# Patient Record
Sex: MALE | Race: WHITE | NOT HISPANIC OR LATINO | Employment: UNEMPLOYED | URBAN - METROPOLITAN AREA
[De-identification: names, ages, dates, MRNs, and addresses within clinical notes are randomized per-mention and may not be internally consistent; named-entity substitution may affect disease eponyms.]

---

## 2019-10-31 ENCOUNTER — HOSPITAL ENCOUNTER (INPATIENT)
Facility: HOSPITAL | Age: 54
LOS: 9 days | Discharge: OTHER INSTITUTION | DRG: 028 | End: 2019-11-09
Attending: INTERNAL MEDICINE | Admitting: INTERNAL MEDICINE
Payer: COMMERCIAL

## 2019-10-31 DIAGNOSIS — I33.0 ENDOCARDITIS DUE TO METHICILLIN SUSCEPTIBLE STAPHYLOCOCCUS AUREUS (MSSA): ICD-10-CM

## 2019-10-31 DIAGNOSIS — F19.10 IV DRUG ABUSE: ICD-10-CM

## 2019-10-31 DIAGNOSIS — F11.10 OPIATE ABUSE, CONTINUOUS: ICD-10-CM

## 2019-10-31 DIAGNOSIS — L02.214 GROIN ABSCESS: ICD-10-CM

## 2019-10-31 DIAGNOSIS — I05.9 ENDOCARDITIS OF MITRAL VALVE: ICD-10-CM

## 2019-10-31 DIAGNOSIS — L02.91 ABSCESS: ICD-10-CM

## 2019-10-31 DIAGNOSIS — B95.61 ENDOCARDITIS DUE TO METHICILLIN SUSCEPTIBLE STAPHYLOCOCCUS AUREUS (MSSA): ICD-10-CM

## 2019-10-31 DIAGNOSIS — R07.9 CHEST PAIN: ICD-10-CM

## 2019-10-31 DIAGNOSIS — R64 CACHEXIA: ICD-10-CM

## 2019-10-31 DIAGNOSIS — R78.81 MSSA BACTEREMIA: ICD-10-CM

## 2019-10-31 DIAGNOSIS — I33.0 ACUTE BACTERIAL ENDOCARDITIS: ICD-10-CM

## 2019-10-31 DIAGNOSIS — G06.2 EPIDURAL ABSCESS: ICD-10-CM

## 2019-10-31 DIAGNOSIS — I38 ENDOCARDITIS: ICD-10-CM

## 2019-10-31 DIAGNOSIS — B95.61 MSSA BACTEREMIA: ICD-10-CM

## 2019-10-31 PROBLEM — E43 SEVERE PROTEIN-CALORIE MALNUTRITION: Status: ACTIVE | Noted: 2019-10-31

## 2019-10-31 PROBLEM — B19.20 HCV (HEPATITIS C VIRUS): Status: ACTIVE | Noted: 2019-10-31

## 2019-10-31 LAB
ESTIMATED AVG GLUCOSE: 114 MG/DL (ref 68–131)
FERRITIN SERPL-MCNC: 759 NG/ML (ref 20–300)
HBA1C MFR BLD HPLC: 5.6 % (ref 4–5.6)
INR PPP: 1.1 (ref 0.8–1.2)
IRON SERPL-MCNC: 14 UG/DL (ref 45–160)
LACTATE SERPL-SCNC: 1.4 MMOL/L (ref 0.5–2.2)
PROCALCITONIN SERPL IA-MCNC: 0.36 NG/ML
PROTHROMBIN TIME: 11.3 SEC (ref 9–12.5)
SATURATED IRON: 6 % (ref 20–50)
TOTAL IRON BINDING CAPACITY: 229 UG/DL (ref 250–450)
TRANSFERRIN SERPL-MCNC: 155 MG/DL (ref 200–375)

## 2019-10-31 PROCEDURE — 99232 PR SUBSEQUENT HOSPITAL CARE,LEVL II: ICD-10-PCS | Mod: ,,, | Performed by: NEUROLOGICAL SURGERY

## 2019-10-31 PROCEDURE — 93010 ELECTROCARDIOGRAM REPORT: CPT | Mod: ,,, | Performed by: INTERNAL MEDICINE

## 2019-10-31 PROCEDURE — 83540 ASSAY OF IRON: CPT

## 2019-10-31 PROCEDURE — 85610 PROTHROMBIN TIME: CPT

## 2019-10-31 PROCEDURE — 85652 RBC SED RATE AUTOMATED: CPT

## 2019-10-31 PROCEDURE — 85025 COMPLETE CBC W/AUTO DIFF WBC: CPT

## 2019-10-31 PROCEDURE — 83036 HEMOGLOBIN GLYCOSYLATED A1C: CPT

## 2019-10-31 PROCEDURE — 80053 COMPREHEN METABOLIC PANEL: CPT

## 2019-10-31 PROCEDURE — 86141 C-REACTIVE PROTEIN HS: CPT

## 2019-10-31 PROCEDURE — 87077 CULTURE AEROBIC IDENTIFY: CPT

## 2019-10-31 PROCEDURE — 93005 ELECTROCARDIOGRAM TRACING: CPT

## 2019-10-31 PROCEDURE — 36415 COLL VENOUS BLD VENIPUNCTURE: CPT

## 2019-10-31 PROCEDURE — 87186 SC STD MICRODIL/AGAR DIL: CPT

## 2019-10-31 PROCEDURE — 99232 SBSQ HOSP IP/OBS MODERATE 35: CPT | Mod: ,,, | Performed by: NEUROLOGICAL SURGERY

## 2019-10-31 PROCEDURE — 11000001 HC ACUTE MED/SURG PRIVATE ROOM

## 2019-10-31 PROCEDURE — 63600175 PHARM REV CODE 636 W HCPCS: Performed by: STUDENT IN AN ORGANIZED HEALTH CARE EDUCATION/TRAINING PROGRAM

## 2019-10-31 PROCEDURE — 83605 ASSAY OF LACTIC ACID: CPT

## 2019-10-31 PROCEDURE — 82728 ASSAY OF FERRITIN: CPT

## 2019-10-31 PROCEDURE — 84145 PROCALCITONIN (PCT): CPT

## 2019-10-31 PROCEDURE — 93010 EKG 12-LEAD: ICD-10-PCS | Mod: ,,, | Performed by: INTERNAL MEDICINE

## 2019-10-31 PROCEDURE — 87040 BLOOD CULTURE FOR BACTERIA: CPT

## 2019-10-31 RX ORDER — ONDANSETRON 2 MG/ML
4 INJECTION INTRAMUSCULAR; INTRAVENOUS EVERY 8 HOURS PRN
Status: DISCONTINUED | OUTPATIENT
Start: 2019-10-31 | End: 2019-11-09 | Stop reason: HOSPADM

## 2019-10-31 RX ORDER — ACETAMINOPHEN 325 MG/1
650 TABLET ORAL EVERY 8 HOURS PRN
Status: DISCONTINUED | OUTPATIENT
Start: 2019-10-31 | End: 2019-11-07

## 2019-10-31 RX ORDER — IBUPROFEN 200 MG
24 TABLET ORAL
Status: DISCONTINUED | OUTPATIENT
Start: 2019-10-31 | End: 2019-11-09 | Stop reason: HOSPADM

## 2019-10-31 RX ORDER — IBUPROFEN 200 MG
16 TABLET ORAL
Status: DISCONTINUED | OUTPATIENT
Start: 2019-10-31 | End: 2019-11-09 | Stop reason: HOSPADM

## 2019-10-31 RX ORDER — POLYETHYLENE GLYCOL 3350 17 G/17G
17 POWDER, FOR SOLUTION ORAL DAILY
Status: DISCONTINUED | OUTPATIENT
Start: 2019-11-01 | End: 2019-11-09 | Stop reason: HOSPADM

## 2019-10-31 RX ORDER — RAMELTEON 8 MG/1
8 TABLET ORAL NIGHTLY PRN
Status: DISCONTINUED | OUTPATIENT
Start: 2019-10-31 | End: 2019-11-09 | Stop reason: HOSPADM

## 2019-10-31 RX ORDER — SODIUM CHLORIDE 0.9 % (FLUSH) 0.9 %
10 SYRINGE (ML) INJECTION
Status: DISCONTINUED | OUTPATIENT
Start: 2019-10-31 | End: 2019-11-09 | Stop reason: HOSPADM

## 2019-10-31 RX ORDER — GLUCAGON 1 MG
1 KIT INJECTION
Status: DISCONTINUED | OUTPATIENT
Start: 2019-10-31 | End: 2019-11-09 | Stop reason: HOSPADM

## 2019-10-31 RX ADMIN — VANCOMYCIN HYDROCHLORIDE 1500 MG: 1.5 INJECTION, POWDER, LYOPHILIZED, FOR SOLUTION INTRAVENOUS at 10:10

## 2019-10-31 RX ADMIN — CEFTRIAXONE 2 G: 2 INJECTION, SOLUTION INTRAVENOUS at 10:10

## 2019-10-31 RX ADMIN — SODIUM CHLORIDE, SODIUM LACTATE, POTASSIUM CHLORIDE, AND CALCIUM CHLORIDE 1000 ML: .6; .31; .03; .02 INJECTION, SOLUTION INTRAVENOUS at 10:10

## 2019-10-31 NOTE — PLAN OF CARE
(Physician in Lead of Transfers)  Outside Transfer Acceptance Note / Regional Referral Center    Upon patient arrival to floor, please call extension [phone] for Hospital Medicine admit team assignment and for additional admit orders for the patient.  Do not page the attending physician associated with the patient on arrival (this physician may not be on duty at the time of arrival).  Rather, always call [phone] to reach the triage physician for orders and team assignment.    Transferring Physician: Kris Artis MD    Accepting Physician: Cisco García MD    Date of Acceptance: 10/31/2019    Transferring Facility: [facility]    Reason for Transfer: HLOC, needs NSGY    Report from Transferring Physician/Hospital course:     Mr. Bronson is a 53-year-old man with IVDA (heroin) and Hx MSSA bacteremia with IE, and HCV who presented to the [facility] on 10/23 with fever.    He has had a difficult year, mostly as a result of infectious diseases related to ongoing heroin abuse. He was treated this year for 3-4 weeks for IE prior to leaving [place] but had a more recent admission on 9/21 for chest pain at which time TTE was performed, and he was discharged after blood cultures were negative ruling out ongoing IE. On admission he was hypotensive refractory to fluids and had bilateral cellulitis with abscesses that required bedside drainage by surgery, after which he was admitted to the MICU for septic shock. He has been treated with broad-spectrum antibiotics and levophed via a subclavian line with significant improvement. He was weaned off vasopressors within 24h and stepped down from the ICU six days ago. Since then his blood cultures have been positive for MSSA (first negative result on 10/25), echocardiogram has shown a MV vegetation, and his antibiotics have been narrowed to cefazolin 2g Q8H.     His admission has been complicated by diffuse body pains and joint pains initially thought to be hyperalgesia  in the setting of mild opiate withdrawal, eventually evolving into neck and back pain. MRI C-spine showed an epidural abscess at C4. NSGY there recommended cervical decompression, which they are unable to do that there as the hospital is not yet accredited for it per Dr. Artis. He also had an MRI L spine that showed SI joint effusion that was aspirated by IR with negative cultures. Ortho there did not recommend any additional intervention.    Dr. Malone is aware of the patient and agreed to consult. Dr. Artis stated that they would take the patient back upon completion of surgical evaluation/treatment    VS:     Temp: 98  HR: 98  RR: 20  SpO2: 100% on RA  BP: 115/75    Labs:     (most of these are from several days ago as they are not obtain labs every day)    WBC 11.2k  Hb 9.4 g/dL  Plt 177k    Bicarb 26  BUN 23  Cr 0.9      CRP 19    Blood cultures negative starting 10/25    Radiographs:     See above    To Do List:     - Obtain routine labs, CBC, CMP, INR, ESR and CRP. Would complete imaging of neuroaxis with brain and T-spine. Continue cefazolin 2g Q8H  - Consult wound care for groin wounds which have requiring packing  - Neuro checks Q4H  - NPO, consult NSGY upon arrival (please call them)  - Please call the [facility] upon completion of NSGY evaluation/treatment to arrange transfer back for the remainder of his IV antibiotic treatment    Upon patient arrival to floor, please call extension [phone] for Hospital Medicine admit team assignment and for additional admit orders for the patient.  Do not page the attending physician associated with the patient on arrival (this physician may not be on duty at the time of arrival).  Rather, always call [phone] to reach the triage physician for orders and team assignment.    Cisco García M.D.   (Physician in Lead of Transfers)  [facility]  [phone] (pager)

## 2019-11-01 PROBLEM — R76.8 HEPATITIS B ANTIBODY POSITIVE: Status: ACTIVE | Noted: 2019-11-01

## 2019-11-01 PROBLEM — R78.81 MSSA BACTEREMIA: Status: RESOLVED | Noted: 2019-11-01 | Resolved: 2019-11-01

## 2019-11-01 PROBLEM — R64 CACHEXIA: Status: ACTIVE | Noted: 2019-11-01

## 2019-11-01 PROBLEM — R78.81 MSSA BACTEREMIA: Status: ACTIVE | Noted: 2019-11-01

## 2019-11-01 PROBLEM — B95.61 MSSA BACTEREMIA: Status: RESOLVED | Noted: 2019-11-01 | Resolved: 2019-11-01

## 2019-11-01 PROBLEM — B95.61 MSSA BACTEREMIA: Status: ACTIVE | Noted: 2019-11-01

## 2019-11-01 PROBLEM — R65.21 SEPTIC SHOCK: Status: ACTIVE | Noted: 2019-11-01

## 2019-11-01 PROBLEM — A41.9 SEPTIC SHOCK: Status: ACTIVE | Noted: 2019-11-01

## 2019-11-01 PROBLEM — R65.21 SEPTIC SHOCK: Status: RESOLVED | Noted: 2019-11-01 | Resolved: 2019-11-01

## 2019-11-01 PROBLEM — A41.9 SEPTIC SHOCK: Status: RESOLVED | Noted: 2019-11-01 | Resolved: 2019-11-01

## 2019-11-01 LAB
ALBUMIN SERPL BCP-MCNC: 2 G/DL (ref 3.5–5.2)
ALP SERPL-CCNC: 104 U/L (ref 55–135)
ALT SERPL W/O P-5'-P-CCNC: 28 U/L (ref 10–44)
AMPHET+METHAMPHET UR QL: NEGATIVE
ANION GAP SERPL CALC-SCNC: 8 MMOL/L (ref 8–16)
ANION GAP SERPL CALC-SCNC: 8 MMOL/L (ref 8–16)
AST SERPL-CCNC: 105 U/L (ref 10–40)
BACTERIA #/AREA URNS AUTO: ABNORMAL /HPF
BARBITURATES UR QL SCN>200 NG/ML: NEGATIVE
BASOPHILS # BLD AUTO: 0.04 K/UL (ref 0–0.2)
BASOPHILS NFR BLD: 0.3 % (ref 0–1.9)
BENZODIAZ UR QL SCN>200 NG/ML: NEGATIVE
BILIRUB SERPL-MCNC: 0.3 MG/DL (ref 0.1–1)
BILIRUB UR QL STRIP: NEGATIVE
BUN SERPL-MCNC: 23 MG/DL (ref 6–20)
BUN SERPL-MCNC: 23 MG/DL (ref 6–20)
BZE UR QL SCN: NEGATIVE
CALCIUM SERPL-MCNC: 8.4 MG/DL (ref 8.7–10.5)
CALCIUM SERPL-MCNC: 8.4 MG/DL (ref 8.7–10.5)
CANNABINOIDS UR QL SCN: NEGATIVE
CHLORIDE SERPL-SCNC: 103 MMOL/L (ref 95–110)
CHLORIDE SERPL-SCNC: 103 MMOL/L (ref 95–110)
CLARITY UR REFRACT.AUTO: CLEAR
CO2 SERPL-SCNC: 25 MMOL/L (ref 23–29)
CO2 SERPL-SCNC: 25 MMOL/L (ref 23–29)
COLOR UR AUTO: YELLOW
CREAT SERPL-MCNC: 0.8 MG/DL (ref 0.5–1.4)
CREAT SERPL-MCNC: 0.9 MG/DL (ref 0.5–1.4)
CREAT SERPL-MCNC: 0.9 MG/DL (ref 0.5–1.4)
CREAT UR-MCNC: 22 MG/DL (ref 23–375)
CRP SERPL-MCNC: 105.72 MG/L (ref 0–3.19)
DIFFERENTIAL METHOD: ABNORMAL
EOSINOPHIL # BLD AUTO: 0.1 K/UL (ref 0–0.5)
EOSINOPHIL NFR BLD: 1 % (ref 0–8)
ERYTHROCYTE [DISTWIDTH] IN BLOOD BY AUTOMATED COUNT: 17.2 % (ref 11.5–14.5)
ERYTHROCYTE [DISTWIDTH] IN BLOOD BY AUTOMATED COUNT: 17.2 % (ref 11.5–14.5)
ERYTHROCYTE [SEDIMENTATION RATE] IN BLOOD BY WESTERGREN METHOD: 103 MM/HR (ref 0–23)
EST. GFR  (AFRICAN AMERICAN): >60 ML/MIN/1.73 M^2
EST. GFR  (AFRICAN AMERICAN): >60 ML/MIN/1.73 M^2
EST. GFR  (NON AFRICAN AMERICAN): >60 ML/MIN/1.73 M^2
EST. GFR  (NON AFRICAN AMERICAN): >60 ML/MIN/1.73 M^2
ETHANOL UR-MCNC: <10 MG/DL
GLUCOSE SERPL-MCNC: 122 MG/DL (ref 70–110)
GLUCOSE SERPL-MCNC: 122 MG/DL (ref 70–110)
GLUCOSE UR QL STRIP: NEGATIVE
HCT VFR BLD AUTO: 25 % (ref 40–54)
HCT VFR BLD AUTO: 25 % (ref 40–54)
HGB BLD-MCNC: 7.4 G/DL (ref 14–18)
HGB BLD-MCNC: 7.4 G/DL (ref 14–18)
HGB UR QL STRIP: ABNORMAL
IMM GRANULOCYTES # BLD AUTO: 0.11 K/UL (ref 0–0.04)
IMM GRANULOCYTES NFR BLD AUTO: 0.9 % (ref 0–0.5)
KETONES UR QL STRIP: NEGATIVE
LEUKOCYTE ESTERASE UR QL STRIP: ABNORMAL
LYMPHOCYTES # BLD AUTO: 1.5 K/UL (ref 1–4.8)
LYMPHOCYTES NFR BLD: 11.8 % (ref 18–48)
MCH RBC QN AUTO: 25.7 PG (ref 27–31)
MCH RBC QN AUTO: 25.7 PG (ref 27–31)
MCHC RBC AUTO-ENTMCNC: 29.6 G/DL (ref 32–36)
MCHC RBC AUTO-ENTMCNC: 29.6 G/DL (ref 32–36)
MCV RBC AUTO: 87 FL (ref 82–98)
MCV RBC AUTO: 87 FL (ref 82–98)
METHADONE UR QL SCN>300 NG/ML: NEGATIVE
MICROSCOPIC COMMENT: ABNORMAL
MONOCYTES # BLD AUTO: 0.5 K/UL (ref 0.3–1)
MONOCYTES NFR BLD: 3.9 % (ref 4–15)
NEUTROPHILS # BLD AUTO: 10.1 K/UL (ref 1.8–7.7)
NEUTROPHILS NFR BLD: 82.1 % (ref 38–73)
NITRITE UR QL STRIP: NEGATIVE
NRBC BLD-RTO: 0 /100 WBC
OPIATES UR QL SCN: NEGATIVE
PCP UR QL SCN>25 NG/ML: NEGATIVE
PH UR STRIP: 6 [PH] (ref 5–8)
PLATELET # BLD AUTO: 296 K/UL (ref 150–350)
PLATELET # BLD AUTO: 296 K/UL (ref 150–350)
PMV BLD AUTO: 10 FL (ref 9.2–12.9)
PMV BLD AUTO: 10 FL (ref 9.2–12.9)
POTASSIUM SERPL-SCNC: 4 MMOL/L (ref 3.5–5.1)
POTASSIUM SERPL-SCNC: 4 MMOL/L (ref 3.5–5.1)
PROT SERPL-MCNC: 7.2 G/DL (ref 6–8.4)
PROT UR QL STRIP: NEGATIVE
RBC # BLD AUTO: 2.88 M/UL (ref 4.6–6.2)
RBC # BLD AUTO: 2.88 M/UL (ref 4.6–6.2)
RBC #/AREA URNS AUTO: 32 /HPF (ref 0–4)
SAMPLE: NORMAL
SODIUM SERPL-SCNC: 136 MMOL/L (ref 136–145)
SODIUM SERPL-SCNC: 136 MMOL/L (ref 136–145)
SP GR UR STRIP: 1 (ref 1–1.03)
SQUAMOUS #/AREA URNS AUTO: 0 /HPF
TOXICOLOGY INFORMATION: ABNORMAL
URN SPEC COLLECT METH UR: ABNORMAL
WBC # BLD AUTO: 12.34 K/UL (ref 3.9–12.7)
WBC # BLD AUTO: 12.34 K/UL (ref 3.9–12.7)
WBC #/AREA URNS AUTO: 7 /HPF (ref 0–5)

## 2019-11-01 PROCEDURE — 63600175 PHARM REV CODE 636 W HCPCS: Performed by: PHYSICIAN ASSISTANT

## 2019-11-01 PROCEDURE — 25500020 PHARM REV CODE 255: Performed by: HOSPITALIST

## 2019-11-01 PROCEDURE — 63600175 PHARM REV CODE 636 W HCPCS: Performed by: HOSPITALIST

## 2019-11-01 PROCEDURE — 25000003 PHARM REV CODE 250: Performed by: STUDENT IN AN ORGANIZED HEALTH CARE EDUCATION/TRAINING PROGRAM

## 2019-11-01 PROCEDURE — 80307 DRUG TEST PRSMV CHEM ANLYZR: CPT

## 2019-11-01 PROCEDURE — 97162 PT EVAL MOD COMPLEX 30 MIN: CPT

## 2019-11-01 PROCEDURE — 99233 PR SUBSEQUENT HOSPITAL CARE,LEVL III: ICD-10-PCS | Mod: AF,HB,, | Performed by: PSYCHIATRY & NEUROLOGY

## 2019-11-01 PROCEDURE — 97802 MEDICAL NUTRITION INDIV IN: CPT

## 2019-11-01 PROCEDURE — 81001 URINALYSIS AUTO W/SCOPE: CPT

## 2019-11-01 PROCEDURE — 11000001 HC ACUTE MED/SURG PRIVATE ROOM

## 2019-11-01 PROCEDURE — 63600175 PHARM REV CODE 636 W HCPCS: Performed by: STUDENT IN AN ORGANIZED HEALTH CARE EDUCATION/TRAINING PROGRAM

## 2019-11-01 PROCEDURE — A9585 GADOBUTROL INJECTION: HCPCS | Performed by: HOSPITALIST

## 2019-11-01 PROCEDURE — 99233 PR SUBSEQUENT HOSPITAL CARE,LEVL III: ICD-10-PCS | Mod: ,,, | Performed by: INTERNAL MEDICINE

## 2019-11-01 PROCEDURE — 99233 SBSQ HOSP IP/OBS HIGH 50: CPT | Mod: ,,, | Performed by: INTERNAL MEDICINE

## 2019-11-01 PROCEDURE — 97165 OT EVAL LOW COMPLEX 30 MIN: CPT

## 2019-11-01 PROCEDURE — 99233 SBSQ HOSP IP/OBS HIGH 50: CPT | Mod: AF,HB,, | Performed by: PSYCHIATRY & NEUROLOGY

## 2019-11-01 RX ORDER — VELPATASVIR AND SOFOSBUVIR 100; 400 MG/1; MG/1
TABLET, FILM COATED ORAL
Status: ON HOLD | COMMUNITY
End: 2019-11-04

## 2019-11-01 RX ORDER — GADOBUTROL 604.72 MG/ML
6 INJECTION INTRAVENOUS
Status: COMPLETED | OUTPATIENT
Start: 2019-11-01 | End: 2019-11-01

## 2019-11-01 RX ORDER — MIDAZOLAM HYDROCHLORIDE 1 MG/ML
1 INJECTION INTRAMUSCULAR; INTRAVENOUS EVERY 10 MIN PRN
Status: DISCONTINUED | OUTPATIENT
Start: 2019-11-01 | End: 2019-11-07

## 2019-11-01 RX ADMIN — CEFAZOLIN 6 G: 10 INJECTION, POWDER, FOR SOLUTION INTRAVENOUS at 08:11

## 2019-11-01 RX ADMIN — CEFTRIAXONE 2 G: 2 INJECTION, SOLUTION INTRAVENOUS at 09:11

## 2019-11-01 RX ADMIN — VANCOMYCIN HYDROCHLORIDE 750 MG: 750 INJECTION, POWDER, LYOPHILIZED, FOR SOLUTION INTRAVENOUS at 09:11

## 2019-11-01 RX ADMIN — POLYETHYLENE GLYCOL 3350 17 G: 17 POWDER, FOR SOLUTION ORAL at 09:11

## 2019-11-01 RX ADMIN — GADOBUTROL 6 ML: 604.72 INJECTION INTRAVENOUS at 12:11

## 2019-11-01 NOTE — ASSESSMENT & PLAN NOTE
Patient with albumin of   Lab Results   Component Value Date    LABPROT 11.3 10/31/2019        Plan:  -Nutrition consult, will f/u recs   -Boost with every meal  -Continue to monitor albumin  -time spent counseling patient on increasing PO intake, modalities to help increase protein intake etc.

## 2019-11-01 NOTE — PROGRESS NOTES
[facility] Hospital Medicine  Progress Note    Patient Name: Ld Bronson  MRN: [id number]  Patient Class: IP- Inpatient   Admission Date: 10/31/2019  Length of Stay: 1 days  Attending Physician: Zainab Casillas MD  Primary Care Provider: Kris Artis Jr, MD    [facility] Medicine Team: [facility]OSP MED 4 Rakan Machado MD    Subjective:     Principal Problem:Epidural abscess        HPI:  Mr. Bronson is a 53-year-old man with IVDA (heroin) and Hx MSSA bacteremia with IE, and HCV who presented to the [facility] on 10/23 with fever.     He has had a difficult year, mostly as a result of infectious diseases related to ongoing heroin abuse. He was treated this year for 3-4 weeks for IE prior to leaving [place] but had a more recent admission on 9/21 for chest pain at which time TTE was performed, and he was discharged after blood cultures were negative ruling out ongoing IE. On admission he was hypotensive refractory to fluids and had bilateral cellulitis with abscesses that required bedside drainage by surgery, after which he was admitted to the MICU for septic shock. He has been treated with broad-spectrum antibiotics and levophed via a subclavian line with significant improvement. He was weaned off vasopressors within 24h and stepped down from the ICU six days ago. Since then his blood cultures have been positive for MSSA (first negative result on 10/25), echocardiogram has shown a MV vegetation, and his antibiotics have been narrowed to cefazolin 2g Q8H.      His admission has been complicated by diffuse body pains and joint pains initially thought to be hyperalgesia in the setting of mild opiate withdrawal, eventually evolving into neck and back pain. MRI C-spine showed an epidural abscess at C4. NSGY there recommended cervical decompression, which they are unable to do that there as the hospital is not yet accredited for it per Dr. Artis. He also had an MRI L spine that showed SI joint  effusion that was aspirated by IR with negative cultures. Ortho there did not recommend any additional intervention. Dr. Malone is aware of the patient and agreed to consult. Dr. Artis stated that they would take the patient back upon completion of surgical evaluation/treatment.     Upon presenting to the floor, patient was in NAD and resting comfortably in hospital bed. Patient reports slight lumbar back pain decreased appetite. Patient denies any current N/V/Chills. Patient does state that he has extensive groin abscesses that are quite painful. Patient reports that [facility]. Patient has no further complaints at this time. [facility] was contacted and report they will see him tonight however will likely not be going to OR tonight due to active case load tonight.     Overview/Hospital Course:  11/1- Patient doing well overnight with no new complaints; neurosurgery, ID, addiction psych, and nutrition consulted, awaiting neurosurgery recommendation on treatment plan for abscess; HOLLAND ordered    Interval History: Patient doing well overnight with no new complaints; neurosurgery, ID, addiction psych, and nutrition consulted, awaiting neurosurgery recommendation on treatment plan for abscess; HOLLAND ordered      Review of Systems   Constitutional: Positive for activity change. Negative for chills and fever.   HENT: Positive for dental problem. Negative for congestion and sore throat.    Respiratory: Negative for cough, shortness of breath and wheezing.    Cardiovascular: Negative for chest pain.   Gastrointestinal: Negative for abdominal distention, abdominal pain, constipation, diarrhea, nausea and vomiting.   Endocrine: Negative.    Genitourinary: Negative for decreased urine volume and dysuria.   Musculoskeletal: Positive for back pain (lumbar).   Skin: Positive for wound (groin wounds s/p I&D).   Neurological: Positive for weakness (b/l LE limited 2/2 to groin pain).  Negative for light-headedness and headaches.     Objective:     Vital Signs (Most Recent):  Temp: 97.9 °F (36.6 °C) (11/01/19 1150)  Pulse: 93 (11/01/19 1150)  Resp: 18 (11/01/19 1150)  BP: 114/70 (11/01/19 1150)  SpO2: 98 % (11/01/19 1150) Vital Signs (24h Range):  Temp:  [97.4 °F (36.3 °C)-98.9 °F (37.2 °C)] 97.9 °F (36.6 °C)  Pulse:  [] 93  Resp:  [16-20] 18  SpO2:  [96 %-98 %] 98 %  BP: ()/(57-71) 114/70     Weight: 58.2 kg (128 lb 4.8 oz)  Body mass index is 17.4 kg/m².    Physical Exam   Constitutional: He is oriented to person, place, and time. No distress.   Frail, thin and cachectic, NAD, resting comfortably in hospital bed   HENT:   Head: Normocephalic and atraumatic.   Eyes: EOM are normal. No scleral icterus.   Neck: Normal range of motion. Neck supple.   Cardiovascular: Normal rate and regular rhythm.   No murmur heard.  Pulmonary/Chest: Effort normal and breath sounds normal. No respiratory distress.   Abdominal: Soft. Bowel sounds are normal. He exhibits no distension. There is no tenderness.   Musculoskeletal: Normal range of motion. He exhibits no edema.   Neurological: He is alert and oriented to person, place, and time. No cranial nerve deficit.   LUE motor 4/5  B/l LE hip flexion 2/5, reports limited by pain  B/l dorsal and plantar flexion 5/5   Skin: Skin is warm. He is not diaphoretic. No erythema.   Groin wounds s/p I&D at outside facility, packed w/ clean gauze          CRANIAL NERVES     CN III, IV, VI   Extraocular motions are normal.        Significant Labs: All pertinent labs within the past 24 hours have been reviewed.    Significant Imaging: I have reviewed and interpreted all pertinent imaging results/findings within the past 24 hours.      Assessment/Plan:      * Epidural abscess  Patient with IVDA, MSSA bacteremia w/ endocarditis (MV veg) and epidural abscess at C4 transferred [facility] NSG evaluation. [facility] to [facility] however they do not have NSG credentialing  so patient was transferred here.     Plan:  -Vanc/Ceftriaxone  -neuro surg consulted awaiting plan  -holding DVT Prophy   -NPO at midnight         Hepatitis B antibody positive  History of positive Hep B antibody test      Cachexia  Patient chronically cachectic  -nutrition consulted       Groin abscess  Patient with multiple groin abscess that was I&D at bedside last week. Covering with bs ABx at this time. Wounds packed.     Plan:  -BS Abx  -Wound care consult   -Obtain records of cultures       Severe protein-calorie malnutrition  Patient with albumin of   Lab Results   Component Value Date    LABPROT 11.3 10/31/2019        Plan:  -Nutrition consult, will f/u recs   -Boost with every meal  -Continue to monitor albumin  -time spent counseling patient on increasing PO intake, modalities to help increase protein intake etc.        HCV (hepatitis C virus)  Hx of hep C unsure of treatment     Plan:  -HCV panel     Opiate abuse, continuous  See above       Endocarditis due to methicillin susceptible Staphylococcus aureus (MSSA)  Patient with recent history of MSSA bacteremia and endocarditis who presents as a transfer for concern of epidural abscess of C4. Patient was treated for IE for 3-4 weeks and left [facility] earlier this month and [facility], BS abx and pressors. Patient was stepped down from ICU and has remained on BS abx on [facility] of the [facility] for the past 6 days.   -MSSA (first negative result on 10/25), echocardiogram has shown a MV vegetation, and his antibiotics have been narrowed to cefazolin 2g Q8H.    Plan:  -Given Epidural abscess and concomitant MSSA endocarditis will treat with Vanc and Ceftriaxone   -obtain records from [facility]  -2D echo  -Repeat cultures- negative thus far      IV drug abuse  Chronic IV Heroin use, recently left AMA From outside facility earlier this month with continued heroin use. Reports last IVDU > days prior to [facility] for [facility].       Plan:  -Addiction psych consult in AM  -patient not currently on MAT         VTE Risk Mitigation (From admission, onward)         Ordered     Place sequential compression device  Until discontinued      10/31/19 1851     IP VTE LOW RISK PATIENT  Once      10/31/19 1851                      Rakan Machado MD  Department of Hospital Medicine   [facility]

## 2019-11-01 NOTE — SUBJECTIVE & OBJECTIVE
History reviewed. No pertinent past medical history.    Past Surgical History:   Procedure Laterality Date    FRACTURE SURGERY         Review of patient's allergies indicates:  No Known Allergies    Medications:  Medications Prior to Admission   Medication Sig    sofosbuvir-velpatasvir 400-100 mg Tab Patient says he does not take any home medications     Antibiotics (From admission, onward)    Start     Stop Route Frequency Ordered    11/01/19 1030  vancomycin 750 mg in dextrose 5 % 250 mL IVPB (ready to mix system)      -- IV Every 12 hours (non-standard times) 11/01/19 0345    10/31/19 2115  cefTRIAXone (ROCEPHIN) 2 g in dextrose 5 % 50 mL IVPB      -- IV Every 12 hours (non-standard times) 10/31/19 2015        Antifungals (From admission, onward)    None        Antivirals (From admission, onward)    None             There is no immunization history on file for this patient.    Family History     None        Social History     Socioeconomic History    Marital status: Single     Spouse name: Not on file    Number of children: Not on file    Years of education: Not on file    Highest education level: Not on file   Occupational History    Not on file   Social Needs    Financial resource strain: Not on file    Food insecurity:     Worry: Not on file     Inability: Not on file    Transportation needs:     Medical: Not on file     Non-medical: Not on file   Tobacco Use    Smoking status: Current Some Day Smoker     Types: Cigarettes    Smokeless tobacco: Former User   Substance and Sexual Activity    Alcohol use: Not on file    Drug use: Yes     Types: Heroin    Sexual activity: Not Currently   Lifestyle    Physical activity:     Days per week: Not on file     Minutes per session: Not on file    Stress: Not on file   Relationships    Social connections:     Talks on phone: Not on file     Gets together: Not on file     Attends Latter day service: Not on file     Active member of club or organization:  Not on file     Attends meetings of clubs or organizations: Not on file     Relationship status: Not on file   Other Topics Concern    Not on file   Social History Narrative    Not on file     Review of Systems   Constitutional: Negative for chills, diaphoresis, fatigue and fever.   Respiratory: Negative for cough and shortness of breath.    Cardiovascular: Negative for chest pain.   Gastrointestinal: Negative for abdominal pain, diarrhea, nausea and vomiting.   Genitourinary: Negative for dysuria.        +bilateral groin wounds   Musculoskeletal: Positive for back pain.   Skin: Positive for wound. Negative for color change, pallor and rash.   Neurological: Negative for dizziness and headaches.   All other systems reviewed and are negative.    Objective:     Vital Signs (Most Recent):  Temp: 97.9 °F (36.6 °C) (11/01/19 1150)  Pulse: 93 (11/01/19 1150)  Resp: 18 (11/01/19 1150)  BP: 114/70 (11/01/19 1150)  SpO2: 98 % (11/01/19 1150) Vital Signs (24h Range):  Temp:  [97.4 °F (36.3 °C)-98.9 °F (37.2 °C)] 97.9 °F (36.6 °C)  Pulse:  [] 93  Resp:  [16-20] 18  SpO2:  [96 %-98 %] 98 %  BP: ()/(57-71) 114/70     Weight: 58.2 kg (128 lb 4.8 oz)  Body mass index is 17.4 kg/m².    Estimated Creatinine Clearance: 76.3 mL/min (based on SCr of 0.9 mg/dL).    Physical Exam   Constitutional: He is oriented to person, place, and time. No distress.   thin   HENT:   Head: Normocephalic.   Eyes: Pupils are equal, round, and reactive to light.   Cardiovascular: Normal rate and regular rhythm.   Murmur heard.  Pulmonary/Chest: Effort normal. No stridor. No respiratory distress.   Abdominal: Soft. He exhibits no distension. There is no tenderness. There is no guarding.   Musculoskeletal: Normal range of motion. He exhibits tenderness. He exhibits no edema or deformity.   Bilateral groin wounds.  Left groin packed   Neurological: He is alert and oriented to person, place, and time.   Skin: Skin is warm and dry. He is not  diaphoretic.   Psychiatric: He has a normal mood and affect.   Vitals reviewed.      Significant Labs: All pertinent labs within the past 24 hours have been reviewed.    Significant Imaging: I have reviewed all pertinent imaging results/findings within the past 24 hours.

## 2019-11-01 NOTE — HPI
Mr. Bronson is a 53-year-old man with IVDA (heroin) and Hx MSSA bacteremia with IE, and HCV who presented to the [facility] on 10/23 with fever.     He has had a difficult year, mostly as a result of infectious diseases related to ongoing heroin abuse. He was treated this year for 3-4 weeks for IE prior to leaving [place] but had a more recent admission on 9/21 for chest pain at which time TTE was performed, and he was discharged after blood cultures were negative ruling out ongoing IE. On admission he was hypotensive refractory to fluids and had bilateral cellulitis with abscesses that required bedside drainage by surgery, after which he was admitted to the MICU for septic shock. He has been treated with broad-spectrum antibiotics and levophed via a subclavian line with significant improvement. He was weaned off vasopressors within 24h and stepped down from the ICU six days ago. Since then his blood cultures have been positive for MSSA (first negative result on 10/25), echocardiogram has shown a MV vegetation, and his antibiotics have been narrowed to cefazolin 2g Q8H.      His admission has been complicated by diffuse body pains and joint pains initially thought to be hyperalgesia in the setting of mild opiate withdrawal, eventually evolving into neck and back pain. MRI C-spine showed an epidural abscess at C4. NSGY there recommended cervical decompression, which they are unable to do that there as the hospital is not yet accredited for it per Dr. Artis. He also had an MRI L spine that showed SI joint effusion that was aspirated by IR with negative cultures. Ortho there did not recommend any additional intervention. Dr. Malone is aware of the patient and agreed to consult. Dr. Artis stated that they would take the patient back upon completion of surgical evaluation/treatment.     Upon presenting to the floor, patient was in NAD and resting comfortably in hospital bed. Patient reports slight lumbar back pain  decreased appetite. Patient denies any current N/V/Chills. Patient does state that he has extensive groin abscesses that are quite painful. Patient reports that these have [facility] with [facility]. Patient has no further complaints at this time. [facility] was contacted and report they will see him tonight however will likely not be going to OR tonight due to active case load tonight.

## 2019-11-01 NOTE — ASSESSMENT & PLAN NOTE
53 M with PMH of IVDU (heroin), MSSA bacteremia with septic shock and endocarditis, and HCV now with a cervical epidural abscess.    Neurologically stable  No immediate neurosurgical intervention required  Recommend consult ID  ESR, CRP, Procalcitonin, BCx x2  MRI from OSH of extremely low quality, will obtain another MRI of the C spine wwo  Further recommendations to follow

## 2019-11-01 NOTE — ASSESSMENT & PLAN NOTE
Patient with IVDA, MSSA bacteremia w/ endocarditis (MV veg) and epidural abscess at C4 transferred for NSG evaluation. [facility] to decompress however they do not have NSG credentialing so patient was transferred here.     Plan:  -Vanc/Ceftriaxone  -neuro surg consulted awaiting plan  -holding DVT Prophy   -NPO at midnight

## 2019-11-01 NOTE — ASSESSMENT & PLAN NOTE
Patient with multiple groin abscess that was I&D at bedside last week. Covering with bs ABx at this time. Wounds packed.     Plan:  -BS Abx  -Wound care consult   -Obtain records of cultures

## 2019-11-01 NOTE — CONSULTS
[facility]  Neurosurgery  Consult Note    Inpatient consult to Neurosurgery  Consult performed by: Óscar Orozco MD  Consult ordered by: Ely Kellogg MD        Subjective:     Chief Complaint/Reason for Admission: Cervical epidural abscess    History of Present Illness: 53 M with PMH of IVDU (heroin), MSSA bacteremia with septic shock and endocarditis, and HCV. Was treated at OSH for septic shock, after complaining of back pain was found to have a cervical spine epidural abscess and transferred to [facility].     No medications prior to admission.       Review of patient's allergies indicates:  No Known Allergies    No past medical history on file.  No past surgical history on file.  Family History     None        Tobacco Use    Smoking status: Not on file   Substance and Sexual Activity    Alcohol use: Not on file    Drug use: Not on file    Sexual activity: Not on file     Review of Systems     Objective:     Weight: 57 kg (125 lb 9.6 oz)  Body mass index is 17.03 kg/m².  Vital Signs (Most Recent):  Temp: 98.7 °F (37.1 °C) (11/01/19 0317)  Pulse: 95 (11/01/19 0317)  Resp: 18 (11/01/19 0317)  BP: 103/66 (11/01/19 0317)  SpO2: 98 % (11/01/19 0317) Vital Signs (24h Range):  Temp:  [97.4 °F (36.3 °C)-98.9 °F (37.2 °C)] 98.7 °F (37.1 °C)  Pulse:  [] 95  Resp:  [16-20] 18  SpO2:  [96 %-98 %] 98 %  BP: ()/(57-75) 103/66                          Neurosurgery Physical Exam  GCS15 AOx3  CN2-12 intact  RUE: 5/5      LUE: 5/5  RLE: 4/5 HF 5/5 QD 5/5 DF 5/5 EHL 5/5 PF LLE: 4/5 HF 5/5 QD 5/5 DF 5/5 EHL 5/5 PF  No hoffmans, no babinski, no clonus  SILT    Significant Labs:  Recent Labs   Lab 10/31/19  2320   *  122*     136   K 4.0  4.0     103   CO2 25  25   BUN 23*  23*   CREATININE 0.9  0.9   CALCIUM 8.4*  8.4*     Recent Labs   Lab 10/31/19  2320   WBC 12.34  12.34   HGB 7.4*  7.4*   HCT 25.0*  25.0*     296     Recent Labs   Lab 10/31/19  1917    INR 1.1     Microbiology Results (last 7 days)     Procedure Component Value Units Date/Time    Blood culture (site 1) [[id number]] Collected:  10/31/19 1917    Order Status:  Completed Specimen:  Blood Updated:  11/01/19 0315     Blood Culture, Routine No Growth to date    Narrative:       Site # 1, aerobic and anaerobic    Blood culture (site 2) [[id number]] Collected:  10/31/19 1922    Order Status:  Completed Specimen:  Blood Updated:  11/01/19 0315     Blood Culture, Routine No Growth to date    Narrative:       Site # 2, aerobic only    Blood culture [[id number]]     Order Status:  Sent Specimen:  Blood     Blood culture [[id number]]     Order Status:  Sent Specimen:  Blood         All pertinent labs from the last 24 hours have been reviewed.    Significant Diagnostics:  I have reviewed all pertinent imaging results/findings within the past 24 hours.    Assessment/Plan:     * Epidural abscess  53 M with PMH of IVDU (heroin), MSSA bacteremia with septic shock and endocarditis, and HCV now with a cervical epidural abscess.    Neurologically stable  No immediate neurosurgical intervention required  Recommend consult ID  ESR, CRP, Procalcitonin, BCx x2  MRI from OSH of extremely low quality, will obtain another MRI of the C spine wwo  Further recommendations to follow          Thank you for your consult. I will follow-up with patient. Please contact us if you have any additional questions.    Óscar Orozco MD  Neurosurgery  [facility]

## 2019-11-01 NOTE — ASSESSMENT & PLAN NOTE
Patient with recent history of MSSA bacteremia and endocarditis who presents as a transfer for concern of epidural abscess of C4. Patient was treated for IE for 3-4 weeks and left AMA earlier this month and [facility], BS abx and pressors. Patient was stepped down from ICU and has remained on [facility] abx [facility] floor of the [facility] for the past 6 days.   -MSSA (first negative result on 10/25), echocardiogram has shown a MV vegetation, and his antibiotics have been narrowed to cefazolin 2g Q8H.    Plan:  -Given Epidural abscess and concomitant MSSA endocarditis will treat with Vanc and Ceftriaxone   -[facility]tain records from [facility]  -2D echo  -Repeat cultures

## 2019-11-01 NOTE — ASSESSMENT & PLAN NOTE
Mr. Bronson is a 52 y/o male  with hx of IVDU (heroin), MSSA endocarditis, biltateral groin abscesses s/p I&D [facility]. He was on cefazolin and is now on vancomycin and ceftriaxone. We are consulted for abx recommendations.    No fever chills or night sweats here. His blood cultures here are NGTD. NSGY evaluation pending.       Recommendations:   1. Discontinue vancomycin and ceftriaxone, started cefazolin as prior MSSA infx  2. NSGY evaluation pending  3. Agree with HOLLAND   4. Anticipate long term IV abx therapy  5. [facility]  6. ID will follow with you pending above

## 2019-11-01 NOTE — SUBJECTIVE & OBJECTIVE
Interval History: Patient doing well overnight with no new complaints; neurosurgery, ID, addiction psych, and nutrition consulted, awaiting neurosurgery recommendation on treatment plan for abscess; HOLLAND ordered      Review of Systems   Constitutional: Positive for activity change. Negative for chills and fever.   HENT: Positive for dental problem. Negative for congestion and sore throat.    Respiratory: Negative for cough, shortness of breath and wheezing.    Cardiovascular: Negative for chest pain.   Gastrointestinal: Negative for abdominal distention, abdominal pain, constipation, diarrhea, nausea and vomiting.   Endocrine: Negative.    Genitourinary: Negative for decreased urine volume and dysuria.   Musculoskeletal: Positive for back pain (lumbar).   Skin: Positive for wound (groin wounds s/p I&D).   Neurological: Positive for weakness (b/l LE limited 2/2 to groin pain). Negative for light-headedness and headaches.     Objective:     Vital Signs (Most Recent):  Temp: 97.9 °F (36.6 °C) (11/01/19 1150)  Pulse: 93 (11/01/19 1150)  Resp: 18 (11/01/19 1150)  BP: 114/70 (11/01/19 1150)  SpO2: 98 % (11/01/19 1150) Vital Signs (24h Range):  Temp:  [97.4 °F (36.3 °C)-98.9 °F (37.2 °C)] 97.9 °F (36.6 °C)  Pulse:  [] 93  Resp:  [16-20] 18  SpO2:  [96 %-98 %] 98 %  BP: ()/(57-71) 114/70     Weight: 58.2 kg (128 lb 4.8 oz)  Body mass index is 17.4 kg/m².    Physical Exam   Constitutional: He is oriented to person, place, and time. No distress.   Frail, thin and cachectic, NAD, resting comfortably in hospital bed   HENT:   Head: Normocephalic and atraumatic.   Eyes: EOM are normal. No scleral icterus.   Neck: Normal range of motion. Neck supple.   Cardiovascular: Normal rate and regular rhythm.   No murmur heard.  Pulmonary/Chest: Effort normal and breath sounds normal. No respiratory distress.   Abdominal: Soft. Bowel sounds are normal. He exhibits no distension. There is no tenderness.   Musculoskeletal: Normal  range of motion. He exhibits no edema.   Neurological: He is alert and oriented to person, place, and time. No cranial nerve deficit.   LUE motor 4/5  B/l LE hip flexion 2/5, reports limited by pain  B/l dorsal and plantar flexion 5/5   Skin: Skin is warm. He is not diaphoretic. No erythema.   Groin wounds s/p I&D at outside facility, packed w/ clean gauze          CRANIAL NERVES     CN III, IV, VI   Extraocular motions are normal.        Significant Labs: All pertinent labs within the past 24 hours have been reviewed.    Significant Imaging: I have reviewed and interpreted all pertinent imaging results/findings within the past 24 hours.

## 2019-11-01 NOTE — ASSESSMENT & PLAN NOTE
Patient with IVDA, MSSA bacteremia w/ endocarditis (MV veg) and epidural abscess at C4 transferred [facility] NSG evaluation. [facility] however they do not have NSG credentialing so patient was transferred here.     Plan:  -Vanc/Ceftriaxone  -neuro surg consulted and will see patient tonight  -holding DVT Prophy   -NPO at midnight

## 2019-11-01 NOTE — PLAN OF CARE
11/01/19 1312   Post-Acute Status   Post-Acute Authorization Other   Other Status No Post-Acute Service Needs

## 2019-11-01 NOTE — CARE UPDATE
Neurosurgery brief note:    Patient seen and examined. Neurologically stable.    Plan of care discussed with attending. Full note to follow.    In brief:  Recommend ID consult  BCx, ESR, CRP, Procalcitonin  Repeat MRIwwo C spine

## 2019-11-01 NOTE — PROGRESS NOTES
Consult received on patient's bilateral groin abscess sites.  See flowsheet below for wound documentation.    Recommend packing daily with iodoform gauze.    Secure chat message sent to Dr. Zainab Casillas with recommendations. Nursing to continue care. Wound care to follow prn.       11/01/19 1131        Wound 10/31/19 1854 Abscess Groin   Date First Assessed/Time First Assessed: 10/31/19 1854   Pre-existing: Yes  Primary Wound Type: Abscess  Side: Left  Location: Groin   Wound Image    Wound WDL ex   Drainage Amount None   Drainage Characteristics/Odor No odor   Appearance Moist;Red   Tissue loss description Full thickness   Periwound Area Intact   Wound Edges Open   Wound Length (cm) 1 cm   Wound Width (cm) 0.5 cm   Wound Depth (cm) 1.5 cm   Wound Volume (cm^3) 0.75 cm^3   Wound Surface Area (cm^2) 0.5 cm^2   Undermining (depth (cm)/location) 2cm at [address] [place] [address] Cleansed with:;Sterile normal saline   Dressing Changed   Packing   (aquacel ag)   Packing Inserted  1   Dressing Change Due 11/02/19        Wound 11/01/19 1131 Abscess Groin   Date First Assessed/Time First Assessed: 11/01/19 1131   Pre-existing: Yes  Primary Wound Type: Abscess  Side: Right  Location: Groin   Wound Image    Wound WDL ex   Drainage Amount None   Drainage Characteristics/Odor No odor   Appearance Moist;Red   Tissue loss description Full thickness   Periwound Area Intact   Wound Edges Open   Wound Length (cm) 0.4 cm   Wound Width (cm) 0.4 cm   Wound Depth (cm) 1.5 cm   Wound Volume (cm^3) 0.24 cm^3   Wound Surface Area (cm^2) 0.16 cm^2   Undermining (depth (cm)/location) 2cm at [address] [place] [address] Cleansed with:;Sterile normal saline   Dressing Changed   Packing   (aquacel ag hydrofiber)   Packing Inserted  1   Dressing Change Due 11/02/19

## 2019-11-01 NOTE — ASSESSMENT & PLAN NOTE
Chronic IV Heroin use, recently left AMA From outside facility earlier this month with continued heroin use. Reports last IVDU > days prior to [facility].       Plan:  -Addiction psych consult in AM  -patient not currently on MAT

## 2019-11-01 NOTE — PT/OT/SLP EVAL
"Physical Therapy Evaluation and Discharge Note    Patient Name:  Ld Bronson   MRN:  [id number]    Recommendations:     Discharge Recommendations:  home   Discharge Equipment Recommendations: none   Barriers to discharge: None    Assessment:     Ld Bronson is a 53 y.o. male admitted with a medical diagnosis of Epidural abscess. .  At this time, patient is functioning at their prior level of function and does not require further acute PT services.     Recent Surgery: * No surgery found *      Plan:     During this hospitalization, patient does not require further acute PT services.  Please re-consult if situation changes.      Subjective     Chief Complaint: pain  Patient/Family Comments/goals: "My birthday is in 3 days and they won't give me anything to eat." "How can I get a wheelchair to go outside?"  Pain/Comfort:  · Pain Rating 1: (reports back pain 8/10)    Patients cultural, spiritual, Syn[employer] conflicts given the current situation: no    Living Environment:  Pt homeless.  Reports occasionally using bicycle for transportation when able.  Prior to admission, patients level of function was independent.  Equipment used at home: none.  DME owned (not currently used): none.  Upon discharge, patient will have assistance from self.    Objective:     Communicated with RN and OT prior to session.  Patient found HOB elevated with telemetry, PICC line upon PT entry to room.    General Precautions: Standard, fall   Orthopedic Precautions:N/A   Braces: N/A     Exams:  · Cognitive Exam:  Patient is oriented to Person, Place, Time and Situation  · RLE ROM: WFL  · RLE Strength: WFL  · LLE ROM: WFL  · LLE Strength: WFL    Functional Mobility:  · Bed Mobility:     · Rolling Left:  independence  · Scooting: independence  · Supine to Sit: independence  · Sit to Supine: independence  · Transfers:     · Sit to Stand:  independence with no AD  · Gait: 30ft c no AD (SBA)  · Decreased gait speed, narrow MICHAEL, mild " unsteadiness, c/o pain  · Balance: standing (SBA)    AM-PAC 6 CLICK MOBILITY  Total Score:20       Therapeutic Activities and Exercises:  Pt educated on: PT role/POC; safety c mobility; benefits of OOB activities; performing therex; d/c recs - v/u  -assisted to bathroom for voiding    AM-PAC 6 CLICK MOBILITY  Total Score:20     Patient left HOB elevated with all lines intact, call button in reach and RN notified.    GOALS:   Multidisciplinary Problems     Physical Therapy Goals     Not on file          Multidisciplinary Problems (Resolved)        Problem: Physical Therapy Goal    Goal Priority Disciplines Outcome Goal Variances Interventions   Physical Therapy Goal   (Resolved)     PT, PT/OT Met                     History:     History reviewed. No pertinent past medical history.    Past Surgical History:   Procedure Laterality Date    FRACTURE SURGERY         Time Tracking:     PT Received On: 11/01/19  PT Start Time: 0957     PT Stop Time: 1005  PT Total Time (min): 8 min     Billable Minutes: Evaluation 8 min      Adeel Pardo, PT  11/01/2019

## 2019-11-01 NOTE — SUBJECTIVE & OBJECTIVE
No past medical history on file.    No past surgical history on file.    Review of patient's allergies indicates:  No Known Allergies    No current facility-administered medications on file prior to encounter.      No current outpatient medications on file prior to encounter.     Family History     None        Tobacco Use    Smoking status: Not on file   Substance and Sexual Activity    Alcohol use: Not on file    Drug use: Not on file    Sexual activity: Not on file     Review of Systems   Constitutional: Positive for activity change. Negative for chills and fever.   HENT: Positive for dental problem. Negative for congestion and sore throat.    Respiratory: Negative for cough, shortness of breath and wheezing.    Cardiovascular: Negative for chest pain.   Gastrointestinal: Negative for abdominal distention, abdominal pain, constipation, diarrhea, nausea and vomiting.   Endocrine: Negative.    Genitourinary: Negative for decreased urine volume and dysuria.   Musculoskeletal: Positive for back pain (lumbar).   Skin: Positive for wound (groin wounds s/p I&D).   Neurological: Positive for weakness (b/l LE limited 2/2 to groin pain). Negative for light-headedness and headaches.     Objective:     Vital Signs (Most Recent):  Temp: 98.9 °F (37.2 °C) (10/31/19 1854)  Pulse: 101 (10/31/19 1854)  Resp: 20 (10/31/19 1854)  BP: (!) 91/58 (10/31/19 1854)  SpO2: 98 % (10/31/19 1854) Vital Signs (24h Range):  Temp:  [98 °F (36.7 °C)-98.9 °F (37.2 °C)] 98.9 °F (37.2 °C)  Pulse:  [] 101  Resp:  [20] 20  SpO2:  [97 %-98 %] 98 %  BP: ()/(58-75) 91/58     Weight: 57 kg (125 lb 9.6 oz)  Body mass index is 17.03 kg/m².    Physical Exam   Constitutional: He is oriented to person, place, and time. No distress.   Frail, thin and cachectic, NAD, resting comfortably in hospital bed   HENT:   Head: Normocephalic and atraumatic.   Eyes: EOM are normal. No scleral icterus.   Neck: Normal range of motion. Neck supple.    Cardiovascular: Normal rate and regular rhythm.   No murmur heard.  Pulmonary/Chest: Effort normal and breath sounds normal. No respiratory distress.   Abdominal: Soft. Bowel sounds are normal. He exhibits no distension. There is no tenderness.   Musculoskeletal: Normal range of motion. He exhibits no edema.   Neurological: He is alert and oriented to person, place, and time. No cranial nerve deficit.   LUE motor 4/5  B/l LE hip flexion 2/5, reports limited by pain  B/l dorsal and plantar flexion 5/5   Skin: Skin is warm. He is not diaphoretic. No erythema.   Groin wounds s/p I&D at outside facility, packed w/ clean gauze          CRANIAL NERVES     CN III, IV, VI   Extraocular motions are normal.        Significant Labs: All pertinent labs within the past 24 hours have been reviewed.    Significant Imaging: I have reviewed and interpreted all pertinent imaging results/findings within the past 24 hours.

## 2019-11-01 NOTE — ASSESSMENT & PLAN NOTE
"  ASSESSMENT     Ld Bronson is a 53 y.o. male with a past psychiatric history of opiate use disorder (IVDU, prescription) who presented to [facility] on 10/31/2019 due to epidural abscess. Psychiatry was consulted for "heroin abuse". Patient's last use of heroin was 4-5 days ago, he denies intolerable withdrawal symptoms at this time. Patient is not interested in Suboxone, Subutex, and/or Methadone. He is interested in residential/inpatient rehabilitation.     IMPRESSION  Opiate use disorder, severe,  Opiate withdrawal, current,  H/o failed treatment by Suboxone, Subutex, Methadone  Homelessness    RECOMMENDATION(S)     1. Scheduled Medication(s):  Defer to primary team    2. Opiate withdrawal PRN Medication(s):  - Clonidine 0.1-0.3mg PO q6-q8  - Hold Clonidine for HR<60, Systolic<90, Diastolic<60  - Hydroxyzine 25mg PO QID PRN for rhinorrhea  - Gabapentin 100mg TID for anxiety  - Zofran 4-8mg PO PRN for nausea/vomiting  - Bentyl for diarrhea  - Meloxicam w/food for pain  - Encourage PO fluids    3. Other:  Recommended that patient residential rehabilitation/inpatient treatment (pt prefers [facility]) for education on opiate abuse, for coping/behavioral skills to reinforce sobriety, as well as to attend NA.    Recommend SW/CM consult to facilitate placement to rehabilitation facility upon discharge.    "

## 2019-11-01 NOTE — HPI
Mr. Bronson is a 54 y/o male  with hx of IVDU (heroin), MSSA endocarditis [facility]. He was on cefazolin and is now on vancomycin and ceftriaxone. We are consulted for abx recommendations.    Per chat review, [facility] endocarditis s/p 3-4 weeks of treatment as left AMA. He returns to ED for CP, transferred to MICU for septic shock. He was noted to have multiple abscesses that required bedside drainage by general surgery. His blood cultures were negative on 10/25. He c/o neck and back pain and was found to have epidural abscess of C4. He also had SI joint effusion and underwent IR aspiration. Cultures NGTD. No additional intervention for ortho surgery.     No fever chills or night sweats here. His blood cultures here are NGTD. NSGY evaluation pending.

## 2019-11-01 NOTE — HPI
53 M with PMH of IVDU (heroin), MSSA bacteremia with septic shock and endocarditis, and HCV. Was treated at OSH for septic shock, after complaining of back pain was found to have a cervical spine epidural abscess and transferred to [facility].

## 2019-11-01 NOTE — PT/OT/SLP EVAL
Occupational Therapy   Evaluation and Discharge Note    Name: Ld Bronson  MRN: [id number]  Admitting Diagnosis:  Epidural abscess      Recommendations:     Discharge Recommendations: home  Discharge Equipment Recommendations:  none  Barriers to discharge:  None    Assessment:     Ld Bronson is a 53 y.o. male with a medical diagnosis of Epidural abscess. At this time, patient is functioning at their prior level of function and does not require further acute OT services.     Plan:     During this hospitalization, patient does not require further acute OT services.  Please re-consult if situation changes.    · Plan of Care Reviewed with: patient    Subjective     Chief Complaint: back pain  Patient/Family Comments/goals: to return to home environment    Occupational Profile:  Living Environment: Pt is homeless and reports no falls.   Previous level of function: IND with ADLs, doesn't drive, rides bike sometimes   Equipment Used at home:  none  Assistance upon Discharge: himself    Pain/Comfort:  · Pain Rating 1: 8/10  · Location - Orientation 1: generalized  · Location 1: back  · Pain Addressed 1: Reposition, Distraction    Patients cultural, spiritual, Zoroastrianism conflicts given the current situation: no    Objective:     Communicated with: nurse and PT prior to session.  Patient found sleeping HOB elevated with telemetry, PICC line upon OT entry to room.    General Precautions: Standard, fall   Orthopedic Precautions:N/A   Braces: N/A     Occupational Performance:    Bed Mobility:    · Patient completed Rolling/Turning to Left with  independence  · Patient completed Scooting/Bridging with independence  · Patient completed Supine to Sit with independence  · Patient completed Sit to Supine with independence    Functional Mobility/Transfers:  · Patient completed Sit <> Stand Transfer with independence  with  no assistive device   · Functional Mobility: Pt ambulated from EOB to bathroom and back with SBA with  no AD.    Activities of Daily Living:  · Toileting: supervision while standing at toilet to urinate     Cognitive/Visual Perceptual:  Cognitive/Psychosocial Skills:     -       Oriented to: Person, Place, Time and Situation     Physical Exam:  Upper Extremity Range of Motion:     -       Right Upper Extremity: WFL  -       Left Upper Extremity: WFL  Upper Extremity Strength:    -       Right Upper Extremity: WFL  -       Left Upper Extremity: WFL   Strength:    -       Right Upper Extremity: WFL  -       Left Upper Extremity: WFL    AMPAC 6 Click ADL:  AMPAC Total Score: 20    Treatment & Education:  Pt edu on role of OT, benefit of participating in out of bed activity, plan of care, and safety when performing functional transfers and self care tasks.  - White board updated  - Self care tasks completed-- as noted above   Education:    Patient left HOB elevated with all lines intact and call button in reach    GOALS:   Multidisciplinary Problems     Occupational Therapy Goals     Not on file                History:     History reviewed. No pertinent past medical history.    Past Surgical History:   Procedure Laterality Date    FRACTURE SURGERY         Time Tracking:     OT Date of Treatment: 11/01/19  OT Start Time: 0957  OT Stop Time: 1004  OT Total Time (min): 7 min    Billable Minutes:Evaluation 7    MARIA VICTORIA Bolden  11/1/2019
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
## 2019-11-01 NOTE — PLAN OF CARE
11/01/19 1228   Discharge Assessment   Assessment Type Discharge Planning Assessment   Confirmed/corrected address and phone number on facesheet? Yes   Assessment information obtained from? Patient   Prior to hospitilization cognitive status: Alert/Oriented   Prior to hospitalization functional status: Independent   Current cognitive status: Alert/Oriented   Current Functional Status: Independent   Facility Arrived From: Transfer from [facility]   Lives With alone   Able to Return to Prior Arrangements yes   Is patient able to care for self after discharge? Unable to determine at this time (comments)   Patient's perception of discharge disposition admitted as an inpatient   Patient currently being followed by outpatient case management? No   Patient currently receives any other outside agency services? No   Equipment Currently Used at Home none   Do you have any problems affording any of your prescribed medications? No   Does the patient have transportation home? No   Discharge Plan A Other  ([facility])   Discharge Plan B Home   DME Needed Upon Discharge  none   Patient/Family in Agreement with Plan yes     Bebe Alexander LMSW  [facility]

## 2019-11-01 NOTE — SUBJECTIVE & OBJECTIVE
Patient History           Medical as of 11/1/2019    Past Medical History: Patient provided no pertinent medical history.           Surgical as of 11/1/2019     Past Surgical History     Procedure Laterality Date Comments Source    FRACTURE SURGERY -- -- -- Provider                  Family as of 11/1/2019    None           Tobacco Use as of 11/1/2019     Smoking Status Smoking Start Date Smoking Quit Date Packs/Day Years Used    Current Some Day Smoker -- -- -- --    Types Comments Smokeless Tobacco Status Smokeless Tobacco Quit Date Source     Cigarettes -- Former User -- Provider            Alcohol Use as of 11/1/2019     Alcohol Use Drinks/Week Alcohol/Week Comments Source    -- -- -- -- Provider    Frequency Standard Drinks Binge Drinking        -- -- --              Drug Use as of 11/1/2019     Drug Use Types Frequency Comments Source    Yes  Heroin -- -- Provider            Sexual Activity as of 11/1/2019     Sexually Active Birth Control Partners Comments Source    Not Currently -- -- -- Provider            Activities of Daily Living as of 11/1/2019    None           Social Documentation as of 11/1/2019    None           Occupational as of 11/1/2019    None           Socioeconomic as of 11/1/2019     Marital Status Spouse Name Number of Children Years Education Education Level Preferred Language Ethnicity Race Source    Single -- -- -- -- English /White White --    Financial Resource Strain Food Insecurity: Worry Food Insecurity: Inability Transportation Needs: Medical Transportation Needs: Non-medical    -- -- -- -- --            Pertinent History     Question Response Comments    Lives with -- --    Place in Birth Order -- --    Lives in -- --    Number of Siblings -- --    Raised by -- --    Legal Involvement -- --    Childhood Trauma -- --    Criminal History of -- --    Financial Status -- --    Highest Level of Education -- --    Does patient have access to a firearm? -- --    [employer] -- --    Primary Leisure Activity -- --    Spirituality -- --        History reviewed. No pertinent past medical history.  Past Surgical History:   Procedure Laterality Date    FRACTURE SURGERY       Family History     None        Tobacco Use    Smoking status: Current Some Day Smoker     Types: Cigarettes    Smokeless tobacco: Former User   Substance and Sexual Activity    Alcohol use: Not on file    Drug use: Yes     Types: Heroin    Sexual activity: Not Currently     Review of patient's allergies indicates:  No Known Allergies    No current facility-administered medications on file prior to encounter.      Current Outpatient Medications on File Prior to Encounter   Medication Sig    sofosbuvir-velpatasvir 400-100 mg Tab Patient says he does not take any home medications     Psychotherapeutics (From admission, onward)    Start     Stop Route Frequency Ordered    10/31/19 1949  ramelteon tablet 8 mg      -- Oral Nightly PRN 10/31/19 1851        Review of Systems  Strengths and Liabilities: Strength: Patient accepts guidance/feedback, Liability: Patient is defensive., Liability: Patient has no suport network., Liability: Patient has poor health., Liability: Patient has poor judgment, Liability: Patient lacks coping skills.    Objective:     Vital Signs (Most Recent):  Temp: 97.9 °F (36.6 °C) (11/01/19 1515)  Pulse: 96 (11/01/19 1515)  Resp: 18 (11/01/19 1515)  BP: 106/66 (11/01/19 1515)  SpO2: 100 % (11/01/19 1515) Vital Signs (24h Range):  Temp:  [97.4 °F (36.3 °C)-98.9 °F (37.2 °C)] 97.9 °F (36.6 °C)  Pulse:  [] 96  Resp:  [16-20] 18  SpO2:  [96 %-100 %] 100 %  BP: ()/(57-71) 106/66     Height: 6' (182.9 cm)  Weight: 58.2 kg (128 lb 4.8 oz)  Body mass index is 17.4 kg/m².      Intake/Output Summary (Last 24 hours) at 11/1/2019 1517  Last data filed at 11/1/2019 1400  Gross per 24 hour   Intake 1140 ml   Output 1500 ml   Net -360 ml       Physical Exam   Psychiatric:   Mental Status  "Exam:  Appearance: unremarkable, disheveled, malnourished, lying in bed, thin & gaunt looking  Level of Consciousness: alert, awake, oriented to person, place, and situation  Behavior/Cooperation: normal, cooperative, hostile, redirectable, eye contact normal  Psychomotor: psychomotor retardation   Speech: normal tone, normal rate, normal pitch, normal volume  Language: english, fluid  Orientation: person, place, situation  Attention Span/Concentration: intact  Memory: Grossly intact  Mood: "annoyed"  Affect: agitated  and constricted  Thought Process: linear, normal and logical  Associations: normal and logical  Thought Content: normal, no suicidality, no homicidality, delusions, or paranoia  Fund of Knowledge: Aware of current events and Intact  Abstraction: proverbs were abstract  Insight: fair  Judgment: limited        Significant Labs:   Last 24 Hours:   Recent Lab Results       11/01/19  0024   10/31/19  2320   10/31/19  1922   10/31/19  1917   10/31/19  1916        Procalcitonin       0.36  Comment:  A concentration < 0.25 ng/mL represents a low risk bacterial   infection.  Procalcitonin may not be accurate among patients with localized   infection, recent trauma or major surgery, immunosuppressed state,   invasive fungal infection, renal dysfunction. Decisions regarding   initiation or continuation of antibiotic therapy should not be based   solely on procalcitonin levels.         Albumin   2.0           Alkaline Phosphatase   104           ALT   28           Anion Gap   8              8           AST   105           Baso #   0.04           Basophil%   0.3           BILIRUBIN TOTAL   0.3  Comment:  For infants and newborns, interpretation of results should be based  on gestational age, weight and in agreement with clinical  observations.  Premature Infant recommended reference ranges:  Up to 24 hours.............<8.0 mg/dL  Up to 48 hours............<12.0 mg/dL  3-5 days..................<15.0 mg/dL  6-29 " days.................<15.0 mg/dL             Blood Culture, Routine     No Growth to date[P] No Growth to date[P]       BUN, Bld   23              23           Calcium   8.4              8.4           Chloride   103              103           CO2   25              25           Creatinine   0.9              0.9           CRP, High Sensitivity   105.72           Differential Method   Automated           eGFR if    >60.0              >60.0           eGFR if non    >60.0  Comment:  Calculation used to obtain the estimated glomerular filtration  rate (eGFR) is the CKD-EPI equation.                 >60.0  Comment:  Calculation used to obtain the estimated glomerular filtration  rate (eGFR) is the CKD-EPI equation.              Eos #   0.1           Eosinophil%   1.0           Estimated Avg Glucose       114       Ferritin       759       Glucose   122              122           Gran # (ANC)   10.1           Gran%   82.1           Hematocrit   25.0              25.0           Hemoglobin   7.4              7.4           Hemoglobin A1C External       5.6  Comment:  ADA Screening Guidelines:  5.7-6.4%  Consistent with prediabetes  >or=6.5%  Consistent with diabetes  High levels of fetal hemoglobin interfere with the HbA1C  assay. Heterozygous hemoglobin variants (HbS, HgC, etc)do  not significantly interfere with this assay.   However, presence of multiple variants may affect accuracy.         Immature Grans (Abs)   0.11  Comment:  Mild elevation in immature granulocytes is non specific and   can be seen in a variety of conditions including stress response,   acute inflammation, trauma and pregnancy. Correlation with other   laboratory and clinical findings is essential.             Immature Granulocytes   0.9           Coumadin Monitoring INR       1.1  Comment:  Coumadin Therapy:  2.0 - 3.0 for INR for all indicators except mechanical heart valves  and antiphospholipid syndromes which should  use 2.5 - 3.5.         Iron       14       Lactate, Jesse         1.4  Comment:  Falsely low lactic acid results can be found in samples   containing >=13.0 mg/dL total bilirubin and/or >=3.5 mg/dL   direct bilirubin.       Lymph #   1.5           Lymph%   11.8           MCH   25.7              25.7           MCHC   29.6              29.6           MCV   87              87           Mono #   0.5           Mono%   3.9           MPV   10.0              10.0           nRBC   0           Platelets   296              296           POC Creatinine 0.8             Potassium   4.0              4.0           PROTEIN TOTAL   7.2           Protime       11.3       RBC   2.88              2.88           RDW   17.2              17.2           Sample VENOUS             Saturated Iron       6       Sed Rate   103           Sodium   136              136           TIBC       229       Transferrin       155       WBC   12.34              12.34                                Significant Imaging: I have reviewed all pertinent imaging results/findings within the past 24 hours.

## 2019-11-01 NOTE — ASSESSMENT & PLAN NOTE
Patient with recent history of MSSA bacteremia and endocarditis who presents as a transfer for concern of epidural abscess of C4. Patient was treated for IE for 3-4 weeks and left [facility] earlier this month and [facility], BS abx and pressors. Patient was stepped down from ICU and has remained on BS abx on [facility] floor of the [facility] for the past 6 days.   -MSSA (first negative result on 10/25), echocardiogram has shown a MV vegetation, and his antibiotics have been narrowed to cefazolin 2g Q8H.    Plan:  -Given Epidural abscess and concomitant MSSA endocarditis will treat with Vanc and Ceftriaxone   -[facility] from [facility]  -2D echo  -Repeat cultures- negative thus far

## 2019-11-01 NOTE — HOSPITAL COURSE
11/1- Patient doing well overnight with no new complaints; neurosurgery, ID, addiction psych, and nutrition consulted, awaiting neurosurgery recommendation on treatment plan for abscess; HOLLAND ordered for Monday. Patient was evaluated by NSG who would like to repeat MRI last night and now CT of C-Spine. Patient antibiotic regiment changed to continuous infusion of Cefazolin 6g given previous cultures were all MSSA. MRIs showing discitis/osteo at C3-C4, C6-C7 and epidural abscess associated at that point. Patient continues to spike low grade fevers of 100.5 most recently, wound care continues to address the packed ground abscesses. Hgb improved after 1U pRBC. HOLLAND showed mitral but no aortic vegetation. To OR today for washout of epidural abscess. [facility] after [facility] procedure today and manage him there further.  Drain removed yesterday- can initiate transfer back to the [facility] and new PICC line placement today- if [facility] to the [facility] prior to getting a new PICC line - would advise getting a new PICC placed when patient admitted [facility] to the [facility] (needs exchange since current PICC was placed before Bcx grew MSSA)      Discharge Recommendations:   Infectious Disease Recommendations-   1. Continue cefazolin 6 grams IV q 24 hours continuous infusion for MSSA  2. Anticipate long term IV abx therapy - minimum of 6 weeks from date of washout 11/6. (through 12/18)  3.  Rec MRI C spine with contrast in 2-3 weeks to ensure patiens stable/improvng  4.  Weekly ESR, CRP, CBC, CMP      5. [facility] - [facility] ID to follow there    Neurosurgery Recommendations:      1. C-collar to be worn when up and OOB. Okay to remove when lying flat. Pt sitting supine in bed without collar on. Discussed again with patient that collar needs to be worn when he is sitting up right, and even at slight incline. Okay to remove only when lying completely flat.       2.OR cultures: Aerobic culture growing staph aureus. Rest  of cultures pending- BCx 10/31 with MSSA. Repeat BCx with NGTD. Continue cefazolin 6g continuous for six weeks total (last dose 12/18)      Recent IVDU: Addiction psych saw patient at [facility].

## 2019-11-01 NOTE — PLAN OF CARE
Problem: Physical Therapy Goal  Goal: Physical Therapy Goal  Outcome: Met     Misa and D/C from acute PT services    Adeel Pardo PT,DPT  11/1/2019
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
## 2019-11-01 NOTE — CONSULTS
[facility]  Adult Nutrition  Consult Note    SUMMARY     Recommendations    Recommendation:  1. Resume regular diet as tolerable  2. If diet advanced recommend adding boost plus TID to increase intake   3. If unable to advance diet, re-consult for TF recs.   4. RD to monitor and follow up     Goals: pt to receive nutrition by RD follow up   Nutrition Goal Status: new  Communication of RD Recs: other (comment)(POC)    Reason for Assessment    Reason For Assessment: consult  Diagnosis: (epidural abscess)  Relevant Medical History: IVDA; HCV  Interdisciplinary Rounds: did not attend  General Information Comments: Pt NPO at this time for possible surgery today. Pt appears fatigued and falling asleep, PTA good intake 2 meals/day per pt, although per chart pt with hx of IVDA. Requesting he receives lunch and boost with meals. No c/o N/V/C/D reported. Pt endorses wt loss of ~40 lbs, states UBW ~165 lbs. Pt unsure of time frame of wt loss. Partial NFPE completed, pt with muscle loss in orbitals, temples, clavicle and hands. Pt meets criteria for severe malnutrition at this time. Attempted to discuss and encourage intake, pt aggitated due to not receiving food and continues falling asleep.   Nutrition Discharge Planning: adequate intake via regular diet     Nutrition Risk Screen    Nutrition Risk Screen: no indicators present    Nutrition/Diet History    Patient Reported Diet/Restrictions/Preferences: general  Spiritual, Cultural Beliefs, Orthodox Practices, Values that Affect Care: no  Food Allergies: NKFA  Factors Affecting Nutritional Intake: NPO    Anthropometrics    Temp: 97.9 °F (36.6 °C)  Height Method: Stated  Height: 6' (182.9 cm)  Height (inches): 72 in  Weight Method: Bed Scale  Weight: 58.2 kg (128 lb 4.8 oz)  Weight (lb): 128.3 lb  Ideal Body Weight (IBW), Male: 178 lb  % Ideal Body Weight, Male (lb): 70.56 lb  BMI (Calculated): 17.1  BMI Grade: 17 - 18.4 protein-energy malnutrition grade  I  Usual Body Weight (UBW), k kg  % Usual Body Weight: 77.76    Lab/Procedures/Meds    Pertinent Labs Reviewed: reviewed  Pertinent Labs Comments: BUN 23; Glucose 122; Ca 8.4  Pertinent Medications Reviewed: reviewed  Pertinent Medications Comments: vancomycin; ceftriaxone     Estimated/Assessed Needs    Weight Used For Calorie Calculations: 58.2 kg (128 lb 4.9 oz)  Energy Calorie Requirements (kcal): 2756-4575 kcal/d   Energy Need Method: Kcal/kg  Protein Requirements: 58-75 g/day (1.0-1.3 g/kg)   Weight Used For Protein Calculations: 58.2 kg (128 lb 4.9 oz)  Fluid Requirements (mL): 1 mL/kcal or per MD  RDA Method (mL): 1455    Nutrition Prescription Ordered    Current Diet Order: NPO     Evaluation of Received Nutrient/Fluid Intake    Energy Calories Required: not meeting needs  Protein Required: not meeting needs  Comments: LBM 10/31  % Intake of Estimated Energy Needs: 0 - 25 %  % Meal Intake: NPO    Nutrition Risk    Level of Risk/Frequency of Follow-up: high(2x/week)     Assessment and Plan    Severe Malnutrition in the context of Chronic Illness/Injury    Related to (etiology):  Decreased intake 2/2 IVDA    Signs and Symptoms (as evidenced by):  Energy Intake: <75% of estimated energy requirement for x 1 month   Body Fat Depletion: moderate and severe depletion of orbitals and triceps   Muscle Mass Depletion: moderate and severe depletion of temples, clavicle region, scapular region and interosseous muscle   Weight Loss: 22% x unsure     Interventions (treatment strategy):  Collaboration of care with other providers   Commercial beverage     Nutrition Diagnosis Status:  New      Monitor and Evaluation    Food and Nutrient Intake: energy intake, food and beverage intake  Food and Nutrient Adminstration: diet order  Knowledge/Beliefs/Attitudes: food and nutrition knowledge/skill  Anthropometric Measurements: weight, weight change  Biochemical Data, Medical Tests and Procedures: electrolyte and renal  panel, lipid profile, gastrointestinal profile, glucose/endocrine profile, inflammatory profile  Nutrition-Focused Physical Findings: overall appearance     Malnutrition Assessment  Malnutrition Type: chronic illness  Weight Loss (Malnutrition): (22% wt loss unsure of time frame)  Energy Intake (Malnutrition): less than 75% for greater than or equal to 1 month  Subcutaneous Fat (Malnutrition): moderate depletion  Muscle Mass (Malnutrition): moderate depletion   Orbital Region (Subcutaneous Fat Loss): moderate depletion  Upper Arm Region (Subcutaneous Fat Loss): moderate depletion   Sikhism Region (Muscle Loss): severe depletion  Clavicle Bone Region (Muscle Loss): severe depletion  Clavicle and Acromion Bone Region (Muscle Loss): moderate depletion  Dorsal Hand (Muscle Loss): moderate depletion       Nutrition Follow-Up    RD Follow-up?: Yes

## 2019-11-01 NOTE — SUBJECTIVE & OBJECTIVE
No medications prior to admission.       Review of patient's allergies indicates:  No Known Allergies    No past medical history on file.  No past surgical history on file.  Family History     None        Tobacco Use    Smoking status: Not on file   Substance and Sexual Activity    Alcohol use: Not on file    Drug use: Not on file    Sexual activity: Not on file     Review of Systems     Objective:     Weight: 57 kg (125 lb 9.6 oz)  Body mass index is 17.03 kg/m².  Vital Signs (Most Recent):  Temp: 98.7 °F (37.1 °C) (11/01/19 0317)  Pulse: 95 (11/01/19 0317)  Resp: 18 (11/01/19 0317)  BP: 103/66 (11/01/19 0317)  SpO2: 98 % (11/01/19 0317) Vital Signs (24h Range):  Temp:  [97.4 °F (36.3 °C)-98.9 °F (37.2 °C)] 98.7 °F (37.1 °C)  Pulse:  [] 95  Resp:  [16-20] 18  SpO2:  [96 %-98 %] 98 %  BP: ()/(57-75) 103/66                          Neurosurgery Physical Exam  GCS15 AOx3  CN2-12 intact  RUE: 5/5      LUE: 5/5  RLE: 4/5 HF 5/5 QD 5/5 DF 5/5 EHL 5/5 PF LLE: 4/5 HF 5/5 QD 5/5 DF 5/5 EHL 5/5 PF  No hoffmans, no babinski, no clonus  SILT    Significant Labs:  Recent Labs   Lab 10/31/19  2320   *  122*     136   K 4.0  4.0     103   CO2 25  25   BUN 23*  23*   CREATININE 0.9  0.9   CALCIUM 8.4*  8.4*     Recent Labs   Lab 10/31/19  2320   WBC 12.34  12.34   HGB 7.4*  7.4*   HCT 25.0*  25.0*     296     Recent Labs   Lab 10/31/19  1917   INR 1.1     Microbiology Results (last 7 days)     Procedure Component Value Units Date/Time    Blood culture (site 1) [[id number]] Collected:  10/31/19 1917    Order Status:  Completed Specimen:  Blood Updated:  11/01/19 0315     Blood Culture, Routine No Growth to date    Narrative:       Site # 1, aerobic and anaerobic    Blood culture (site 2) [[id number]] Collected:  10/31/19 1922    Order Status:  Completed Specimen:  Blood Updated:  11/01/19 0315     Blood Culture, Routine No Growth to date    Narrative:       Site # 2, aerobic only     Blood culture [[id number]]     Order Status:  Sent Specimen:  Blood     Blood culture [[id number]]     Order Status:  Sent Specimen:  Blood         All pertinent labs from the last 24 hours have been reviewed.    Significant Diagnostics:  I have reviewed all pertinent imaging results/findings within the past 24 hours.

## 2019-11-01 NOTE — PLAN OF CARE
Problem: Malnutrition  Goal: Improved Nutritional Intake  Outcome: Ongoing, Progressing     Recommendations    Recommendation:  1. Resume regular diet as tolerable  2. If diet advanced recommend adding boost plus TID to increase intake   3. If unable to advance diet, re-consult for TF recs.   4. RD to monitor and follow up     Goals: pt to receive nutrition by RD follow up   Nutrition Goal Status: new  Communication of RD Recs: other (comment)(POC)

## 2019-11-01 NOTE — CONSULTS
[facility]  Infectious Disease  Consult Note    Patient Name: Ld Bronson  MRN: [id number]  Admission Date: 10/31/2019  Hospital Length of Stay: 1 days  Attending Physician: Zainab Casillas MD  Primary Care Provider: Kris Artis Jr, MD     Isolation Status: No active isolations      Inpatient consult to Infectious Diseases  Consult performed by: John Toribio PA-C  Consult ordered by: Ely Kellogg MD        Assessment/Plan:     * Epidural abscess  Mr. Bronson is a 52 y/o male  with hx of IVDU (heroin), MSSA endocarditis, biltateral groin abscesses s/p I&D presents as a transfer from [facility] for NSGY evaluation for epidural abscess. He was on cefazolin and is now on vancomycin and ceftriaxone. We are consulted for abx recommendations.    No fever chills or night sweats here. His blood cultures here are NGTD. NSGY evaluation pending.       Recommendations:   1. Discontinue vancomycin and ceftriaxone, started cefazolin as prior MSSA infx  2. NSGY evaluation pending  3. Agree with HOLLAND   4. Anticipate long term IV abx therapy  5. [facility]  6. ID will follow with you pending above           Thank you for your consult. I will follow-up with patient. Please contact us if you have any additional questions.    John Toribio PA-C  Infectious Disease  [facility]  [phone]    Subjective:     Principal Problem: Epidural abscess    HPI: Mr. Bronson is a 52 y/o male  with hx of IVDU (heroin), MSSA endocarditis presents as a transfer from [facility] for NSGY evaluation for epidural abscess. He was on cefazolin and is now on vancomycin and ceftriaxone. We are consulted for abx recommendations.    Per chat review, [facility] s/p 3-4 weeks of treatment as left AMA. He returns to ED for CP, transferred to MICU for septic shock. He was noted to have multiple abscesses that required bedside drainage by  general surgery. His blood cultures were negative on 10/25. He c/o neck and back pain and was found to have epidural abscess of C4. He also had SI joint effusion and underwent IR aspiration. Cultures NGTD. No additional intervention for ortho surgery.     No fever chills or night sweats here. His blood cultures here are NGTD. NSGY evaluation pending.     History reviewed. No pertinent past medical history.    Past Surgical History:   Procedure Laterality Date    FRACTURE SURGERY         Review of patient's allergies indicates:  No Known Allergies    Medications:  Medications Prior to Admission   Medication Sig    sofosbuvir-velpatasvir 400-100 mg Tab Patient says he does not take any home medications     Antibiotics (From admission, onward)    Start     Stop Route Frequency Ordered    11/01/19 1030  vancomycin 750 mg in dextrose 5 % 250 mL IVPB (ready to mix system)      -- IV Every 12 hours (non-standard times) 11/01/19 0345    10/31/19 2115  cefTRIAXone (ROCEPHIN) 2 g in dextrose 5 % 50 mL IVPB      -- IV Every 12 hours (non-standard times) 10/31/19 2015        Antifungals (From admission, onward)    None        Antivirals (From admission, onward)    None             There is no immunization history on file for this patient.    Family History     None        Social History     Socioeconomic History    Marital status: Single     Spouse name: Not on file    Number of children: Not on file    Years of education: Not on file    Highest education level: Not on file   Occupational History    Not on file   Social Needs    Financial resource strain: Not on file    Food insecurity:     Worry: Not on file     Inability: Not on file    Transportation needs:     Medical: Not on file     Non-medical: Not on file   Tobacco Use    Smoking status: Current Some Day Smoker     Types: Cigarettes    Smokeless tobacco: Former User   Substance and Sexual Activity    Alcohol use: Not on file    Drug use: Yes     Types:  Heroin    Sexual activity: Not Currently   Lifestyle    Physical activity:     Days per week: Not on file     Minutes per session: Not on file    Stress: Not on file   Relationships    Social connections:     Talks on phone: Not on file     Gets together: Not on file     Attends Buddhist service: Not on file     Active member of club or organization: Not on file     Attends meetings of clubs or organizations: Not on file     Relationship status: Not on file   Other Topics Concern    Not on file   Social History Narrative    Not on file     Review of Systems   Constitutional: Negative for chills, diaphoresis, fatigue and fever.   Respiratory: Negative for cough and shortness of breath.    Cardiovascular: Negative for chest pain.   Gastrointestinal: Negative for abdominal pain, diarrhea, nausea and vomiting.   Genitourinary: Negative for dysuria.        +bilateral groin wounds   Musculoskeletal: Positive for back pain.   Skin: Positive for wound. Negative for color change, pallor and rash.   Neurological: Negative for dizziness and headaches.   All other systems reviewed and are negative.    Objective:     Vital Signs (Most Recent):  Temp: 97.9 °F (36.6 °C) (11/01/19 1150)  Pulse: 93 (11/01/19 1150)  Resp: 18 (11/01/19 1150)  BP: 114/70 (11/01/19 1150)  SpO2: 98 % (11/01/19 1150) Vital Signs (24h Range):  Temp:  [97.4 °F (36.3 °C)-98.9 °F (37.2 °C)] 97.9 °F (36.6 °C)  Pulse:  [] 93  Resp:  [16-20] 18  SpO2:  [96 %-98 %] 98 %  BP: ()/(57-71) 114/70     Weight: 58.2 kg (128 lb 4.8 oz)  Body mass index is 17.4 kg/m².    Estimated Creatinine Clearance: 76.3 mL/min (based on SCr of 0.9 mg/dL).    Physical Exam   Constitutional: He is oriented to person, place, and time. No distress.   thin   HENT:   Head: Normocephalic.   Eyes: Pupils are equal, round, and reactive to light.   Cardiovascular: Normal rate and regular rhythm.   Murmur heard.  Pulmonary/Chest: Effort normal. No stridor. No respiratory  distress.   Abdominal: Soft. He exhibits no distension. There is no tenderness. There is no guarding.   Musculoskeletal: Normal range of motion. He exhibits tenderness. He exhibits no edema or deformity.   Bilateral groin wounds.  Left groin packed   Neurological: He is alert and oriented to person, place, and time.   Skin: Skin is warm and dry. He is not diaphoretic.   Psychiatric: He has a normal mood and affect.   Vitals reviewed.      Significant Labs: All pertinent labs within the past 24 hours have been reviewed.    Significant Imaging: I have reviewed all pertinent imaging results/findings within the past 24 hours.

## 2019-11-01 NOTE — H&P
[facility] Hospital Medicine  History & Physical    Patient Name: Ld Bronson  MRN: [id number]  Admission Date: 10/31/2019  Attending Physician: Zainab Casillas MD   Primary Care Provider: Kris Artis Jr, MD    [facility] Medicine Team: [facility] MED 4 Ely Kellogg MD     Patient information was obtained from patient and ER records.     Subjective:     Principal Problem:Epidural abscess    Chief Complaint: No chief complaint on file.       HPI: Mr. Bronson is a 53-year-old man with IVDA (heroin) and Hx MSSA bacteremia with IE, and HCV who presented to the [facility] on 10/23 with fever.     He has had a difficult year, mostly as a result of infectious diseases related to ongoing heroin abuse. He was treated this year for 3-4 weeks for IE prior to leaving [place] but had a more recent admission on 9/21 for chest pain at which time TTE was performed, and he was discharged after blood cultures were negative ruling out ongoing IE. On admission he was hypotensive refractory to fluids and had bilateral cellulitis with abscesses that required bedside drainage by surgery, after which he was admitted to the MICU for septic shock. He has been treated with broad-spectrum antibiotics and levophed via a subclavian line with significant improvement. He was weaned off vasopressors within 24h and stepped down from the ICU six days ago. Since then his blood cultures have been positive for MSSA (first negative result on 10/25), echocardiogram has shown a MV vegetation, and his antibiotics have been narrowed to cefazolin 2g Q8H.      His admission has been complicated by diffuse body pains and joint pains initially thought to be hyperalgesia in the setting of mild opiate withdrawal, eventually evolving into neck and back pain. MRI C-spine showed an epidural abscess at C4. NSGY there recommended cervical decompression, which they are unable to do that there as the hospital is not yet accredited for  it per Dr. Artis. He also had an MRI L spine that showed SI joint effusion that was aspirated by IR with negative cultures. Ortho there did not recommend any additional intervention. Dr. Malone is aware of the patient and agreed to consult. Dr. Artis stated that they would take the patient back upon completion of surgical evaluation/treatment.     Upon presenting to the floor, patient was in NAD and resting comfortably in hospital bed. Patient reports slight lumbar back pain decreased appetite. Patient denies any current N/V/Chills. Patient does state that he has extensive groin abscesses that are quite painful. Patient reports that these [facility] with [facility]. Patient has no further complaints at this time. [facility] was contacted and report they will see him tonight however will likely not be going to OR tonight due to active case load tonight.     No past medical history on file.    No past surgical history on file.    Review of patient's allergies indicates:  No Known Allergies    No current facility-administered medications on file prior to encounter.      No current outpatient medications on file prior to encounter.     Family History     None        Tobacco Use    Smoking status: Not on file   Substance and Sexual Activity    Alcohol use: Not on file    Drug use: Not on file    Sexual activity: Not on file     Review of Systems   Constitutional: Positive for activity change. Negative for chills and fever.   HENT: Positive for dental problem. Negative for congestion and sore throat.    Respiratory: Negative for cough, shortness of breath and wheezing.    Cardiovascular: Negative for chest pain.   Gastrointestinal: Negative for abdominal distention, abdominal pain, constipation, diarrhea, nausea and vomiting.   Endocrine: Negative.    Genitourinary: Negative for decreased urine volume and dysuria.   Musculoskeletal: Positive for back pain (lumbar).   Skin: Positive for  wound (groin wounds s/p I&D).   Neurological: Positive for weakness (b/l LE limited 2/2 to groin pain). Negative for light-headedness and headaches.     Objective:     Vital Signs (Most Recent):  Temp: 98.9 °F (37.2 °C) (10/31/19 1854)  Pulse: 101 (10/31/19 1854)  Resp: 20 (10/31/19 1854)  BP: (!) 91/58 (10/31/19 1854)  SpO2: 98 % (10/31/19 1854) Vital Signs (24h Range):  Temp:  [98 °F (36.7 °C)-98.9 °F (37.2 °C)] 98.9 °F (37.2 °C)  Pulse:  [] 101  Resp:  [20] 20  SpO2:  [97 %-98 %] 98 %  BP: ()/(58-75) 91/58     Weight: 57 kg (125 lb 9.6 oz)  Body mass index is 17.03 kg/m².    Physical Exam   Constitutional: He is oriented to person, place, and time. No distress.   Frail, thin and cachectic, NAD, resting comfortably in hospital bed   HENT:   Head: Normocephalic and atraumatic.   Eyes: EOM are normal. No scleral icterus.   Neck: Normal range of motion. Neck supple.   Cardiovascular: Normal rate and regular rhythm.   No murmur heard.  Pulmonary/Chest: Effort normal and breath sounds normal. No respiratory distress.   Abdominal: Soft. Bowel sounds are normal. He exhibits no distension. There is no tenderness.   Musculoskeletal: Normal range of motion. He exhibits no edema.   Neurological: He is alert and oriented to person, place, and time. No cranial nerve deficit.   LUE motor 4/5  B/l LE hip flexion 2/5, reports limited by pain  B/l dorsal and plantar flexion 5/5   Skin: Skin is warm. He is not diaphoretic. No erythema.   Groin wounds s/p I&D at outside facility, packed w/ clean gauze          CRANIAL NERVES     CN III, IV, VI   Extraocular motions are normal.        Significant Labs: All pertinent labs within the past 24 hours have been reviewed.    Significant Imaging: I have reviewed and interpreted all pertinent imaging results/findings within the past 24 hours.    Assessment/Plan:     * Epidural abscess  Patient with IVDA, MSSA bacteremia w/ endocarditis (MV veg) and epidural abscess at C4  transferred [facility] NSG evaluation. [facility] to decompress however they do not have NSG credentialing so patient was transferred here.     Plan:  -Vanc/Ceftriaxone  -neuro surg consulted and will see patient tonight  -holding DVT Prophy   -NPO at midnight         Groin abscess  Patient with multiple groin abscess that was I&D at bedside last week. Covering with bs ABx at this time. Wounds packed.     Plan:  -BS Abx  -Wound care consult   -Obtain records of cultures       Severe protein-calorie malnutrition  Patient with albumin of   Lab Results   Component Value Date    LABPROT 11.3 10/31/2019        Plan:  -Nutrition consult, will f/u recs   -Boost with every meal  -Continue to monitor albumin  -time spent counseling patient on increasing PO intake, modalities to help increase protein intake etc.        HCV (hepatitis C virus)  Hx of hep C unsure of treatment     Plan:  -HCV panel     Opiate abuse, continuous  See above       Endocarditis due to methicillin susceptible Staphylococcus aureus (MSSA)  Patient with recent history of MSSA bacteremia and endocarditis who presents as a transfer for concern of epidural abscess of C4. Patient was treated for IE for 3-4 weeks and left AMA earlier this month and [facility], BS abx and pressors. Patient was stepped down from ICU and has remained on BS abx on [facility] floor of the [facility] for the past 6 days.   -MSSA (first negative result on 10/25), echocardiogram has shown a MV vegetation, and his antibiotics have been narrowed to cefazolin 2g Q8H.    Plan:  -Given Epidural abscess and concomitant MSSA endocarditis will treat with Vanc and Ceftriaxone   -obtain records from [facility]  -2D echo  -Repeat cultures       IV drug abuse  Chronic IV Heroin use, recently left AMA From outside facility earlier this month with continued heroin use. Reports last IVDU > days prior to his admission to [facility] for [facility].       Plan:  -Addiction psych  consult in AM  -patient not currently on MAT       VTE Risk Mitigation (From admission, onward)         Ordered     Place sequential compression device  Until discontinued      10/31/19 1851     IP VTE LOW RISK PATIENT  Once      10/31/19 1851                   Ely Kellogg MD  Department of Hospital Medicine   [facility]

## 2019-11-01 NOTE — PROGRESS NOTES
Pharmacokinetic Initial Assessment: IV Vancomycin    Assessment/Plan:    Initiate intravenous vancomycin with loading dose of 1500 mg once followed by a maintenance dose of vancomycin 750mg IV every 12 hours  Desired empiric serum trough concentration is 10 to 20 mcg/mL  Draw vancomycin trough level 30 min prior to fourth dose on 11/02 at approximately 1000  Pharmacy will continue to follow and monitor vancomycin.      Please contact pharmacy at extension [phone] with any questions regarding this assessment.     Thank you for the consult,   Justino Ramirez       Patient brief summary:  Ld Bronson is a 53 y.o. male initiated on antimicrobial therapy with IV Vancomycin for treatment of suspected epidural abcess    Drug Allergies:   Review of patient's allergies indicates:  No Known Allergies    Actual Body Weight:   57 kg    Renal Function:   Estimated Creatinine Clearance: 74.8 mL/min (based on SCr of 0.9 mg/dL).,     Dialysis Method (if applicable):  N/A    CBC (last 72 hours):  Recent Labs   Lab Result Units 10/31/19  1917 10/31/19  2320   WBC K/uL  --  12.34  12.34   Hemoglobin g/dL  --  7.4*  7.4*   Hemoglobin A1C % 5.6  --    Hematocrit %  --  25.0*  25.0*   Platelets K/uL  --  296  296   Gran% %  --  82.1*   Lymph% %  --  11.8*   Mono% %  --  3.9*   Eosinophil% %  --  1.0   Basophil% %  --  0.3   Differential Method   --  Automated       Metabolic Panel (last 72 hours):  Recent Labs   Lab Result Units 10/31/19  2320   Sodium mmol/L 136  136   Potassium mmol/L 4.0  4.0   Chloride mmol/L 103  103   CO2 mmol/L 25  25   Glucose mg/dL 122*  122*   BUN, Bld mg/dL 23*  23*   Creatinine mg/dL 0.9  0.9   Albumin g/dL 2.0*   Total Bilirubin mg/dL 0.3   Alkaline Phosphatase U/L 104   AST U/L 105*   ALT U/L 28       Drug levels (last 3 results):  No results for input(s): VANCOMYCINRA, VANCOMYCINPE, VANCOMYCINTR in the last 72 hours.    Microbiologic Results:  Microbiology Results (last 7 days)     Procedure  Component Value Units Date/Time    Blood culture (site 1) [[id number]] Collected:  10/31/19 1917    Order Status:  Completed Specimen:  Blood Updated:  11/01/19 0315     Blood Culture, Routine No Growth to date    Narrative:       Site # 1, aerobic and anaerobic    Blood culture (site 2) [[id number]] Collected:  10/31/19 1922    Order Status:  Completed Specimen:  Blood Updated:  11/01/19 0315     Blood Culture, Routine No Growth to date    Narrative:       Site # 2, aerobic only    Blood culture [[id number]]     Order Status:  Sent Specimen:  Blood     Blood culture [[id number]]     Order Status:  Sent Specimen:  Blood

## 2019-11-02 LAB
ALBUMIN SERPL BCP-MCNC: 2 G/DL (ref 3.5–5.2)
ALBUMIN SERPL BCP-MCNC: 2 G/DL (ref 3.5–5.2)
ALP SERPL-CCNC: 106 U/L (ref 55–135)
ALP SERPL-CCNC: 106 U/L (ref 55–135)
ALT SERPL W/O P-5'-P-CCNC: 38 U/L (ref 10–44)
ALT SERPL W/O P-5'-P-CCNC: 38 U/L (ref 10–44)
ANION GAP SERPL CALC-SCNC: 11 MMOL/L (ref 8–16)
ANION GAP SERPL CALC-SCNC: 11 MMOL/L (ref 8–16)
AST SERPL-CCNC: 107 U/L (ref 10–40)
AST SERPL-CCNC: 107 U/L (ref 10–40)
BASOPHILS # BLD AUTO: 0.05 K/UL (ref 0–0.2)
BASOPHILS NFR BLD: 0.4 % (ref 0–1.9)
BILIRUB SERPL-MCNC: 0.3 MG/DL (ref 0.1–1)
BILIRUB SERPL-MCNC: 0.3 MG/DL (ref 0.1–1)
BUN SERPL-MCNC: 20 MG/DL (ref 6–20)
BUN SERPL-MCNC: 20 MG/DL (ref 6–20)
CALCIUM SERPL-MCNC: 8.4 MG/DL (ref 8.7–10.5)
CALCIUM SERPL-MCNC: 8.4 MG/DL (ref 8.7–10.5)
CHLORIDE SERPL-SCNC: 103 MMOL/L (ref 95–110)
CHLORIDE SERPL-SCNC: 103 MMOL/L (ref 95–110)
CO2 SERPL-SCNC: 24 MMOL/L (ref 23–29)
CO2 SERPL-SCNC: 24 MMOL/L (ref 23–29)
CREAT SERPL-MCNC: 1 MG/DL (ref 0.5–1.4)
CREAT SERPL-MCNC: 1 MG/DL (ref 0.5–1.4)
DIFFERENTIAL METHOD: ABNORMAL
EOSINOPHIL # BLD AUTO: 0 K/UL (ref 0–0.5)
EOSINOPHIL NFR BLD: 0.2 % (ref 0–8)
ERYTHROCYTE [DISTWIDTH] IN BLOOD BY AUTOMATED COUNT: 17.2 % (ref 11.5–14.5)
EST. GFR  (AFRICAN AMERICAN): >60 ML/MIN/1.73 M^2
EST. GFR  (AFRICAN AMERICAN): >60 ML/MIN/1.73 M^2
EST. GFR  (NON AFRICAN AMERICAN): >60 ML/MIN/1.73 M^2
EST. GFR  (NON AFRICAN AMERICAN): >60 ML/MIN/1.73 M^2
GLUCOSE SERPL-MCNC: 139 MG/DL (ref 70–110)
GLUCOSE SERPL-MCNC: 139 MG/DL (ref 70–110)
HCT VFR BLD AUTO: 24.3 % (ref 40–54)
HGB BLD-MCNC: 7.5 G/DL (ref 14–18)
IMM GRANULOCYTES # BLD AUTO: 0.08 K/UL (ref 0–0.04)
IMM GRANULOCYTES NFR BLD AUTO: 0.6 % (ref 0–0.5)
LYMPHOCYTES # BLD AUTO: 1.3 K/UL (ref 1–4.8)
LYMPHOCYTES NFR BLD: 10.5 % (ref 18–48)
MAGNESIUM SERPL-MCNC: 1.6 MG/DL (ref 1.6–2.6)
MCH RBC QN AUTO: 26.6 PG (ref 27–31)
MCHC RBC AUTO-ENTMCNC: 30.9 G/DL (ref 32–36)
MCV RBC AUTO: 86 FL (ref 82–98)
MONOCYTES # BLD AUTO: 0.6 K/UL (ref 0.3–1)
MONOCYTES NFR BLD: 4.6 % (ref 4–15)
NEUTROPHILS # BLD AUTO: 10.5 K/UL (ref 1.8–7.7)
NEUTROPHILS NFR BLD: 83.7 % (ref 38–73)
NRBC BLD-RTO: 0 /100 WBC
PHOSPHATE SERPL-MCNC: 3.8 MG/DL (ref 2.7–4.5)
PLATELET # BLD AUTO: 289 K/UL (ref 150–350)
PMV BLD AUTO: 9.8 FL (ref 9.2–12.9)
POTASSIUM SERPL-SCNC: 3.7 MMOL/L (ref 3.5–5.1)
POTASSIUM SERPL-SCNC: 3.7 MMOL/L (ref 3.5–5.1)
PROT SERPL-MCNC: 7.3 G/DL (ref 6–8.4)
PROT SERPL-MCNC: 7.3 G/DL (ref 6–8.4)
RBC # BLD AUTO: 2.82 M/UL (ref 4.6–6.2)
SODIUM SERPL-SCNC: 138 MMOL/L (ref 136–145)
SODIUM SERPL-SCNC: 138 MMOL/L (ref 136–145)
WBC # BLD AUTO: 12.52 K/UL (ref 3.9–12.7)

## 2019-11-02 PROCEDURE — 80074 ACUTE HEPATITIS PANEL: CPT

## 2019-11-02 PROCEDURE — 63600175 PHARM REV CODE 636 W HCPCS: Performed by: PHYSICIAN ASSISTANT

## 2019-11-02 PROCEDURE — 99232 PR SUBSEQUENT HOSPITAL CARE,LEVL II: ICD-10-PCS | Mod: ,,, | Performed by: NEUROLOGICAL SURGERY

## 2019-11-02 PROCEDURE — 87350 HEPATITIS BE AG IA: CPT

## 2019-11-02 PROCEDURE — 87522 HEPATITIS C REVRS TRNSCRPJ: CPT

## 2019-11-02 PROCEDURE — 63600175 PHARM REV CODE 636 W HCPCS: Performed by: STUDENT IN AN ORGANIZED HEALTH CARE EDUCATION/TRAINING PROGRAM

## 2019-11-02 PROCEDURE — 80053 COMPREHEN METABOLIC PANEL: CPT

## 2019-11-02 PROCEDURE — 85025 COMPLETE CBC W/AUTO DIFF WBC: CPT

## 2019-11-02 PROCEDURE — 86704 HEP B CORE ANTIBODY TOTAL: CPT

## 2019-11-02 PROCEDURE — 25500020 PHARM REV CODE 255: Performed by: HOSPITALIST

## 2019-11-02 PROCEDURE — 99233 PR SUBSEQUENT HOSPITAL CARE,LEVL III: ICD-10-PCS | Mod: ,,, | Performed by: HOSPITALIST

## 2019-11-02 PROCEDURE — 84100 ASSAY OF PHOSPHORUS: CPT

## 2019-11-02 PROCEDURE — 83735 ASSAY OF MAGNESIUM: CPT

## 2019-11-02 PROCEDURE — 99232 SBSQ HOSP IP/OBS MODERATE 35: CPT | Mod: ,,, | Performed by: NEUROLOGICAL SURGERY

## 2019-11-02 PROCEDURE — 11000001 HC ACUTE MED/SURG PRIVATE ROOM

## 2019-11-02 PROCEDURE — 99233 SBSQ HOSP IP/OBS HIGH 50: CPT | Mod: ,,, | Performed by: HOSPITALIST

## 2019-11-02 PROCEDURE — A9585 GADOBUTROL INJECTION: HCPCS | Performed by: HOSPITALIST

## 2019-11-02 RX ORDER — HEPARIN SODIUM 5000 [USP'U]/ML
5000 INJECTION, SOLUTION INTRAVENOUS; SUBCUTANEOUS EVERY 8 HOURS
Status: DISPENSED | OUTPATIENT
Start: 2019-11-02 | End: 2019-11-05

## 2019-11-02 RX ORDER — LOPERAMIDE HYDROCHLORIDE 2 MG/1
2 CAPSULE ORAL 4 TIMES DAILY PRN
Status: DISCONTINUED | OUTPATIENT
Start: 2019-11-02 | End: 2019-11-09 | Stop reason: HOSPADM

## 2019-11-02 RX ORDER — GADOBUTROL 604.72 MG/ML
6 INJECTION INTRAVENOUS
Status: COMPLETED | OUTPATIENT
Start: 2019-11-02 | End: 2019-11-02

## 2019-11-02 RX ORDER — GABAPENTIN 300 MG/1
300 CAPSULE ORAL 3 TIMES DAILY PRN
Status: DISCONTINUED | OUTPATIENT
Start: 2019-11-02 | End: 2019-11-08

## 2019-11-02 RX ADMIN — SODIUM CHLORIDE, SODIUM LACTATE, POTASSIUM CHLORIDE, AND CALCIUM CHLORIDE 1000 ML: .6; .31; .03; .02 INJECTION, SOLUTION INTRAVENOUS at 09:11

## 2019-11-02 RX ADMIN — MIDAZOLAM 1 MG: 1 INJECTION INTRAMUSCULAR; INTRAVENOUS at 03:11

## 2019-11-02 RX ADMIN — MIDAZOLAM 1 MG: 1 INJECTION INTRAMUSCULAR; INTRAVENOUS at 02:11

## 2019-11-02 RX ADMIN — GADOBUTROL 6 ML: 604.72 INJECTION INTRAVENOUS at 03:11

## 2019-11-02 NOTE — CARE UPDATE
Rapid Response Nurse Chart Check     Chart check completed, abnormal VS noted. Bedside RN Marly contacted, no concerns verbalized at this time, instructed to call [phone] for further concerns or assistance.

## 2019-11-02 NOTE — PROGRESS NOTES
Rapid Response Nurse Chart Check     Chart check completed, abnormal VS noted. Bedside RN Mela contacted, no concerns verbalized at this time, instructed to call [phone] for further concerns or assistance.

## 2019-11-02 NOTE — PLAN OF CARE
Chart reviewed    Mr. Bronson is a 54 y/o male  with hx of IVDU (heroin), MSSA endocarditis, biltateral groin abscesses s/p I&D [facility].      Blood cultures here are positive for GPCs. Repeat blood cultures today are pending.         Recommendations:   1. Continue cefazolin as prior MSSA infx  2. NSGY evaluation pending, likely no surgical intervention.   3. Agree with HOLLAND   4. Anticipate long term IV abx therapy  5. [facility]  6. ID will follow with you pending above     Thank you, please call if with questions  John Toribio  [phone]

## 2019-11-02 NOTE — ASSESSMENT & PLAN NOTE
53 M with PMH of IVDU (heroin), MSSA bacteremia with septic shock and endocarditis, and HCV now with a cervical epidural abscess.    Neurologically stable  No immediate neurosurgical intervention required  Recommend consult ID, continue IV abx  ESR, CRP, Procalcitonin all elevated, BCx x2 with GPCs resembling staph  Repeat MRIwwo Csp demonstrating cervical epidural abscess  Recommend CT Csp  Likely no surgical intervention

## 2019-11-02 NOTE — PROGRESS NOTES
Pt returned from MRI, transported by 2 RN via bed. Pt received 1 mg dose of versed during MRI for anxiety. XIOMARA.

## 2019-11-02 NOTE — SUBJECTIVE & OBJECTIVE
Interval History: Repeat MRIwwo of Cspine obtained    Medications:  Continuous Infusions:  Scheduled Meds:   ceFAZolin(ANCEF) in D5W 500 mL CONTINUOUS INFUSION  6 g Intravenous Q24H    heparin (porcine)  5,000 Units Subcutaneous Q8H    polyethylene glycol  17 g Oral Daily     PRN Meds:acetaminophen, Dextrose 10% Bolus, Dextrose 10% Bolus, gabapentin, glucagon (human recombinant), glucose, glucose, loperamide, midazolam, ondansetron, ramelteon, sodium chloride 0.9%     Review of Systems  Objective:     Weight: 58.2 kg (128 lb 4.8 oz)  Body mass index is 17.4 kg/m².  Vital Signs (Most Recent):  Temp: 97.9 °F (36.6 °C) (11/02/19 0813)  Pulse: 100 (11/02/19 0813)  Resp: 18 (11/02/19 0813)  BP: 108/64 (11/02/19 0813)  SpO2: 99 % (11/02/19 0813) Vital Signs (24h Range):  Temp:  [97.9 °F (36.6 °C)-100.5 °F (38.1 °C)] 97.9 °F (36.6 °C)  Pulse:  [] 100  Resp:  [18-24] 18  SpO2:  [96 %-100 %] 99 %  BP: ()/(50-70) 108/64                     Male External Urinary Catheter 11/02/19 0341 Medium (Active)   Collection Container Standard drainage bag 11/2/2019  8:00 AM   Securement Method other (see comments) 11/2/2019  3:41 AM   Skin no redness;no breakdown 11/2/2019  8:00 AM   Tolerance no signs/symptoms of discomfort 11/2/2019  8:00 AM       Neurosurgery Physical Exam  GCS15 Aox3  CN2-12 intact  RUE/LUE: 5/5 Delt/Bi/Tri/IO/  RLE:/LLE: 4/5 HF Pain Limited 5/5 QD/DF/EHL/PF  +2 reflexes, no hoffmans, no babinski, no clonus  SILT    Significant Labs:  Recent Labs   Lab 10/31/19  2320 11/02/19  0450 11/02/19  0454   *  122* 139*  139*  --      136 138  138  --    K 4.0  4.0 3.7  3.7  --      103 103  103  --    CO2 25  25 24  24  --    BUN 23*  23* 20  20  --    CREATININE 0.9  0.9 1.0  1.0  --    CALCIUM 8.4*  8.4* 8.4*  8.4*  --    MG  --   --  1.6     Recent Labs   Lab 10/31/19  3810 11/02/19  0454   WBC 12.34  12.34 12.52   HGB 7.4*  7.4* 7.5*   HCT 25.0*  25.0* 24.3*   PLT  296  296 289     Recent Labs   Lab 10/31/19  1917   INR 1.1     Microbiology Results (last 7 days)     Procedure Component Value Units Date/Time    Blood culture (site 2) [[id number]] Collected:  10/31/19 1922    Order Status:  Completed Specimen:  Blood Updated:  11/02/19 0912     Blood Culture, Routine Gram stain aer bottle: Gram positive cocci in clusters resembling Staph       Results called to and read back by: Mela Arreaga RN  11/02/2019        09:12    Narrative:       Site # 2, aerobic only    Blood culture (site 1) [[id number]] Collected:  10/31/19 1917    Order Status:  Completed Specimen:  Blood Updated:  11/01/19 2022     Blood Culture, Routine No Growth to date      No Growth to date    Narrative:       Site # 1, aerobic and anaerobic    Blood culture [[id number]]     Order Status:  Sent Specimen:  Blood     Blood culture [[id number]]     Order Status:  Sent Specimen:  Blood         All pertinent labs from the last 24 hours have been reviewed.    Significant Diagnostics:  I have reviewed all pertinent imaging results/findings within the past 24 hours.

## 2019-11-02 NOTE — CARE UPDATE
RAPID RESPONSE NURSE PROACTIVE ROUNDING NOTE     Time of Visit: 0848    Admit Date: 10/31/2019  LOS: 2  Code Status: Full Code   Date of Visit: 2019  : 1965  Age: 53 y.o.  Sex: male  Race: White  Bed: 660/660 A:   MRN: [id number]  Was the patient discharged from an ICU this admission? no   Was the patient discharged from a PACU within last 24 hours?  no  Did the patient receive conscious sedation/general anesthesia in last 24 hours?  no  Was the patient in the ED within the past 24 hours?  no  Was the patient started on NIPPV within the past 24 hours?  no  Attending Physician: Zainab Casillas MD  [place] [facility] [place] [facility]    ASSESSMENT     Diagnosis: Epidural abscess    Abnormal Vital Signs: /64 (BP Location: Right arm, Patient Position: Lying)   Pulse 100   Temp 97.9 °F (36.6 °C) (Oral)   Resp 18   Ht 6' (1.829 m)   Wt 58.2 kg (128 lb 4.8 oz)   SpO2 99%   BMI 17.40 kg/m²      Clinical Issues: Circulatory    Patient  has no past medical history on file.    Patient sitting up in bed eating applesauce watching TV. RN at bedside. No distress noted. VSS.      INTERVENTIONS/ RECOMMENDATIONS     Continue with frequent VS.     Discussed plan of care with Mela CERDA.    PHYSICIAN ESCALATION     Yes/No  no    Disposition: Remain in room 660A.    FOLLOW-UP     Call back the Rapid Response Nurse, Mine Michaels RN at [phone] for additional questions or concerns.

## 2019-11-02 NOTE — ASSESSMENT & PLAN NOTE
Patient with albumin of   Lab Results   Component Value Date    LABPROT 11.3 10/31/2019    ALBUMIN 2.0 (L) 11/02/2019    ALBUMIN 2.0 (L) 11/02/2019        Plan:  -Nutrition consult, will f/u recs   -Boost with every meal  -Continue to monitor albumin  -time spent counseling patient on increasing PO intake, modalities to help increase protein intake etc.

## 2019-11-02 NOTE — ASSESSMENT & PLAN NOTE
Patient with IVDA, MSSA bacteremia w/ endocarditis (MV veg) and epidural abscess at C4 transferred [facility]. [facility] however they do not have NSG credentialing so patient was transferred here.   -MRI here showing C3-C4 and C6-C7 osteo/discitis w/ epidural abscess at that point     Plan:  -Cefazolin 6g continuous   -neuro surg consulted awaiting plan  -holding DVT Prophy   -NPO at midnight on Sunday night

## 2019-11-02 NOTE — ASSESSMENT & PLAN NOTE
Hx of hep C unsure of treatment, previously on Sofosuvir-Velpatasvir   -however he only filled this once   -Care everywhere showing HCV log 6.24 and RNA >200k    Plan:  -f/u outpatient

## 2019-11-02 NOTE — PROGRESS NOTES
[facility] Hospital Medicine  Progress Note    Patient Name: Ld Bronson  MRN: [id number]  Patient Class: IP- Inpatient   Admission Date: 10/31/2019  Length of Stay: 2 days  Attending Physician: Zainab Casillas MD  Primary Care Provider: Kris Artis Jr, MD    [facility] Medicine Team: [facility] [place] [facility] MED 4 Ely Kellogg MD    Subjective:     Principal Problem:Epidural abscess        HPI:  Mr. Bronson is a 53-year-old man with IVDA (heroin) and Hx MSSA bacteremia with IE, and HCV who presented to the [facility] on 10/23 with fever.     He has had a difficult year, mostly as a result of infectious diseases related to ongoing heroin abuse. He was treated this year for 3-4 weeks for IE prior to leaving [facility] but had a more recent admission on 9/21 for chest pain at which time TTE was performed, and he was discharged after blood cultures were negative ruling out ongoing IE. On admission he was hypotensive refractory to fluids and had bilateral cellulitis with abscesses that required bedside drainage by surgery, after which he was admitted to the MICU for septic shock. He has been treated with broad-spectrum antibiotics and levophed via a subclavian line with significant improvement. He was weaned off vasopressors within 24h and stepped down from the ICU six days ago. Since then his blood cultures have been positive for MSSA (first negative result on 10/25), echocardiogram has shown a MV vegetation, and his antibiotics have been narrowed to cefazolin 2g Q8H.      His admission has been complicated by diffuse body pains and joint pains initially thought to be hyperalgesia in the setting of mild opiate withdrawal, eventually evolving into neck and back pain. MRI C-spine showed an epidural abscess at C4. NSGY there recommended cervical decompression, which they are unable to do that there as the hospital is not yet accredited for it per Dr. Artis. He also had an MRI L spine that showed SI  joint effusion that was aspirated by IR with negative cultures. Ortho there did not recommend any additional intervention. Dr. Malone is aware of the patient and agreed to consult. Dr. Artis stated that they would take the patient back upon completion of surgical evaluation/treatment.     Upon presenting to the floor, patient was in NAD and resting comfortably in hospital bed. Patient reports slight lumbar back pain decreased appetite. Patient denies any current N/V/Chills. Patient does state that he has extensive groin abscesses that are quite painful. Patient reports that these have [facility] with [facility]. Patient has no further complaints at this time. NSG was contacted and report they will see him tonight however will likely not be going to OR tonight due to active case load tonight.     Overview/Hospital Course:  11/1- Patient doing well overnight with no new complaints; neurosurgery, ID, addiction psych, and nutrition consulted, awaiting neurosurgery recommendation on treatment plan for abscess; HOLLAND ordered for Monday. Patient was evaluated by NSG who would like to repeat MRI last night and now CT of C-Spine. Patient antibiotic regiment changed to continuous infusion of Cefazolin 6g given previous cultures were all MSSA. MRIs showing discitis/osteo at C3-C4, C6-C7 and epidural abscess associated at that point. Patient continues to spike low grade fevers of 100.5 most recently, wound care continues to address the packed ground abscesses. Plan to likely go to OR on Monday/Tuesday.     Interval History: Patient antibiotic regiment changed to continuous infusion of Cefazolin 6g given previous cultures were all MSSA. MRIs showing discitis/osteo at C3-C4, C6-C7 and epidural abscess associated at that point. Patient continues to spike low grade fevers of 100.5 most recently, wound care continues to address the packed ground abscesses. Plan to likely go to OR on Monday/Tuesday.        Review of Systems   Constitutional: Negative for chills and fever.   Respiratory: Negative for cough and shortness of breath.    Cardiovascular: Negative for chest pain.   Gastrointestinal: Negative for abdominal pain, diarrhea, nausea and vomiting.   Endocrine: Negative for cold intolerance and heat intolerance.   Genitourinary: Negative for difficulty urinating.   Musculoskeletal: Positive for back pain.   Neurological: Negative for headaches.   Psychiatric/Behavioral: Positive for agitation.     Objective:     Vital Signs (Most Recent):  Temp: 97.9 °F (36.6 °C) (11/02/19 0813)  Pulse: 100 (11/02/19 0813)  Resp: 18 (11/02/19 0813)  BP: 108/64 (11/02/19 0813)  SpO2: 99 % (11/02/19 0813) Vital Signs (24h Range):  Temp:  [97.9 °F (36.6 °C)-100.5 °F (38.1 °C)] 97.9 °F (36.6 °C)  Pulse:  [] 100  Resp:  [18-24] 18  SpO2:  [96 %-100 %] 99 %  BP: ()/(50-70) 108/64     Weight: 58.2 kg (128 lb 4.8 oz)  Body mass index is 17.4 kg/m².    Intake/Output Summary (Last 24 hours) at 11/2/2019 1420  Last data filed at 11/2/2019 0600  Gross per 24 hour   Intake 500 ml   Output 1800 ml   Net -1300 ml      Physical Exam   Constitutional: He is oriented to person, place, and time. He appears well-developed and well-nourished. No distress.   HENT:   Head: Normocephalic and atraumatic.   Eyes: Pupils are equal, round, and reactive to light. EOM are normal. No scleral icterus.   Neck: Normal range of motion. Neck supple. No JVD present.   Cardiovascular: Normal rate, normal heart sounds and intact distal pulses.   No murmur heard.  Sinus tach 110   Pulmonary/Chest: Effort normal and breath sounds normal. No respiratory distress. He has no wheezes.   Abdominal: Soft. Bowel sounds are normal. He exhibits no distension. There is no tenderness.   Musculoskeletal: Normal range of motion. He exhibits no edema.   Neurological: He is alert and oriented to person, place, and time. No cranial nerve deficit.   Skin: Skin is warm.  Capillary refill takes less than 2 seconds. He is not diaphoretic. No erythema.   Psychiatric: He has a normal mood and affect.   Vitals reviewed.      Significant Labs: All pertinent labs within the past 24 hours have been reviewed.    Significant Imaging: I have reviewed and interpreted all pertinent imaging results/findings within the past 24 hours.      Assessment/Plan:      * Epidural abscess  Patient with IVDA, MSSA bacteremia w/ endocarditis (MV veg) and epidural abscess at C4 transferred for NSG evaluation. [facility] however they do not have NSG credentialing so patient was transferred here.   -MRI here showing C3-C4 and C6-C7 osteo/discitis w/ epidural abscess at that point     Plan:  -Cefazolin 6g continuous   -neuro surg consulted awaiting plan  -holding DVT Prophy   -NPO at midnight on Sunday night          Hepatitis B antibody positive  History of positive Hep B antibody test      Cachexia  Patient chronically cachectic  -nutrition consulted       Groin abscess  Patient with multiple groin abscess that was I&D at bedside last week. Covering with bs ABx at this time. Wounds packed.     Plan:  -BS Abx  -Wound care consult   -Obtain records of cultures       Severe protein-calorie malnutrition  Patient with albumin of   Lab Results   Component Value Date    LABPROT 11.3 10/31/2019    ALBUMIN 2.0 (L) 11/02/2019    ALBUMIN 2.0 (L) 11/02/2019        Plan:  -Nutrition consult, will f/u recs   -Boost with every meal  -Continue to monitor albumin  -time spent counseling patient on increasing PO intake, modalities to help increase protein intake etc.        HCV (hepatitis C virus)  Hx of hep C unsure of treatment, previously on Sofosuvir-Velpatasvir   -however he only filled this once   -Care everywhere showing HCV log 6.24 and RNA >200k    Plan:  -f/u outpatient     Opiate abuse, continuous  See above       Endocarditis due to methicillin susceptible Staphylococcus aureus (MSSA)  Patient  with recent history of MSSA bacteremia and endocarditis who presents as a transfer for concern of epidural abscess of C4. Patient was treated for IE for 3-4 weeks and left AMA earlier this month and [facility], BS abx and pressors. Patient was stepped down from ICU and has remained on BS abx on [facility] floor of the [facility] for the past 6 days.   -MSSA (first negative result on 10/25), echocardiogram has shown a MV vegetation, and his antibiotics have been narrowed to cefazolin 2g Q8H.    Plan:  -Given Epidural abscess and concomitant MSSA endocarditis will treat with Cefazolin  -[facility] records from [facility], likely not till next week  -2D echo, pending   -Repeat cultures (10/31)- Aerobic 1/2 GPC in clusters resembling Staph  -Repeat cultures drawn at that time      IV drug abuse  Chronic IV Heroin use, recently left AMA From outside facility earlier this month with continued heroin use. Reports last IVDU > days prior to his [facility] for [facility].       Plan:  -Addiction psych consult in AM  -patient not currently on MAT         VTE Risk Mitigation (From admission, onward)         Ordered     heparin (porcine) injection 5,000 Units  Every 8 hours      11/02/19 1049     Place sequential compression device  Until discontinued      10/31/19 1851     IP VTE LOW RISK PATIENT  Once      10/31/19 1851                  Plan discussed with attending Dr. Casillas, further recommendations as per attending addendum. Please feel free to call with any questions or concerns.        Ely Kellogg MD  Internal Medicine  Resident Physician - PGY3      Ely Kellogg MD  Department of Hospital Medicine   [facility]

## 2019-11-02 NOTE — ASSESSMENT & PLAN NOTE
Patient with recent history of MSSA bacteremia and endocarditis who presents as a transfer for concern of epidural abscess of C4. Patient was treated for IE for 3-4 weeks and left AMA earlier this month and [facility], BS abx and pressors. Patient was stepped down from ICU and has remained on BS abx on [facility] the [facility] for the past 6 days.   -MSSA (first negative result on 10/25), echocardiogram has shown a MV vegetation, and his antibiotics have been narrowed to cefazolin 2g Q8H.    Plan:  -Given Epidural abscess and concomitant MSSA endocarditis will treat with Cefazolin  -[facility], likely not till next week  -2D echo, pending   -Repeat cultures (10/31)- Aerobic 1/2 GPC in clusters resembling Staph  -Repeat cultures drawn at that time

## 2019-11-02 NOTE — PROGRESS NOTES
Patient transferred to the MRI bed,,, tele, O2 sensor and BP cuff applied,, 1 mg of versed given,,, VSS,,, WCTM

## 2019-11-02 NOTE — SUBJECTIVE & OBJECTIVE
Interval History: Patient antibiotic regiment changed to continuous infusion of Cefazolin 6g given previous cultures were all MSSA. MRIs showing discitis/osteo at C3-C4, C6-C7 and epidural abscess associated at that point. Patient continues to spike low grade fevers of 100.5 most recently, wound care continues to address the packed ground abscesses. Plan to likely go to OR on Monday/Tuesday.       Review of Systems   Constitutional: Negative for chills and fever.   Respiratory: Negative for cough and shortness of breath.    Cardiovascular: Negative for chest pain.   Gastrointestinal: Negative for abdominal pain, diarrhea, nausea and vomiting.   Endocrine: Negative for cold intolerance and heat intolerance.   Genitourinary: Negative for difficulty urinating.   Musculoskeletal: Positive for back pain.   Neurological: Negative for headaches.   Psychiatric/Behavioral: Positive for agitation.     Objective:     Vital Signs (Most Recent):  Temp: 97.9 °F (36.6 °C) (11/02/19 0813)  Pulse: 100 (11/02/19 0813)  Resp: 18 (11/02/19 0813)  BP: 108/64 (11/02/19 0813)  SpO2: 99 % (11/02/19 0813) Vital Signs (24h Range):  Temp:  [97.9 °F (36.6 °C)-100.5 °F (38.1 °C)] 97.9 °F (36.6 °C)  Pulse:  [] 100  Resp:  [18-24] 18  SpO2:  [96 %-100 %] 99 %  BP: ()/(50-70) 108/64     Weight: 58.2 kg (128 lb 4.8 oz)  Body mass index is 17.4 kg/m².    Intake/Output Summary (Last 24 hours) at 11/2/2019 1420  Last data filed at 11/2/2019 0600  Gross per 24 hour   Intake 500 ml   Output 1800 ml   Net -1300 ml      Physical Exam   Constitutional: He is oriented to person, place, and time. He appears well-developed and well-nourished. No distress.   HENT:   Head: Normocephalic and atraumatic.   Eyes: Pupils are equal, round, and reactive to light. EOM are normal. No scleral icterus.   Neck: Normal range of motion. Neck supple. No JVD present.   Cardiovascular: Normal rate, normal heart sounds and intact distal pulses.   No murmur  heard.  Sinus tach 110   Pulmonary/Chest: Effort normal and breath sounds normal. No respiratory distress. He has no wheezes.   Abdominal: Soft. Bowel sounds are normal. He exhibits no distension. There is no tenderness.   Musculoskeletal: Normal range of motion. He exhibits no edema.   Neurological: He is alert and oriented to person, place, and time. No cranial nerve deficit.   Skin: Skin is warm. Capillary refill takes less than 2 seconds. He is not diaphoretic. No erythema.   Psychiatric: He has a normal mood and affect.   Vitals reviewed.      Significant Labs: All pertinent labs within the past 24 hours have been reviewed.    Significant Imaging: I have reviewed and interpreted all pertinent imaging results/findings within the past 24 hours.

## 2019-11-02 NOTE — PROGRESS NOTES
[facility]  Neurosurgery  Progress Note    Subjective:     History of Present Illness: 53 M with PMH of IVDU (heroin), MSSA bacteremia with septic shock and endocarditis, and HCV. Was treated at OSH for septic shock, after complaining of back pain was found to have a cervical spine epidural abscess and transferred to [facility] [place].     Post-Op Info:  * No surgery found *         Interval History: Repeat MRIwwo of Cspine obtained    Medications:  Continuous Infusions:  Scheduled Meds:   ceFAZolin(ANCEF) in D5W 500 mL CONTINUOUS INFUSION  6 g Intravenous Q24H    heparin (porcine)  5,000 Units Subcutaneous Q8H    polyethylene glycol  17 g Oral Daily     PRN Meds:acetaminophen, Dextrose 10% Bolus, Dextrose 10% Bolus, gabapentin, glucagon (human recombinant), glucose, glucose, loperamide, midazolam, ondansetron, ramelteon, sodium chloride 0.9%     Review of Systems  Objective:     Weight: 58.2 kg (128 lb 4.8 oz)  Body mass index is 17.4 kg/m².  Vital Signs (Most Recent):  Temp: 97.9 °F (36.6 °C) (11/02/19 0813)  Pulse: 100 (11/02/19 0813)  Resp: 18 (11/02/19 0813)  BP: 108/64 (11/02/19 0813)  SpO2: 99 % (11/02/19 0813) Vital Signs (24h Range):  Temp:  [97.9 °F (36.6 °C)-100.5 °F (38.1 °C)] 97.9 °F (36.6 °C)  Pulse:  [] 100  Resp:  [18-24] 18  SpO2:  [96 %-100 %] 99 %  BP: ()/(50-70) 108/64                     Male External Urinary Catheter 11/02/19 0341 Medium (Active)   Collection Container Standard drainage bag 11/2/2019  8:00 AM   Securement Method other (see comments) 11/2/2019  3:41 AM   Skin no redness;no breakdown 11/2/2019  8:00 AM   Tolerance no signs/symptoms of discomfort 11/2/2019  8:00 AM       Neurosurgery Physical Exam  GCS15 Aox3  CN2-12 intact  RUE/LUE: 5/5 Delt/Bi/Tri/IO/  RLE:/LLE: 4/5 HF Pain Limited 5/5 QD/DF/EHL/PF  +2 reflexes, no hoffmans, no babinski, no clonus  SILT    Significant Labs:  Recent Labs   Lab 10/31/19  2320 11/02/19  0450 11/02/19  0454   *  122*  139*  139*  --      136 138  138  --    K 4.0  4.0 3.7  3.7  --      103 103  103  --    CO2 25  25 24  24  --    BUN 23*  23* 20  20  --    CREATININE 0.9  0.9 1.0  1.0  --    CALCIUM 8.4*  8.4* 8.4*  8.4*  --    MG  --   --  1.6     Recent Labs   Lab 10/31/19  2320 11/02/19  0454   WBC 12.34  12.34 12.52   HGB 7.4*  7.4* 7.5*   HCT 25.0*  25.0* 24.3*     296 289     Recent Labs   Lab 10/31/19  1917   INR 1.1     Microbiology Results (last 7 days)     Procedure Component Value Units Date/Time    Blood culture (site 2) [[id number]] Collected:  10/31/19 1922    Order Status:  Completed Specimen:  Blood Updated:  11/02/19 0912     Blood Culture, Routine Gram stain aer bottle: Gram positive cocci in clusters resembling Staph       Results called to and read back by: Mela Arreaga RN  11/02/2019        09:12    Narrative:       Site # 2, aerobic only    Blood culture (site 1) [[id number]] Collected:  10/31/19 1917    Order Status:  Completed Specimen:  Blood Updated:  11/01/19 2022     Blood Culture, Routine No Growth to date      No Growth to date    Narrative:       Site # 1, aerobic and anaerobic    Blood culture [[id number]]     Order Status:  Sent Specimen:  Blood     Blood culture [[id number]]     Order Status:  Sent Specimen:  Blood         All pertinent labs from the last 24 hours have been reviewed.    Significant Diagnostics:  I have reviewed all pertinent imaging results/findings within the past 24 hours.    Assessment/Plan:     * Epidural abscess  53 M with PMH of IVDU (heroin), MSSA bacteremia with septic shock and endocarditis, and HCV now with a cervical epidural abscess.    Neurologically stable  No immediate neurosurgical intervention required  Recommend consult ID, continue IV abx  ESR, CRP, Procalcitonin all elevated, BCx x2 with GPCs resembling staph  Repeat MRIwwo Csp demonstrating cervical epidural abscess  Recommend CT Csp  Likely no surgical  intervention          Óscar Orozco MD  Neurosurgery  [facility]

## 2019-11-03 LAB
ABO + RH BLD: NORMAL
ALBUMIN SERPL BCP-MCNC: 1.9 G/DL (ref 3.5–5.2)
ALP SERPL-CCNC: 92 U/L (ref 55–135)
ALT SERPL W/O P-5'-P-CCNC: 35 U/L (ref 10–44)
ANION GAP SERPL CALC-SCNC: 7 MMOL/L (ref 8–16)
ASCENDING AORTA: 3.5 CM
AST SERPL-CCNC: 101 U/L (ref 10–40)
AV INDEX (PROSTH): 0.77
AV MEAN GRADIENT: 4 MMHG
AV PEAK GRADIENT: 7 MMHG
AV VALVE AREA: 3.36 CM2
AV VELOCITY RATIO: 0.93
BASOPHILS # BLD AUTO: 0.03 K/UL (ref 0–0.2)
BASOPHILS NFR BLD: 0.3 % (ref 0–1.9)
BILIRUB SERPL-MCNC: 0.2 MG/DL (ref 0.1–1)
BLD GP AB SCN CELLS X3 SERPL QL: NORMAL
BLD PROD TYP BPU: NORMAL
BLOOD UNIT EXPIRATION DATE: NORMAL
BLOOD UNIT TYPE CODE: 5100
BLOOD UNIT TYPE: NORMAL
BSA FOR ECHO PROCEDURE: 1.72 M2
BUN SERPL-MCNC: 21 MG/DL (ref 6–20)
CALCIUM SERPL-MCNC: 8.6 MG/DL (ref 8.7–10.5)
CHLORIDE SERPL-SCNC: 103 MMOL/L (ref 95–110)
CO2 SERPL-SCNC: 27 MMOL/L (ref 23–29)
CODING SYSTEM: NORMAL
CREAT SERPL-MCNC: 0.9 MG/DL (ref 0.5–1.4)
CV ECHO LV RWT: 0.43 CM
DIFFERENTIAL METHOD: ABNORMAL
DISPENSE STATUS: NORMAL
DOP CALC AO PEAK VEL: 1.36 M/S
DOP CALC AO VTI: 19.74 CM
DOP CALC LVOT AREA: 4.4 CM2
DOP CALC LVOT DIAMETER: 2.36 CM
DOP CALC LVOT PEAK VEL: 1.26 M/S
DOP CALC LVOT STROKE VOLUME: 66.24 CM3
DOP CALCLVOT PEAK VEL VTI: 15.15 CM
E WAVE DECELERATION TIME: 144.39 MSEC
E/A RATIO: 0.65
E/E' RATIO: 5.9 M/S
ECHO LV POSTERIOR WALL: 0.86 CM (ref 0.6–1.1)
EOSINOPHIL # BLD AUTO: 0.1 K/UL (ref 0–0.5)
EOSINOPHIL NFR BLD: 1 % (ref 0–8)
ERYTHROCYTE [DISTWIDTH] IN BLOOD BY AUTOMATED COUNT: 16.9 % (ref 11.5–14.5)
EST. GFR  (AFRICAN AMERICAN): >60 ML/MIN/1.73 M^2
EST. GFR  (NON AFRICAN AMERICAN): >60 ML/MIN/1.73 M^2
FRACTIONAL SHORTENING: 32 % (ref 28–44)
GLUCOSE SERPL-MCNC: 104 MG/DL (ref 70–110)
HCT VFR BLD AUTO: 23.1 % (ref 40–54)
HGB BLD-MCNC: 6.9 G/DL (ref 14–18)
IMM GRANULOCYTES # BLD AUTO: 0.05 K/UL (ref 0–0.04)
IMM GRANULOCYTES NFR BLD AUTO: 0.5 % (ref 0–0.5)
INTERVENTRICULAR SEPTUM: 0.98 CM (ref 0.6–1.1)
IVRT: 0.07 MSEC
LA MAJOR: 5.21 CM
LA MINOR: 5.19 CM
LA WIDTH: 4.11 CM
LEFT ATRIUM SIZE: 3.34 CM
LEFT ATRIUM VOLUME INDEX: 34.4 ML/M2
LEFT ATRIUM VOLUME: 60.67 CM3
LEFT INTERNAL DIMENSION IN SYSTOLE: 2.75 CM (ref 2.1–4)
LEFT VENTRICLE DIASTOLIC VOLUME INDEX: 40.11 ML/M2
LEFT VENTRICLE DIASTOLIC VOLUME: 70.69 ML
LEFT VENTRICLE MASS INDEX: 65 G/M2
LEFT VENTRICLE SYSTOLIC VOLUME INDEX: 16 ML/M2
LEFT VENTRICLE SYSTOLIC VOLUME: 28.17 ML
LEFT VENTRICULAR INTERNAL DIMENSION IN DIASTOLE: 4.02 CM (ref 3.5–6)
LEFT VENTRICULAR MASS: 113.97 G
LV LATERAL E/E' RATIO: 4.13 M/S
LV SEPTAL E/E' RATIO: 10.33 M/S
LYMPHOCYTES # BLD AUTO: 1.6 K/UL (ref 1–4.8)
LYMPHOCYTES NFR BLD: 16.2 % (ref 18–48)
MAGNESIUM SERPL-MCNC: 1.6 MG/DL (ref 1.6–2.6)
MCH RBC QN AUTO: 25.7 PG (ref 27–31)
MCHC RBC AUTO-ENTMCNC: 29.9 G/DL (ref 32–36)
MCV RBC AUTO: 86 FL (ref 82–98)
MONOCYTES # BLD AUTO: 0.5 K/UL (ref 0.3–1)
MONOCYTES NFR BLD: 5.6 % (ref 4–15)
MV PEAK A VEL: 0.95 M/S
MV PEAK E VEL: 0.62 M/S
NEUTROPHILS # BLD AUTO: 7.3 K/UL (ref 1.8–7.7)
NEUTROPHILS NFR BLD: 76.4 % (ref 38–73)
NRBC BLD-RTO: 0 /100 WBC
PHOSPHATE SERPL-MCNC: 4.2 MG/DL (ref 2.7–4.5)
PISA TR MAX VEL: 2.65 M/S
PLATELET # BLD AUTO: 270 K/UL (ref 150–350)
PMV BLD AUTO: 9.4 FL (ref 9.2–12.9)
POTASSIUM SERPL-SCNC: 4.1 MMOL/L (ref 3.5–5.1)
PROT SERPL-MCNC: 7.1 G/DL (ref 6–8.4)
PULM VEIN S/D RATIO: 1.68
PV PEAK D VEL: 0.56 M/S
PV PEAK S VEL: 0.94 M/S
RA MAJOR: 4.75 CM
RA PRESSURE: 3 MMHG
RA WIDTH: 3.36 CM
RBC # BLD AUTO: 2.68 M/UL (ref 4.6–6.2)
RIGHT VENTRICULAR END-DIASTOLIC DIMENSION: 3.72 CM
RV TISSUE DOPPLER FREE WALL SYSTOLIC VELOCITY 1 (APICAL 4 CHAMBER VIEW): 14.76 CM/S
SINUS: 3.64 CM
SODIUM SERPL-SCNC: 137 MMOL/L (ref 136–145)
STJ: 3.41 CM
TDI LATERAL: 0.15 M/S
TDI SEPTAL: 0.06 M/S
TDI: 0.11 M/S
TR MAX PG: 28 MMHG
TRANS ERYTHROCYTES VOL PATIENT: NORMAL ML
TRICUSPID ANNULAR PLANE SYSTOLIC EXCURSION: 2.01 CM
TV REST PULMONARY ARTERY PRESSURE: 31 MMHG
WBC # BLD AUTO: 9.59 K/UL (ref 3.9–12.7)

## 2019-11-03 PROCEDURE — 85025 COMPLETE CBC W/AUTO DIFF WBC: CPT

## 2019-11-03 PROCEDURE — 84100 ASSAY OF PHOSPHORUS: CPT

## 2019-11-03 PROCEDURE — 11000001 HC ACUTE MED/SURG PRIVATE ROOM

## 2019-11-03 PROCEDURE — 99232 SBSQ HOSP IP/OBS MODERATE 35: CPT | Mod: ,,, | Performed by: HOSPITALIST

## 2019-11-03 PROCEDURE — 99232 PR SUBSEQUENT HOSPITAL CARE,LEVL II: ICD-10-PCS | Mod: ,,, | Performed by: HOSPITALIST

## 2019-11-03 PROCEDURE — P9021 RED BLOOD CELLS UNIT: HCPCS

## 2019-11-03 PROCEDURE — 63600175 PHARM REV CODE 636 W HCPCS: Performed by: PHYSICIAN ASSISTANT

## 2019-11-03 PROCEDURE — 83735 ASSAY OF MAGNESIUM: CPT

## 2019-11-03 PROCEDURE — 99232 SBSQ HOSP IP/OBS MODERATE 35: CPT | Mod: ,,, | Performed by: NEUROLOGICAL SURGERY

## 2019-11-03 PROCEDURE — 86901 BLOOD TYPING SEROLOGIC RH(D): CPT

## 2019-11-03 PROCEDURE — 80053 COMPREHEN METABOLIC PANEL: CPT

## 2019-11-03 PROCEDURE — 99232 PR SUBSEQUENT HOSPITAL CARE,LEVL II: ICD-10-PCS | Mod: ,,, | Performed by: NEUROLOGICAL SURGERY

## 2019-11-03 PROCEDURE — 25000003 PHARM REV CODE 250: Performed by: STUDENT IN AN ORGANIZED HEALTH CARE EDUCATION/TRAINING PROGRAM

## 2019-11-03 PROCEDURE — 86920 COMPATIBILITY TEST SPIN: CPT

## 2019-11-03 PROCEDURE — 36430 TRANSFUSION BLD/BLD COMPNT: CPT

## 2019-11-03 RX ORDER — FERROUS SULFATE 325(65) MG
325 TABLET, DELAYED RELEASE (ENTERIC COATED) ORAL DAILY
Status: DISCONTINUED | OUTPATIENT
Start: 2019-11-03 | End: 2019-11-09 | Stop reason: HOSPADM

## 2019-11-03 RX ORDER — FOLIC ACID 1 MG/1
1 TABLET ORAL DAILY
Status: DISCONTINUED | OUTPATIENT
Start: 2019-11-03 | End: 2019-11-09 | Stop reason: HOSPADM

## 2019-11-03 RX ORDER — HYDROCODONE BITARTRATE AND ACETAMINOPHEN 500; 5 MG/1; MG/1
TABLET ORAL
Status: DISCONTINUED | OUTPATIENT
Start: 2019-11-03 | End: 2019-11-09 | Stop reason: HOSPADM

## 2019-11-03 RX ADMIN — FOLIC ACID 1 MG: 1 TABLET ORAL at 09:11

## 2019-11-03 RX ADMIN — CEFAZOLIN 6 G: 10 INJECTION, POWDER, FOR SOLUTION INTRAVENOUS at 12:11

## 2019-11-03 RX ADMIN — FERROUS SULFATE TAB EC 325 MG (65 MG FE EQUIVALENT) 325 MG: 325 (65 FE) TABLET DELAYED RESPONSE at 09:11

## 2019-11-03 NOTE — ASSESSMENT & PLAN NOTE
Patient with IVDA, MSSA bacteremia w/ endocarditis (MV veg) and epidural abscess at C4 transferred for NSG evaluation. [facility] however they do not have NSG credentialing so patient was transferred here.   -MRI here showing C3-C4 and C6-C7 osteo/discitis w/ epidural abscess at that point     Plan:  -Cefazolin 6g continuous   -neuro surg consulted awaiting plan  -holding DVT Prophy   - 11/3- Hemoglobin of 6.9 overnight, plan to transfuse 1 unit PRBC  -NPO at midnight on Sunday night

## 2019-11-03 NOTE — PROGRESS NOTES
[facility] Hospital Medicine  Progress Note    Patient Name: Ld Bronson  MRN: [id number]  Patient Class: IP- Inpatient   Admission Date: 10/31/2019  Length of Stay: 3 days  Attending Physician: Zainab Casillas MD  Primary Care Provider: Kris Artis Jr, MD    [facility] Medicine Team: [facility] 4 Rakan Machado MD    Subjective:     Principal Problem:Epidural abscess        HPI:  Mr. Bronson is a 53-year-old man with IVDA (heroin) and Hx MSSA bacteremia with IE, and HCV who presented to the [facility] on 10/23 with fever.     He has had a difficult year, mostly as a result of infectious diseases related to ongoing heroin abuse. He was treated this year for 3-4 weeks for IE prior to leaving [place] but had a more recent admission on 9/21 for chest pain at which time TTE was performed, and he was discharged after blood cultures were negative ruling out ongoing IE. On admission he was hypotensive refractory to fluids and had bilateral cellulitis with abscesses that required bedside drainage by surgery, after which he was admitted to the MICU for septic shock. He has been treated with broad-spectrum antibiotics and levophed via a subclavian line with significant improvement. He was weaned off vasopressors within 24h and stepped down from the ICU six days ago. Since then his blood cultures have been positive for MSSA (first negative result on 10/25), echocardiogram has shown a MV vegetation, and his antibiotics have been narrowed to cefazolin 2g Q8H.      His admission has been complicated by diffuse body pains and joint pains initially thought to be hyperalgesia in the setting of mild opiate withdrawal, eventually evolving into neck and back pain. MRI C-spine showed an epidural abscess at C4. NSGY there recommended cervical decompression, which they are unable to do that there as the hospital is not yet accredited for it per Dr. Artis. He also had an MRI L spine that showed SI joint  effusion that was aspirated by IR with negative cultures. Ortho there did not recommend any additional intervention. Dr. Malone is aware of the patient and agreed to consult. Dr. Artis stated that they would take the patient back upon completion of surgical evaluation/treatment.     Upon presenting to the floor, patient was in NAD and resting comfortably in hospital bed. Patient reports slight lumbar back pain decreased appetite. Patient denies any current N/V/Chills. Patient does state that he has extensive groin abscesses that are quite painful. Patient reports that these [facility] with [facility]. Patient has no further complaints at this time. NSG was contacted and report they will see him tonight however will likely not be going to OR tonight due to active case load tonight.     Overview/Hospital Course:  11/1- Patient doing well overnight with no new complaints; neurosurgery, ID, addiction psych, and nutrition consulted, awaiting neurosurgery recommendation on treatment plan for abscess; HOLLAND ordered for Monday. Patient was evaluated by NSG who would like to repeat MRI last night and now CT of C-Spine. Patient antibiotic regiment changed to continuous infusion of Cefazolin 6g given previous cultures were all MSSA. MRIs showing discitis/osteo at C3-C4, C6-C7 and epidural abscess associated at that point. Patient continues to spike low grade fevers of 100.5 most recently, wound care continues to address the packed ground abscesses. Plan to likely go to OR on Monday/Tuesday. 11/3- hemoglobin dropped to 6.9, will transfuse 1 unit PRBC    Interval History: Patient antibiotic regiment changed to continuous infusion of Cefazolin 6g given previous cultures were all MSSA. MRIs showing discitis/osteo at C3-C4, C6-C7 and epidural abscess associated at that point. Patient continues to spike low grade fevers, wound care continues to address the packed ground abscesses. Plan to likely go  to OR on Monday/Tuesday. Hemoglobin of 6.9 overnight, plan to transfuse 1 unit PRBC.      Review of Systems   Constitutional: Negative for chills and fever.   Respiratory: Negative for cough and shortness of breath.    Cardiovascular: Negative for chest pain.   Gastrointestinal: Negative for abdominal pain, diarrhea, nausea and vomiting.   Endocrine: Negative for cold intolerance and heat intolerance.   Genitourinary: Negative for difficulty urinating.   Musculoskeletal: Positive for back pain.   Neurological: Negative for headaches.   Psychiatric/Behavioral: Positive for agitation.     Objective:     Vital Signs (Most Recent):  Temp: 97.9 °F (36.6 °C) (11/03/19 0734)  Pulse: 100 (11/03/19 0734)  Resp: 18 (11/03/19 0734)  BP: 100/63 (11/03/19 0734)  SpO2: 100 % (11/03/19 0734) Vital Signs (24h Range):  Temp:  [97.3 °F (36.3 °C)-99.6 °F (37.6 °C)] 97.9 °F (36.6 °C)  Pulse:  [] 100  Resp:  [18-20] 18  SpO2:  [93 %-100 %] 100 %  BP: ()/(57-67) 100/63     Weight: 58.2 kg (128 lb 4.8 oz)  Body mass index is 17.4 kg/m².    Intake/Output Summary (Last 24 hours) at 11/3/2019 1200  Last data filed at 11/3/2019 0900  Gross per 24 hour   Intake 2280 ml   Output 4125 ml   Net -1845 ml      Physical Exam   Constitutional: He is oriented to person, place, and time. He appears well-developed and well-nourished. No distress.   HENT:   Head: Normocephalic and atraumatic.   Eyes: Pupils are equal, round, and reactive to light. EOM are normal. No scleral icterus.   Neck: Normal range of motion. Neck supple. No JVD present.   Cardiovascular: Normal rate, normal heart sounds and intact distal pulses.   No murmur heard.  Sinus tach 110   Pulmonary/Chest: Effort normal and breath sounds normal. No respiratory distress. He has no wheezes.   Abdominal: Soft. Bowel sounds are normal. He exhibits no distension. There is no tenderness.   Musculoskeletal: Normal range of motion. He exhibits no edema.   Neurological: He is alert and  oriented to person, place, and time. No cranial nerve deficit.   Skin: Skin is warm. Capillary refill takes less than 2 seconds. He is not diaphoretic. No erythema.   Psychiatric: He has a normal mood and affect.   Vitals reviewed.      Significant Labs: All pertinent labs within the past 24 hours have been reviewed.    Significant Imaging: I have reviewed and interpreted all pertinent imaging results/findings within the past 24 hours.      Assessment/Plan:      * Epidural abscess  Patient with IVDA, MSSA bacteremia w/ endocarditis (MV veg) and epidural abscess at C4 transferred for NSG evaluation. [facility] to decomp[facility] however they do not have NSG credentialing so patient was transferred here.   -MRI here showing C3-C4 and C6-C7 osteo/discitis w/ epidural abscess at that point     Plan:  -Cefazolin 6g continuous   -neuro surg consulted awaiting plan  -holding DVT Prophy   - 11/3- Hemoglobin of 6.9 overnight, plan to transfuse 1 unit PRBC  -NPO at midnight on Sunday night          Hepatitis B antibody positive  History of positive Hep B antibody test      Cachexia  Patient chronically cachectic  -nutrition consulted       Groin abscess  Patient with multiple groin abscess that was I&D at bedside last week. Covering with bs ABx at this time. Wounds packed.     Plan:  -BS Abx  -Wound care consult   -Obtain records of cultures       Severe protein-calorie malnutrition  Patient with albumin of   Lab Results   Component Value Date    LABPROT 11.3 10/31/2019    ALBUMIN 2.0 (L) 11/02/2019    ALBUMIN 2.0 (L) 11/02/2019        Plan:  -Nutrition consult, will f/u recs   -Boost with every meal  -Continue to monitor albumin  -time spent counseling patient on increasing PO intake, modalities to help increase protein intake etc.        HCV (hepatitis C virus)  Hx of hep C unsure of treatment, previously on Sofosuvir-Velpatasvir   -however he only filled this once   -Care everywhere showing HCV log 6.24 and RNA  >200k    Plan:  -f/u outpatient     Opiate abuse, continuous  See above       Endocarditis due to methicillin susceptible Staphylococcus aureus (MSSA)  Patient with recent history of MSSA bacteremia and endocarditis who presents as a transfer for concern of epidural abscess of C4. Patient was treated for IE for 3-4 weeks and left AMA earlier this month and [facility], BS abx and pressors. Patient was stepped down from ICU and has remained on BS abx on [facility] floor of the [facility] for the past 6 days.   -MSSA (first negative result on 10/25), echocardiogram has shown a MV vegetation, and his antibiotics have been narrowed to cefazolin 2g Q8H.    Plan:  -Given Epidural abscess and concomitant MSSA endocarditis will treat with Cefazolin  -obtain records from [facility], likely not till next week  -2D echo, pending   -Repeat cultures (10/31)- Aerobic 1/2 GPC in clusters resembling Staph  -Repeat cultures drawn at that time      IV drug abuse  Chronic IV Heroin use, recently left AMA From outside facility earlier this month with continued heroin use. Reports last IVDU > days prior to his admission [facility] for [facility].       Plan:  -Addiction psych consult in AM  -patient not currently on MAT         VTE Risk Mitigation (From admission, onward)         Ordered     heparin (porcine) injection 5,000 Units  Every 8 hours      11/02/19 1049     Place sequential compression device  Until discontinued      10/31/19 1851     IP VTE LOW RISK PATIENT  Once      10/31/19 1851                      Rakan Machado MD  Department of Hospital Medicine   [facility]

## 2019-11-03 NOTE — SUBJECTIVE & OBJECTIVE
Interval History: CT C spine obtained    Medications:  Continuous Infusions:  Scheduled Meds:   ceFAZolin(ANCEF) in D5W 500 mL CONTINUOUS INFUSION  6 g Intravenous Q24H    ferrous sulfate  325 mg Oral Daily    folic acid  1 mg Oral Daily    heparin (porcine)  5,000 Units Subcutaneous Q8H    polyethylene glycol  17 g Oral Daily     PRN Meds:sodium chloride, acetaminophen, Dextrose 10% Bolus, Dextrose 10% Bolus, gabapentin, glucagon (human recombinant), glucose, glucose, loperamide, midazolam, ondansetron, ramelteon, sodium chloride 0.9%     Review of Systems  Objective:     Weight: 58.2 kg (128 lb 4.8 oz)  Body mass index is 17.4 kg/m².  Vital Signs (Most Recent):  Temp: 97.9 °F (36.6 °C) (11/03/19 0734)  Pulse: 100 (11/03/19 0734)  Resp: 18 (11/03/19 0734)  BP: 100/63 (11/03/19 0734)  SpO2: 100 % (11/03/19 0734) Vital Signs (24h Range):  Temp:  [97.3 °F (36.3 °C)-99.6 °F (37.6 °C)] 97.9 °F (36.6 °C)  Pulse:  [] 100  Resp:  [18-20] 18  SpO2:  [93 %-100 %] 100 %  BP: ()/(57-67) 100/63     Date 11/03/19 0700 - 11/04/19 0659   Shift 9148-1815 7437-8118 4206-7032 24 Hour Total   INTAKE   P.O. 960   960   Shift Total(mL/kg) 960(16.5)   960(16.5)   OUTPUT   Urine(mL/kg/hr) 900   900   Shift Total(mL/kg) 900(15.5)   900(15.5)   Weight (kg) 58.2 58.2 58.2 58.2                   Male External Urinary Catheter 11/02/19 0341 Medium (Active)   Collection Container Standard drainage bag 11/2/2019  8:00 AM   Securement Method other (see comments) 11/2/2019  3:41 AM   Skin no redness;no breakdown 11/2/2019  8:00 AM   Tolerance no signs/symptoms of discomfort 11/2/2019  8:00 AM       Neurosurgery Physical Exam  GCS15 Aox3  CN2-12 intact  RUE/LUE: 5/5 Delt/Bi/Tri/IO/  RLE:/LLE: 4/5 HF Pain Limited 5/5 QD/DF/EHL/PF  +2 reflexes, no hoffmans, no babinski, no clonus  SILT    Significant Labs:  Recent Labs   Lab 11/02/19  0450 11/02/19  0454 11/03/19  0418   *  139*  --  104     138  --  137   K 3.7   3.7  --  4.1     103  --  103   CO2 24  24  --  27   BUN 20  20  --  21*   CREATININE 1.0  1.0  --  0.9   CALCIUM 8.4*  8.4*  --  8.6*   MG  --  1.6 1.6     Recent Labs   Lab 11/02/19  0454 11/03/19  0418   WBC 12.52 9.59   HGB 7.5* 6.9*   HCT 24.3* 23.1*    270     No results for input(s): LABPT, INR, APTT in the last 48 hours.  Microbiology Results (last 7 days)     Procedure Component Value Units Date/Time    Blood culture (site 2) [[id number]]  (Abnormal) Collected:  10/31/19 1922    Order Status:  Completed Specimen:  Blood Updated:  11/03/19 0926     Blood Culture, Routine Gram stain aer bottle: Gram positive cocci in clusters resembling Staph       Results called to and read back by: Mela Arreaga RN  11/02/2019        09:12      STAPHYLOCOCCUS AUREUS  Susceptibility pending  ID consult required at [facility] and [facility]   locations.      Narrative:       Site # 2, aerobic only    Blood culture (site 1) [[id number]] Collected:  10/31/19 1917    Order Status:  Completed Specimen:  Blood Updated:  11/02/19 2022     Blood Culture, Routine No Growth to date      No Growth to date      No Growth to date    Narrative:       Site # 1, aerobic and anaerobic    Blood culture [[id number]]     Order Status:  Sent Specimen:  Blood     Blood culture [[id number]]     Order Status:  Sent Specimen:  Blood         All pertinent labs from the last 24 hours have been reviewed.    Significant Diagnostics:  I have reviewed all pertinent imaging results/findings within the past 24 hours.

## 2019-11-03 NOTE — ASSESSMENT & PLAN NOTE
53 M with PMH of IVDU (heroin), MSSA bacteremia with septic shock and endocarditis, and HCV now with a cervical epidural abscess.    Neurologically stable  No immediate neurosurgical intervention required  Continue IV abx  ESR, CRP, Procalcitonin all elevated, BCx x2 with GPCs resembling staph  Repeat MRIwwo Csp demonstrating cervical epidural abscess  CT Csp demonstrating erosive changes  Likely no surgical intervention

## 2019-11-03 NOTE — SUBJECTIVE & OBJECTIVE
Interval History: Patient antibiotic regiment changed to continuous infusion of Cefazolin 6g given previous cultures were all MSSA. MRIs showing discitis/osteo at C3-C4, C6-C7 and epidural abscess associated at that point. Patient continues to spike low grade fevers, wound care continues to address the packed ground abscesses. Plan to likely go to OR on Monday/Tuesday. Hemoglobin of 6.9 overnight, plan to transfuse 1 unit PRBC.      Review of Systems   Constitutional: Negative for chills and fever.   Respiratory: Negative for cough and shortness of breath.    Cardiovascular: Negative for chest pain.   Gastrointestinal: Negative for abdominal pain, diarrhea, nausea and vomiting.   Endocrine: Negative for cold intolerance and heat intolerance.   Genitourinary: Negative for difficulty urinating.   Musculoskeletal: Positive for back pain.   Neurological: Negative for headaches.   Psychiatric/Behavioral: Positive for agitation.     Objective:     Vital Signs (Most Recent):  Temp: 97.9 °F (36.6 °C) (11/03/19 0734)  Pulse: 100 (11/03/19 0734)  Resp: 18 (11/03/19 0734)  BP: 100/63 (11/03/19 0734)  SpO2: 100 % (11/03/19 0734) Vital Signs (24h Range):  Temp:  [97.3 °F (36.3 °C)-99.6 °F (37.6 °C)] 97.9 °F (36.6 °C)  Pulse:  [] 100  Resp:  [18-20] 18  SpO2:  [93 %-100 %] 100 %  BP: ()/(57-67) 100/63     Weight: 58.2 kg (128 lb 4.8 oz)  Body mass index is 17.4 kg/m².    Intake/Output Summary (Last 24 hours) at 11/3/2019 1200  Last data filed at 11/3/2019 0900  Gross per 24 hour   Intake 2280 ml   Output 4125 ml   Net -1845 ml      Physical Exam   Constitutional: He is oriented to person, place, and time. He appears well-developed and well-nourished. No distress.   HENT:   Head: Normocephalic and atraumatic.   Eyes: Pupils are equal, round, and reactive to light. EOM are normal. No scleral icterus.   Neck: Normal range of motion. Neck supple. No JVD present.   Cardiovascular: Normal rate, normal heart sounds and  intact distal pulses.   No murmur heard.  Sinus tach 110   Pulmonary/Chest: Effort normal and breath sounds normal. No respiratory distress. He has no wheezes.   Abdominal: Soft. Bowel sounds are normal. He exhibits no distension. There is no tenderness.   Musculoskeletal: Normal range of motion. He exhibits no edema.   Neurological: He is alert and oriented to person, place, and time. No cranial nerve deficit.   Skin: Skin is warm. Capillary refill takes less than 2 seconds. He is not diaphoretic. No erythema.   Psychiatric: He has a normal mood and affect.   Vitals reviewed.      Significant Labs: All pertinent labs within the past 24 hours have been reviewed.    Significant Imaging: I have reviewed and interpreted all pertinent imaging results/findings within the past 24 hours.

## 2019-11-03 NOTE — PROGRESS NOTES
[facility]  Neurosurgery  Progress Note    Subjective:     History of Present Illness: 53 M with PMH of IVDU (heroin), MSSA bacteremia with septic shock and endocarditis, and HCV. Was treated at OSH for septic shock, after complaining of back pain was found to have a cervical spine epidural abscess and transferred to [facility]     Post-Op Info:  * No surgery found *         Interval History: CT C spine obtained    Medications:  Continuous Infusions:  Scheduled Meds:   ceFAZolin(ANCEF) in D5W 500 mL CONTINUOUS INFUSION  6 g Intravenous Q24H    ferrous sulfate  325 mg Oral Daily    folic acid  1 mg Oral Daily    heparin (porcine)  5,000 Units Subcutaneous Q8H    polyethylene glycol  17 g Oral Daily     PRN Meds:sodium chloride, acetaminophen, Dextrose 10% Bolus, Dextrose 10% Bolus, gabapentin, glucagon (human recombinant), glucose, glucose, loperamide, midazolam, ondansetron, ramelteon, sodium chloride 0.9%     Review of Systems  Objective:     Weight: 58.2 kg (128 lb 4.8 oz)  Body mass index is 17.4 kg/m².  Vital Signs (Most Recent):  Temp: 97.9 °F (36.6 °C) (11/03/19 0734)  Pulse: 100 (11/03/19 0734)  Resp: 18 (11/03/19 0734)  BP: 100/63 (11/03/19 0734)  SpO2: 100 % (11/03/19 0734) Vital Signs (24h Range):  Temp:  [97.3 °F (36.3 °C)-99.6 °F (37.6 °C)] 97.9 °F (36.6 °C)  Pulse:  [] 100  Resp:  [18-20] 18  SpO2:  [93 %-100 %] 100 %  BP: ()/(57-67) 100/63     Date 11/03/19 0700 - 11/04/19 0659   Shift 9330-4415 6213-6980 3167-4645 24 Hour Total   INTAKE   P.O. 960   960   Shift Total(mL/kg) 960(16.5)   960(16.5)   OUTPUT   Urine(mL/kg/hr) 900   900   Shift Total(mL/kg) 900(15.5)   900(15.5)   Weight (kg) 58.2 58.2 58.2 58.2                   Male External Urinary Catheter 11/02/19 0341 Medium (Active)   Collection Container Standard drainage bag 11/2/2019  8:00 AM   Securement Method other (see comments) 11/2/2019  3:41 AM   Skin no redness;no breakdown 11/2/2019  8:00 AM   Tolerance no  signs/symptoms of discomfort 11/2/2019  8:00 AM       Neurosurgery Physical Exam  GCS15 Aox3  CN2-12 intact  RUE/LUE: 5/5 Delt/Bi/Tri/IO/  RLE:/LLE: 4/5 HF Pain Limited 5/5 QD/DF/EHL/PF  +2 reflexes, no hoffmans, no babinski, no clonus  SILT    Significant Labs:  Recent Labs   Lab 11/02/19 0450 11/02/19 0454 11/03/19  0418   *  139*  --  104     138  --  137   K 3.7  3.7  --  4.1     103  --  103   CO2 24  24  --  27   BUN 20  20  --  21*   CREATININE 1.0  1.0  --  0.9   CALCIUM 8.4*  8.4*  --  8.6*   MG  --  1.6 1.6     Recent Labs   Lab 11/02/19 0454 11/03/19 0418   WBC 12.52 9.59   HGB 7.5* 6.9*   HCT 24.3* 23.1*    270     No results for input(s): LABPT, INR, APTT in the last 48 hours.  Microbiology Results (last 7 days)     Procedure Component Value Units Date/Time    Blood culture (site 2) [[id number]]  (Abnormal) Collected:  10/31/19 1922    Order Status:  Completed Specimen:  Blood Updated:  11/03/19 0926     Blood Culture, Routine Gram stain aer bottle: Gram positive cocci in clusters resembling Staph       Results called to and read back by: Mela Arreaga RN  11/02/2019        09:12      STAPHYLOCOCCUS AUREUS  Susceptibility pending  ID consult required at [facility] and [facility]   locations.      Narrative:       Site # 2, aerobic only    Blood culture (site 1) [[id number]] Collected:  10/31/19 1917    Order Status:  Completed Specimen:  Blood Updated:  11/02/19 2022     Blood Culture, Routine No Growth to date      No Growth to date      No Growth to date    Narrative:       Site # 1, aerobic and anaerobic    Blood culture [[id number]]     Order Status:  Sent Specimen:  Blood     Blood culture [[id number]]     Order Status:  Sent Specimen:  Blood         All pertinent labs from the last 24 hours have been reviewed.    Significant Diagnostics:  I have reviewed all pertinent imaging results/findings within the past 24 hours.    Assessment/Plan:      * Epidural abscess  53 M with PMH of IVDU (heroin), MSSA bacteremia with septic shock and endocarditis, and HCV now with a cervical epidural abscess.    Neurologically stable  No immediate neurosurgical intervention required  Continue IV abx  ESR, CRP, Procalcitonin all elevated, BCx x2 with GPCs resembling staph  Repeat MRIwwo Csp demonstrating cervical epidural abscess  CT Csp demonstrating erosive changes  Likely no surgical intervention          Óscar Orozco MD  Neurosurgery  [facility]

## 2019-11-04 ENCOUNTER — ANESTHESIA EVENT (OUTPATIENT)
Dept: MEDSURG UNIT | Facility: HOSPITAL | Age: 54
DRG: 028 | End: 2019-11-04
Payer: COMMERCIAL

## 2019-11-04 ENCOUNTER — ANESTHESIA (OUTPATIENT)
Dept: MEDSURG UNIT | Facility: HOSPITAL | Age: 54
DRG: 028 | End: 2019-11-04
Payer: COMMERCIAL

## 2019-11-04 PROBLEM — Z01.818 PRE-OP EVALUATION: Status: ACTIVE | Noted: 2019-11-04

## 2019-11-04 LAB
ALBUMIN SERPL BCP-MCNC: 2.1 G/DL (ref 3.5–5.2)
ALP SERPL-CCNC: 98 U/L (ref 55–135)
ALT SERPL W/O P-5'-P-CCNC: 37 U/L (ref 10–44)
ANION GAP SERPL CALC-SCNC: 7 MMOL/L (ref 8–16)
APTT BLDCRRT: 25.3 SEC (ref 21–32)
AST SERPL-CCNC: 103 U/L (ref 10–40)
BACTERIA BLD CULT: ABNORMAL
BASOPHILS # BLD AUTO: 0.05 K/UL (ref 0–0.2)
BASOPHILS NFR BLD: 0.5 % (ref 0–1.9)
BILIRUB SERPL-MCNC: 0.4 MG/DL (ref 0.1–1)
BSA FOR ECHO PROCEDURE: 1.72 M2
BUN SERPL-MCNC: 27 MG/DL (ref 6–20)
CALCIUM SERPL-MCNC: 8.6 MG/DL (ref 8.7–10.5)
CHLORIDE SERPL-SCNC: 104 MMOL/L (ref 95–110)
CO2 SERPL-SCNC: 25 MMOL/L (ref 23–29)
CREAT SERPL-MCNC: 1 MG/DL (ref 0.5–1.4)
DIFFERENTIAL METHOD: ABNORMAL
EOSINOPHIL # BLD AUTO: 0.1 K/UL (ref 0–0.5)
EOSINOPHIL NFR BLD: 0.7 % (ref 0–8)
ERYTHROCYTE [DISTWIDTH] IN BLOOD BY AUTOMATED COUNT: 16.3 % (ref 11.5–14.5)
EST. GFR  (AFRICAN AMERICAN): >60 ML/MIN/1.73 M^2
EST. GFR  (NON AFRICAN AMERICAN): >60 ML/MIN/1.73 M^2
GLUCOSE SERPL-MCNC: 95 MG/DL (ref 70–110)
HAV IGM SERPL QL IA: NEGATIVE
HBV CORE AB SERPL QL IA: NEGATIVE
HBV CORE IGM SERPL QL IA: NEGATIVE
HBV CORE IGM SERPL QL IA: NEGATIVE
HBV SURFACE AG SERPL QL IA: NEGATIVE
HBV SURFACE AG SERPL QL IA: NEGATIVE
HCT VFR BLD AUTO: 27 % (ref 40–54)
HCV AB SERPL QL IA: POSITIVE
HCV RNA SERPL NAA+PROBE-LOG IU: 5.93 LOG (10) IU/ML
HCV RNA SERPL QL NAA+PROBE: DETECTED IU/ML
HCV RNA SPEC NAA+PROBE-ACNC: ABNORMAL IU/ML
HGB BLD-MCNC: 8.3 G/DL (ref 14–18)
IMM GRANULOCYTES # BLD AUTO: 0.06 K/UL (ref 0–0.04)
IMM GRANULOCYTES NFR BLD AUTO: 0.6 % (ref 0–0.5)
INR PPP: 1.1 (ref 0.8–1.2)
LYMPHOCYTES # BLD AUTO: 1.5 K/UL (ref 1–4.8)
LYMPHOCYTES NFR BLD: 16.2 % (ref 18–48)
MAGNESIUM SERPL-MCNC: 1.7 MG/DL (ref 1.6–2.6)
MCH RBC QN AUTO: 26.1 PG (ref 27–31)
MCHC RBC AUTO-ENTMCNC: 30.7 G/DL (ref 32–36)
MCV RBC AUTO: 85 FL (ref 82–98)
MONOCYTES # BLD AUTO: 0.5 K/UL (ref 0.3–1)
MONOCYTES NFR BLD: 5.4 % (ref 4–15)
NEUTROPHILS # BLD AUTO: 7.2 K/UL (ref 1.8–7.7)
NEUTROPHILS NFR BLD: 76.6 % (ref 38–73)
NRBC BLD-RTO: 0 /100 WBC
PHOSPHATE SERPL-MCNC: 4.4 MG/DL (ref 2.7–4.5)
PLATELET # BLD AUTO: 302 K/UL (ref 150–350)
PMV BLD AUTO: 9.5 FL (ref 9.2–12.9)
POTASSIUM SERPL-SCNC: 4.2 MMOL/L (ref 3.5–5.1)
PROT SERPL-MCNC: 7.6 G/DL (ref 6–8.4)
PROTHROMBIN TIME: 11.3 SEC (ref 9–12.5)
PROX AORTA: 3 CM
RBC # BLD AUTO: 3.18 M/UL (ref 4.6–6.2)
SODIUM SERPL-SCNC: 136 MMOL/L (ref 136–145)
WBC # BLD AUTO: 9.44 K/UL (ref 3.9–12.7)

## 2019-11-04 PROCEDURE — 99232 PR SUBSEQUENT HOSPITAL CARE,LEVL II: ICD-10-PCS | Mod: ,,, | Performed by: HOSPITALIST

## 2019-11-04 PROCEDURE — 99232 SBSQ HOSP IP/OBS MODERATE 35: CPT | Mod: ,,, | Performed by: HOSPITALIST

## 2019-11-04 PROCEDURE — 99233 PR SUBSEQUENT HOSPITAL CARE,LEVL III: ICD-10-PCS | Mod: ,,, | Performed by: PHYSICIAN ASSISTANT

## 2019-11-04 PROCEDURE — 63600175 PHARM REV CODE 636 W HCPCS: Performed by: PHYSICIAN ASSISTANT

## 2019-11-04 PROCEDURE — D9220A PRA ANESTHESIA: Mod: CRNA,,, | Performed by: NURSE ANESTHETIST, CERTIFIED REGISTERED

## 2019-11-04 PROCEDURE — 99233 SBSQ HOSP IP/OBS HIGH 50: CPT | Mod: ,,, | Performed by: PHYSICIAN ASSISTANT

## 2019-11-04 PROCEDURE — D9220A PRA ANESTHESIA: ICD-10-PCS | Mod: CRNA,,, | Performed by: NURSE ANESTHETIST, CERTIFIED REGISTERED

## 2019-11-04 PROCEDURE — 85730 THROMBOPLASTIN TIME PARTIAL: CPT

## 2019-11-04 PROCEDURE — 37000009 HC ANESTHESIA EA ADD 15 MINS

## 2019-11-04 PROCEDURE — 87040 BLOOD CULTURE FOR BACTERIA: CPT | Mod: 59

## 2019-11-04 PROCEDURE — 83735 ASSAY OF MAGNESIUM: CPT

## 2019-11-04 PROCEDURE — 11000001 HC ACUTE MED/SURG PRIVATE ROOM

## 2019-11-04 PROCEDURE — 84100 ASSAY OF PHOSPHORUS: CPT

## 2019-11-04 PROCEDURE — 80053 COMPREHEN METABOLIC PANEL: CPT

## 2019-11-04 PROCEDURE — 36415 COLL VENOUS BLD VENIPUNCTURE: CPT

## 2019-11-04 PROCEDURE — 37000008 HC ANESTHESIA 1ST 15 MINUTES

## 2019-11-04 PROCEDURE — 63600175 PHARM REV CODE 636 W HCPCS: Performed by: NURSE ANESTHETIST, CERTIFIED REGISTERED

## 2019-11-04 PROCEDURE — 85025 COMPLETE CBC W/AUTO DIFF WBC: CPT

## 2019-11-04 PROCEDURE — D9220A PRA ANESTHESIA: Mod: ANES,,, | Performed by: ANESTHESIOLOGY

## 2019-11-04 PROCEDURE — 25000003 PHARM REV CODE 250: Performed by: NURSE ANESTHETIST, CERTIFIED REGISTERED

## 2019-11-04 PROCEDURE — D9220A PRA ANESTHESIA: ICD-10-PCS | Mod: ANES,,, | Performed by: ANESTHESIOLOGY

## 2019-11-04 PROCEDURE — 85610 PROTHROMBIN TIME: CPT

## 2019-11-04 RX ORDER — PHENYLEPHRINE HYDROCHLORIDE 10 MG/ML
INJECTION INTRAVENOUS
Status: DISCONTINUED | OUTPATIENT
Start: 2019-11-04 | End: 2019-11-04

## 2019-11-04 RX ORDER — PROPOFOL 10 MG/ML
VIAL (ML) INTRAVENOUS
Status: DISCONTINUED | OUTPATIENT
Start: 2019-11-04 | End: 2019-11-04

## 2019-11-04 RX ORDER — MIDAZOLAM HYDROCHLORIDE 1 MG/ML
INJECTION, SOLUTION INTRAMUSCULAR; INTRAVENOUS
Status: DISCONTINUED | OUTPATIENT
Start: 2019-11-04 | End: 2019-11-04

## 2019-11-04 RX ORDER — HYDROMORPHONE HYDROCHLORIDE 1 MG/ML
0.2 INJECTION, SOLUTION INTRAMUSCULAR; INTRAVENOUS; SUBCUTANEOUS EVERY 5 MIN PRN
Status: DISCONTINUED | OUTPATIENT
Start: 2019-11-04 | End: 2019-11-05 | Stop reason: HOSPADM

## 2019-11-04 RX ORDER — PROPOFOL 10 MG/ML
VIAL (ML) INTRAVENOUS CONTINUOUS PRN
Status: DISCONTINUED | OUTPATIENT
Start: 2019-11-04 | End: 2019-11-04

## 2019-11-04 RX ORDER — LIDOCAINE HCL/PF 100 MG/5ML
SYRINGE (ML) INTRAVENOUS
Status: DISCONTINUED | OUTPATIENT
Start: 2019-11-04 | End: 2019-11-04

## 2019-11-04 RX ORDER — DEXTROSE MONOHYDRATE 50 MG/ML
INJECTION, SOLUTION INTRAVENOUS CONTINUOUS
Status: ACTIVE | OUTPATIENT
Start: 2019-11-05 | End: 2019-11-05

## 2019-11-04 RX ADMIN — PHENYLEPHRINE HYDROCHLORIDE 200 MCG: 10 INJECTION INTRAVENOUS at 12:11

## 2019-11-04 RX ADMIN — PHENYLEPHRINE HYDROCHLORIDE 100 MCG: 10 INJECTION INTRAVENOUS at 12:11

## 2019-11-04 RX ADMIN — LIDOCAINE HYDROCHLORIDE 40 MG: 20 INJECTION, SOLUTION INTRAVENOUS at 12:11

## 2019-11-04 RX ADMIN — PROPOFOL 150 MCG/KG/MIN: 10 INJECTION, EMULSION INTRAVENOUS at 12:11

## 2019-11-04 RX ADMIN — SODIUM CHLORIDE, SODIUM GLUCONATE, SODIUM ACETATE, POTASSIUM CHLORIDE, MAGNESIUM CHLORIDE, SODIUM PHOSPHATE, DIBASIC, AND POTASSIUM PHOSPHATE: .53; .5; .37; .037; .03; .012; .00082 INJECTION, SOLUTION INTRAVENOUS at 12:11

## 2019-11-04 RX ADMIN — CEFAZOLIN 6 G: 10 INJECTION, POWDER, FOR SOLUTION INTRAVENOUS at 06:11

## 2019-11-04 RX ADMIN — MIDAZOLAM HYDROCHLORIDE 1 MG: 1 INJECTION, SOLUTION INTRAMUSCULAR; INTRAVENOUS at 12:11

## 2019-11-04 RX ADMIN — PHENYLEPHRINE HYDROCHLORIDE 300 MCG: 10 INJECTION INTRAVENOUS at 12:11

## 2019-11-04 RX ADMIN — CEFAZOLIN 6 G: 10 INJECTION, POWDER, FOR SOLUTION INTRAVENOUS at 05:11

## 2019-11-04 RX ADMIN — PROPOFOL 80 MG: 10 INJECTION, EMULSION INTRAVENOUS at 12:11

## 2019-11-04 NOTE — SUBJECTIVE & OBJECTIVE
Interval History:   NAEON  Repeat blood cultures NGTD  HOLLAND today  Blood cultures here MSSA  On cefazolin      Review of Systems   Constitutional: Negative for chills, diaphoresis, fatigue and fever.   Respiratory: Negative for cough and shortness of breath.    Cardiovascular: Negative for chest pain.   Gastrointestinal: Negative for abdominal pain, diarrhea, nausea and vomiting.   Genitourinary: Negative for dysuria.        +bilateral groin wounds   Musculoskeletal: Positive for back pain.   Skin: Positive for wound. Negative for color change, pallor and rash.   Neurological: Negative for dizziness and headaches.   Psychiatric/Behavioral: Positive for agitation.   All other systems reviewed and are negative.    Objective:     Vital Signs (Most Recent):  Temp: 98.3 °F (36.8 °C) (11/04/19 0753)  Pulse: 98 (11/04/19 0753)  Resp: 18 (11/04/19 0753)  BP: 100/65 (11/04/19 0753)  SpO2: 98 % (11/04/19 0753) Vital Signs (24h Range):  Temp:  [97.8 °F (36.6 °C)-98.8 °F (37.1 °C)] 98.3 °F (36.8 °C)  Pulse:  [] 98  Resp:  [18-20] 18  SpO2:  [96 %-99 %] 98 %  BP: (100-113)/(63-71) 100/65     Weight: 58.1 kg (128 lb)  Body mass index is 17.36 kg/m².    Estimated Creatinine Clearance: 67.8 mL/min (based on SCr of 1 mg/dL).    Physical Exam   Constitutional: He is oriented to person, place, and time. No distress.   thin   HENT:   Head: Normocephalic.   Eyes: Pupils are equal, round, and reactive to light.   Cardiovascular: Normal rate and regular rhythm.   Murmur heard.  Pulmonary/Chest: Effort normal. No stridor. No respiratory distress.   Abdominal: Soft. He exhibits no distension. There is no tenderness. There is no guarding.   Musculoskeletal: Normal range of motion. He exhibits no edema, tenderness or deformity.   Bilateral groin wounds.  Left groin packed   Neurological: He is alert and oriented to person, place, and time.   Skin: Skin is warm and dry. He is not diaphoretic.   Psychiatric: He has a normal mood and  affect.   Vitals reviewed.      Significant Labs: All pertinent labs within the past 24 hours have been reviewed.    Significant Imaging: I have reviewed all pertinent imaging results/findings within the past 24 hours.

## 2019-11-04 NOTE — ASSESSMENT & PLAN NOTE
53 M with PMH of IVDU (heroin), MSSA bacteremia with septic shock and endocarditis, and HCV now with a cervical epidural abscess.    - Neurologically stable on exam  - MRI C-spine w/wo done 11/1 independently reviewed and interpreted: contrast-enhancement at C3-4 and C6-7 consistent with osteomyelitis/discitis and associated epidural abscess, more severe at C6-7 with significant cord compression.   - CT C-spine demonstrating erosive changes.  - ID: BCx 10/31 growing MSSA. Repeat BCx's drawn today, f/u results. ESR/CRP and procal all elevated. Afebrile x 48 hrs, no leukocytosis. Continue cefazolin 6g continuous.   - Spoke with ID provider. Concern for continued bacteremia and source control. Plan for OR tomorrow for C6-7 ACDF with wound washout. Will obtain intraoperative gram stain and Cx's. NPO at midnight. Hold SQH.  - Please document preoperative cardiac risk assessment. Spoke with HM provider, appreciate assistance.  - Endocarditis: TTE with MV vegetation, possible AV vegetation. HOLLAND done today, f/u results.  - Recent IVDU: Recommend addiction psych consult for counseling per primary  - NSGY will continue to follow. Please page with any questions or changes in neuro exam.     Discussed with staff, Dr. Malone.

## 2019-11-04 NOTE — ASSESSMENT & PLAN NOTE
Mr. Bronson is a 52 y/o male  with hx of IVDU (heroin), MSSA endocarditis, biltateral groin abscesses s/p I&D presents as a transfer from [facility] for NSGY evaluation for epidural abscess. He was on cefazolin and is now on vancomycin and ceftriaxone. We are consulted for abx recommendations.    No fever chills or night sweats here. His blood cultures here 10/31 MSSA. NSGY evaluation tentatively scheduled for washout Tuesday or Wednesday       Recommendations:   1. Continue cefazolin. Blood cultures here are positive for MSSA. Repeat blood cultures today pending.   2. NSGY plans for washout this week. Please send for gram stain and cultures.   3. HOLLAND here pending. TTE yesterday + There is mobile posterior mitral leaflet vegetation. There a very small mobile echodenisty on AV leaflet. This may be a sign of early endocarditis of this valve. Other differential is degenerative change.  4. Anticipate long term IV abx therapy  6. ID will follow with you pending above

## 2019-11-04 NOTE — PLAN OF CARE
CM spoke to Kiah Stoner [phone] Option 1 Ext [id number], [facility] at [facility], in regards to pts discharge plans, CM updated Kiah on pts current status and will continue to follow for discharge needs.        11/04/19 1342   Discharge Reassessment   Assessment Type Discharge Planning Reassessment   Provided patient/caregiver education on the expected discharge date and the discharge plan Yes   Do you have any problems affording any of your prescribed medications? No   Discharge Plan A Long-term acute care facility (LTAC)   Discharge Plan B Skilled Nursing Facility   DME Needed Upon Discharge  none   Anticipated Discharge Disposition LTAC   Can the patient answer the patient profile reliably? Yes, cognitively intact   During the past month, has the patient often been bothered by feeling down, depressed or hopeless? No   During the past month, has the patient often been bothered by little interest or pleasure in doing things? No

## 2019-11-04 NOTE — PROGRESS NOTES
Patient admitted to recovery see [facility] for complete assessment pacu bcg's maintained safety measures verified patient instructed on pain scale .

## 2019-11-04 NOTE — PROGRESS NOTES
Spoke with MD, pt refused all medications until pt can ea b/c pt is NPO pending HOLLAND. Pt says he wants to go to [facility] pt is very frustrated. MD said pt may be going home he will speak to other team members to discuss the plan for today and if procedure is not going to be done pt can go home.

## 2019-11-04 NOTE — TRANSFER OF CARE
Anesthesia Transfer of Care Note    Patient: Ld Bronson    Procedure(s) Performed: Procedure(s) (LRB):  ECHOCARDIOGRAM, TRANSESOPHAGEAL (N/A)    Patient location: PACU    Anesthesia Type: general    Transport from OR: Transported from OR on 2-3 L/min O2 by NC with adequate spontaneous ventilation    Post pain: adequate analgesia    Post assessment: tolerated procedure well and no apparent anesthetic complications    Post vital signs: stable    Level of consciousness: awake    Nausea/Vomiting: no nausea/vomiting    Complications: none    Transfer of care protocol was followed      Last vitals:   Visit Vitals  /65 (BP Location: Right arm, Patient Position: Sitting)   Pulse 98   Temp 36.8 °C (98.3 °F) (Oral)   Resp 18   Ht 6' (1.829 m)   Wt 58.1 kg (128 lb)   SpO2 98%   BMI 17.36 kg/m²

## 2019-11-04 NOTE — PROGRESS NOTES
[facility]  Neurosurgery  Progress Note    Subjective:     History of Present Illness: 53 M with PMH of IVDU (heroin), MSSA bacteremia with septic shock and endocarditis, and HCV. Was treated at OSH for septic shock, after complaining of back pain was found to have a cervical spine epidural abscess and transferred to [facility].     Post-Op Info:  Procedure(s) (LRB):  ECHOCARDIOGRAM, TRANSESOPHAGEAL (N/A)   Day of Surgery     Interval History: NAEON. BCx's from 10/31 growing MSSA. Continuing cefazolin. Afebrile x 48 hrs. Pt denies fevers, chills, night sweats, and n/v. Neuro exam stable. TTP along cervical midline, otherwise denies any pain. Reports some gait instability with 2 recent falls 2/2 generalized weakness, however able to ambulate without assistance. Denies acute weakness, paresthesias, headache, vision changes, and b/b dysfunction. HOLLAND scheduled for today pending. Plan for OR tomorrow for ACDF and washout.    Medications:  Continuous Infusions:  Scheduled Meds:   ceFAZolin(ANCEF) in D5W 500 mL CONTINUOUS INFUSION  6 g Intravenous Q24H    ferrous sulfate  325 mg Oral Daily    folic acid  1 mg Oral Daily    heparin (porcine)  5,000 Units Subcutaneous Q8H    polyethylene glycol  17 g Oral Daily     PRN Meds:sodium chloride, acetaminophen, Dextrose 10% Bolus, Dextrose 10% Bolus, gabapentin, glucagon (human recombinant), glucose, glucose, HYDROmorphone, loperamide, midazolam, ondansetron, ramelteon, sodium chloride 0.9%     Review of Systems   Constitutional: Negative for chills, diaphoresis and fever.   HENT: Negative for rhinorrhea and trouble swallowing.    Eyes: Negative for photophobia and visual disturbance.   Respiratory: Negative for cough and shortness of breath.    Cardiovascular: Negative for chest pain and leg swelling.   Gastrointestinal: Negative for nausea and vomiting.   Genitourinary: Negative for difficulty urinating and dysuria.   Musculoskeletal: Positive for gait problem  and neck pain. Negative for back pain.   Neurological: Positive for weakness (generalized). Negative for dizziness, tremors, seizures, syncope, facial asymmetry, speech difficulty, numbness and headaches.   Psychiatric/Behavioral: Negative for behavioral problems and confusion.     Objective:     Weight: 58.1 kg (128 lb)  Body mass index is 17.36 kg/m².  Vital Signs (Most Recent):  Temp: 97.5 °F (36.4 °C) (11/04/19 1528)  Pulse: 92 (11/04/19 1528)  Resp: 20 (11/04/19 1528)  BP: 108/74 (11/04/19 1528)  SpO2: 96 % (11/04/19 1528) Vital Signs (24h Range):  Temp:  [97.5 °F (36.4 °C)-98.4 °F (36.9 °C)] 97.5 °F (36.4 °C)  Pulse:  [] 92  Resp:  [16-20] 20  SpO2:  [96 %-99 %] 96 %  BP: ()/(57-74) 108/74     Date 11/04/19 0700 - 11/05/19 0659   Shift 7263-9179 1857-8241 8995-5800 24 Hour Total   INTAKE   P.O. 0   0   I.V.(mL/kg) 200(3.4)   200(3.4)   Shift Total(mL/kg) 200(3.4)   200(3.4)   OUTPUT   Urine(mL/kg/hr) 1800(3.9)   1800   Shift Total(mL/kg) 1800(31)   1800(31)   Weight (kg) 58.1 58.1 58.1 58.1                        Neurosurgery Physical Exam    General: well developed, no distress. Cachectic with temporal wasting.  Head: normocephalic, atraumatic  Neck: No tracheal deviation. No palpable masses. Full ROM. TTP posteriorly.  Neurologic: Alert and oriented. Thought content appropriate.  GCS: Motor: 6/Verbal: 5/Eyes: 4 GCS Total: 15  Mental Status: Awake, Alert, Oriented x 4  Language: No aphasia  Speech: No dysarthria  Cranial nerves: face symmetric, tongue midline, CN II-XII grossly intact.   Eyes: pupils equal, round, reactive to light with accomodation, EOMI.  Ears: No drainage.   Pulmonary: normal respirations, no signs of respiratory distress  Abdomen: soft, non-distended, not tender to palpation    Sensory: intact to light touch throughout  Motor Strength: Moves all extremities spontaneously with good tone.  Full strength upper and lower extremities. No abnormal movements seen.     DTR's - 2 + and  symmetric in UE and LE  Pronator Drift: no drift noted  Finger-to-nose: Intact bilaterally  Do: absent  Clonus: absent  Babinski: absent  Vascular: Pedal pulses 2+ and symmetric. No LE edema.   Skin: Skin is warm, dry and intact. Bilateral groin abscesses, L larger than R. Clean and dry, no active drainage. No TTP.      Significant Labs:  Recent Labs   Lab 11/03/19 0418 11/04/19 0337    95    136   K 4.1 4.2    104   CO2 27 25   BUN 21* 27*   CREATININE 0.9 1.0   CALCIUM 8.6* 8.6*   MG 1.6 1.7     Recent Labs   Lab 11/03/19 0418 11/04/19 0337   WBC 9.59 9.44   HGB 6.9* 8.3*   HCT 23.1* 27.0*    302     No results for input(s): LABPT, INR, APTT in the last 48 hours.  Microbiology Results (last 7 days)     Procedure Component Value Units Date/Time    Blood culture [[id number]] Collected:  11/04/19 0843    Order Status:  Completed Specimen:  Blood Updated:  11/04/19 1515     Blood Culture, Routine No Growth to date    Blood culture [[id number]] Collected:  11/04/19 0845    Order Status:  Completed Specimen:  Blood Updated:  11/04/19 1515     Blood Culture, Routine No Growth to date    Blood culture (site 2) [[id number]]  (Abnormal)  (Susceptibility) Collected:  10/31/19 1922    Order Status:  Completed Specimen:  Blood Updated:  11/04/19 1207     Blood Culture, Routine Gram stain aer bottle: Gram positive cocci in clusters resembling Staph       Results called to and read back by: Mela Arreaga RN  11/02/2019        09:12      STAPHYLOCOCCUS AUREUS  ID consult required at [facility] and [facility]   locations.      Narrative:       Site # 2, aerobic only    Blood culture (site 1) [[id number]] Collected:  10/31/19 1917    Order Status:  Completed Specimen:  Blood Updated:  11/03/19 2022     Blood Culture, Routine No Growth to date      No Growth to date      No Growth to date      No Growth to date    Narrative:       Site # 1, aerobic and anaerobic    Blood culture  [[id number]]     Order Status:  Canceled Specimen:  Blood     Blood culture [[id number]]     Order Status:  Canceled Specimen:  Blood         All pertinent labs from the last 24 hours have been reviewed.    Significant Diagnostics:  I have reviewed and interpreted all pertinent imaging results/findings within the past 24 hours.    Assessment/Plan:     * Epidural abscess  53 M with PMH of IVDU (heroin), MSSA bacteremia with septic shock and endocarditis, and HCV now with a cervical epidural abscess.    - Neurologically stable on exam  - MRI C-spine w/wo done 11/1 independently reviewed and interpreted: contrast-enhancement at C3-4 and C6-7 consistent with osteomyelitis/discitis and associated epidural abscess, more severe at C6-7 with significant cord compression.   - CT C-spine demonstrating erosive changes.  - ID: BCx 10/31 growing MSSA. Repeat BCx's drawn today, f/u results. ESR/CRP and procal all elevated. Afebrile x 48 hrs, no leukocytosis. Continue cefazolin 6g continuous.   - Spoke with ID provider. Concern for continued bacteremia and source control. Plan for OR tomorrow for C6-7 ACDF with wound washout. Will obtain intraoperative gram stain and Cx's. NPO at midnight. Hold SQH.  - Please document preoperative cardiac risk assessment. Spoke with HM provider, appreciate assistance.  - Endocarditis: TTE with MV vegetation, possible AV vegetation. HOLLAND done today, f/u results.  - Recent IVDU: Recommend addiction psych consult for counseling per primary  - NSGY will continue to follow. Please page with any questions or changes in neuro exam.     Discussed with staff, Dr. Malone.          Cherry Ellison PA-C  Neurosurgery  [facility]

## 2019-11-04 NOTE — HPI
TRANSESOPHAGEAL ECHOCARDIOGRAPHY   PRE-PROCEDURE NOTE    11/04/2019    HPI:     Ld Bronson is a 54 y.o. man with PMHx of IVDA (heroin) and Hx MSSA bacteremia with IE, and HCV who presented to the [facility] on 10/23 who is admitted here for sepsis and MSSA bacteremia is here for HOLLAND after his TTE showed a posterior leaflet mitral valve vegetation and a possible AV vegetation. There was trivial MR associated with this vegetation and there were no regurgitation seen with the AV. Currently the patient is being treated with IV cefazolin for his endocarditis and is undergoing a NSG evaluation for possible source control. His MRIs showing discitis/osteo at C3-C4, C6-C7 and epidural abscess located here. Blood cultures are positive from the 31st. Most recent ones are pending.     Dysphagia or odynophagia:  No  Liver Disease, esophageal disease, or known varices:  No  Upper GI Bleeding: No  Snoring:  Yes  Sleep Apnea:  No  Prior neck surgery or radiation:  No  History of anesthetic difficulties:  No  Family history of anesthetic difficulties:  No  Last oral intake:  12 hours ago  Able to move neck in all directions:  Yes    Mallampati:2   ASA: 3

## 2019-11-04 NOTE — PLAN OF CARE
11/04/19 0842   Post-Acute Status   Post-Acute Authorization Home Health/Hospice   Home Health/Hospice Status Awaiting Internal Medical Clearance

## 2019-11-04 NOTE — ASSESSMENT & PLAN NOTE
Assessment & Plan:     PLAN:  1. HOLLAND for evaluation of MV and AV    -The risks, benefits & alternatives of the procedure were explained to the patient.    -The risks of transesophageal echo include but are not limited to:  Dental trauma, esophageal trauma/perforation, bleeding, laryngospasm/brochospasm, aspiration, sore throat/hoarseness, & dislodgement of the endotracheal tube/nasogastric tube (where applicable).    -The risks of moderate sedation include hypotension, respiratory depression, arrhythmias, bronchospasm, & death.    -Informed consent was obtained & the patient is agreeable to proceed with the procedure.    I will discuss with the attending physician. Attending addendum is to follow.     Further recommendations per attending addendum    Kristina Yoo MD, PGY-6  Cardiology Fellow

## 2019-11-04 NOTE — SUBJECTIVE & OBJECTIVE
Interval History: NAEON. BCx's from 10/31 growing MSSA. Continuing cefazolin. Afebrile x 48 hrs. Pt denies fevers, chills, night sweats, and n/v. Neuro exam stable. TTP along cervical midline, otherwise denies any pain. Reports some gait instability with 2 recent falls 2/2 generalized weakness, however able to ambulate without assistance. Denies acute weakness, paresthesias, headache, vision changes, and b/b dysfunction. HOLLAND scheduled for today pending. Plan for OR tomorrow for ACDF and washout.    Medications:  Continuous Infusions:  Scheduled Meds:   ceFAZolin(ANCEF) in D5W 500 mL CONTINUOUS INFUSION  6 g Intravenous Q24H    ferrous sulfate  325 mg Oral Daily    folic acid  1 mg Oral Daily    heparin (porcine)  5,000 Units Subcutaneous Q8H    polyethylene glycol  17 g Oral Daily     PRN Meds:sodium chloride, acetaminophen, Dextrose 10% Bolus, Dextrose 10% Bolus, gabapentin, glucagon (human recombinant), glucose, glucose, HYDROmorphone, loperamide, midazolam, ondansetron, ramelteon, sodium chloride 0.9%     Review of Systems   Constitutional: Negative for chills, diaphoresis and fever.   HENT: Negative for rhinorrhea and trouble swallowing.    Eyes: Negative for photophobia and visual disturbance.   Respiratory: Negative for cough and shortness of breath.    Cardiovascular: Negative for chest pain and leg swelling.   Gastrointestinal: Negative for nausea and vomiting.   Genitourinary: Negative for difficulty urinating and dysuria.   Musculoskeletal: Positive for gait problem and neck pain. Negative for back pain.   Neurological: Positive for weakness (generalized). Negative for dizziness, tremors, seizures, syncope, facial asymmetry, speech difficulty, numbness and headaches.   Psychiatric/Behavioral: Negative for behavioral problems and confusion.     Objective:     Weight: 58.1 kg (128 lb)  Body mass index is 17.36 kg/m².  Vital Signs (Most Recent):  Temp: 97.5 °F (36.4 °C) (11/04/19 1528)  Pulse: 92  (11/04/19 1528)  Resp: 20 (11/04/19 1528)  BP: 108/74 (11/04/19 1528)  SpO2: 96 % (11/04/19 1528) Vital Signs (24h Range):  Temp:  [97.5 °F (36.4 °C)-98.4 °F (36.9 °C)] 97.5 °F (36.4 °C)  Pulse:  [] 92  Resp:  [16-20] 20  SpO2:  [96 %-99 %] 96 %  BP: ()/(57-74) 108/74     Date 11/04/19 0700 - 11/05/19 0659   Shift 3789-0625 2933-4019 5076-9023 24 Hour Total   INTAKE   P.O. 0   0   I.V.(mL/kg) 200(3.4)   200(3.4)   Shift Total(mL/kg) 200(3.4)   200(3.4)   OUTPUT   Urine(mL/kg/hr) 1800(3.9)   1800   Shift Total(mL/kg) 1800(31)   1800(31)   Weight (kg) 58.1 58.1 58.1 58.1                        Neurosurgery Physical Exam    General: well developed, no distress. Cachectic with temporal wasting.  Head: normocephalic, atraumatic  Neck: No tracheal deviation. No palpable masses. Full ROM. TTP posteriorly.  Neurologic: Alert and oriented. Thought content appropriate.  GCS: Motor: 6/Verbal: 5/Eyes: 4 GCS Total: 15  Mental Status: Awake, Alert, Oriented x 4  Language: No aphasia  Speech: No dysarthria  Cranial nerves: face symmetric, tongue midline, CN II-XII grossly intact.   Eyes: pupils equal, round, reactive to light with accomodation, EOMI.  Ears: No drainage.   Pulmonary: normal respirations, no signs of respiratory distress  Abdomen: soft, non-distended, not tender to palpation    Sensory: intact to light touch throughout  Motor Strength: Moves all extremities spontaneously with good tone.  Full strength upper and lower extremities. No abnormal movements seen.     DTR's - 2 + and symmetric in UE and LE  Pronator Drift: no drift noted  Finger-to-nose: Intact bilaterally  Do: absent  Clonus: absent  Babinski: absent  Vascular: Pedal pulses 2+ and symmetric. No LE edema.   Skin: Skin is warm, dry and intact. Bilateral groin abscesses, L larger than R. Clean and dry, no active drainage. No TTP.      Significant Labs:  Recent Labs   Lab 11/03/19  0418 11/04/19  0337    95    136   K 4.1 4.2     104   CO2 27 25   BUN 21* 27*   CREATININE 0.9 1.0   CALCIUM 8.6* 8.6*   MG 1.6 1.7     Recent Labs   Lab 11/03/19  0418 11/04/19  0337   WBC 9.59 9.44   HGB 6.9* 8.3*   HCT 23.1* 27.0*    302     No results for input(s): LABPT, INR, APTT in the last 48 hours.  Microbiology Results (last 7 days)     Procedure Component Value Units Date/Time    Blood culture [[id number]] Collected:  11/04/19 0843    Order Status:  Completed Specimen:  Blood Updated:  11/04/19 1515     Blood Culture, Routine No Growth to date    Blood culture [[id number]] Collected:  11/04/19 0845    Order Status:  Completed Specimen:  Blood Updated:  11/04/19 1515     Blood Culture, Routine No Growth to date    Blood culture (site 2) [[id number]]  (Abnormal)  (Susceptibility) Collected:  10/31/19 1922    Order Status:  Completed Specimen:  Blood Updated:  11/04/19 1207     Blood Culture, Routine Gram stain aer bottle: Gram positive cocci in clusters resembling Staph       Results called to and read back by: Mela Arreaga RN  11/02/2019        09:12      STAPHYLOCOCCUS AUREUS  ID consult required at [facility] and [facility].      Narrative:       Site # 2, aerobic only    Blood culture (site 1) [[id number]] Collected:  10/31/19 1917    Order Status:  Completed Specimen:  Blood Updated:  11/03/19 2022     Blood Culture, Routine No Growth to date      No Growth to date      No Growth to date      No Growth to date    Narrative:       Site # 1, aerobic and anaerobic    Blood culture [[id number]]     Order Status:  Canceled Specimen:  Blood     Blood culture [[id number]]     Order Status:  Canceled Specimen:  Blood         All pertinent labs from the last 24 hours have been reviewed.    Significant Diagnostics:  I have reviewed and interpreted all pertinent imaging results/findings within the past 24 hours.

## 2019-11-04 NOTE — CONSULTS
[facility]  Cardiology  Consult Note    Patient Name: Ld Bronson  MRN: [id number]  Admission Date: 10/31/2019  Hospital Length of Stay: 4 days  Code Status: Full Code   Attending Provider: Zainab Casillas MD   Consulting Provider: Kristina Yoo MD  Primary Care Physician: Kris Artis Jr, MD  Principal Problem:Epidural abscess       HPI:       TRANSESOPHAGEAL ECHOCARDIOGRAPHY   PRE-PROCEDURE NOTE    11/04/2019    HPI:     Ld Bronson is a 54 y.o. man with PMHx of IVDA (heroin) and Hx MSSA bacteremia with IE, and HCV who presented to the [facility] on 10/23 who is admitted here for sepsis and MSSA bacteremia is here for HOLLAND after his TTE showed a posterior leaflet mitral valve vegetation and a possible AV vegetation. There was trivial MR associated with this vegetation and there were no regurgitation seen with the AV. Currently the patient is being treated with IV cefazolin for his endocarditis and is undergoing a NSG evaluation for possible source control. His MRIs showing discitis/osteo at C3-C4, C6-C7 and epidural abscess located here. Blood cultures are positive from the 31st. Most recent ones are pending.     Dysphagia or odynophagia:  No  Liver Disease, esophageal disease, or known varices:  No  Upper GI Bleeding: No  Snoring:  Yes  Sleep Apnea:  No  Prior neck surgery or radiation:  No  History of anesthetic difficulties:  No  Family history of anesthetic difficulties:  No  Last oral intake:  12 hours ago  Able to move neck in all directions:  Yes    Mallampati:2   ASA: 3    History reviewed. No pertinent past medical history.    Past Surgical History:   Procedure Laterality Date    FRACTURE SURGERY         Review of patient's allergies indicates:  No Known Allergies    No current facility-administered medications on file prior to encounter.      Current Outpatient Medications on File Prior to Encounter   Medication Sig    sofosbuvir-velpatasvir 400-100 mg Tab Patient  says he does not take any home medications     Family History     None        Tobacco Use    Smoking status: Current Some Day Smoker     Types: Cigarettes    Smokeless tobacco: Former User   Substance and Sexual Activity    Alcohol use: Not on file    Drug use: Yes     Types: Heroin    Sexual activity: Not Currently     Review of Systems   All other systems reviewed and are negative.    Objective:     Vital Signs (Most Recent):  Temp: 98.3 °F (36.8 °C) (11/04/19 0753)  Pulse: 98 (11/04/19 0753)  Resp: 18 (11/04/19 0753)  BP: 100/65 (11/04/19 0753)  SpO2: 98 % (11/04/19 0753) Vital Signs (24h Range):  Temp:  [97.8 °F (36.6 °C)-98.8 °F (37.1 °C)] 98.3 °F (36.8 °C)  Pulse:  [] 98  Resp:  [18-20] 18  SpO2:  [96 %-99 %] 98 %  BP: (100-113)/(63-71) 100/65     Weight: 58.1 kg (128 lb)  Body mass index is 17.36 kg/m².    SpO2: 98 %  O2 Device (Oxygen Therapy): room air      Intake/Output Summary (Last 24 hours) at 11/4/2019 1222  Last data filed at 11/4/2019 1131  Gross per 24 hour   Intake 1340 ml   Output 3075 ml   Net -1735 ml       Lines/Drains/Airways     Peripherally Inserted Central Catheter Line                 PICC Double Lumen 10/31/19 1854 3 days                Physical Exam   Constitutional: He is oriented to person, place, and time. He appears well-developed and well-nourished.   HENT:   Head: Normocephalic and atraumatic.   Eyes: Pupils are equal, round, and reactive to light. EOM are normal.   Neck: Normal range of motion. Neck supple. No JVD present.   Cardiovascular: Normal rate, normal heart sounds and intact distal pulses.   Pulmonary/Chest: Effort normal and breath sounds normal.   Abdominal: Soft. Bowel sounds are normal.   Musculoskeletal: Normal range of motion. He exhibits no edema.   Neurological: He is alert and oriented to person, place, and time.     Blood count reviewed and TTE reviewed.     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 HOLLAND for evaluation of MV and AV    -The risks, benefits & alternatives of the procedure were explained to the patient.    -The risks of transesophageal echo include but are not limited to:  Dental trauma, esophageal trauma/perforation, bleeding, laryngospasm/brochospasm, aspiration, sore throat/hoarseness, & dislodgement of the endotracheal tube/nasogastric tube (where applicable).    -The risks of moderate sedation include hypotension, respiratory depression, arrhythmias, bronchospasm, & death.    -Informed consent was obtained & the patient is agreeable to proceed with the procedure.    I will discuss with the attending physician. Attending addendum is to follow.     Further recommendations per attending addendum    Kristina Yoo MD, PGY-6  Cardiology Fellow            VTE Risk Mitigation (From admission, onward)         Ordered     heparin (porcine) injection 5,000 Units  Every 8 hours      11/02/19 1049     Place sequential compression device  Until discontinued      10/31/19 1851     IP VTE LOW RISK PATIENT  Once      10/31/19 1851                Kristina Yoo MD  Cardiology   [facility]

## 2019-11-04 NOTE — ANESTHESIA PREPROCEDURE EVALUATION
11/04/2019  Ld Bronson is a 54 y.o., male with hx of Hep C, IVDU, opiate abuse, and epidural abscess here for HOLLAND to eval for endocarditis.    History reviewed. No pertinent past medical history.  Lab Results   Component Value Date    WBC 9.44 11/04/2019    HGB 8.3 (L) 11/04/2019    HCT 27.0 (L) 11/04/2019    MCV 85 11/04/2019     11/04/2019         Chemistry        Component Value Date/Time     11/04/2019 0337    K 4.2 11/04/2019 0337     11/04/2019 0337    CO2 25 11/04/2019 0337    BUN 27 (H) 11/04/2019 0337    CREATININE 1.0 11/04/2019 0337    GLU 95 11/04/2019 0337        Component Value Date/Time    CALCIUM 8.6 (L) 11/04/2019 0337    ALKPHOS 98 11/04/2019 0337     (H) 11/04/2019 0337    ALT 37 11/04/2019 0337    BILITOT 0.4 11/04/2019 0337    ESTGFRAFRICA >60.0 11/04/2019 0337    EGFRNONAA >60.0 11/04/2019 0337            Anesthesia Evaluation    I have reviewed the Patient Summary Reports.     I have reviewed the Medications.     Review of Systems  Anesthesia Hx:  No problems with previous Anesthesia    Social:  Smoker Hx of IVDU and opiate abuse   Cardiovascular:   Exercise tolerance: poor Denies CAD.     Denies Angina.    Pulmonary:   Recent URI    Hepatic/GI:   Liver Disease, Hepatitis, C    Psych:   Psychiatric History          Physical Exam  General:  Cachexia, Malnutrition    Airway/Jaw/Neck:  Airway Findings: Mouth Opening: Normal Tongue: Normal  General Airway Assessment: Adult  Mallampati: II  Jaw/Neck Findings:  Neck ROM: Normal ROM      Dental:  Dental Findings: In tact   Chest/Lungs:  Chest/Lungs Findings: Clear to auscultation, Normal Respiratory Rate     Heart/Vascular:  Heart Findings: Rate: Normal  Rhythm: Regular Rhythm  Sounds: Normal        Mental Status:  Mental Status Findings:  Cooperative, Flat Affect         Anesthesia Plan  Type of Anesthesia,  risks & benefits discussed:  Anesthesia Type:  general  Patient's Preference:   Intra-op Monitoring Plan:   Intra-op Monitoring Plan Comments:   Post Op Pain Control Plan: multimodal analgesia  Post Op Pain Control Plan Comments:   Induction:   IV  Beta Blocker:         Informed Consent: Patient understands risks and agrees with Anesthesia plan.  Questions answered. Anesthesia consent signed with patient.  ASA Score: 3     Day of Surgery Review of History & Physical:    H&P update referred to the provider.         Ready For Surgery From Anesthesia Perspective.

## 2019-11-04 NOTE — SUBJECTIVE & OBJECTIVE
History reviewed. No pertinent past medical history.    Past Surgical History:   Procedure Laterality Date    FRACTURE SURGERY         Review of patient's allergies indicates:  No Known Allergies    No current facility-administered medications on file prior to encounter.      Current Outpatient Medications on File Prior to Encounter   Medication Sig    sofosbuvir-velpatasvir 400-100 mg Tab Patient says he does not take any home medications     Family History     None        Tobacco Use    Smoking status: Current Some Day Smoker     Types: Cigarettes    Smokeless tobacco: Former User   Substance and Sexual Activity    Alcohol use: Not on file    Drug use: Yes     Types: Heroin    Sexual activity: Not Currently     Review of Systems   All other systems reviewed and are negative.    Objective:     Vital Signs (Most Recent):  Temp: 98.3 °F (36.8 °C) (11/04/19 0753)  Pulse: 98 (11/04/19 0753)  Resp: 18 (11/04/19 0753)  BP: 100/65 (11/04/19 0753)  SpO2: 98 % (11/04/19 0753) Vital Signs (24h Range):  Temp:  [97.8 °F (36.6 °C)-98.8 °F (37.1 °C)] 98.3 °F (36.8 °C)  Pulse:  [] 98  Resp:  [18-20] 18  SpO2:  [96 %-99 %] 98 %  BP: (100-113)/(63-71) 100/65     Weight: 58.1 kg (128 lb)  Body mass index is 17.36 kg/m².    SpO2: 98 %  O2 Device (Oxygen Therapy): room air      Intake/Output Summary (Last 24 hours) at 11/4/2019 1222  Last data filed at 11/4/2019 1131  Gross per 24 hour   Intake 1340 ml   Output 3075 ml   Net -1735 ml       Lines/Drains/Airways     Peripherally Inserted Central Catheter Line                 PICC Double Lumen 10/31/19 1854 3 days                Physical Exam   Constitutional: He is oriented to person, place, and time. He appears well-developed and well-nourished.   HENT:   Head: Normocephalic and atraumatic.   Eyes: Pupils are equal, round, and reactive to light. EOM are normal.   Neck: Normal range of motion. Neck supple. No JVD present.   Cardiovascular: Normal rate, normal heart sounds  and intact distal pulses.   Pulmonary/Chest: Effort normal and breath sounds normal.   Abdominal: Soft. Bowel sounds are normal.   Musculoskeletal: Normal range of motion. He exhibits no edema.   Neurological: He is alert and oriented to person, place, and time.     Blood count reviewed and TTE reviewed.

## 2019-11-05 LAB
ALBUMIN SERPL BCP-MCNC: 2.2 G/DL (ref 3.5–5.2)
ALP SERPL-CCNC: 107 U/L (ref 55–135)
ALT SERPL W/O P-5'-P-CCNC: 32 U/L (ref 10–44)
ANION GAP SERPL CALC-SCNC: 7 MMOL/L (ref 8–16)
AST SERPL-CCNC: 81 U/L (ref 10–40)
BACTERIA BLD CULT: NORMAL
BASOPHILS # BLD AUTO: 0.08 K/UL (ref 0–0.2)
BASOPHILS NFR BLD: 0.9 % (ref 0–1.9)
BILIRUB SERPL-MCNC: 0.3 MG/DL (ref 0.1–1)
BUN SERPL-MCNC: 28 MG/DL (ref 6–20)
CALCIUM SERPL-MCNC: 9.1 MG/DL (ref 8.7–10.5)
CHLORIDE SERPL-SCNC: 100 MMOL/L (ref 95–110)
CO2 SERPL-SCNC: 25 MMOL/L (ref 23–29)
CREAT SERPL-MCNC: 1 MG/DL (ref 0.5–1.4)
DIFFERENTIAL METHOD: ABNORMAL
EOSINOPHIL # BLD AUTO: 0.1 K/UL (ref 0–0.5)
EOSINOPHIL NFR BLD: 0.5 % (ref 0–8)
ERYTHROCYTE [DISTWIDTH] IN BLOOD BY AUTOMATED COUNT: 16.4 % (ref 11.5–14.5)
EST. GFR  (AFRICAN AMERICAN): >60 ML/MIN/1.73 M^2
EST. GFR  (NON AFRICAN AMERICAN): >60 ML/MIN/1.73 M^2
GLUCOSE SERPL-MCNC: 94 MG/DL (ref 70–110)
HBV E AG SERPL QL IA: NORMAL
HCT VFR BLD AUTO: 27.1 % (ref 40–54)
HGB BLD-MCNC: 8.6 G/DL (ref 14–18)
IMM GRANULOCYTES # BLD AUTO: 0.04 K/UL (ref 0–0.04)
IMM GRANULOCYTES NFR BLD AUTO: 0.4 % (ref 0–0.5)
LYMPHOCYTES # BLD AUTO: 1.6 K/UL (ref 1–4.8)
LYMPHOCYTES NFR BLD: 17.5 % (ref 18–48)
MAGNESIUM SERPL-MCNC: 1.8 MG/DL (ref 1.6–2.6)
MCH RBC QN AUTO: 26.8 PG (ref 27–31)
MCHC RBC AUTO-ENTMCNC: 31.7 G/DL (ref 32–36)
MCV RBC AUTO: 84 FL (ref 82–98)
MONOCYTES # BLD AUTO: 0.6 K/UL (ref 0.3–1)
MONOCYTES NFR BLD: 5.9 % (ref 4–15)
NEUTROPHILS # BLD AUTO: 7 K/UL (ref 1.8–7.7)
NEUTROPHILS NFR BLD: 74.8 % (ref 38–73)
NRBC BLD-RTO: 0 /100 WBC
PHOSPHATE SERPL-MCNC: 4.7 MG/DL (ref 2.7–4.5)
PLATELET # BLD AUTO: 321 K/UL (ref 150–350)
PMV BLD AUTO: 9.4 FL (ref 9.2–12.9)
POTASSIUM SERPL-SCNC: 4.2 MMOL/L (ref 3.5–5.1)
PROT SERPL-MCNC: 8 G/DL (ref 6–8.4)
RBC # BLD AUTO: 3.21 M/UL (ref 4.6–6.2)
SODIUM SERPL-SCNC: 132 MMOL/L (ref 136–145)
WBC # BLD AUTO: 9.37 K/UL (ref 3.9–12.7)

## 2019-11-05 PROCEDURE — 63600175 PHARM REV CODE 636 W HCPCS: Performed by: STUDENT IN AN ORGANIZED HEALTH CARE EDUCATION/TRAINING PROGRAM

## 2019-11-05 PROCEDURE — 36415 COLL VENOUS BLD VENIPUNCTURE: CPT

## 2019-11-05 PROCEDURE — 63600175 PHARM REV CODE 636 W HCPCS: Performed by: PHYSICIAN ASSISTANT

## 2019-11-05 PROCEDURE — 83735 ASSAY OF MAGNESIUM: CPT

## 2019-11-05 PROCEDURE — 84100 ASSAY OF PHOSPHORUS: CPT

## 2019-11-05 PROCEDURE — 99233 PR SUBSEQUENT HOSPITAL CARE,LEVL III: ICD-10-PCS | Mod: ,,, | Performed by: HOSPITALIST

## 2019-11-05 PROCEDURE — 85025 COMPLETE CBC W/AUTO DIFF WBC: CPT

## 2019-11-05 PROCEDURE — 99233 SBSQ HOSP IP/OBS HIGH 50: CPT | Mod: ,,, | Performed by: HOSPITALIST

## 2019-11-05 PROCEDURE — 80053 COMPREHEN METABOLIC PANEL: CPT

## 2019-11-05 PROCEDURE — 11000001 HC ACUTE MED/SURG PRIVATE ROOM

## 2019-11-05 RX ADMIN — DEXTROSE: 5 SOLUTION INTRAVENOUS at 12:11

## 2019-11-05 RX ADMIN — CEFAZOLIN 6 G: 10 INJECTION, POWDER, FOR SOLUTION INTRAVENOUS at 05:11

## 2019-11-05 NOTE — PROGRESS NOTES
[facility] Hospital Medicine  Progress Note    Patient Name: Ld Bronson  MRN: [id number]  Patient Class: IP- Inpatient   Admission Date: 10/31/2019  Length of Stay: 5 days  Attending Physician: Leonid Rey, *  Primary Care Provider: Kris Artis Jr, MD    Hospital Medicine Team: [facility] 4 Car Lopez MD    Subjective:     Principal Problem:Epidural abscess        HPI:  Mr. Bronson is a 53-year-old man with IVDA (heroin) and Hx MSSA bacteremia with IE, and HCV who presented to the [facility] on 10/23 with fever.     He has had a difficult year, mostly as a result of infectious diseases related to ongoing heroin abuse. He was treated this year for 3-4 weeks for IE prior to leaving [place] but had a more recent admission on 9/21 for chest pain at which time TTE was performed, and he was discharged after blood cultures were negative ruling out ongoing IE. On admission he was hypotensive refractory to fluids and had bilateral cellulitis with abscesses that required bedside drainage by surgery, after which he was admitted to the MICU for septic shock. He has been treated with broad-spectrum antibiotics and levophed via a subclavian line with significant improvement. He was weaned off vasopressors within 24h and stepped down from the ICU six days ago. Since then his blood cultures have been positive for MSSA (first negative result on 10/25), echocardiogram has shown a MV vegetation, and his antibiotics have been narrowed to cefazolin 2g Q8H.      His admission has been complicated by diffuse body pains and joint pains initially thought to be hyperalgesia in the setting of mild opiate withdrawal, eventually evolving into neck and back pain. MRI C-spine showed an epidural abscess at C4. NSGY there recommended cervical decompression, which they are unable to do that there as the hospital is not yet accredited for it per Dr. Artis. He also had an MRI L spine that  showed SI joint effusion that was aspirated by IR with negative cultures. Ortho there did not recommend any additional intervention. Dr. Malone is aware of the patient and agreed to consult. Dr. Artis stated that they would take the patient back upon completion of surgical evaluation/treatment.     Upon presenting to the floor, patient was in NAD and resting comfortably in hospital bed. Patient reports slight lumbar back pain decreased appetite. Patient denies any current N/V/Chills. Patient does state that he has extensive groin abscesses that are quite painful. Patient reports that these [facility] with [facility]. Patient has no further complaints at this time. NSG was contacted and report they will see him tonight however will likely not be going to OR tonight due to active case load tonight.     Overview/Hospital Course:  11/1- Patient doing well overnight with no new complaints; neurosurgery, ID, addiction psych, and nutrition consulted, awaiting neurosurgery recommendation on treatment plan for abscess; HOLLAND ordered for Monday. Patient was evaluated by NSG who would like to repeat MRI last night and now CT of C-Spine. Patient antibiotic regiment changed to continuous infusion of Cefazolin 6g given previous cultures were all MSSA. MRIs showing discitis/osteo at C3-C4, C6-C7 and epidural abscess associated at that point. Patient continues to spike low grade fevers of 100.5 most recently, wound care continues to address the packed ground abscesses. Hgb improved after 1U pRBC. HOLLAND showed mitral but no aortic vegetation. To OR today for washout of epidural abscess.     Interval History: NAEON  Upset with being NPO- advised patient that we will resume diet once NSG gives input after procedure    Review of Systems   Constitutional: Negative for chills and fever.   Respiratory: Negative for cough and shortness of breath.    Cardiovascular: Negative for chest pain.    Gastrointestinal: Negative for abdominal pain, diarrhea, nausea and vomiting.   Endocrine: Negative for cold intolerance and heat intolerance.   Genitourinary: Negative for difficulty urinating.   Musculoskeletal: Positive for back pain.   Neurological: Negative for headaches.   Psychiatric/Behavioral: Positive for agitation (due to being NPO).     Objective:     Vital Signs (Most Recent):  Temp: 97.9 °F (36.6 °C) (11/05/19 1201)  Pulse: 89 (11/05/19 1201)  Resp: 18 (11/05/19 1201)  BP: 112/70 (11/05/19 1201)  SpO2: 100 % (11/05/19 1201) Vital Signs (24h Range):  Temp:  [96 °F (35.6 °C)-98.1 °F (36.7 °C)] 97.9 °F (36.6 °C)  Pulse:  [] 89  Resp:  [18-20] 18  SpO2:  [96 %-100 %] 100 %  BP: (100-129)/(62-74) 112/70     Weight: 58.1 kg (128 lb)  Body mass index is 17.36 kg/m².    Intake/Output Summary (Last 24 hours) at 11/5/2019 1350  Last data filed at 11/5/2019 1200  Gross per 24 hour   Intake 640 ml   Output 2175 ml   Net -1535 ml      Physical Exam   Constitutional: He is oriented to person, place, and time. No distress.   cachectic   HENT:   Head: Normocephalic and atraumatic.   Eyes: Pupils are equal, round, and reactive to light. EOM are normal. No scleral icterus.   Neck: Normal range of motion. Neck supple. No JVD present.   Cardiovascular: Normal rate, normal heart sounds and intact distal pulses.   No murmur heard.  Pulmonary/Chest: Effort normal and breath sounds normal. No respiratory distress. He has no wheezes.   Abdominal: Soft. Bowel sounds are normal. He exhibits no distension. There is no tenderness.   Genitourinary:   Genitourinary Comments: Groin abscess - healthy open wound - not indurated or draining pus   Musculoskeletal: Normal range of motion. He exhibits no edema or tenderness.   Neurological: He is alert and oriented to person, place, and time. No cranial nerve deficit.   Skin: Skin is warm. Capillary refill takes less than 2 seconds. He is not diaphoretic. No erythema.    Psychiatric: He has a normal mood and affect.   Vitals reviewed.      Significant Labs:   Recent Lab Results       11/05/19  0303   11/04/19  1638        Albumin 2.2       Alkaline Phosphatase 107       ALT 32       Anion Gap 7       aPTT   25.3  Comment:  aPTT therapeutic range = 39-69 seconds     AST 81       Baso # 0.08       Basophil% 0.9       BILIRUBIN TOTAL 0.3  Comment:  For infants and newborns, interpretation of results should be based  on gestational age, weight and in agreement with clinical  observations.  Premature Infant recommended reference ranges:  Up to 24 hours.............<8.0 mg/dL  Up to 48 hours............<12.0 mg/dL  3-5 days..................<15.0 mg/dL  6-29 days.................<15.0 mg/dL         BUN, Bld 28       Calcium 9.1       Chloride 100       CO2 25       Creatinine 1.0       Differential Method Automated       eGFR if  >60.0       eGFR if non  >60.0  Comment:  Calculation used to obtain the estimated glomerular filtration  rate (eGFR) is the CKD-EPI equation.          Eos # 0.1       Eosinophil% 0.5       Glucose 94       Gran # (ANC) 7.0       Gran% 74.8       Hematocrit 27.1       Hemoglobin 8.6       Immature Grans (Abs) 0.04  Comment:  Mild elevation in immature granulocytes is non specific and   can be seen in a variety of conditions including stress response,   acute inflammation, trauma and pregnancy. Correlation with other   laboratory and clinical findings is essential.         Immature Granulocytes 0.4       Coumadin Monitoring INR   1.1  Comment:  Coumadin Therapy:  2.0 - 3.0 for INR for all indicators except mechanical heart valves  and antiphospholipid syndromes which should use 2.5 - 3.5.       Lymph # 1.6       Lymph% 17.5       Magnesium 1.8       MCH 26.8       MCHC 31.7       MCV 84       Mono # 0.6       Mono% 5.9       MPV 9.4       nRBC 0       Phosphorus 4.7       Platelets 321       Potassium 4.2       PROTEIN TOTAL 8.0        Protime   11.3     RBC 3.21       RDW 16.4       Sodium 132       WBC 9.37             Significant Imaging: I have reviewed all pertinent imaging results/findings within the past 24 hours.      Assessment/Plan:      * Epidural abscess  Patient with IVDA, MSSA bacteremia w/ endocarditis (MV veg) and epidural abscess at C4 transferred [facility]G evaluation. [facility] however they do not have NSG credentialing so patient was transferred here.   -MRI here showing C3-C4 and C6-C7 osteo/discitis w/ epidural abscess at that point     Plan:  -Cefazolin 6g continuous   -neuro surg consulted- washout scheduled today in the noon  -holding DVT Prophy due to planned washout in the OR today  -NPO since midnight for NSG procedure today - received d5w 1L 100c/hr infusion overnight on top off d5w mixed with cefazolin      Pre-op evaluation  Preoperative Cardiovascular Risk Assessment:  Non-emergent surgery.  No active cardiac problems (such as unstable angina, decompensated heart failure, significant uncontrolled arrhythmias or severe valvular disease).  Low/Intermediate risk surgery.  Functional Status: able to climb a flight of stairs (> 4 METS)  Revised cardiac risk index (RCRI) score is 0.     1 pt Each: Ischemic Heart Disease, Cerebrovascular Disease,                      CHF, DM (on insulin), Creatinine > 2     Other Issues:       Anticoagulation:  Yes- Hold off Heparin     Revised Cardiac Risk Index   1. History of ischemic heart disease   2. History of congestive heart failure   3. History of cerebrovascular disease (stroke or transient ischemic attack)   4. History of diabetes requiring preoperative insulin use 5. Chronic kidney disease (creatinine > 2 mg/dL)   5. Undergoing suprainguinal vascular, intraperitoneal, or intrathoracic surgery Risk for cardiac death, nonfatal myocardial infarction, and nonfatal cardiac arrest     0 predictors = 3.9%, 1 predictor = 6.0%, 2 predictors = 10.1%, ?3 predictors  = >15%    Preoperative evaluation:  RCRI score of 0 thus 3.9% rate of major cardiac complications.  Acceptable cardiac risk going for this low/intermediate risk surgery.  No absolute contraindications to the proposed surgery at this time   - Ok to proceed to surgery  - hold off heparin on day of surgery and 7 days post-op         Hepatitis B antibody positive  History of positive Hep B antibody test      Cachexia  Patient chronically cachectic  -nutrition consulted       Groin abscess  Patient with multiple groin abscess that was I&D at bedside last week. Covering with bs ABx at this time. Wounds packed.     Plan:  -BS Abx  -Wound care consult - recommend supportive care as does not seem infected  -Obtain records of cultures       Severe protein-calorie malnutrition  Patient with albumin of   Lab Results   Component Value Date    LABPROT 11.3 10/31/2019    ALBUMIN 2.0 (L) 11/02/2019    ALBUMIN 2.0 (L) 11/02/2019        Plan:  -Nutrition consult, will f/u recs   -Boost with every meal  -Continue to monitor albumin  -time spent counseling patient on increasing PO intake, modalities to help increase protein intake etc.        HCV (hepatitis C virus)  Hx of hep C unsure of treatment, previously on Sofosuvir-Velpatasvir   -however he only filled this once   -Care everywhere showing HCV log 6.24 and RNA >200k    Plan:  -f/u outpatient     Opiate abuse, continuous  See above       Endocarditis due to methicillin susceptible Staphylococcus aureus (MSSA)  Patient with recent history of MSSA bacteremia and endocarditis who presents as a transfer for concern of epidural abscess of C4. Patient was treated for IE for 3-4 weeks and left A[facility] earlier this month and [facility], BS abx and pressors. Patient was stepped down from ICU and has remained on BS abx on the floor of the [facility] for the past 6 days.   -MSSA (first negative result on 10/25), echocardiogram has shown a MV vegetation, and  his antibiotics have been narrowed to cefazolin 2g Q8H.    Plan:  -Given Epidural abscess and concomitant MSSA endocarditis will treat with Cefazolin  -[facility] records from [facility], likely not till next week  -HOLLAND and TTE both showing mitral vegetation. HOLLAND ruled out any aortic vegetations which was a concern from TTE   -Repeat cultures (10/31)- Aerobic 1/2 GPC in clusters resembling Staph  -repeat Bcx 11/4- NGTD      IV drug abuse  Chronic IV Heroin use, recently left AMA From outside facility earlier this month with continued heroin use. Reports last IVDU > days prior to his admission [facility] for [facility].       Plan:  -Addiction psych - follow recs  -patient not currently on MAT         VTE Risk Mitigation (From admission, onward)         Ordered     heparin (porcine) injection 5,000 Units  Every 8 hours      11/02/19 1049     Place sequential compression device  Until discontinued      10/31/19 1851     IP VTE LOW RISK PATIENT  Once      10/31/19 1851                      Car Lopez MD  Department of Hospital Medicine   [facility]

## 2019-11-05 NOTE — ASSESSMENT & PLAN NOTE
Patient with IVDA, MSSA bacteremia w/ endocarditis (MV veg) and epidural abscess at C4 transferred for NSG evaluation. At [facility]G rec was to decompress however they do not have NSG credentialing so patient was transferred here.   -MRI here showing C3-C4 and C6-C7 osteo/discitis w/ epidural abscess at that point     Plan:  -Cefazolin 6g continuous   -neuro surg consulted- washout scheduled today in the noon  -holding DVT Prophy due to planned washout in the OR today  -NPO since midnight for NSG procedure today - received d5w 1L 100c/hr infusion overnight on top off d5w mixed with cefazolin

## 2019-11-05 NOTE — PROGRESS NOTES
Pt requesting to cancel sx and reschedule for another day. Pt states he is hungry. Stated he has been starving for thee days.  Notified CN and resident for DR. Malone. DR. Malone's resident spoke w/ PT. Sx cx.   1540- Dr. Klein, anesthesia spoke w/ PT. Report given to [facility] floor nurse. Pt aaox3 and in no distress.

## 2019-11-05 NOTE — ASSESSMENT & PLAN NOTE
Chronic IV Heroin use, recently left AMA From outside facility earlier this month with continued heroin use. Reports last IVDU > days prior [facility].       Plan:  -Addiction psych - follow recs  -patient not currently on MAT

## 2019-11-05 NOTE — PROGRESS NOTES
[facility] Hospital Medicine  Progress Note    Patient Name: Ld Bronson  MRN: [id number]  Patient Class: IP- Inpatient   Admission Date: 10/31/2019  Length of Stay: 4 days  Attending Physician: Zainab Casillas MD  Primary Care Provider: Kris Artis Jr, MD    [facility] Medicine Team: [facility] 4 Car Lopez MD    Subjective:     Principal Problem:Epidural abscess        HPI:  Mr. Bronson is a 53-year-old man with IVDA (heroin) and Hx MSSA bacteremia with IE, and HCV who presented to the [facility] on 10/23 with fever.     He has had a difficult year, mostly as a result of infectious diseases related to ongoing heroin abuse. He was treated this year for 3-4 weeks for IE prior to leaving [place] but had a more recent admission on 9/21 for chest pain at which time TTE was performed, and he was discharged after blood cultures were negative ruling out ongoing IE. On admission he was hypotensive refractory to fluids and had bilateral cellulitis with abscesses that required bedside drainage by surgery, after which he was admitted to the MICU for septic shock. He has been treated with broad-spectrum antibiotics and levophed via a subclavian line with significant improvement. He was weaned off vasopressors within 24h and stepped down from the ICU six days ago. Since then his blood cultures have been positive for MSSA (first negative result on 10/25), echocardiogram has shown a MV vegetation, and his antibiotics have been narrowed to cefazolin 2g Q8H.      His admission has been complicated by diffuse body pains and joint pains initially thought to be hyperalgesia in the setting of mild opiate withdrawal, eventually evolving into neck and back pain. MRI C-spine showed an epidural abscess at C4. NSGY there recommended cervical decompression, which they are unable to do that there as the hospital is not yet accredited for it per Dr. Artis. He also had an MRI L spine that showed SI  joint effusion that was aspirated by IR with negative cultures. Ortho there did not recommend any additional intervention. Dr. Malone is aware of the patient and agreed to consult. Dr. Artis stated that they would take the patient back upon completion of surgical evaluation/treatment.     Upon presenting to the floor, patient was in NAD and resting comfortably in hospital bed. Patient reports slight lumbar back pain decreased appetite. Patient denies any current N/V/Chills. Patient does state that he has extensive groin abscesses that are quite painful. Patient reports that these have [facility] with dressing [facility]. Patient has no further complaints at this time. NSG was contacted and report they will see him tonight however will likely not be going to OR tonight due to active case load tonight.     Overview/Hospital Course:  11/1- Patient doing well overnight with no new complaints; neurosurgery, ID, addiction psych, and nutrition consulted, awaiting neurosurgery recommendation on treatment plan for abscess; HOLLAND ordered for Monday. Patient was evaluated by NSG who would like to repeat MRI last night and now CT of C-Spine. Patient antibiotic regiment changed to continuous infusion of Cefazolin 6g given previous cultures were all MSSA. MRIs showing discitis/osteo at C3-C4, C6-C7 and epidural abscess associated at that point. Patient continues to spike low grade fevers of 100.5 most recently, wound care continues to address the packed ground abscesses. Hgb improved after 1U pRBC. HOLLAND showed mitral but no aortic vegetation. OR tomorrow for washout of epidural abscess.    Interval History: NAEON  Upset with being NPO  HOLLAND today - no AV endocarditis      Review of Systems   Constitutional: Negative for chills and fever.   Respiratory: Negative for cough and shortness of breath.    Cardiovascular: Negative for chest pain.   Gastrointestinal: Negative for abdominal pain, diarrhea, nausea and  vomiting.   Endocrine: Negative for cold intolerance and heat intolerance.   Genitourinary: Negative for difficulty urinating.   Musculoskeletal: Positive for back pain.   Neurological: Negative for headaches.   Psychiatric/Behavioral: Positive for agitation (due to being NPO).     Objective:     Vital Signs (Most Recent):  Temp: 97.5 °F (36.4 °C) (11/04/19 1528)  Pulse: 92 (11/04/19 1528)  Resp: 20 (11/04/19 1528)  BP: 108/74 (11/04/19 1528)  SpO2: 96 % (11/04/19 1528) Vital Signs (24h Range):  Temp:  [97.5 °F (36.4 °C)-98.3 °F (36.8 °C)] 97.5 °F (36.4 °C)  Pulse:  [] 92  Resp:  [16-20] 20  SpO2:  [96 %-99 %] 96 %  BP: ()/(57-74) 108/74     Weight: 58.1 kg (128 lb)  Body mass index is 17.36 kg/m².    Intake/Output Summary (Last 24 hours) at 11/4/2019 1800  Last data filed at 11/4/2019 1456  Gross per 24 hour   Intake 1140 ml   Output 2650 ml   Net -1510 ml      Physical Exam   Constitutional: He is oriented to person, place, and time. No distress.   cachectic   HENT:   Head: Normocephalic and atraumatic.   Eyes: Pupils are equal, round, and reactive to light. EOM are normal. No scleral icterus.   Neck: Normal range of motion. Neck supple. No JVD present.   Cardiovascular: Normal rate, normal heart sounds and intact distal pulses.   No murmur heard.  Pulmonary/Chest: Effort normal and breath sounds normal. No respiratory distress. He has no wheezes.   Abdominal: Soft. Bowel sounds are normal. He exhibits no distension. There is no tenderness.   Musculoskeletal: Normal range of motion. He exhibits no edema or tenderness.   Neurological: He is alert and oriented to person, place, and time. No cranial nerve deficit.   Skin: Skin is warm. Capillary refill takes less than 2 seconds. He is not diaphoretic. No erythema.   Psychiatric: He has a normal mood and affect.   Vitals reviewed.      Significant Labs:   Recent Lab Results       11/04/19  1638   11/04/19  1253   11/04/19  0845   11/04/19  0843    11/04/19  0337        Albumin         2.1     Alkaline Phosphatase         98     ALT         37     Anion Gap         7     Aortic proximal velocity   3.00           aPTT 25.3  Comment:  aPTT therapeutic range = 39-69 seconds             AST         103     Baso #         0.05     Basophil%         0.5     BILIRUBIN TOTAL         0.4  Comment:  For infants and newborns, interpretation of results should be based  on gestational age, weight and in agreement with clinical  observations.  Premature Infant recommended reference ranges:  Up to 24 hours.............<8.0 mg/dL  Up to 48 hours............<12.0 mg/dL  3-5 days..................<15.0 mg/dL  6-29 days.................<15.0 mg/dL       Blood Culture, Routine     No Growth to date[P] No Growth to date[P]       BSA   1.72           BUN, Bld         27     Calcium         8.6     Chloride         104     CO2         25     Creatinine         1.0     Differential Method         Automated     eGFR if          >60.0     eGFR if non          >60.0  Comment:  Calculation used to obtain the estimated glomerular filtration  rate (eGFR) is the CKD-EPI equation.        Eos #         0.1     Eosinophil%         0.7     Glucose         95     Gran # (ANC)         7.2     Gran%         76.6     Hematocrit         27.0     Hemoglobin         8.3     Immature Grans (Abs)         0.06  Comment:  Mild elevation in immature granulocytes is non specific and   can be seen in a variety of conditions including stress response,   acute inflammation, trauma and pregnancy. Correlation with other   laboratory and clinical findings is essential.       Immature Granulocytes         0.6     Lymph #         1.5     Lymph%         16.2     Magnesium         1.7     MCH         26.1     MCHC         30.7     MCV         85     Mono #         0.5     Mono%         5.4     MPV         9.5     nRBC         0     Phosphorus         4.4     Platelets         302      Potassium         4.2     PROTEIN TOTAL         7.6     RBC         3.18     RDW         16.3     Sodium         136     WBC         9.44                          Significant Imaging: I have reviewed all pertinent imaging results/findings within the past 24 hours.      Assessment/Plan:      * Epidural abscess  Patient with IVDA, MSSA bacteremia w/ endocarditis (MV veg) and epidural abscess at C4 transferred [facility]G evaluation. [facility] to [facility] however they do not have NSG credentialing so patient was transferred here.   -MRI here showing C3-C4 and C6-C7 osteo/discitis w/ epidural abscess at that point     Plan:  -Cefazolin 6g continuous   -neuro surg consulted- washout scheduled tomorrow in the noon  -holding DVT Prophy due to planned washout in the OR tomorrow  -NPO at midnight - will give d5w 1L 100c/hr infusion overnight on top off d5w mixed with cefazolin      Pre-op evaluation  Preoperative Cardiovascular Risk Assessment:  Non-emergent surgery.  No active cardiac problems (such as unstable angina, decompensated heart failure, significant uncontrolled arrhythmias or severe valvular disease).  Low/Intermediate risk surgery.  Functional Status: able to climb a flight of stairs (> 4 METS)  Revised cardiac risk index (RCRI) score is 0.     1 pt Each: Ischemic Heart Disease, Cerebrovascular Disease,                      CHF, DM (on insulin), Creatinine > 2     Other Issues:       Anticoagulation:  Yes- Hold off Heparin     Revised Cardiac Risk Index   1. History of ischemic heart disease   2. History of congestive heart failure   3. History of cerebrovascular disease (stroke or transient ischemic attack)   4. History of diabetes requiring preoperative insulin use 5. Chronic kidney disease (creatinine > 2 mg/dL)   5. Undergoing suprainguinal vascular, intraperitoneal, or intrathoracic surgery Risk for cardiac death, nonfatal myocardial infarction, and nonfatal cardiac arrest     0 predictors = 3.9%, 1  predictor = 6.0%, 2 predictors = 10.1%, ?3 predictors = >15%    Preoperative evaluation:  RCRI score of 0 thus 3.9% rate of major cardiac complications.  Acceptable cardiac risk going for this low/intermediate risk surgery.  No absolute contraindications to the proposed surgery at this time   - Ok to proceed to surgery  - hold off heparin on day of surgery and 7 days post-op         Hepatitis B antibody positive  History of positive Hep B antibody test      Cachexia  Patient chronically cachectic  -nutrition consulted       Groin abscess  Patient with multiple groin abscess that was I&D at bedside last week. Covering with bs ABx at this time. Wounds packed.     Plan:  -BS Abx  -Wound care consult - recommend supportive care as does not seem infected  -Obtain records of cultures       Severe protein-calorie malnutrition  Patient with albumin of   Lab Results   Component Value Date    LABPROT 11.3 10/31/2019    ALBUMIN 2.0 (L) 11/02/2019    ALBUMIN 2.0 (L) 11/02/2019        Plan:  -Nutrition consult, will f/u recs   -Boost with every meal  -Continue to monitor albumin  -time spent counseling patient on increasing PO intake, modalities to help increase protein intake etc.        HCV (hepatitis C virus)  Hx of hep C unsure of treatment, previously on Sofosuvir-Velpatasvir   -however he only filled this once   -Care everywhere showing HCV log 6.24 and RNA >200k    Plan:  -f/u outpatient     Opiate abuse, continuous  See above       Endocarditis due to methicillin susceptible Staphylococcus aureus (MSSA)  Patient with recent history of MSSA bacteremia and endocarditis who presents as a transfer for concern of epidural abscess of C4. Patient was treated for IE for 3-4 weeks and left AMA earlier this month and [facility], BS abx and pressors. Patient was stepped down from ICU and has remained on BS abx on [facility] floor of the [facility] for the past 6 days.   -MSSA (first negative result on  10/25), echocardiogram has shown a MV vegetation, and his antibiotics have been narrowed to cefazolin 2g Q8H.    Plan:  -Given Epidural abscess and concomitant MSSA endocarditis will treat with Cefazolin  -[facility] records from [facility], likely not till next week  -HOLLAND and TTE both showing mitral vegetation. HOLLAND ruled out any aortic vegetations which was a concern from TTE   -Repeat cultures (10/31)- Aerobic 1/2 GPC in clusters resembling Staph  -repeating Bcx 11/4- NGTD      IV drug abuse  Chronic IV Heroin use, recently left AMA From outside facility earlier this month with continued heroin use. Reports last IVDU > days prior to [facility].       Plan:  -Addiction psych consult in AM  -patient not currently on MAT         VTE Risk Mitigation (From admission, onward)         Ordered     heparin (porcine) injection 5,000 Units  Every 8 hours      11/02/19 1049     Place sequential compression device  Until discontinued      10/31/19 1851     IP VTE LOW RISK PATIENT  Once      10/31/19 1851                      Car Lopez MD  Department of Hospital Medicine   [facility]

## 2019-11-05 NOTE — ASSESSMENT & PLAN NOTE
Patient with multiple groin abscess that was I&D at bedside last week. Covering with bs ABx at this time. Wounds packed.     Plan:  -BS Abx  -Wound care consult - recommend supportive care as does not seem infected  -Obtain records of cultures

## 2019-11-05 NOTE — ASSESSMENT & PLAN NOTE
Preoperative Cardiovascular Risk Assessment:  Non-emergent surgery.  No active cardiac problems (such as unstable angina, decompensated heart failure, significant uncontrolled arrhythmias or severe valvular disease).  Low/Intermediate risk surgery.  Functional Status: able to climb a flight of stairs (> 4 METS)  Revised cardiac risk index (RCRI) score is 0.     1 pt Each: Ischemic Heart Disease, Cerebrovascular Disease,                      CHF, DM (on insulin), Creatinine > 2     Other Issues:       Anticoagulation:  Yes- Hold off Heparin     Revised Cardiac Risk Index   1. History of ischemic heart disease   2. History of congestive heart failure   3. History of cerebrovascular disease (stroke or transient ischemic attack)   4. History of diabetes requiring preoperative insulin use 5. Chronic kidney disease (creatinine > 2 mg/dL)   5. Undergoing suprainguinal vascular, intraperitoneal, or intrathoracic surgery Risk for cardiac death, nonfatal myocardial infarction, and nonfatal cardiac arrest     0 predictors = 3.9%, 1 predictor = 6.0%, 2 predictors = 10.1%, ?3 predictors = >15%    Preoperative evaluation:  RCRI score of 0 thus 3.9% rate of major cardiac complications.  Acceptable cardiac risk going for this low/intermediate risk surgery.  No absolute contraindications to the proposed surgery at this time   - Ok to proceed to surgery  - hold off heparin on day of surgery and 7 days post-op

## 2019-11-05 NOTE — SUBJECTIVE & OBJECTIVE
Interval History: NAEON  Upset with being NPO- advised patient that we will resume diet once NSG gives input after procedure    Review of Systems   Constitutional: Negative for chills and fever.   Respiratory: Negative for cough and shortness of breath.    Cardiovascular: Negative for chest pain.   Gastrointestinal: Negative for abdominal pain, diarrhea, nausea and vomiting.   Endocrine: Negative for cold intolerance and heat intolerance.   Genitourinary: Negative for difficulty urinating.   Musculoskeletal: Positive for back pain.   Neurological: Negative for headaches.   Psychiatric/Behavioral: Positive for agitation (due to being NPO).     Objective:     Vital Signs (Most Recent):  Temp: 97.9 °F (36.6 °C) (11/05/19 1201)  Pulse: 89 (11/05/19 1201)  Resp: 18 (11/05/19 1201)  BP: 112/70 (11/05/19 1201)  SpO2: 100 % (11/05/19 1201) Vital Signs (24h Range):  Temp:  [96 °F (35.6 °C)-98.1 °F (36.7 °C)] 97.9 °F (36.6 °C)  Pulse:  [] 89  Resp:  [18-20] 18  SpO2:  [96 %-100 %] 100 %  BP: (100-129)/(62-74) 112/70     Weight: 58.1 kg (128 lb)  Body mass index is 17.36 kg/m².    Intake/Output Summary (Last 24 hours) at 11/5/2019 1350  Last data filed at 11/5/2019 1200  Gross per 24 hour   Intake 640 ml   Output 2175 ml   Net -1535 ml      Physical Exam   Constitutional: He is oriented to person, place, and time. No distress.   cachectic   HENT:   Head: Normocephalic and atraumatic.   Eyes: Pupils are equal, round, and reactive to light. EOM are normal. No scleral icterus.   Neck: Normal range of motion. Neck supple. No JVD present.   Cardiovascular: Normal rate, normal heart sounds and intact distal pulses.   No murmur heard.  Pulmonary/Chest: Effort normal and breath sounds normal. No respiratory distress. He has no wheezes.   Abdominal: Soft. Bowel sounds are normal. He exhibits no distension. There is no tenderness.   Genitourinary:   Genitourinary Comments: Groin abscess - healthy open wound - not indurated or  draining pus   Musculoskeletal: Normal range of motion. He exhibits no edema or tenderness.   Neurological: He is alert and oriented to person, place, and time. No cranial nerve deficit.   Skin: Skin is warm. Capillary refill takes less than 2 seconds. He is not diaphoretic. No erythema.   Psychiatric: He has a normal mood and affect.   Vitals reviewed.      Significant Labs:   Recent Lab Results       11/05/19  0303   11/04/19  1638        Albumin 2.2       Alkaline Phosphatase 107       ALT 32       Anion Gap 7       aPTT   25.3  Comment:  aPTT therapeutic range = 39-69 seconds     AST 81       Baso # 0.08       Basophil% 0.9       BILIRUBIN TOTAL 0.3  Comment:  For infants and newborns, interpretation of results should be based  on gestational age, weight and in agreement with clinical  observations.  Premature Infant recommended reference ranges:  Up to 24 hours.............<8.0 mg/dL  Up to 48 hours............<12.0 mg/dL  3-5 days..................<15.0 mg/dL  6-29 days.................<15.0 mg/dL         BUN, Bld 28       Calcium 9.1       Chloride 100       CO2 25       Creatinine 1.0       Differential Method Automated       eGFR if  >60.0       eGFR if non  >60.0  Comment:  Calculation used to obtain the estimated glomerular filtration  rate (eGFR) is the CKD-EPI equation.          Eos # 0.1       Eosinophil% 0.5       Glucose 94       Gran # (ANC) 7.0       Gran% 74.8       Hematocrit 27.1       Hemoglobin 8.6       Immature Grans (Abs) 0.04  Comment:  Mild elevation in immature granulocytes is non specific and   can be seen in a variety of conditions including stress response,   acute inflammation, trauma and pregnancy. Correlation with other   laboratory and clinical findings is essential.         Immature Granulocytes 0.4       Coumadin Monitoring INR   1.1  Comment:  Coumadin Therapy:  2.0 - 3.0 for INR for all indicators except mechanical heart valves  and  antiphospholipid syndromes which should use 2.5 - 3.5.       Lymph # 1.6       Lymph% 17.5       Magnesium 1.8       MCH 26.8       MCHC 31.7       MCV 84       Mono # 0.6       Mono% 5.9       MPV 9.4       nRBC 0       Phosphorus 4.7       Platelets 321       Potassium 4.2       PROTEIN TOTAL 8.0       Protime   11.3     RBC 3.21       RDW 16.4       Sodium 132       WBC 9.37             Significant Imaging: I have reviewed all pertinent imaging results/findings within the past 24 hours.

## 2019-11-05 NOTE — PHARMACY MED REC
"  Admission Medication Reconciliation - Pharmacy Consult Note    The home medication history was taken by Arminda José.  Based on information gathered and subsequent review by the clinical pharmacist, the items below may need attention.    You may go to "Admission" then "Reconcile Home Medications" tabs to review and/or act upon these items.        No issues noted with the medication reconciliation.        Potential issues to be addressed PRIOR TO DISCHARGE  o Drug cost interfering with therapy (provide alternatives if possible)  o Patient request for refills  o Prescriptions could not be filled prior to admission  o Patient lacks understanding of therapy    Please address this information as you see fit.  Feel free to contact us if you have any questions or require assistance.    Guanako Mane, PharmD  [id number]              .    .          "

## 2019-11-05 NOTE — PLAN OF CARE
Updated progress note:    Pt scheduled for OR today for C6/7 ACDF.  Pt is currently refusing to undergo procedure due to hunger.  Pt states that he has been starving for 3 days and refuses to wait any longer to eat thus cancelling his own surgery.  Risks and benefits were discussed with pt including the risk of not surgically treating his epidural abscess at C6/7which could result in neurologic worsening.  Pt unwilling to consider surgery at this point.  Pt also [facility].  Due to the aforementioned findings NSGY will cancel the ACDF today and will contact the medicine team for further treatment plan.    Information discussed with Dr. Malone and she concurs.    Anival Valiente DO.

## 2019-11-05 NOTE — ASSESSMENT & PLAN NOTE
Patient with IVDA, MSSA bacteremia w/ endocarditis (MV veg) and epidural abscess at C4 transferred for NSG evaluation. At [facility] rec was to decompress however they do not have NSG credentialing so patient was transferred here.   -MRI here showing C3-C4 and C6-C7 osteo/discitis w/ epidural abscess at that point     Plan:  -Cefazolin 6g continuous   -neuro surg consulted- washout scheduled tomorrow in the noon  -holding DVT Prophy due to planned washout in the OR tomorrow  -NPO at midnight - will give d5w 1L 100c/hr infusion overnight on top off d5w mixed with cefazolin

## 2019-11-05 NOTE — PLAN OF CARE
Spoke with patient regarding his desire to cancel surgery for today given his stated prolonged NPO status and desire to eat.  Long discussion had with patient regarding the risks of delaying surgery, as well as the fact that he would be proceeding to surgery within the next 15-30 minutes and would likely be able to eat following the procedure.  Despite our discussion, patient continues to reiterate his desire to cancel surgery for now.      Grady Klein MD  Anesthesiology

## 2019-11-05 NOTE — SUBJECTIVE & OBJECTIVE
Interval History: NAEON  Upset with being NPO  HOLLAND today - no AV endocarditis      Review of Systems   Constitutional: Negative for chills and fever.   Respiratory: Negative for cough and shortness of breath.    Cardiovascular: Negative for chest pain.   Gastrointestinal: Negative for abdominal pain, diarrhea, nausea and vomiting.   Endocrine: Negative for cold intolerance and heat intolerance.   Genitourinary: Negative for difficulty urinating.   Musculoskeletal: Positive for back pain.   Neurological: Negative for headaches.   Psychiatric/Behavioral: Positive for agitation (due to being NPO).     Objective:     Vital Signs (Most Recent):  Temp: 97.5 °F (36.4 °C) (11/04/19 1528)  Pulse: 92 (11/04/19 1528)  Resp: 20 (11/04/19 1528)  BP: 108/74 (11/04/19 1528)  SpO2: 96 % (11/04/19 1528) Vital Signs (24h Range):  Temp:  [97.5 °F (36.4 °C)-98.3 °F (36.8 °C)] 97.5 °F (36.4 °C)  Pulse:  [] 92  Resp:  [16-20] 20  SpO2:  [96 %-99 %] 96 %  BP: ()/(57-74) 108/74     Weight: 58.1 kg (128 lb)  Body mass index is 17.36 kg/m².    Intake/Output Summary (Last 24 hours) at 11/4/2019 1800  Last data filed at 11/4/2019 1456  Gross per 24 hour   Intake 1140 ml   Output 2650 ml   Net -1510 ml      Physical Exam   Constitutional: He is oriented to person, place, and time. No distress.   cachectic   HENT:   Head: Normocephalic and atraumatic.   Eyes: Pupils are equal, round, and reactive to light. EOM are normal. No scleral icterus.   Neck: Normal range of motion. Neck supple. No JVD present.   Cardiovascular: Normal rate, normal heart sounds and intact distal pulses.   No murmur heard.  Pulmonary/Chest: Effort normal and breath sounds normal. No respiratory distress. He has no wheezes.   Abdominal: Soft. Bowel sounds are normal. He exhibits no distension. There is no tenderness.   Musculoskeletal: Normal range of motion. He exhibits no edema or tenderness.   Neurological: He is alert and oriented to person, place, and time.  No cranial nerve deficit.   Skin: Skin is warm. Capillary refill takes less than 2 seconds. He is not diaphoretic. No erythema.   Psychiatric: He has a normal mood and affect.   Vitals reviewed.      Significant Labs:   Recent Lab Results       11/04/19  1638   11/04/19  1253   11/04/19  0845   11/04/19  0843   11/04/19  0337        Albumin         2.1     Alkaline Phosphatase         98     ALT         37     Anion Gap         7     Aortic proximal velocity   3.00           aPTT 25.3  Comment:  aPTT therapeutic range = 39-69 seconds             AST         103     Baso #         0.05     Basophil%         0.5     BILIRUBIN TOTAL         0.4  Comment:  For infants and newborns, interpretation of results should be based  on gestational age, weight and in agreement with clinical  observations.  Premature Infant recommended reference ranges:  Up to 24 hours.............<8.0 mg/dL  Up to 48 hours............<12.0 mg/dL  3-5 days..................<15.0 mg/dL  6-29 days.................<15.0 mg/dL       Blood Culture, Routine     No Growth to date[P] No Growth to date[P]       BSA   1.72           BUN, Bld         27     Calcium         8.6     Chloride         104     CO2         25     Creatinine         1.0     Differential Method         Automated     eGFR if          >60.0     eGFR if non          >60.0  Comment:  Calculation used to obtain the estimated glomerular filtration  rate (eGFR) is the CKD-EPI equation.        Eos #         0.1     Eosinophil%         0.7     Glucose         95     Gran # (ANC)         7.2     Gran%         76.6     Hematocrit         27.0     Hemoglobin         8.3     Immature Grans (Abs)         0.06  Comment:  Mild elevation in immature granulocytes is non specific and   can be seen in a variety of conditions including stress response,   acute inflammation, trauma and pregnancy. Correlation with other   laboratory and clinical findings is essential.        Immature Granulocytes         0.6     Lymph #         1.5     Lymph%         16.2     Magnesium         1.7     MCH         26.1     MCHC         30.7     MCV         85     Mono #         0.5     Mono%         5.4     MPV         9.5     nRBC         0     Phosphorus         4.4     Platelets         302     Potassium         4.2     PROTEIN TOTAL         7.6     RBC         3.18     RDW         16.3     Sodium         136     WBC         9.44                          Significant Imaging: I have reviewed all pertinent imaging results/findings within the past 24 hours.

## 2019-11-05 NOTE — ASSESSMENT & PLAN NOTE
Patient with recent history of MSSA bacteremia and endocarditis who presents as a transfer for concern of epidural abscess of C4. Patient was treated for IE for 3-4 weeks and left AMA earlier this month and [facility], BS abx and pressors. Patient was stepped down from ICU and has remained on [facility] abx [facility] the [facility] for the past 6 days.   -MSSA (first negative result on 10/25), echocardiogram has shown a MV vegetation, and his antibiotics have been narrowed to cefazolin 2g Q8H.    Plan:  -Given Epidural abscess and concomitant MSSA endocarditis will treat with Cefazolin  -[facility] records from [facility], likely not till next week  -HOLLAND and TTE both showing mitral vegetation. HOLLAND ruled out any aortic vegetations which was a concern from TTE   -Repeat cultures (10/31)- Aerobic 1/2 GPC in clusters resembling Staph  -repeating Bcx 11/4- NGTD

## 2019-11-05 NOTE — ANESTHESIA POSTPROCEDURE EVALUATION
Anesthesia Post Evaluation    Patient: Ld Bronson    Procedure(s) Performed: Procedure(s) (LRB):  ECHOCARDIOGRAM, TRANSESOPHAGEAL (N/A)    Final Anesthesia Type: general  Patient location during evaluation: PACU  Patient participation: Yes- Able to Participate  Level of consciousness: awake and alert and oriented  Post-procedure vital signs: reviewed and stable  Pain management: adequate  Airway patency: patent  PONV status at discharge: No PONV  Anesthetic complications: no      Cardiovascular status: blood pressure returned to baseline and hemodynamically stable  Respiratory status: unassisted and spontaneous ventilation  Hydration status: euvolemic  Follow-up not needed.          Vitals Value Taken Time   /66 11/5/2019  2:06 PM   Temp 36.7 °C (98 °F) 11/5/2019  2:06 PM   Pulse 92 11/5/2019  2:06 PM   Resp 20 11/5/2019  2:06 PM   SpO2 98 % 11/5/2019  2:06 PM         Event Time     Out of Recovery 11/04/2019 13:52:00          Pain/Mckay Score: Pain Rating Prior to Med Admin: 2 (11/5/2019  3:00 AM)

## 2019-11-05 NOTE — ASSESSMENT & PLAN NOTE
Patient with recent history of MSSA bacteremia and endocarditis who presents as a transfer for concern of epidural abscess of C4. Patient was treated for IE for 3-4 weeks and left AMA earlier this month and [facility], BS abx and pressors. Patient was stepped down from ICU and has remained on [facility] abx [facility] for the past 6 days.   -MSSA (first negative result on 10/25), echocardiogram has shown a MV vegetation, and his antibiotics have been narrowed to cefazolin 2g Q8H.    Plan:  -Given Epidural abscess and concomitant MSSA endocarditis will treat with Cefazolin  -[facility], likely not till next week  -HOLLAND and TTE both showing mitral vegetation. HOLLAND ruled out any aortic vegetations which was a concern from TTE   -Repeat cultures (10/31)- Aerobic 1/2 GPC in clusters resembling Staph  -repeat Bcx 11/4- NGTD

## 2019-11-06 ENCOUNTER — ANESTHESIA (OUTPATIENT)
Dept: SURGERY | Facility: HOSPITAL | Age: 54
DRG: 028 | End: 2019-11-06
Payer: COMMERCIAL

## 2019-11-06 ENCOUNTER — ANESTHESIA EVENT (OUTPATIENT)
Dept: SURGERY | Facility: HOSPITAL | Age: 54
DRG: 028 | End: 2019-11-06
Payer: COMMERCIAL

## 2019-11-06 LAB
GRAM STN SPEC: NORMAL

## 2019-11-06 PROCEDURE — 36620 INSERTION CATHETER ARTERY: CPT | Mod: 59,,, | Performed by: ANESTHESIOLOGY

## 2019-11-06 PROCEDURE — 22853 INSJ BIOMECHANICAL DEVICE: CPT | Mod: 80,,, | Performed by: NEUROLOGICAL SURGERY

## 2019-11-06 PROCEDURE — 20930 SP BONE ALGRFT MORSEL ADD-ON: CPT | Mod: ,,, | Performed by: NEUROLOGICAL SURGERY

## 2019-11-06 PROCEDURE — 20930 PR ALLOGRAFT FOR SPINE SURGERY ONLY MORSELIZED: ICD-10-PCS | Mod: ,,, | Performed by: NEUROLOGICAL SURGERY

## 2019-11-06 PROCEDURE — 37000009 HC ANESTHESIA EA ADD 15 MINS: Performed by: NEUROLOGICAL SURGERY

## 2019-11-06 PROCEDURE — 87206 SMEAR FLUORESCENT/ACID STAI: CPT

## 2019-11-06 PROCEDURE — 63265 PR EXCIS INTRASP LESN,XDURAL,CERVICAL: ICD-10-PCS | Mod: 80,51,, | Performed by: NEUROLOGICAL SURGERY

## 2019-11-06 PROCEDURE — 22853 PR INSERT BIOMECH DEV W/INTERBODY ARTHRODESIS, EA CONTIGUOUS DEFECT: ICD-10-PCS | Mod: 80,,, | Performed by: NEUROLOGICAL SURGERY

## 2019-11-06 PROCEDURE — 36000711: Performed by: NEUROLOGICAL SURGERY

## 2019-11-06 PROCEDURE — D9220A PRA ANESTHESIA: ICD-10-PCS | Mod: ANES,,, | Performed by: ANESTHESIOLOGY

## 2019-11-06 PROCEDURE — 36000710: Performed by: NEUROLOGICAL SURGERY

## 2019-11-06 PROCEDURE — 37000008 HC ANESTHESIA 1ST 15 MINUTES: Performed by: NEUROLOGICAL SURGERY

## 2019-11-06 PROCEDURE — 99233 PR SUBSEQUENT HOSPITAL CARE,LEVL III: ICD-10-PCS | Mod: ,,, | Performed by: HOSPITALIST

## 2019-11-06 PROCEDURE — 63600175 PHARM REV CODE 636 W HCPCS: Performed by: STUDENT IN AN ORGANIZED HEALTH CARE EDUCATION/TRAINING PROGRAM

## 2019-11-06 PROCEDURE — 22853 PR INSERT BIOMECH DEV W/INTERBODY ARTHRODESIS, EA CONTIGUOUS DEFECT: ICD-10-PCS | Mod: ,,, | Performed by: NEUROLOGICAL SURGERY

## 2019-11-06 PROCEDURE — 22551 ARTHRD ANT NTRBDY CERVICAL: CPT | Mod: ,,, | Performed by: NEUROLOGICAL SURGERY

## 2019-11-06 PROCEDURE — 27201037 HC PRESSURE MONITORING SET UP

## 2019-11-06 PROCEDURE — 63265 EXCISE INTRASPINL LESION CRV: CPT | Mod: 80,51,, | Performed by: NEUROLOGICAL SURGERY

## 2019-11-06 PROCEDURE — 87186 SC STD MICRODIL/AGAR DIL: CPT | Mod: 59

## 2019-11-06 PROCEDURE — 99233 SBSQ HOSP IP/OBS HIGH 50: CPT | Mod: ,,, | Performed by: HOSPITALIST

## 2019-11-06 PROCEDURE — 63600175 PHARM REV CODE 636 W HCPCS: Performed by: NEUROLOGICAL SURGERY

## 2019-11-06 PROCEDURE — D9220A PRA ANESTHESIA: Mod: CRNA,,, | Performed by: NURSE ANESTHETIST, CERTIFIED REGISTERED

## 2019-11-06 PROCEDURE — 94761 N-INVAS EAR/PLS OXIMETRY MLT: CPT

## 2019-11-06 PROCEDURE — D9220A PRA ANESTHESIA: Mod: ANES,,, | Performed by: ANESTHESIOLOGY

## 2019-11-06 PROCEDURE — 71000033 HC RECOVERY, INTIAL HOUR: Performed by: NEUROLOGICAL SURGERY

## 2019-11-06 PROCEDURE — 22551 ARTHRD ANT NTRBDY CERVICAL: CPT | Mod: 80,,, | Performed by: NEUROLOGICAL SURGERY

## 2019-11-06 PROCEDURE — 11000001 HC ACUTE MED/SURG PRIVATE ROOM

## 2019-11-06 PROCEDURE — 36620 PR INSERT CATH,ART,PERCUT,SHORTTERM: ICD-10-PCS | Mod: 59,,, | Performed by: ANESTHESIOLOGY

## 2019-11-06 PROCEDURE — 87070 CULTURE OTHR SPECIMN AEROBIC: CPT

## 2019-11-06 PROCEDURE — 25000003 PHARM REV CODE 250: Performed by: NEUROLOGICAL SURGERY

## 2019-11-06 PROCEDURE — 87102 FUNGUS ISOLATION CULTURE: CPT

## 2019-11-06 PROCEDURE — 63600175 PHARM REV CODE 636 W HCPCS: Performed by: ANESTHESIOLOGY

## 2019-11-06 PROCEDURE — 87077 CULTURE AEROBIC IDENTIFY: CPT

## 2019-11-06 PROCEDURE — 22845 PR ANTERIOR INSTRUMENTATION 2-3 VERTEBRAL SEGMENTS: ICD-10-PCS | Mod: 80,59,, | Performed by: NEUROLOGICAL SURGERY

## 2019-11-06 PROCEDURE — 22845 PR ANTERIOR INSTRUMENTATION 2-3 VERTEBRAL SEGMENTS: ICD-10-PCS | Mod: 59,,, | Performed by: NEUROLOGICAL SURGERY

## 2019-11-06 PROCEDURE — 63600175 PHARM REV CODE 636 W HCPCS: Performed by: NURSE ANESTHETIST, CERTIFIED REGISTERED

## 2019-11-06 PROCEDURE — 63265 PR EXCIS INTRASP LESN,XDURAL,CERVICAL: ICD-10-PCS | Mod: 51,,, | Performed by: NEUROLOGICAL SURGERY

## 2019-11-06 PROCEDURE — 25000003 PHARM REV CODE 250: Performed by: STUDENT IN AN ORGANIZED HEALTH CARE EDUCATION/TRAINING PROGRAM

## 2019-11-06 PROCEDURE — 27201423 OPTIME MED/SURG SUP & DEVICES STERILE SUPPLY: Performed by: NEUROLOGICAL SURGERY

## 2019-11-06 PROCEDURE — 27800903 OPTIME MED/SURG SUP & DEVICES OTHER IMPLANTS: Performed by: NEUROLOGICAL SURGERY

## 2019-11-06 PROCEDURE — 63265 EXCISE INTRASPINL LESION CRV: CPT | Mod: 51,,, | Performed by: NEUROLOGICAL SURGERY

## 2019-11-06 PROCEDURE — 87205 SMEAR GRAM STAIN: CPT

## 2019-11-06 PROCEDURE — 22551 PR ARTHRODESIS ANT INTERBODY INC DISCECTOMY, CERVICAL BELOW C2: ICD-10-PCS | Mod: 80,,, | Performed by: NEUROLOGICAL SURGERY

## 2019-11-06 PROCEDURE — 22551 PR ARTHRODESIS ANT INTERBODY INC DISCECTOMY, CERVICAL BELOW C2: ICD-10-PCS | Mod: ,,, | Performed by: NEUROLOGICAL SURGERY

## 2019-11-06 PROCEDURE — 22845 INSERT SPINE FIXATION DEVICE: CPT | Mod: 80,59,, | Performed by: NEUROLOGICAL SURGERY

## 2019-11-06 PROCEDURE — 87075 CULTR BACTERIA EXCEPT BLOOD: CPT | Mod: 59

## 2019-11-06 PROCEDURE — 25000003 PHARM REV CODE 250: Performed by: NURSE ANESTHETIST, CERTIFIED REGISTERED

## 2019-11-06 PROCEDURE — 22845 INSERT SPINE FIXATION DEVICE: CPT | Mod: 59,,, | Performed by: NEUROLOGICAL SURGERY

## 2019-11-06 PROCEDURE — 22853 INSJ BIOMECHANICAL DEVICE: CPT | Mod: ,,, | Performed by: NEUROLOGICAL SURGERY

## 2019-11-06 PROCEDURE — 87116 MYCOBACTERIA CULTURE: CPT | Mod: 59

## 2019-11-06 PROCEDURE — C1713 ANCHOR/SCREW BN/BN,TIS/BN: HCPCS | Performed by: NEUROLOGICAL SURGERY

## 2019-11-06 PROCEDURE — 97803 MED NUTRITION INDIV SUBSEQ: CPT

## 2019-11-06 PROCEDURE — D9220A PRA ANESTHESIA: ICD-10-PCS | Mod: CRNA,,, | Performed by: NURSE ANESTHETIST, CERTIFIED REGISTERED

## 2019-11-06 DEVICE — PUTTY OSTEOSELECT IN SYR 1CC: Type: IMPLANTABLE DEVICE | Site: NECK | Status: FUNCTIONAL

## 2019-11-06 RX ORDER — ROCURONIUM BROMIDE 10 MG/ML
INJECTION, SOLUTION INTRAVENOUS
Status: DISCONTINUED | OUTPATIENT
Start: 2019-11-06 | End: 2019-11-06

## 2019-11-06 RX ORDER — PROPOFOL 10 MG/ML
VIAL (ML) INTRAVENOUS
Status: DISCONTINUED | OUTPATIENT
Start: 2019-11-06 | End: 2019-11-06

## 2019-11-06 RX ORDER — MIDAZOLAM HYDROCHLORIDE 1 MG/ML
INJECTION, SOLUTION INTRAMUSCULAR; INTRAVENOUS
Status: DISCONTINUED | OUTPATIENT
Start: 2019-11-06 | End: 2019-11-06

## 2019-11-06 RX ORDER — SODIUM CHLORIDE 0.9 % (FLUSH) 0.9 %
10 SYRINGE (ML) INJECTION
Status: DISCONTINUED | OUTPATIENT
Start: 2019-11-06 | End: 2019-11-06

## 2019-11-06 RX ORDER — HYDROMORPHONE HYDROCHLORIDE 1 MG/ML
0.2 INJECTION, SOLUTION INTRAMUSCULAR; INTRAVENOUS; SUBCUTANEOUS EVERY 5 MIN PRN
Status: DISCONTINUED | OUTPATIENT
Start: 2019-11-06 | End: 2019-11-06 | Stop reason: HOSPADM

## 2019-11-06 RX ORDER — FENTANYL CITRATE 50 UG/ML
25 INJECTION, SOLUTION INTRAMUSCULAR; INTRAVENOUS EVERY 5 MIN PRN
Status: DISCONTINUED | OUTPATIENT
Start: 2019-11-06 | End: 2019-11-06 | Stop reason: HOSPADM

## 2019-11-06 RX ORDER — BACITRACIN 50000 [IU]/1
INJECTION, POWDER, FOR SOLUTION INTRAMUSCULAR
Status: DISCONTINUED | OUTPATIENT
Start: 2019-11-06 | End: 2019-11-06 | Stop reason: HOSPADM

## 2019-11-06 RX ORDER — SUCCINYLCHOLINE CHLORIDE 20 MG/ML
INJECTION INTRAMUSCULAR; INTRAVENOUS
Status: DISCONTINUED | OUTPATIENT
Start: 2019-11-06 | End: 2019-11-06

## 2019-11-06 RX ORDER — ONDANSETRON 2 MG/ML
4 INJECTION INTRAMUSCULAR; INTRAVENOUS ONCE AS NEEDED
Status: DISCONTINUED | OUTPATIENT
Start: 2019-11-06 | End: 2019-11-06 | Stop reason: HOSPADM

## 2019-11-06 RX ORDER — FENTANYL CITRATE 50 UG/ML
INJECTION, SOLUTION INTRAMUSCULAR; INTRAVENOUS
Status: DISCONTINUED | OUTPATIENT
Start: 2019-11-06 | End: 2019-11-06

## 2019-11-06 RX ORDER — SODIUM CHLORIDE 9 MG/ML
INJECTION, SOLUTION INTRAVENOUS CONTINUOUS PRN
Status: DISCONTINUED | OUTPATIENT
Start: 2019-11-06 | End: 2019-11-06

## 2019-11-06 RX ORDER — ONDANSETRON 2 MG/ML
INJECTION INTRAMUSCULAR; INTRAVENOUS
Status: DISCONTINUED | OUTPATIENT
Start: 2019-11-06 | End: 2019-11-06

## 2019-11-06 RX ORDER — PHENYLEPHRINE HYDROCHLORIDE 10 MG/ML
INJECTION INTRAVENOUS
Status: DISCONTINUED | OUTPATIENT
Start: 2019-11-06 | End: 2019-11-06

## 2019-11-06 RX ORDER — PROPOFOL 10 MG/ML
VIAL (ML) INTRAVENOUS CONTINUOUS PRN
Status: DISCONTINUED | OUTPATIENT
Start: 2019-11-06 | End: 2019-11-06

## 2019-11-06 RX ORDER — KETAMINE HCL IN 0.9 % NACL 50 MG/5 ML
SYRINGE (ML) INTRAVENOUS
Status: DISCONTINUED | OUTPATIENT
Start: 2019-11-06 | End: 2019-11-06

## 2019-11-06 RX ORDER — VANCOMYCIN HYDROCHLORIDE 1 G/20ML
INJECTION, POWDER, LYOPHILIZED, FOR SOLUTION INTRAVENOUS
Status: DISCONTINUED | OUTPATIENT
Start: 2019-11-06 | End: 2019-11-06 | Stop reason: HOSPADM

## 2019-11-06 RX ORDER — LIDOCAINE HCL/PF 100 MG/5ML
SYRINGE (ML) INTRAVENOUS
Status: DISCONTINUED | OUTPATIENT
Start: 2019-11-06 | End: 2019-11-06

## 2019-11-06 RX ORDER — EPHEDRINE SULFATE 50 MG/ML
INJECTION, SOLUTION INTRAVENOUS
Status: DISCONTINUED | OUTPATIENT
Start: 2019-11-06 | End: 2019-11-06

## 2019-11-06 RX ORDER — REMIFENTANIL HYDROCHLORIDE 1 MG/ML
INJECTION, POWDER, LYOPHILIZED, FOR SOLUTION INTRAVENOUS CONTINUOUS PRN
Status: DISCONTINUED | OUTPATIENT
Start: 2019-11-06 | End: 2019-11-06

## 2019-11-06 RX ORDER — SODIUM CHLORIDE 0.9 % (FLUSH) 0.9 %
3 SYRINGE (ML) INJECTION
Status: DISCONTINUED | OUTPATIENT
Start: 2019-11-06 | End: 2019-11-06

## 2019-11-06 RX ADMIN — EPHEDRINE SULFATE 5 MG: 50 INJECTION, SOLUTION INTRAMUSCULAR; INTRAVENOUS; SUBCUTANEOUS at 02:11

## 2019-11-06 RX ADMIN — ONDANSETRON 4 MG: 2 INJECTION INTRAMUSCULAR; INTRAVENOUS at 04:11

## 2019-11-06 RX ADMIN — PHENYLEPHRINE HYDROCHLORIDE 200 MCG: 10 INJECTION INTRAVENOUS at 02:11

## 2019-11-06 RX ADMIN — SODIUM CHLORIDE: 0.9 INJECTION, SOLUTION INTRAVENOUS at 02:11

## 2019-11-06 RX ADMIN — FENTANYL CITRATE 50 MCG: 50 INJECTION, SOLUTION INTRAMUSCULAR; INTRAVENOUS at 05:11

## 2019-11-06 RX ADMIN — ROCURONIUM BROMIDE 5 MG: 10 INJECTION, SOLUTION INTRAVENOUS at 02:11

## 2019-11-06 RX ADMIN — PROPOFOL 100 MCG/KG/MIN: 10 INJECTION, EMULSION INTRAVENOUS at 02:11

## 2019-11-06 RX ADMIN — PHENYLEPHRINE HYDROCHLORIDE 100 MCG: 10 INJECTION INTRAVENOUS at 04:11

## 2019-11-06 RX ADMIN — EPHEDRINE SULFATE 5 MG: 50 INJECTION, SOLUTION INTRAMUSCULAR; INTRAVENOUS; SUBCUTANEOUS at 03:11

## 2019-11-06 RX ADMIN — HYDROMORPHONE HYDROCHLORIDE 0.2 MG: 1 INJECTION, SOLUTION INTRAMUSCULAR; INTRAVENOUS; SUBCUTANEOUS at 05:11

## 2019-11-06 RX ADMIN — CEFAZOLIN 6 G: 10 INJECTION, POWDER, FOR SOLUTION INTRAVENOUS at 06:11

## 2019-11-06 RX ADMIN — EPHEDRINE SULFATE 25 MG: 50 INJECTION, SOLUTION INTRAMUSCULAR; INTRAVENOUS; SUBCUTANEOUS at 02:11

## 2019-11-06 RX ADMIN — EPHEDRINE SULFATE 5 MG: 50 INJECTION, SOLUTION INTRAMUSCULAR; INTRAVENOUS; SUBCUTANEOUS at 04:11

## 2019-11-06 RX ADMIN — LIDOCAINE HYDROCHLORIDE 50 MG: 20 INJECTION, SOLUTION INTRAVENOUS at 02:11

## 2019-11-06 RX ADMIN — FENTANYL CITRATE 50 MCG: 50 INJECTION, SOLUTION INTRAMUSCULAR; INTRAVENOUS at 02:11

## 2019-11-06 RX ADMIN — PHENYLEPHRINE HYDROCHLORIDE 200 MCG: 10 INJECTION INTRAVENOUS at 03:11

## 2019-11-06 RX ADMIN — MIDAZOLAM HYDROCHLORIDE 1 MG: 1 INJECTION, SOLUTION INTRAMUSCULAR; INTRAVENOUS at 05:11

## 2019-11-06 RX ADMIN — MIDAZOLAM HYDROCHLORIDE 1 MG: 1 INJECTION, SOLUTION INTRAMUSCULAR; INTRAVENOUS at 02:11

## 2019-11-06 RX ADMIN — SODIUM CHLORIDE, SODIUM GLUCONATE, SODIUM ACETATE, POTASSIUM CHLORIDE, MAGNESIUM CHLORIDE, SODIUM PHOSPHATE, DIBASIC, AND POTASSIUM PHOSPHATE: .53; .5; .37; .037; .03; .012; .00082 INJECTION, SOLUTION INTRAVENOUS at 02:11

## 2019-11-06 RX ADMIN — PHENYLEPHRINE HYDROCHLORIDE 0.25 MCG/KG/MIN: 10 INJECTION INTRAVENOUS at 02:11

## 2019-11-06 RX ADMIN — RAMELTEON 8 MG: 8 TABLET ORAL at 08:11

## 2019-11-06 RX ADMIN — Medication 0.1 MCG/KG/MIN: at 02:11

## 2019-11-06 RX ADMIN — Medication 35 MG: at 03:11

## 2019-11-06 RX ADMIN — SUCCINYLCHOLINE CHLORIDE 120 MG: 20 INJECTION, SOLUTION INTRAMUSCULAR; INTRAVENOUS at 02:11

## 2019-11-06 RX ADMIN — PROPOFOL 50 MG: 10 INJECTION, EMULSION INTRAVENOUS at 02:11

## 2019-11-06 RX ADMIN — ACETAMINOPHEN 650 MG: 325 TABLET ORAL at 08:11

## 2019-11-06 NOTE — TRANSFER OF CARE
Anesthesia Transfer of Care Note    Patient: Ld Bronson    Procedure(s) Performed: Procedure(s) (LRB):  C6-C7 ACDF w neuromonitoring, Mikayla Doshi for instrumentation (N/A)    Patient location: PACU    Anesthesia Type: general    Transport from OR: Transported from OR on 6-10 L/min O2 by face mask with adequate spontaneous ventilation    Post pain: adequate analgesia    Post assessment: no apparent anesthetic complications and tolerated procedure well    Post vital signs: stable    Level of consciousness: awake and responds to stimulation    Nausea/Vomiting: no nausea/vomiting    Complications: none    Transfer of care protocol was followed      Last vitals:   Visit Vitals  /66 (BP Location: Right arm, Patient Position: Lying)   Pulse 90   Temp 36.4 °C (97.6 °F) (Oral)   Resp 18   Ht 6' (1.829 m)   Wt 58.1 kg (128 lb)   SpO2 100%   BMI 17.36 kg/m²

## 2019-11-06 NOTE — PROGRESS NOTES
[facility]  Adult Nutrition  Progress Note    SUMMARY       Recommendations    Recommendation:  1. Resume regular diet as tolerable  2. Continue boost plus when diet resumed   3. RD to monitor and follow up     Goals: pt to receive nutrition by RD follow up   Nutrition Goal Status: goal met  Communication of RD Recs: other (comment)(POC)    Reason for Assessment    Reason For Assessment: RD follow-up  Diagnosis: (epidural abscess)  Relevant Medical History: IVDA; HCV  Interdisciplinary Rounds: did not attend  General Information Comments: Pt NPO at this time for procedure today. Unavailabe on multiple attempts. Per chart, pt previously was upset about NPO, diet to resume when procedure completed. Pt receiving boost plus and eating 100% of meals. Good appetite and intake at this time. Partial NFPE compeleted, pt with muslce loss in orbitals, temples, clavicle, and hands. Pt meets criteria for severe malnutrition at this time. Continue to encourage intake.   Nutrition Discharge Planning: adequate intake via regular diet     Nutrition Risk Screen    Nutrition Risk Screen: no indicators present    Nutrition/Diet History    Patient Reported Diet/Restrictions/Preferences: general  Spiritual, Cultural Beliefs, Yazdanism Practices, Values that Affect Care: no  Food Allergies: NKFA  Factors Affecting Nutritional Intake: NPO    Anthropometrics    Temp: 97.6 °F (36.4 °C)  Height Method: Stated  Height: 6' (182.9 cm)  Height (inches): 72 in  Weight Method: Bed Scale  Weight: 58.1 kg (128 lb)  Weight (lb): 128 lb  Ideal Body Weight (IBW), Male: 178 lb  % Ideal Body Weight, Male (lb): 71.91 lb  BMI (Calculated): 17.4  BMI Grade: 17 - 18.4 protein-energy malnutrition grade I  Usual Body Weight (UBW), k kg  % Usual Body Weight: 77.76    Lab/Procedures/Meds    Pertinent Labs Reviewed: reviewed  Pertinent Labs Comments: Phos 4.7; Na 132; BUN 20  Pertinent Medications Reviewed: reviewed  Pertinent Medications  Comments: folic acid; ferrous sulfate; cefazolin     Estimated/Assessed Needs    Weight Used For Calorie Calculations: 58.2 kg (128 lb 4.9 oz)  Energy Calorie Requirements (kcal): 4588-9961 kcal/d   Energy Need Method: Kcal/kg  Protein Requirements: 58-75 g/day (1.0-1.3 g/kg)   Weight Used For Protein Calculations: 58.2 kg (128 lb 4.9 oz)  Fluid Requirements (mL): 1 mL/kcal or per MD  RDA Method (mL): 1455    Nutrition Prescription Ordered    Current Diet Order: NPO     Evaluation of Received Nutrient/Fluid Intake    I/O: -7.5 L since admit   Energy Calories Required: not meeting needs  Protein Required: not meeting needs  Comments: LBM 11/5  % Intake of Estimated Energy Needs: 0 - 25 %  % Meal Intake: NPO    Nutrition Risk    Level of Risk/Frequency of Follow-up: high(2x/week)     Assessment and Plan    Severe Malnutrition in the context of Chronic Illness/Injury     Related to (etiology):  Decreased intake 2/2 IVDA     Signs and Symptoms (as evidenced by):  Energy Intake: <75% of estimated energy requirement for x 1 month   Body Fat Depletion: moderate and severe depletion of orbitals and triceps   Muscle Mass Depletion: moderate and severe depletion of temples, clavicle region, scapular region and interosseous muscle   Weight Loss: 22% x unsure      Interventions (treatment strategy):  Collaboration of care with other providers   Commercial beverage      Nutrition Diagnosis Status:  Continues     Monitor and Evaluation    Food and Nutrient Intake: energy intake, food and beverage intake  Food and Nutrient Adminstration: diet order  Knowledge/Beliefs/Attitudes: food and nutrition knowledge/skill  Anthropometric Measurements: weight, weight change  Biochemical Data, Medical Tests and Procedures: electrolyte and renal panel, lipid profile, gastrointestinal profile, glucose/endocrine profile, inflammatory profile  Nutrition-Focused Physical Findings: overall appearance     Malnutrition Assessment  Malnutrition Type:  chronic illness  Weight Loss (Malnutrition): (22% wt loss unsure of time frame)  Energy Intake (Malnutrition): less than 75% for greater than or equal to 1 month  Subcutaneous Fat (Malnutrition): moderate depletion  Muscle Mass (Malnutrition): moderate depletion   Orbital Region (Subcutaneous Fat Loss): moderate depletion  Upper Arm Region (Subcutaneous Fat Loss): moderate depletion   [place] Region (Muscle Loss): severe depletion  Clavicle Bone Region (Muscle Loss): severe depletion  Clavicle and Acromion Bone Region (Muscle Loss): moderate depletion  Dorsal Hand (Muscle Loss): moderate depletion     Nutrition Follow-Up    RD Follow-up?: Yes

## 2019-11-06 NOTE — ASSESSMENT & PLAN NOTE
Patient with IVDA, MSSA bacteremia w/ endocarditis (MV veg) and epidural abscess at C4 transferred for NSG evaluation. [facility] however they do not have NSG credentialing so patient was transferred here.   -MRI here showing C3-C4 and C6-C7 osteo/discitis w/ epidural abscess at that point     Plan:  -Cefazolin 6g continuous   -neuro surg consulted- washout scheduled today in the noon  -holding DVT Prophy due to planned washout in the OR today  -NPO since midnight for NSG procedure today

## 2019-11-06 NOTE — PLAN OF CARE
Unable to complete Pre-op in its entirety due to OR roll time.  Verified patient armband at bedside, and ensured alert bands in place.  Confirmed presence of consents.  Reviewed potential allergies.  VS obtained and documented NPO status.  Site marking visible to right neck.  Continuous Ancef running, both PICC lumens flushed.

## 2019-11-06 NOTE — ANESTHESIA PROCEDURE NOTES
Arterial    Diagnosis: Epidural abscess    Patient location during procedure: done in OR  Procedure start time: 11/6/2019 2:18 PM  Timeout: 11/6/2019 2:18 PM  Procedure end time: 11/6/2019 2:20 PM    Staffing  Authorizing Provider: Daron Ureña MD  Performing Provider: Daron Ureña MD    Anesthesiologist was present at the time of the procedure.    Preanesthetic Checklist  Completed: patient identified, site marked, surgical consent, pre-op evaluation, timeout performed, IV checked, risks and benefits discussed, monitors and equipment checked and anesthesia consent givenArterial  Skin Prep: chlorhexidine gluconate  Local Infiltration: none  Orientation: left  Location: radial  Catheter Size: 20 G  Catheter placement by Anatomical landmarks. Heme positive aspiration all ports.Insertion Attempts: 1  Assessment  Dressing: secured with tape and tegaderm  Patient: Tolerated well

## 2019-11-06 NOTE — ASSESSMENT & PLAN NOTE
Patient with albumin of   Lab Results   Component Value Date    LABPROT 11.3 11/04/2019    ALBUMIN 2.2 (L) 11/05/2019        Plan:  -Nutrition consult, will f/u recs   -Boost with every meal  -Continue to monitor albumin  -time spent counseling patient on increasing PO intake, modalities to help increase protein intake etc.

## 2019-11-06 NOTE — SUBJECTIVE & OBJECTIVE
Interval History: NIKUNJ  Verbalizes understanding of why he has to be NPO before the surgery today  Resting comfortably in the bed      Review of Systems   Constitutional: Negative for chills and fever.   Respiratory: Negative for cough and shortness of breath.    Cardiovascular: Negative for chest pain.   Gastrointestinal: Negative for abdominal pain, diarrhea, nausea and vomiting.   Endocrine: Negative for cold intolerance and heat intolerance.   Genitourinary: Negative for difficulty urinating.   Musculoskeletal: Positive for back pain.   Neurological: Negative for headaches.   Psychiatric/Behavioral: Negative for agitation (due to being NPO).     Objective:     Vital Signs (Most Recent):  Temp: 97.6 °F (36.4 °C) (11/06/19 1351)  Pulse: 90 (11/06/19 1351)  Resp: 18 (11/06/19 1351)  BP: 101/66 (11/06/19 1351)  SpO2: 100 % (11/06/19 1351) Vital Signs (24h Range):  Temp:  [97.6 °F (36.4 °C)-98.4 °F (36.9 °C)] 97.6 °F (36.4 °C)  Pulse:  [] 90  Resp:  [16-20] 18  SpO2:  [93 %-100 %] 100 %  BP: (100-123)/(62-89) 101/66     Weight: 58.1 kg (128 lb)  Body mass index is 17.36 kg/m².    Intake/Output Summary (Last 24 hours) at 11/6/2019 1628  Last data filed at 11/6/2019 0700  Gross per 24 hour   Intake 240 ml   Output 900 ml   Net -660 ml      Physical Exam   Constitutional: He is oriented to person, place, and time. No distress.   cachectic   HENT:   Head: Normocephalic and atraumatic.   Eyes: Pupils are equal, round, and reactive to light. EOM are normal. No scleral icterus.   Neck: Normal range of motion. Neck supple. No JVD present.   Cardiovascular: Normal rate, normal heart sounds and intact distal pulses.   No murmur heard.  Pulmonary/Chest: Effort normal and breath sounds normal. No respiratory distress. He has no wheezes.   Abdominal: Soft. Bowel sounds are normal. He exhibits no distension. There is no tenderness.   Genitourinary:   Genitourinary Comments: Groin abscess - healthy open wound - not indurated  or draining pus   Musculoskeletal: Normal range of motion. He exhibits no edema or tenderness.   Neurological: He is alert and oriented to person, place, and time. No cranial nerve deficit.   Skin: Skin is warm. Capillary refill takes less than 2 seconds. He is not diaphoretic. No erythema.   Psychiatric: He has a normal mood and affect.   Vitals reviewed.      Significant Labs:   Recent Lab Results     None          Significant Imaging: I have reviewed all pertinent imaging results/findings within the past 24 hours.

## 2019-11-06 NOTE — ANESTHESIA PREPROCEDURE EVALUATION
11/06/2019  Ld Bronson is a 54 y.o., male.    Anesthesia Evaluation    I have reviewed the Patient Summary Reports.     I have reviewed the Medications.     Review of Systems  Anesthesia Hx:  Denies Hx of Anesthetic complications  History of prior surgery of interest to airway management or planning: Denies Family Hx of Anesthesia complications.   Denies Personal Hx of Anesthesia complications.   Social:  Smoker IV drug abuse, with endocarditis   Hematology/Oncology:  Hematology Normal   Oncology Normal     Cardiovascular:   Valvular problems/Murmurs (mitral valve vegetations.) ECG has been reviewed. Infective endocarditis    Conclusion     · Low normal ejection fraction 50%.  · Normal right ventricular systolic function.  · No thrombus is present in the appendage.  · No Aortic plaque present.  · No vegetation is seen on the Aortic or tricuspid valves.  · There are two very mobile shaggy vegetations seen on the mitral valve: one with the A2 leaflet, measuring 7 x 5 mm and the other with the P2 leaflet, measuring 5 x 3 mm.       Conclusion     · Tachycardia present  · Normal left ventricular systolic function. The estimated ejection fraction is 65%  · Concentric left ventricular remodeling.  · Indeterminate left ventricular diastolic function.  · No wall motion abnormalities.  · Normal right ventricular systolic function.  · There is mobile posterior mitral leaflet vegetation. The posterior leaflet is partially flail. Trivial MR seen on this study. consider repeat Echo in 24-48 hr to evaluate for worsening MR in the setting of partially flail leaflet if clinically indicated.  · There a very small mobile echodenisty on AV leaflet (clip # 39). This may be a sign of early endocarditis of this valve. Other differential is degenerative change.  · The estimated PA systolic pressure is 31 mm Hg  · Normal  central venous pressure (3 mm Hg).            Pulmonary:  Pulmonary Normal    Hepatic/GI:   Liver Disease, Hepatitis, C    Musculoskeletal:  Musculoskeletal Normal Cervical abscess epidural   Endocrine:  Endocrine Normal    Dermatological:  Skin Normal    Psych:   Psychiatric History          Physical Exam  General:  Cachexia    Airway/Jaw/Neck:  Airway Findings: Mouth Opening: Normal Tongue: Normal  General Airway Assessment: Adult  Mallampati: II  Improves to I with phonation.  TM Distance: Normal, at least 6 cm      Dental:  Dental Findings:   Chest/Lungs:  Chest/Lungs Findings: Clear to auscultation, Normal Respiratory Rate     Heart/Vascular:  Heart Findings: Rate: Tachycardia  Rhythm: Regular Rhythm        Mental Status:  Mental Status Findings:  Cooperative, Alert and Oriented         Anesthesia Plan  Type of Anesthesia, risks & benefits discussed:  Anesthesia Type:  general  Patient's Preference:   Intra-op Monitoring Plan: standard ASA monitors and arterial line  Intra-op Monitoring Plan Comments:   Post Op Pain Control Plan:   Post Op Pain Control Plan Comments:   Induction:   IV  Beta Blocker:  Patient is not currently on a Beta-Blocker (No further documentation required).       Informed Consent: Patient understands risks and agrees with Anesthesia plan.  Questions answered. Anesthesia consent signed with patient.  ASA Score: 4     Day of Surgery Review of History & Physical:    H&P update referred to the surgeon.         Ready For Surgery From Anesthesia Perspective.

## 2019-11-06 NOTE — BRIEF OP NOTE
[facility]  Brief Operative Note    SUMMARY     Surgery Date: 11/6/2019     Surgeon(s) and Role:     * Becca Malone MD - Primary     * Jered Felton MD - Assist    Assisting Surgeon: None    Pre-op Diagnosis:  Epidural abscess [G06.2]    Post-op Diagnosis:  Post-Op Diagnosis Codes:     * Epidural abscess [G06.2]    Procedure(s) (LRB):  C6-C7 ACDF w neuromonitoring, Mikayla Doshi for instrumentation (N/A)    Anesthesia: General    Description of Procedure: ACDF C6 C7 with drainage of epidural abscess    Description of the findings of the procedure: purulent fluid in disc space and epidural space, sent for cultures. See full op note for details    Estimated Blood Loss: * No values recorded between 11/6/2019  3:22 PM and 11/6/2019  5:21 PM *         Specimens:   Specimen (12h ago, onward)    None

## 2019-11-06 NOTE — NURSING TRANSFER
Nursing Transfer Note      11/6/2019     Transfer To: 660    Transfer via stretcher    Transfer with chart, tpn    Transported by pct    Medicines sent: tpn    Chart send with patient: Yes

## 2019-11-06 NOTE — PROGRESS NOTES
Pt with no new complaints. Endorses continued neck pain. Denies paresthesias or weakness. NPO for procedure today    Neurologically intact  -OR today for C6-7 ACDF.  -Continue NPO for OR. Hold SQH.  -Continue IV abx per ID  -Repeat Bcx with NGTD    Please contact NSGY with any questions or concerns    Leeanna Raymond PA-C   Pager [phone]  Neurosurgery  [facility]

## 2019-11-06 NOTE — NURSING
Patient rounds made edel than every hour.  AAO x 4.  Vital signs stable.  Afebrile.  Sinus tachycardia with heart rate between 104 - 106.  No complaints of chest pain or shortness of breath.  Voiding per urinal.  No complaints of pain to groins.  Remained free from falls or incidents this shift.  Remained NPO past midnight for surgery today.  At 0300 patient insisted on eating ice chips which was provided to him.

## 2019-11-06 NOTE — PLAN OF CARE
Problem: Malnutrition  Goal: Improved Nutritional Intake  Outcome: Ongoing, Progressing   Recommendations    Recommendation:  1. Resume regular diet as tolerable  2. Continue boost plus when diet resumed   3. RD to monitor and follow up     Goals: pt to receive nutrition by RD follow up   Nutrition Goal Status: goal met  Communication of RD Recs: other (comment)(POC)

## 2019-11-06 NOTE — PHYSICIAN QUERY
"PT Name: Ld Bronson  MR #: [id number]    Physician Query Form - Hematology Clarification      CDS: Alma Delia Travis RN  Contact information: [email or web address]    This form is a permanent document in the medical record.      Query Date: November 6, 2019    By submitting this query, we are merely seeking further clarification of documentation. Please utilize your independent clinical judgment when addressing the question(s) below.    The Medical record contains the following:   Indicators  Supporting Clinical Findings Location in Medical Record    "Anemia" documented     x H & H =  10/31/2019 23:20 11/2/2019 04:54 11/3/2019 04:18   Hemoglobin 7.4  7.5  6.9    Hematocrit 25.0  24.3  23.1     Labs     BP =                     HR=      "GI bleeding" documented      Acute bleeding (Non GI site)     x Transfusion(s) 11/3- hemoglobin dropped to 6.9, will transfuse 1 unit PRBC    Hgb improved after 1U pRBC HM PN 11/3     PN 11/4    Treatment:     x Other:   10/31/2019 19:17   Iron 14   TIBC 229    Saturated Iron 6   Transferrin 155    Ferritin 759        . Audie is a 53yo man with IVDU, HCV, and hx of MSSA bacteremia with IE who [facility] 2/2 MV endocarditis, MSSA bacteremia (repeat Bcx 11/4 NGTD) , and C spine epidural abscess. Also with bilateral groin abscesses on initial presentation, now s/p I&D with resolution of surrounding cellulitis.  Severe protein-calorie malnutrition Labs                      PN 11/5                     Provider, please specify diagnosis or diagnoses associated with above clinical findings.    [  ] Iron deficiency anemia   [  ] Chronic blood loss anemia     [ x ] Anemia of chronic disease (Specify chronic disease) : bacteremia, infectino      [  ] Other Hematological Diagnosis (please specify):     [  ] Clinically Undetermined     Please document in your progress notes daily for the duration of treatment, until resolved, and include in your " discharge summary.

## 2019-11-07 PROBLEM — I05.9 ENDOCARDITIS OF MITRAL VALVE: Status: ACTIVE | Noted: 2019-11-07

## 2019-11-07 LAB
ANION GAP SERPL CALC-SCNC: 7 MMOL/L (ref 8–16)
BASOPHILS # BLD AUTO: 0.04 K/UL (ref 0–0.2)
BASOPHILS NFR BLD: 0.5 % (ref 0–1.9)
BUN SERPL-MCNC: 26 MG/DL (ref 6–20)
CALCIUM SERPL-MCNC: 8.9 MG/DL (ref 8.7–10.5)
CHLORIDE SERPL-SCNC: 100 MMOL/L (ref 95–110)
CO2 SERPL-SCNC: 26 MMOL/L (ref 23–29)
CREAT SERPL-MCNC: 0.9 MG/DL (ref 0.5–1.4)
DIFFERENTIAL METHOD: ABNORMAL
EOSINOPHIL # BLD AUTO: 0 K/UL (ref 0–0.5)
EOSINOPHIL NFR BLD: 0.3 % (ref 0–8)
ERYTHROCYTE [DISTWIDTH] IN BLOOD BY AUTOMATED COUNT: 16.3 % (ref 11.5–14.5)
EST. GFR  (AFRICAN AMERICAN): >60 ML/MIN/1.73 M^2
EST. GFR  (NON AFRICAN AMERICAN): >60 ML/MIN/1.73 M^2
GLUCOSE SERPL-MCNC: 137 MG/DL (ref 70–110)
HCT VFR BLD AUTO: 26.1 % (ref 40–54)
HGB BLD-MCNC: 8.1 G/DL (ref 14–18)
IMM GRANULOCYTES # BLD AUTO: 0.02 K/UL (ref 0–0.04)
IMM GRANULOCYTES NFR BLD AUTO: 0.2 % (ref 0–0.5)
LYMPHOCYTES # BLD AUTO: 1.2 K/UL (ref 1–4.8)
LYMPHOCYTES NFR BLD: 13.5 % (ref 18–48)
MCH RBC QN AUTO: 26.5 PG (ref 27–31)
MCHC RBC AUTO-ENTMCNC: 31 G/DL (ref 32–36)
MCV RBC AUTO: 85 FL (ref 82–98)
MONOCYTES # BLD AUTO: 0.3 K/UL (ref 0.3–1)
MONOCYTES NFR BLD: 3.4 % (ref 4–15)
NEUTROPHILS # BLD AUTO: 7.2 K/UL (ref 1.8–7.7)
NEUTROPHILS NFR BLD: 82.1 % (ref 38–73)
NRBC BLD-RTO: 0 /100 WBC
PLATELET # BLD AUTO: 224 K/UL (ref 150–350)
PMV BLD AUTO: 8.9 FL (ref 9.2–12.9)
POTASSIUM SERPL-SCNC: 4.1 MMOL/L (ref 3.5–5.1)
RBC # BLD AUTO: 3.06 M/UL (ref 4.6–6.2)
SODIUM SERPL-SCNC: 133 MMOL/L (ref 136–145)
WBC # BLD AUTO: 8.81 K/UL (ref 3.9–12.7)

## 2019-11-07 PROCEDURE — 25000003 PHARM REV CODE 250: Performed by: STUDENT IN AN ORGANIZED HEALTH CARE EDUCATION/TRAINING PROGRAM

## 2019-11-07 PROCEDURE — 99233 SBSQ HOSP IP/OBS HIGH 50: CPT | Mod: ,,, | Performed by: NURSE PRACTITIONER

## 2019-11-07 PROCEDURE — 99233 PR SUBSEQUENT HOSPITAL CARE,LEVL III: ICD-10-PCS | Mod: ,,, | Performed by: NURSE PRACTITIONER

## 2019-11-07 PROCEDURE — 99233 SBSQ HOSP IP/OBS HIGH 50: CPT | Mod: ,,, | Performed by: HOSPITALIST

## 2019-11-07 PROCEDURE — 36415 COLL VENOUS BLD VENIPUNCTURE: CPT

## 2019-11-07 PROCEDURE — 99233 PR SUBSEQUENT HOSPITAL CARE,LEVL III: ICD-10-PCS | Mod: ,,, | Performed by: HOSPITALIST

## 2019-11-07 PROCEDURE — 80048 BASIC METABOLIC PNL TOTAL CA: CPT

## 2019-11-07 PROCEDURE — 86703 HIV-1/HIV-2 1 RESULT ANTBDY: CPT

## 2019-11-07 PROCEDURE — 11000001 HC ACUTE MED/SURG PRIVATE ROOM

## 2019-11-07 PROCEDURE — 99024 POSTOP FOLLOW-UP VISIT: CPT | Mod: ,,, | Performed by: PHYSICIAN ASSISTANT

## 2019-11-07 PROCEDURE — 85025 COMPLETE CBC W/AUTO DIFF WBC: CPT

## 2019-11-07 PROCEDURE — 99024 PR POST-OP FOLLOW-UP VISIT: ICD-10-PCS | Mod: ,,, | Performed by: PHYSICIAN ASSISTANT

## 2019-11-07 PROCEDURE — 63600175 PHARM REV CODE 636 W HCPCS: Performed by: STUDENT IN AN ORGANIZED HEALTH CARE EDUCATION/TRAINING PROGRAM

## 2019-11-07 RX ORDER — ACETAMINOPHEN 500 MG
1000 TABLET ORAL EVERY 8 HOURS
Status: DISCONTINUED | OUTPATIENT
Start: 2019-11-07 | End: 2019-11-09 | Stop reason: HOSPADM

## 2019-11-07 RX ORDER — ENOXAPARIN SODIUM 100 MG/ML
40 INJECTION SUBCUTANEOUS EVERY 24 HOURS
Status: DISCONTINUED | OUTPATIENT
Start: 2019-11-08 | End: 2019-11-09 | Stop reason: HOSPADM

## 2019-11-07 RX ORDER — TRAMADOL HYDROCHLORIDE 50 MG/1
50 TABLET ORAL EVERY 6 HOURS PRN
Status: DISCONTINUED | OUTPATIENT
Start: 2019-11-07 | End: 2019-11-09 | Stop reason: HOSPADM

## 2019-11-07 RX ADMIN — FOLIC ACID 1 MG: 1 TABLET ORAL at 08:11

## 2019-11-07 RX ADMIN — ACETAMINOPHEN 1000 MG: 500 TABLET ORAL at 10:11

## 2019-11-07 RX ADMIN — TRAMADOL HYDROCHLORIDE 50 MG: 50 TABLET ORAL at 10:11

## 2019-11-07 RX ADMIN — RAMELTEON 8 MG: 8 TABLET ORAL at 10:11

## 2019-11-07 RX ADMIN — CEFAZOLIN 6 G: 10 INJECTION, POWDER, FOR SOLUTION INTRAVENOUS at 06:11

## 2019-11-07 RX ADMIN — FERROUS SULFATE TAB EC 325 MG (65 MG FE EQUIVALENT) 325 MG: 325 (65 FE) TABLET DELAYED RESPONSE at 08:11

## 2019-11-07 NOTE — SUBJECTIVE & OBJECTIVE
Interval History: POD#1 ACDF C6-7.  Surgical cultures pending.  Repeat blood cultures of 11/4 NGTD.   No significant complaints.     Review of Systems   Constitutional: Negative for chills, diaphoresis, fatigue and fever.   Respiratory: Negative for cough and shortness of breath.    Cardiovascular: Negative for chest pain.   Gastrointestinal: Negative for abdominal pain, diarrhea, nausea and vomiting.   Genitourinary: Negative for dysuria.        +bilateral groin wounds   Musculoskeletal: Positive for back pain.   Skin: Positive for wound. Negative for color change, pallor and rash.   Neurological: Negative for dizziness and headaches.   Psychiatric/Behavioral: Negative for agitation.   All other systems reviewed and are negative.    Objective:     Vital Signs (Most Recent):  Temp: 98.1 °F (36.7 °C) (11/07/19 0745)  Pulse: 89 (11/07/19 0745)  Resp: 14 (11/07/19 0745)  BP: 108/64 (11/07/19 0745)  SpO2: 96 % (11/07/19 0745) Vital Signs (24h Range):  Temp:  [96 °F (35.6 °C)-98.3 °F (36.8 °C)] 98.1 °F (36.7 °C)  Pulse:  [89-95] 89  Resp:  [14-25] 14  SpO2:  [96 %-100 %] 96 %  BP: ()/(61-69) 108/64     Weight: 58.1 kg (128 lb 1.4 oz)  Body mass index is 17.37 kg/m².    Estimated Creatinine Clearance: 75.3 mL/min (based on SCr of 0.9 mg/dL).    Physical Exam   Constitutional: He is oriented to person, place, and time. No distress.   thin   HENT:   Head: Normocephalic.   Eyes: Pupils are equal, round, and reactive to light.   Neck:   Neck brace in place.   Anterior incision c/d/i.   Drain in place with small amount serosanguinous drainage      Cardiovascular: Normal rate and regular rhythm.   Murmur heard.  Pulmonary/Chest: Effort normal. No stridor. No respiratory distress.   Abdominal: Soft. He exhibits no distension. There is no tenderness. There is no guarding.   Musculoskeletal: Normal range of motion. He exhibits no edema, tenderness or deformity.   Bilateral groin wounds -  Left groin packed - c/d/i  No  drainage, induration, erythema noted   Neurological: He is alert and oriented to person, place, and time.   Skin: Skin is warm and dry. He is not diaphoretic.   Multiple tattoos.   Psychiatric: He has a normal mood and affect. His behavior is normal.   Vitals reviewed.      Significant Labs:   Blood Culture:   Recent Labs   Lab 10/31/19  1917 10/31/19  1922 11/04/19  0843 11/04/19  0845   LABBLOO No growth after 5 days. Gram stain aer bottle: Gram positive cocci in clusters resembling Staph   Results called to and read back by: Mela Arreaga RN  11/02/2019    09:12  STAPHYLOCOCCUS AUREUS  ID consult required at [facility] and [facility].  * No Growth to date  No Growth to date  No Growth to date  No Growth to date No Growth to date  No Growth to date  No Growth to date  No Growth to date     CBC:   Recent Labs   Lab 11/07/19  0918   WBC 8.81   HGB 8.1*   HCT 26.1*        CMP:   Recent Labs   Lab 11/07/19  0918   *   K 4.1      CO2 26   *   BUN 26*   CREATININE 0.9   CALCIUM 8.9   ANIONGAP 7*   EGFRNONAA >60.0     Wound Culture: No results for input(s): LABAERO in the last 4320 hours.    Significant Imaging: I have reviewed all pertinent imaging results/findings within the past 24 hours.

## 2019-11-07 NOTE — ASSESSMENT & PLAN NOTE
Patient with IVDA, MSSA bacteremia w/ endocarditis (MV veg) and epidural abscess at C4 transferred [facility] NSG evaluation. [facility] however they do not have NSG credentialing so patient was transferred here.   -MRI here showing C3-C4 and C6-C7 osteo/discitis w/ epidural abscess at that point     Plan:  -Cefazolin 6g continuous   - post op day 1 from wash out

## 2019-11-07 NOTE — PLAN OF CARE
Off floor in surgery  Afebrile and WBC WNL  Blood cultures 11/4 NTD    Continue Ancef    See in am  FU OR cultures    Kris Ya PA-C [facility]

## 2019-11-07 NOTE — PLAN OF CARE
Pt remain free from fall and injuries. Uses urinal and go to bathroom. Hemovac in place. Continuous ABX cefazolin. Acetaminophen per request. VSS safety maintained. Will monitor.

## 2019-11-07 NOTE — ANESTHESIA POSTPROCEDURE EVALUATION
Anesthesia Post Evaluation    Patient: Ld Bronson    Procedure(s) Performed: Procedure(s) (LRB):  C6-C7 ACDF w neuromonitoring, Mikayla Doshi for instrumentation (N/A)    Final Anesthesia Type: general  Patient location during evaluation: PACU  Patient participation: Yes- Able to Participate  Level of consciousness: awake and alert  Post-procedure vital signs: reviewed and stable  Pain management: adequate  Airway patency: patent  PONV status at discharge: No PONV  Anesthetic complications: no      Cardiovascular status: blood pressure returned to baseline  Respiratory status: unassisted and spontaneous ventilation  Hydration status: euvolemic  Follow-up not needed.          Vitals Value Taken Time   /67 11/6/2019  6:02 PM   Temp 36.4 °C (97.6 °F) 11/6/2019  5:30 PM   Pulse 90 11/6/2019  6:03 PM   Resp 21 11/6/2019  6:03 PM   SpO2 97 % 11/6/2019  6:03 PM   Vitals shown include unvalidated device data.      Event Time     Out of Recovery 11/06/2019 17:57:00          Pain/Mckay Score: Pain Rating Prior to Med Admin: 8 (11/6/2019  5:49 PM)  Mckay Score: 10 (11/6/2019  5:45 PM)

## 2019-11-07 NOTE — SUBJECTIVE & OBJECTIVE
Interval History: NAEON. C6-7 ACDF yesterday with epidural abscess washout. No complications. Pt denies paresthesias or weakness. Pain is well controlled. OR cultures with NGTD/pending. Post op xray pending. HV drain in place.    Medications:  Continuous Infusions:  Scheduled Meds:   ceFAZolin(ANCEF) in D5W 500 mL CONTINUOUS INFUSION  6 g Intravenous Q24H    ferrous sulfate  325 mg Oral Daily    folic acid  1 mg Oral Daily    polyethylene glycol  17 g Oral Daily     PRN Meds:sodium chloride, acetaminophen, Dextrose 10% Bolus, Dextrose 10% Bolus, gabapentin, glucagon (human recombinant), glucose, glucose, loperamide, ondansetron, ramelteon, sodium chloride 0.9%     Review of Systems  Objective:     Weight: 58.1 kg (128 lb 1.4 oz)  Body mass index is 17.37 kg/m².  Vital Signs (Most Recent):  Temp: 98.1 °F (36.7 °C) (11/07/19 0745)  Pulse: 89 (11/07/19 0745)  Resp: 14 (11/07/19 0745)  BP: 108/64 (11/07/19 0745)  SpO2: 96 % (11/07/19 0745) Vital Signs (24h Range):  Temp:  [96 °F (35.6 °C)-98.3 °F (36.8 °C)] 98.1 °F (36.7 °C)  Pulse:  [89-95] 89  Resp:  [14-25] 14  SpO2:  [96 %-100 %] 96 %  BP: ()/(61-69) 108/64     Date 11/07/19 0700 - 11/08/19 0659   Shift 8060-3598 8549-6486 7115-2465 24 Hour Total   INTAKE   P.O. 450   450   IV Piggyback 500   500   Shift Total(mL/kg) 950(16.4)   950(16.4)   OUTPUT   Urine(mL/kg/hr) 450   450   Drains 0   0   Shift Total(mL/kg) 450(7.7)   450(7.7)   Weight (kg) 58.1 58.1 58.1 58.1                        Closed/Suction Drain 11/06/19 0914 Anterior Neck Accordion (Active)   Site Description Unable to view 11/7/2019  7:34 AM   Dressing Type Transparent 11/7/2019  7:34 AM   Dressing Status Clean;Dry;Intact 11/7/2019  7:34 AM   Status To bulb suction 11/7/2019  7:34 AM   Output (mL) 0 mL 11/7/2019  9:00 AM       Neurosurgery Physical Exam  General: well developed, well nourished, no distress.   Head: normocephalic, atraumatic. C-collar in place  Neurologic: Alert and oriented.  Thought content appropriate.  GCS: Motor: 6/Verbal: 5/Eyes: 4 GCS Total: 15  Mental Status: Awake, Alert, Oriented x 4  Language: No aphasia  Speech: No dysarthria  Cranial nerves: face symmetric, tongue midline, CN II-XII grossly intact.   Eyes: pupils equal, round, reactive to light with accomodation, EOMI.   Pulmonary: normal respirations, no signs of respiratory distress  Abdomen: soft, non-distended, not tender to palpation  Skin: Skin is warm, dry and intact.  Sensory: intact to light touch throughout    Motor Strength:Moves all extremities spontaneously with good tone.  Full strength upper and lower extremities. No abnormal movements seen.       Reflexes:   Do's: Negative.  Babinski's: Negative.  Clonus: Negative.    Incision: Clean, dry, steri-strips intact. Skin edges well approximated. No surrounding erythema or edema. No drainage or TTP. No underlying hematoma or fluid collection. HV drain in place to full suction.    Significant Labs:  Recent Labs   Lab 11/07/19 0918   *   *   K 4.1      CO2 26   BUN 26*   CREATININE 0.9   CALCIUM 8.9     Recent Labs   Lab 11/07/19 0918   WBC 8.81   HGB 8.1*   HCT 26.1*        No results for input(s): LABPT, INR, APTT in the last 48 hours.  Microbiology Results (last 7 days)     Procedure Component Value Units Date/Time    Blood culture [[id number]] Collected:  11/04/19 0843    Order Status:  Completed Specimen:  Blood Updated:  11/07/19 1012     Blood Culture, Routine No Growth to date      No Growth to date      No Growth to date      No Growth to date    Blood culture [[id number]] Collected:  11/04/19 0845    Order Status:  Completed Specimen:  Blood Updated:  11/07/19 1012     Blood Culture, Routine No Growth to date      No Growth to date      No Growth to date      No Growth to date    Culture, Anaerobe [[id number]] Collected:  11/06/19 1602    Order Status:  Completed Specimen:  Body Fluid from Neck Updated:  11/07/19 0726      Anaerobic Culture Culture in progress    Narrative:       C6-C7 epidural discectitis    Culture, Anaerobe [[id number]] Collected:  11/06/19 1609    Order Status:  Completed Specimen:  Body Fluid from Neck Updated:  11/07/19 0726     Anaerobic Culture Culture in progress    Narrative:       2.Epidural purulent material    Gram stain [[id number]] Collected:  11/06/19 1609    Order Status:  Completed Specimen:  Body Fluid from Neck Updated:  11/06/19 2016     Gram Stain Result Few WBC's      No organisms seen    Narrative:       2.Epidural purulent material    Gram stain [[id number]] Collected:  11/06/19 1602    Order Status:  Completed Specimen:  Body Fluid from Neck Updated:  11/06/19 1920     Gram Stain Result No WBC's      No organisms seen    Narrative:       C6-C7 epidural discectitis    AFB Culture & Smear [[id number]] Collected:  11/06/19 1602    Order Status:  Sent Specimen:  Body Fluid from Neck Updated:  11/06/19 1636    Aerobic culture [[id number]] Collected:  11/06/19 1602    Order Status:  Sent Specimen:  Body Fluid from Neck Updated:  11/06/19 1636    Fungus culture [[id number]] Collected:  11/06/19 1602    Order Status:  Sent Specimen:  Body Fluid from Neck Updated:  11/06/19 1635    Aerobic culture [[id number]] Collected:  11/06/19 1609    Order Status:  Sent Specimen:  Body Fluid from Neck Updated:  11/06/19 1634    AFB Culture & Smear [[id number]] Collected:  11/06/19 1609    Order Status:  Sent Specimen:  Body Fluid from Neck Updated:  11/06/19 1634    Fungus culture [[id number]] Collected:  11/06/19 1609    Order Status:  Sent Specimen:  Body Fluid from Neck Updated:  11/06/19 1634    Blood culture (site 1) [[id number]] Collected:  10/31/19 1917    Order Status:  Completed Specimen:  Blood Updated:  11/05/19 2022     Blood Culture, Routine No growth after 5 days.    Narrative:       Site # 1, aerobic and anaerobic    Culture, Anaerobe [[id number]]     Order Status:  No result Specimen:  Body Fluid from Back      Aerobic culture [[id number]]     Order Status:  No result Specimen:  Body Fluid from Back     Gram stain [[id number]]     Order Status:  No result Specimen:  Body Fluid from Back     Blood culture (site 2) [[id number]]  (Abnormal)  (Susceptibility) Collected:  10/31/19 1922    Order Status:  Completed Specimen:  Blood Updated:  11/04/19 1207     Blood Culture, Routine Gram stain aer bottle: Gram positive cocci in clusters resembling Staph       Results called to and read back by: Mela Arreaga RN  11/02/2019        09:12      STAPHYLOCOCCUS AUREUS  ID consult required at [facility]   locations.      Narrative:       Site # 2, aerobic only    Blood culture [[id number]]     Order Status:  Canceled Specimen:  Blood     Blood culture [[id number]]     Order Status:  Canceled Specimen:  Blood         All pertinent labs from the last 24 hours have been reviewed.    Significant Diagnostics:  No results found in the last 24 hours.

## 2019-11-07 NOTE — SUBJECTIVE & OBJECTIVE
Interval History: Post op day 1. NIKUNJ. Complaining of some pain this morning. Scheduled tylenol started with prn tramadol.     Review of Systems   Constitutional: Negative for chills and fever.   Respiratory: Negative for cough and shortness of breath.    Cardiovascular: Negative for chest pain.   Gastrointestinal: Negative for abdominal pain, diarrhea, nausea and vomiting.   Endocrine: Negative for cold intolerance and heat intolerance.   Genitourinary: Negative for difficulty urinating.   Musculoskeletal: Positive for back pain.   Neurological: Negative for headaches.   Psychiatric/Behavioral: Negative for agitation.     Objective:     Vital Signs (Most Recent):  Temp: 97.5 °F (36.4 °C) (11/07/19 1223)  Pulse: 92 (11/07/19 1223)  Resp: 18 (11/07/19 1223)  BP: 104/65 (11/07/19 1223)  SpO2: 97 % (11/07/19 1223) Vital Signs (24h Range):  Temp:  [96 °F (35.6 °C)-98.3 °F (36.8 °C)] 97.5 °F (36.4 °C)  Pulse:  [89-95] 92  Resp:  [14-25] 18  SpO2:  [96 %-98 %] 97 %  BP: ()/(61-69) 104/65     Weight: 58.1 kg (128 lb 1.4 oz)  Body mass index is 17.37 kg/m².    Intake/Output Summary (Last 24 hours) at 11/7/2019 1604  Last data filed at 11/7/2019 1337  Gross per 24 hour   Intake 2970 ml   Output 900 ml   Net 2070 ml      Physical Exam   Constitutional: He is oriented to person, place, and time. No distress.   Cachectic  C-collar in place   HENT:   Head: Normocephalic and atraumatic.   Eyes: Pupils are equal, round, and reactive to light. EOM are normal. No scleral icterus.   Neck: Normal range of motion. Neck supple. No JVD present.   Cardiovascular: Normal rate, normal heart sounds and intact distal pulses.   No murmur heard.  Pulmonary/Chest: Effort normal and breath sounds normal. No respiratory distress. He has no wheezes.   Abdominal: Soft. Bowel sounds are normal. He exhibits no distension. There is no tenderness.   Genitourinary:   Genitourinary Comments: Groin abscess - healthy open wound - not indurated or  draining pus   Musculoskeletal: Normal range of motion. He exhibits no edema or tenderness.   Neurological: He is alert and oriented to person, place, and time. No cranial nerve deficit.   Skin: Skin is warm. Capillary refill takes less than 2 seconds. He is not diaphoretic. No erythema.   Psychiatric: He has a normal mood and affect.   Vitals reviewed.      Significant Labs: All pertinent labs within the past 24 hours have been reviewed.    Significant Imaging: I have reviewed all pertinent imaging results/findings within the past 24 hours.

## 2019-11-07 NOTE — ASSESSMENT & PLAN NOTE
52 y/o male  with hx of IVDU (heroin), MSSA endocarditis ([facility] treated [facility] before leaving [place]), biltateral groin abscesses s/p I&D who [facility] evaluation for cervical epidural abscess. Blood cultures on admit 10/31 + for MSSA. Repeat blood cultures 11/4 NGTD.  HOLLAND showed two mitral valve vegetations.   He is now POD #1 ACDF C6-7.  Surgical cultures showing Staph Aureus (sensitivities pending).   Stable, non-septic appearing      Plan/recommendations:  1. Continue cefazolin 6 grams IV q 24 hours continuous infusion for MSSA  2.  Will follow surgical cultures.   3. Anticipate long term IV abx therapy - minimum of 6 weeks from date of washout.   4.  Check HIV  5.  Will follow up tomorrow with final recommendations.  [facility] and follow up there.    Data reviewed and plan discussed with ID staff

## 2019-11-07 NOTE — PROGRESS NOTES
Certification of Assistant at Surgery       Surgery Date: 11/6/2019     Participating Surgeons:  Surgeon(s) and Role:     * Becca Malone MD - Primary     * Jered Felton MD -Aassist    Procedures:  Procedure(s) (LRB):  C6-C7 ACDF w neuromonitoring, Mikayla Doshi for instrumentation (N/A)    Assistant Surgeon's Certification of Necessity:  I understand that section 1842 (b) (6) (d) of the Social Security Act generally prohibits Medicare Part B reasonable charge payment for the services of assistants at surgery in teaching hospitals when qualified residents are available to furnish such services. I certify that the services for which payment is claimed were medically necessary, and that no qualified resident was available to perform the services. I further understand that these services are subject to post-payment review by the Medicare carrier.      Jered Felton MD    11/07/2019  10:33 AM

## 2019-11-07 NOTE — PROGRESS NOTES
[facility]  Neurosurgery  Progress Note    Subjective:     History of Present Illness: 53 M with PMH of IVDU (heroin), MSSA bacteremia with septic shock and endocarditis, and HCV. Was treated at OSH for septic shock, after complaining of back pain was found to have a cervical spine epidural abscess and transferred to [facility].     Post-Op Info:  Procedure(s) (LRB):  C6-C7 ACDF w neuromonitoring, Mikayla Doshi for instrumentation (N/A)   1 Day Post-Op     Interval History: NAEON. C6-7 ACDF yesterday with epidural abscess washout. No complications. Pt denies paresthesias or weakness. Pain is well controlled. Denies difficulty swallowing or voice changes. OR cultures with NGTD/pending. Post op xray pending. HV drain in place.    Medications:  Continuous Infusions:  Scheduled Meds:   ceFAZolin(ANCEF) in D5W 500 mL CONTINUOUS INFUSION  6 g Intravenous Q24H    ferrous sulfate  325 mg Oral Daily    folic acid  1 mg Oral Daily    polyethylene glycol  17 g Oral Daily     PRN Meds:sodium chloride, acetaminophen, Dextrose 10% Bolus, Dextrose 10% Bolus, gabapentin, glucagon (human recombinant), glucose, glucose, loperamide, ondansetron, ramelteon, sodium chloride 0.9%     Review of Systems  Objective:     Weight: 58.1 kg (128 lb 1.4 oz)  Body mass index is 17.37 kg/m².  Vital Signs (Most Recent):  Temp: 98.1 °F (36.7 °C) (11/07/19 0745)  Pulse: 89 (11/07/19 0745)  Resp: 14 (11/07/19 0745)  BP: 108/64 (11/07/19 0745)  SpO2: 96 % (11/07/19 0745) Vital Signs (24h Range):  Temp:  [96 °F (35.6 °C)-98.3 °F (36.8 °C)] 98.1 °F (36.7 °C)  Pulse:  [89-95] 89  Resp:  [14-25] 14  SpO2:  [96 %-100 %] 96 %  BP: ()/(61-69) 108/64     Date 11/07/19 0700 - 11/08/19 0659   Shift 0012-8797 4936-2995 3492-8699 24 Hour Total   INTAKE   P.O. 450   450   IV Piggyback 500   500   Shift Total(mL/kg) 950(16.4)   950(16.4)   OUTPUT   Urine(mL/kg/hr) 450   450   Drains 0   0   Shift Total(mL/kg) 450(7.7)   450(7.7)   Weight (kg) 58.1  58.1 58.1 58.1                        Closed/Suction Drain 11/06/19 0914 Anterior Neck Accordion (Active)   Site Description Unable to view 11/7/2019  7:34 AM   Dressing Type Transparent 11/7/2019  7:34 AM   Dressing Status Clean;Dry;Intact 11/7/2019  7:34 AM   Status To bulb suction 11/7/2019  7:34 AM   Output (mL) 0 mL 11/7/2019  9:00 AM       Neurosurgery Physical Exam  General: well developed, well nourished, no distress.   Head: normocephalic, atraumatic. C-collar in place  Neurologic: Alert and oriented. Thought content appropriate.  GCS: Motor: 6/Verbal: 5/Eyes: 4 GCS Total: 15  Mental Status: Awake, Alert, Oriented x 4  Language: No aphasia  Speech: No dysarthria  Cranial nerves: face symmetric, tongue midline, CN II-XII grossly intact.   Eyes: pupils equal, round, reactive to light with accomodation, EOMI.   Pulmonary: normal respirations, no signs of respiratory distress  Abdomen: soft, non-distended, not tender to palpation  Skin: Skin is warm, dry and intact.  Sensory: intact to light touch throughout    Motor Strength:Moves all extremities spontaneously with good tone.  Full strength upper and lower extremities. No abnormal movements seen.       Reflexes:   Od's: Negative.  Babinski's: Negative.  Clonus: Negative.    Incision: Clean, dry, steri-strips intact. Skin edges well approximated. No surrounding erythema or edema. No drainage or TTP. No underlying hematoma or fluid collection. HV drain in place to full suction.    Significant Labs:  Recent Labs   Lab 11/07/19 0918   *   *   K 4.1      CO2 26   BUN 26*   CREATININE 0.9   CALCIUM 8.9     Recent Labs   Lab 11/07/19 0918   WBC 8.81   HGB 8.1*   HCT 26.1*        No results for input(s): LABPT, INR, APTT in the last 48 hours.  Microbiology Results (last 7 days)     Procedure Component Value Units Date/Time    Blood culture [[id number]] Collected:  11/04/19 0843    Order Status:  Completed Specimen:  Blood Updated:   11/07/19 1012     Blood Culture, Routine No Growth to date      No Growth to date      No Growth to date      No Growth to date    Blood culture [[id number]] Collected:  11/04/19 0845    Order Status:  Completed Specimen:  Blood Updated:  11/07/19 1012     Blood Culture, Routine No Growth to date      No Growth to date      No Growth to date      No Growth to date    Culture, Anaerobe [[id number]] Collected:  11/06/19 1602    Order Status:  Completed Specimen:  Body Fluid from Neck Updated:  11/07/19 0726     Anaerobic Culture Culture in progress    Narrative:       C6-C7 epidural discectitis    Culture, Anaerobe [[id number]] Collected:  11/06/19 1609    Order Status:  Completed Specimen:  Body Fluid from Neck Updated:  11/07/19 0726     Anaerobic Culture Culture in progress    Narrative:       2.Epidural purulent material    Gram stain [[id number]] Collected:  11/06/19 1609    Order Status:  Completed Specimen:  Body Fluid from Neck Updated:  11/06/19 2016     Gram Stain Result Few WBC's      No organisms seen    Narrative:       2.Epidural purulent material    Gram stain [[id number]] Collected:  11/06/19 1602    Order Status:  Completed Specimen:  Body Fluid from Neck Updated:  11/06/19 1920     Gram Stain Result No WBC's      No organisms seen    Narrative:       C6-C7 epidural discectitis    AFB Culture & Smear [[id number]] Collected:  11/06/19 1602    Order Status:  Sent Specimen:  Body Fluid from Neck Updated:  11/06/19 1636    Aerobic culture [[id number]] Collected:  11/06/19 1602    Order Status:  Sent Specimen:  Body Fluid from Neck Updated:  11/06/19 1636    Fungus culture [[id number]] Collected:  11/06/19 1602    Order Status:  Sent Specimen:  Body Fluid from Neck Updated:  11/06/19 1635    Aerobic culture [[id number]] Collected:  11/06/19 1609    Order Status:  Sent Specimen:  Body Fluid from Neck Updated:  11/06/19 1634    AFB Culture & Smear [[id number]] Collected:  11/06/19 1609    Order Status:  Sent Specimen:   Body Fluid from Neck Updated:  11/06/19 1634    Fungus culture [[id number]] Collected:  11/06/19 1609    Order Status:  Sent Specimen:  Body Fluid from Neck Updated:  11/06/19 1634    Blood culture (site 1) [[id number]] Collected:  10/31/19 1917    Order Status:  Completed Specimen:  Blood Updated:  11/05/19 2022     Blood Culture, Routine No growth after 5 days.    Narrative:       Site # 1, aerobic and anaerobic    Culture, Anaerobe [[id number]]     Order Status:  No result Specimen:  Body Fluid from Back     Aerobic culture [[id number]]     Order Status:  No result Specimen:  Body Fluid from Back     Gram stain [[id number]]     Order Status:  No result Specimen:  Body Fluid from Back     Blood culture (site 2) [[id number]]  (Abnormal)  (Susceptibility) Collected:  10/31/19 1922    Order Status:  Completed Specimen:  Blood Updated:  11/04/19 1207     Blood Culture, Routine Gram stain aer bottle: Gram positive cocci in clusters resembling Staph       Results called to and read back by: Mela Arreaga RN  11/02/2019        09:12      STAPHYLOCOCCUS AUREUS  ID consult required at [facility],[facility] and [facility]   locations.      Narrative:       Site # 2, aerobic only    Blood culture [[id number]]     Order Status:  Canceled Specimen:  Blood     Blood culture [[id number]]     Order Status:  Canceled Specimen:  Blood         All pertinent labs from the last 24 hours have been reviewed.    Significant Diagnostics:  No results found in the last 24 hours.      Assessment/Plan:     * Epidural abscess  53 M with PMH of IVDU (heroin), MSSA bacteremia with septic shock and endocarditis, and HCV with a cervical epidural abscess. Pt s/p C6-7 ACDF with washout on 11/6.     - Neurologically stable on exam  - Post op xrays pending  - C-collar in place. C-collar to be worn when up and OOB. Okay to remove when lying flat.   - HV drain in place to full suction. Output to be emptied and recorded q 6 hours.   - OR cultures with  NGTD/pending  - ID: BCx 10/31 with MSSA. Repeat BCx with NGTD. Continue cefazolin 6g continuous.   - Recent IVDU: Recommend addiction psych consult for counseling per primary  - NSGY will continue to follow. Please page with any questions or changes in neuro exam.     Discussed with Dr. Faisal Raymond PARickyC  Neurosurgery  [facility]

## 2019-11-07 NOTE — NURSING
"Received patient from PACU with hemovac drain draining small amount of bright red discharged.  Accordion collapsed.  Patient a/ox4, able to slide over from stretcher to bed.  C/o 9/10 neck pain, but states, "I don't want anything just get me comfortable."  Patient adjusted in bed with x3 pillows.  Bed in lowest position, call light within reach.  Currently eating.  "

## 2019-11-07 NOTE — PROGRESS NOTES
[facility]  Infectious Disease  Progress Note    Patient Name: Ld Bronson  MRN: [id number]  Admission Date: 10/31/2019  Length of Stay: 7 days  Attending Physician: Leonid Rey, *  Primary Care Provider: Kris Artis Jr, MD    Isolation Status: No active isolations  Assessment/Plan:      * Epidural abscess     54 y/o male  with hx of IVDU (heroin), MSSA endocarditis ([facility]), biltateral groin abscesses s/p I&D who was transferred from [facility] for NSGY evaluation for cervical epidural abscess. Blood cultures on admit 10/31 + for MSSA. Repeat blood cultures 11/4 NGTD.  HOLLAND showed two mitral valve vegetations.   He is now POD #1 ACDF C6-7.  Surgical cultures showing Staph Aureus (sensitivities pending).   Stable, non-septic appearing      Plan/recommendations:  1. Continue cefazolin 6 grams IV q 24 hours continuous infusion for MSSA  2.  Will follow surgical cultures.   3. Anticipate long term IV abx therapy - minimum of 6 weeks from date of washout.   4.  Check HIV  5.  Will follow up tomorrow with final recommendations.  [facility] and follow up there.    Data reviewed and plan discussed with ID staff      Endocarditis due to methicillin susceptible Staphylococcus aureus (MSSA)  See Assessment/Plan above    Thank you.   Please call for any questions or concerns.  Isha Francis, KENNY, ANP-C  [phone]  Subjective:     Principal Problem:Epidural abscess    HPI: Mr. Bronson is a 54 y/o male  with hx of IVDU (heroin), MSSA endocarditis presents as a transfer from [facility] for NSGY evaluation for epidural abscess. He was on cefazolin and is now on vancomycin and ceftriaxone. We are consulted for abx recommendations.    Per chat review, [facility] endocarditis s/p 3-4 weeks of treatment as left AMA. He returns to ED for CP, transferred to MICU for septic shock. He was noted to have  multiple abscesses that required bedside drainage by general surgery. His blood cultures were negative on 10/25. He c/o neck and back pain and was found to have epidural abscess of C4. He also had SI joint effusion and underwent IR aspiration. Cultures NGTD. No additional intervention for ortho surgery.     No fever chills or night sweats here. His blood cultures here are NGTD. NSGY evaluation pending.   Interval History: POD#1 ACDF C6-7.  Surgical cultures pending.  Repeat blood cultures of 11/4 NGTD.   No significant complaints.     Review of Systems   Constitutional: Negative for chills, diaphoresis, fatigue and fever.   Respiratory: Negative for cough and shortness of breath.    Cardiovascular: Negative for chest pain.   Gastrointestinal: Negative for abdominal pain, diarrhea, nausea and vomiting.   Genitourinary: Negative for dysuria.        +bilateral groin wounds   Musculoskeletal: Positive for back pain.   Skin: Positive for wound. Negative for color change, pallor and rash.   Neurological: Negative for dizziness and headaches.   Psychiatric/Behavioral: Negative for agitation.   All other systems reviewed and are negative.    Objective:     Vital Signs (Most Recent):  Temp: 98.1 °F (36.7 °C) (11/07/19 0745)  Pulse: 89 (11/07/19 0745)  Resp: 14 (11/07/19 0745)  BP: 108/64 (11/07/19 0745)  SpO2: 96 % (11/07/19 0745) Vital Signs (24h Range):  Temp:  [96 °F (35.6 °C)-98.3 °F (36.8 °C)] 98.1 °F (36.7 °C)  Pulse:  [89-95] 89  Resp:  [14-25] 14  SpO2:  [96 %-100 %] 96 %  BP: ()/(61-69) 108/64     Weight: 58.1 kg (128 lb 1.4 oz)  Body mass index is 17.37 kg/m².    Estimated Creatinine Clearance: 75.3 mL/min (based on SCr of 0.9 mg/dL).    Physical Exam   Constitutional: He is oriented to person, place, and time. No distress.   thin   HENT:   Head: Normocephalic.   Eyes: Pupils are equal, round, and reactive to light.   Neck:   Neck brace in place.   Anterior incision c/d/i.   Drain in place with small amount  serosanguinous drainage      Cardiovascular: Normal rate and regular rhythm.   Murmur heard.  Pulmonary/Chest: Effort normal. No stridor. No respiratory distress.   Abdominal: Soft. He exhibits no distension. There is no tenderness. There is no guarding.   Musculoskeletal: Normal range of motion. He exhibits no edema, tenderness or deformity.   Bilateral groin wounds -  Left groin packed - c/d/i  No drainage, induration, erythema noted   Neurological: He is alert and oriented to person, place, and time.   Skin: Skin is warm and dry. He is not diaphoretic.   Multiple tattoos.   Psychiatric: He has a normal mood and affect. His behavior is normal.   Vitals reviewed.      Significant Labs:   Blood Culture:   Recent Labs   Lab 10/31/19  1917 10/31/19  1922 11/04/19  0843 11/04/19  0845   LABBLOO No growth after 5 days. Gram stain aer bottle: Gram positive cocci in clusters resembling Staph   Results called to and read back by: Mela Arreaga RN  11/02/2019    09:12  STAPHYLOCOCCUS AUREUS  ID consult required at [facility] and [facility]   locations.  * No Growth to date  No Growth to date  No Growth to date  No Growth to date No Growth to date  No Growth to date  No Growth to date  No Growth to date     CBC:   Recent Labs   Lab 11/07/19  0918   WBC 8.81   HGB 8.1*   HCT 26.1*        CMP:   Recent Labs   Lab 11/07/19  0918   *   K 4.1      CO2 26   *   BUN 26*   CREATININE 0.9   CALCIUM 8.9   ANIONGAP 7*   EGFRNONAA >60.0     Wound Culture: No results for input(s): LABAERO in the last 4320 hours.    Significant Imaging: I have reviewed all pertinent imaging results/findings within the past 24 hours.

## 2019-11-07 NOTE — NURSING
Packing and dressing changed to patient's left groin.  Patient tolerated well, will continue to monitor.

## 2019-11-07 NOTE — PROGRESS NOTES
[facility] Hospital Medicine  Progress Note    Patient Name: Ld Bronson  MRN: [id number]  Patient Class: IP- Inpatient   Admission Date: 10/31/2019  Length of Stay: 7 days  Attending Physician: Leonid Rey, *  Primary Care Provider: Kris Artis Jr, MD    [facility] Medicine Team: [facility]OSP MED 4 Marcos Sherman MD    Subjective:     Principal Problem:Epidural abscess        HPI:  Mr. Bronson is a 53-year-old man with IVDA (heroin) and Hx MSSA bacteremia with IE, and HCV who presented to the [facility] on 10/23 with fever.     He has had a difficult year, mostly as a result of infectious diseases related to ongoing heroin abuse. He was treated this year for 3-4 weeks for IE prior to leaving [place] but had a more recent admission on 9/21 for chest pain at which time TTE was performed, and he was discharged after blood cultures were negative ruling out ongoing IE. On admission he was hypotensive refractory to fluids and had bilateral cellulitis with abscesses that required bedside drainage by surgery, after which he was admitted to the MICU for septic shock. He has been treated with broad-spectrum antibiotics and levophed via a subclavian line with significant improvement. He was weaned off vasopressors within 24h and stepped down from the ICU six days ago. Since then his blood cultures have been positive for MSSA (first negative result on 10/25), echocardiogram has shown a MV vegetation, and his antibiotics have been narrowed to cefazolin 2g Q8H.      His admission has been complicated by diffuse body pains and joint pains initially thought to be hyperalgesia in the setting of mild opiate withdrawal, eventually evolving into neck and back pain. MRI C-spine showed an epidural abscess at C4. NSGY there recommended cervical decompression, which they are unable to do that there as the hospital is not yet accredited for it per Dr. Artis. He also had an MRI L spine that showed SI  joint effusion that was aspirated by IR with negative cultures. Ortho there did not recommend any additional intervention. Dr. Malone is aware of the patient and agreed to consult. Dr. Artis stated that they would take the patient back upon completion of surgical evaluation/treatment.     Upon presenting to the floor, patient was in NAD and resting comfortably in hospital bed. Patient reports slight lumbar back pain decreased appetite. Patient denies any current N/V/Chills. Patient does state that he has extensive groin abscesses that are quite painful. Patient reports that these have [facility] with [facility]. Patient has no further complaints at this time. NSG was contacted and report they will see him tonight however will likely not be going to OR tonight due to active case load tonight.     Overview/Hospital Course:  11/1- Patient doing well overnight with no new complaints; neurosurgery, ID, addiction psych, and nutrition consulted, awaiting neurosurgery recommendation on treatment plan for abscess; HOLLAND ordered for Monday. Patient was evaluated by NSG who would like to repeat MRI last night and now CT of C-Spine. Patient antibiotic regiment changed to continuous infusion of Cefazolin 6g given previous cultures were all MSSA. MRIs showing discitis/osteo at C3-C4, C6-C7 and epidural abscess associated at that point. Patient continues to spike low grade fevers of 100.5 most recently, wound care continues to address the packed ground abscesses. Hgb improved after 1U pRBC. HOLLAND showed mitral but no aortic vegetation. To OR today for washout of epidural abscess. [facility] after the procedure today and manage him there further    Interval History: Post op day 1. NAEON. Complaining of some pain this morning. Scheduled tylenol started with prn tramadol.     Review of Systems   Constitutional: Negative for chills and fever.   Respiratory: Negative for cough and shortness of  breath.    Cardiovascular: Negative for chest pain.   Gastrointestinal: Negative for abdominal pain, diarrhea, nausea and vomiting.   Endocrine: Negative for cold intolerance and heat intolerance.   Genitourinary: Negative for difficulty urinating.   Musculoskeletal: Positive for back pain.   Neurological: Negative for headaches.   Psychiatric/Behavioral: Negative for agitation.     Objective:     Vital Signs (Most Recent):  Temp: 97.5 °F (36.4 °C) (11/07/19 1223)  Pulse: 92 (11/07/19 1223)  Resp: 18 (11/07/19 1223)  BP: 104/65 (11/07/19 1223)  SpO2: 97 % (11/07/19 1223) Vital Signs (24h Range):  Temp:  [96 °F (35.6 °C)-98.3 °F (36.8 °C)] 97.5 °F (36.4 °C)  Pulse:  [89-95] 92  Resp:  [14-25] 18  SpO2:  [96 %-98 %] 97 %  BP: ()/(61-69) 104/65     Weight: 58.1 kg (128 lb 1.4 oz)  Body mass index is 17.37 kg/m².    Intake/Output Summary (Last 24 hours) at 11/7/2019 1604  Last data filed at 11/7/2019 1337  Gross per 24 hour   Intake 2970 ml   Output 900 ml   Net 2070 ml      Physical Exam   Constitutional: He is oriented to person, place, and time. No distress.   Cachectic  C-collar in place   HENT:   Head: Normocephalic and atraumatic.   Eyes: Pupils are equal, round, and reactive to light. EOM are normal. No scleral icterus.   Neck: Normal range of motion. Neck supple. No JVD present.   Cardiovascular: Normal rate, normal heart sounds and intact distal pulses.   No murmur heard.  Pulmonary/Chest: Effort normal and breath sounds normal. No respiratory distress. He has no wheezes.   Abdominal: Soft. Bowel sounds are normal. He exhibits no distension. There is no tenderness.   Genitourinary:   Genitourinary Comments: Groin abscess - healthy open wound - not indurated or draining pus   Musculoskeletal: Normal range of motion. He exhibits no edema or tenderness.   Neurological: He is alert and oriented to person, place, and time. No cranial nerve deficit.   Skin: Skin is warm. Capillary refill takes less than 2  seconds. He is not diaphoretic. No erythema.   Psychiatric: He has a normal mood and affect.   Vitals reviewed.      Significant Labs: All pertinent labs within the past 24 hours have been reviewed.    Significant Imaging: I have reviewed all pertinent imaging results/findings within the past 24 hours.      Assessment/Plan:      * Epidural abscess  Patient with IVDA, MSSA bacteremia w/ endocarditis (MV veg) and epidural abscess at C4 transferred [facility] NSG evaluation. [facility] to decomp[facility] however they do not have NSG credentialing so patient was transferred here.   -MRI here showing C3-C4 and C6-C7 osteo/discitis w/ epidural abscess at that point     Plan:  -Cefazolin 6g continuous   - post op day 1 from wash out       Hepatitis B antibody positive  History of positive Hep B antibody test      Cachexia  Patient chronically cachectic  -nutrition consulted       Groin abscess  Patient with multiple groin abscess that was I&D at bedside last week. Covering with bs ABx at this time. Wounds packed.     Plan:  -BS Abx  -Wound care consult - recommend supportive care as does not seem infected  -Obtain records of cultures       Severe protein-calorie malnutrition  Patient with albumin of   Lab Results   Component Value Date    LABPROT 11.3 11/04/2019    ALBUMIN 2.2 (L) 11/05/2019        Plan:  -Nutrition consult, will f/u recs   -Boost with every meal  -Continue to monitor albumin  -time spent counseling patient on increasing PO intake, modalities to help increase protein intake etc.        HCV (hepatitis C virus)  Hx of hep C unsure of treatment, previously on Sofosuvir-Velpatasvir   -however he only filled this once   -Care everywhere showing HCV log 6.24 and RNA >200k    Plan:  -f/u outpatient     Opiate abuse, continuous  See above       Endocarditis due to methicillin susceptible Staphylococcus aureus (MSSA)  Patient with recent history of MSSA bacteremia and endocarditis who presents as a transfer for concern  of epidural abscess of C4. Patient was treated for IE for 3-4 weeks and left AMA earlier this month and [facility], BS abx and pressors. Patient was stepped down from ICU and has remained on BS abx on the floor of the [facility] for the past 6 days.   -MSSA (first negative result on 10/25), echocardiogram has shown a MV vegetation, and his antibiotics have been narrowed to cefazolin 2g Q8H.    Plan:  -Given Epidural abscess and concomitant MSSA endocarditis will treat with Cefazolin  -obtain records from [facility], likely not till next week  -HOLLAND and TTE both showing mitral vegetation. HOLLAND ruled out any aortic vegetations which was a concern from TTE   -Repeat cultures (10/31)- Aerobic 1/2 GPC in clusters resembling Staph  -repeat Bcx 11/4- NGTD      IV drug abuse  Chronic IV Heroin use, recently left AMA From outside facility earlier this month with continued heroin use. Reports last IVDU > days prior to his admission to [facility] for [facility].       Plan:  -Addiction psych - follow recs  -patient not currently on MAT       VTE Risk Mitigation (From admission, onward)         Ordered     enoxaparin injection 40 mg  Daily      11/07/19 1201     heparin (porcine) injection 5,000 Units  Every 8 hours      11/02/19 1049     Place sequential compression device  Until discontinued      10/31/19 1851     IP VTE LOW RISK PATIENT  Once      10/31/19 1851                      Marcos Sherman MD  Department of Hospital Medicine   [facility]

## 2019-11-08 PROBLEM — I38 ENDOCARDITIS: Status: ACTIVE | Noted: 2019-11-07

## 2019-11-08 LAB
ANION GAP SERPL CALC-SCNC: 9 MMOL/L (ref 8–16)
BACTERIA SPEC AEROBE CULT: ABNORMAL
BACTERIA SPEC AEROBE CULT: ABNORMAL
BASOPHILS # BLD AUTO: 0.05 K/UL (ref 0–0.2)
BASOPHILS NFR BLD: 0.5 % (ref 0–1.9)
BUN SERPL-MCNC: 29 MG/DL (ref 6–20)
CALCIUM SERPL-MCNC: 8.6 MG/DL (ref 8.7–10.5)
CHLORIDE SERPL-SCNC: 102 MMOL/L (ref 95–110)
CO2 SERPL-SCNC: 24 MMOL/L (ref 23–29)
CREAT SERPL-MCNC: 0.9 MG/DL (ref 0.5–1.4)
DIFFERENTIAL METHOD: ABNORMAL
EOSINOPHIL # BLD AUTO: 0.1 K/UL (ref 0–0.5)
EOSINOPHIL NFR BLD: 1 % (ref 0–8)
ERYTHROCYTE [DISTWIDTH] IN BLOOD BY AUTOMATED COUNT: 16.5 % (ref 11.5–14.5)
EST. GFR  (AFRICAN AMERICAN): >60 ML/MIN/1.73 M^2
EST. GFR  (NON AFRICAN AMERICAN): >60 ML/MIN/1.73 M^2
GLUCOSE SERPL-MCNC: 99 MG/DL (ref 70–110)
HCT VFR BLD AUTO: 26.2 % (ref 40–54)
HGB BLD-MCNC: 7.9 G/DL (ref 14–18)
HIV 1+2 AB+HIV1 P24 AG SERPL QL IA: NEGATIVE
IMM GRANULOCYTES # BLD AUTO: 0.04 K/UL (ref 0–0.04)
IMM GRANULOCYTES NFR BLD AUTO: 0.4 % (ref 0–0.5)
LYMPHOCYTES # BLD AUTO: 1 K/UL (ref 1–4.8)
LYMPHOCYTES NFR BLD: 10.6 % (ref 18–48)
MCH RBC QN AUTO: 25.9 PG (ref 27–31)
MCHC RBC AUTO-ENTMCNC: 30.2 G/DL (ref 32–36)
MCV RBC AUTO: 86 FL (ref 82–98)
MONOCYTES # BLD AUTO: 0.4 K/UL (ref 0.3–1)
MONOCYTES NFR BLD: 4.7 % (ref 4–15)
NEUTROPHILS # BLD AUTO: 7.7 K/UL (ref 1.8–7.7)
NEUTROPHILS NFR BLD: 82.8 % (ref 38–73)
NRBC BLD-RTO: 0 /100 WBC
PLATELET # BLD AUTO: 241 K/UL (ref 150–350)
PMV BLD AUTO: 9.8 FL (ref 9.2–12.9)
POTASSIUM SERPL-SCNC: 4.2 MMOL/L (ref 3.5–5.1)
RBC # BLD AUTO: 3.05 M/UL (ref 4.6–6.2)
SODIUM SERPL-SCNC: 135 MMOL/L (ref 136–145)
WBC # BLD AUTO: 9.28 K/UL (ref 3.9–12.7)

## 2019-11-08 PROCEDURE — 99233 PR SUBSEQUENT HOSPITAL CARE,LEVL III: ICD-10-PCS | Mod: ,,, | Performed by: HOSPITALIST

## 2019-11-08 PROCEDURE — 99024 PR POST-OP FOLLOW-UP VISIT: ICD-10-PCS | Mod: ,,, | Performed by: PHYSICIAN ASSISTANT

## 2019-11-08 PROCEDURE — 99233 SBSQ HOSP IP/OBS HIGH 50: CPT | Mod: ,,, | Performed by: HOSPITALIST

## 2019-11-08 PROCEDURE — 99233 PR SUBSEQUENT HOSPITAL CARE,LEVL III: ICD-10-PCS | Mod: ,,, | Performed by: PHYSICIAN ASSISTANT

## 2019-11-08 PROCEDURE — 99233 SBSQ HOSP IP/OBS HIGH 50: CPT | Mod: ,,, | Performed by: PHYSICIAN ASSISTANT

## 2019-11-08 PROCEDURE — 25000003 PHARM REV CODE 250: Performed by: STUDENT IN AN ORGANIZED HEALTH CARE EDUCATION/TRAINING PROGRAM

## 2019-11-08 PROCEDURE — 80048 BASIC METABOLIC PNL TOTAL CA: CPT

## 2019-11-08 PROCEDURE — 63600175 PHARM REV CODE 636 W HCPCS: Performed by: STUDENT IN AN ORGANIZED HEALTH CARE EDUCATION/TRAINING PROGRAM

## 2019-11-08 PROCEDURE — 85025 COMPLETE CBC W/AUTO DIFF WBC: CPT

## 2019-11-08 PROCEDURE — 11000001 HC ACUTE MED/SURG PRIVATE ROOM

## 2019-11-08 PROCEDURE — 36415 COLL VENOUS BLD VENIPUNCTURE: CPT

## 2019-11-08 PROCEDURE — 99024 POSTOP FOLLOW-UP VISIT: CPT | Mod: ,,, | Performed by: PHYSICIAN ASSISTANT

## 2019-11-08 RX ADMIN — RAMELTEON 8 MG: 8 TABLET ORAL at 08:11

## 2019-11-08 RX ADMIN — TRAMADOL HYDROCHLORIDE 50 MG: 50 TABLET ORAL at 06:11

## 2019-11-08 RX ADMIN — ACETAMINOPHEN 1000 MG: 500 TABLET ORAL at 09:11

## 2019-11-08 RX ADMIN — TRAMADOL HYDROCHLORIDE 50 MG: 50 TABLET ORAL at 02:11

## 2019-11-08 RX ADMIN — TRAMADOL HYDROCHLORIDE 50 MG: 50 TABLET ORAL at 08:11

## 2019-11-08 RX ADMIN — ACETAMINOPHEN 1000 MG: 500 TABLET ORAL at 06:11

## 2019-11-08 RX ADMIN — CEFAZOLIN 6 G: 10 INJECTION, POWDER, FOR SOLUTION INTRAVENOUS at 05:11

## 2019-11-08 NOTE — PROGRESS NOTES
[facility]  Infectious Disease  Progress Note    Patient Name: Ld Bronson  MRN: [id number]  Admission Date: 10/31/2019  Length of Stay: 8 days  Attending Physician: Leonid Rey, *  Primary Care Provider: Kris Artis Jr, MD    Isolation Status: No active isolations  Assessment/Plan:      * Epidural abscess     54 y/o male  with hx of IVDU (heroin), MSSA endocarditis ([facility] [place]), biltateral groin abscesses s/p I&D who was transferred from [facility] for NSGY evaluation for cervical epidural abscess. Blood cultures on admit 10/31 + for MSSA. Repeat blood cultures 11/4 NGTD.  HOLLAND showed two mitral valve vegetations.   He is now s/p ACDF C6-7.  Surgical cultures showing MSSA.   Stable, non-septic appearing.  HIV negative.  PICC line apparently in place on 10/31 when was bacteremic.      Plan/recommendations:  1. Continue cefazolin 6 grams IV q 24 hours continuous infusion for MSSA  2.  Remove an replace PICC  3. Anticipate long term IV abx therapy - minimum of 6 weeks from date of washout 11/6. (through 12/18)  4.  Rec MRI C spine with contrast in 2-3 weeks to ensure patiens stable/improvng  5.  Weekly ESR, CRP, CBC, CMP  6. [facility] to [facility] there    Data reviewed and plan discussed with ID staff and primary team - will sign off.      Endocarditis due to methicillin susceptible Staphylococcus aureus (MSSA)  See Assessment/Plan above        Anticipated Disposition: tbd    Thank you for your consult. I will sign off. Please contact us if you have any additional questions.    HOLLY Rios  Infectious Disease  [facility]    Subjective:     Principal Problem:Epidural abscess    HPI: Mr. Bronson is a 54 y/o male  with hx of IVDU (heroin), MSSA endocarditis presents as a transfer from [facility] for NSGY evaluation for epidural abscess. He was on cefazolin and is now on vancomycin and  ceftriaxone. We are consulted for abx recommendations.    Per chat review, [facility] valve endocarditis s/p 3-4 weeks of treatment as left AMA. He returns to ED for CP, transferred to MICU for septic shock. He was noted to have multiple abscesses that required bedside drainage by general surgery. His blood cultures were negative on 10/25. He c/o neck and back pain and was found to have epidural abscess of C4. He also had SI joint effusion and underwent IR aspiration. Cultures NGTD. No additional intervention for ortho surgery.     No fever chills or night sweats here. His blood cultures here are NGTD. NSGY evaluation pending.   Interval History: No AEON.  Afebrile and WBC WNL.  POD#2 ACDF C6-7.  Surgical cultures with MSSA.  Repeat blood cultures of 11/4 NGTD.   No significant complaints.     Review of Systems   Constitutional: Negative for chills, diaphoresis, fatigue and fever.   Respiratory: Negative for cough and shortness of breath.    Cardiovascular: Negative for chest pain.   Gastrointestinal: Negative for abdominal pain, diarrhea, nausea and vomiting.   Genitourinary: Negative for dysuria.        +bilateral groin wounds   Musculoskeletal: Positive for back pain.   Skin: Positive for wound. Negative for color change, pallor and rash.   Neurological: Negative for dizziness and headaches.   Psychiatric/Behavioral: Negative for agitation.   All other systems reviewed and are negative.    Objective:     Vital Signs (Most Recent):  Temp: 97.7 °F (36.5 °C) (11/08/19 1214)  Pulse: 89 (11/08/19 1214)  Resp: 18 (11/08/19 1214)  BP: 109/66 (11/08/19 1214)  SpO2: 97 % (11/08/19 1214) Vital Signs (24h Range):  Temp:  [97 °F (36.1 °C)-98.9 °F (37.2 °C)] 97.7 °F (36.5 °C)  Pulse:  [] 89  Resp:  [15-20] 18  SpO2:  [95 %-98 %] 97 %  BP: (101-112)/(63-69) 109/66     Weight: 56 kg (123 lb 6.4 oz)  Body mass index is 16.74 kg/m².    Estimated Creatinine Clearance: 72.6 mL/min (based on SCr of 0.9  mg/dL).    Physical Exam   Constitutional: He is oriented to person, place, and time. No distress.   thin   HENT:   Head: Normocephalic.   Eyes: Pupils are equal, round, and reactive to light.   Neck:   Neck brace in place.   Anterior incision c/d/i.   Drain in place with small amount serosanguinous drainage      Cardiovascular: Normal rate and regular rhythm.   Murmur heard.  Pulmonary/Chest: Effort normal. No stridor. No respiratory distress.   Abdominal: Soft. He exhibits no distension. There is no tenderness. There is no guarding.   Musculoskeletal: Normal range of motion. He exhibits no edema, tenderness or deformity.   Bilateral groin wounds -  Left groin packed - c/d/i  No drainage, induration, erythema noted   Neurological: He is alert and oriented to person, place, and time.   Skin: Skin is warm and dry. He is not diaphoretic.   Multiple tattoos.   Psychiatric: He has a normal mood and affect. His behavior is normal.   Vitals reviewed.      Significant Labs:   Blood Culture:   Recent Labs   Lab 10/31/19  1917 10/31/19  1922 11/04/19  0843 11/04/19  0845   LABBLOO No growth after 5 days. Gram stain aer bottle: Gram positive cocci in clusters resembling Staph   Results called to and read back by: Mela Arreaga RN  11/02/2019    09:12  STAPHYLOCOCCUS AUREUS  ID consult required at [facility],[place],[facility] and [facility]   locations.  * No Growth to date  No Growth to date  No Growth to date  No Growth to date  No Growth to date No Growth to date  No Growth to date  No Growth to date  No Growth to date  No Growth to date     CBC:   Recent Labs   Lab 11/07/19 0918 11/08/19 0322   WBC 8.81 9.28   HGB 8.1* 7.9*   HCT 26.1* 26.2*    241     CMP:   Recent Labs   Lab 11/07/19 0918 11/08/19 0322   * 135*   K 4.1 4.2    102   CO2 26 24   * 99   BUN 26* 29*   CREATININE 0.9 0.9   CALCIUM 8.9 8.6*   ANIONGAP 7* 9   EGFRNONAA >60.0 >60.0     Wound Culture:   Recent Labs   Lab  11/06/19  1602 11/06/19  1609   LABAERO STAPHYLOCOCCUS AUREUS  Moderate  * STAPHYLOCOCCUS AUREUS  Few  *       Significant Imaging: I have reviewed all pertinent imaging results/findings within the past 24 hours.

## 2019-11-08 NOTE — SUBJECTIVE & OBJECTIVE
Interval History: No AEON.  Afebrile and WBC WNL.  POD#2 ACDF C6-7.  Surgical cultures with MSSA.  Repeat blood cultures of 11/4 NGTD.   No significant complaints.     Review of Systems   Constitutional: Negative for chills, diaphoresis, fatigue and fever.   Respiratory: Negative for cough and shortness of breath.    Cardiovascular: Negative for chest pain.   Gastrointestinal: Negative for abdominal pain, diarrhea, nausea and vomiting.   Genitourinary: Negative for dysuria.        +bilateral groin wounds   Musculoskeletal: Positive for back pain.   Skin: Positive for wound. Negative for color change, pallor and rash.   Neurological: Negative for dizziness and headaches.   Psychiatric/Behavioral: Negative for agitation.   All other systems reviewed and are negative.    Objective:     Vital Signs (Most Recent):  Temp: 97.7 °F (36.5 °C) (11/08/19 1214)  Pulse: 89 (11/08/19 1214)  Resp: 18 (11/08/19 1214)  BP: 109/66 (11/08/19 1214)  SpO2: 97 % (11/08/19 1214) Vital Signs (24h Range):  Temp:  [97 °F (36.1 °C)-98.9 °F (37.2 °C)] 97.7 °F (36.5 °C)  Pulse:  [] 89  Resp:  [15-20] 18  SpO2:  [95 %-98 %] 97 %  BP: (101-112)/(63-69) 109/66     Weight: 56 kg (123 lb 6.4 oz)  Body mass index is 16.74 kg/m².    Estimated Creatinine Clearance: 72.6 mL/min (based on SCr of 0.9 mg/dL).    Physical Exam   Constitutional: He is oriented to person, place, and time. No distress.   thin   HENT:   Head: Normocephalic.   Eyes: Pupils are equal, round, and reactive to light.   Neck:   Neck brace in place.   Anterior incision c/d/i.   Drain in place with small amount serosanguinous drainage      Cardiovascular: Normal rate and regular rhythm.   Murmur heard.  Pulmonary/Chest: Effort normal. No stridor. No respiratory distress.   Abdominal: Soft. He exhibits no distension. There is no tenderness. There is no guarding.   Musculoskeletal: Normal range of motion. He exhibits no edema, tenderness or deformity.   Bilateral groin wounds  -  Left groin packed - c/d/i  No drainage, induration, erythema noted   Neurological: He is alert and oriented to person, place, and time.   Skin: Skin is warm and dry. He is not diaphoretic.   Multiple tattoos.   Psychiatric: He has a normal mood and affect. His behavior is normal.   Vitals reviewed.      Significant Labs:   Blood Culture:   Recent Labs   Lab 10/31/19  1917 10/31/19  1922 11/04/19  0843 11/04/19  0845   LABBLOO No growth after 5 days. Gram stain aer bottle: Gram positive cocci in clusters resembling Staph   Results called to and read back by: Mela Arreaga RN  11/02/2019    09:12  STAPHYLOCOCCUS AUREUS  ID consult required at [facility],[employer],[facility] and [facility].  * No Growth to date  No Growth to date  No Growth to date  No Growth to date  No Growth to date No Growth to date  No Growth to date  No Growth to date  No Growth to date  No Growth to date     CBC:   Recent Labs   Lab 11/07/19  0918 11/08/19  0322   WBC 8.81 9.28   HGB 8.1* 7.9*   HCT 26.1* 26.2*    241     CMP:   Recent Labs   Lab 11/07/19  0918 11/08/19  0322   * 135*   K 4.1 4.2    102   CO2 26 24   * 99   BUN 26* 29*   CREATININE 0.9 0.9   CALCIUM 8.9 8.6*   ANIONGAP 7* 9   EGFRNONAA >60.0 >60.0     Wound Culture:   Recent Labs   Lab 11/06/19  1602 11/06/19  1609   LABAERO STAPHYLOCOCCUS AUREUS  Moderate  * STAPHYLOCOCCUS AUREUS  Few  *       Significant Imaging: I have reviewed all pertinent imaging results/findings within the past 24 hours.

## 2019-11-08 NOTE — PROGRESS NOTES
[facility]  Neurosurgery  Progress Note    Subjective:     History of Present Illness: 53 M with PMH of IVDU (heroin), MSSA bacteremia with septic shock and endocarditis, and HCV. Was treated at OSH for septic shock, after complaining of back pain was found to have a cervical spine epidural abscess and transferred to [facility].     Post-Op Info:  Procedure(s) (LRB):  C6-C7 ACDF w neuromonitoring, Mikayla Doshi for instrumentation (N/A)   2 Days Post-Op     Interval History: NAEON. Pt denies difficulty swallowing or voice changes. Denies paresthesias or weakness. Aerobic cultures growing staph, pt currently on ancef. Tolerating po without difficulty.     Medications:  Continuous Infusions:  Scheduled Meds:   acetaminophen  1,000 mg Oral Q8H    ceFAZolin(ANCEF) in D5W 500 mL CONTINUOUS INFUSION  6 g Intravenous Q24H    enoxaparin  40 mg Subcutaneous Daily    ferrous sulfate  325 mg Oral Daily    folic acid  1 mg Oral Daily    polyethylene glycol  17 g Oral Daily     PRN Meds:sodium chloride, Dextrose 10% Bolus, Dextrose 10% Bolus, glucagon (human recombinant), glucose, glucose, loperamide, ondansetron, ramelteon, sodium chloride 0.9%, traMADol     Review of Systems  Objective:     Weight: 56 kg (123 lb 6.4 oz)  Body mass index is 16.74 kg/m².  Vital Signs (Most Recent):  Temp: 97.7 °F (36.5 °C) (11/08/19 1214)  Pulse: 89 (11/08/19 1214)  Resp: 18 (11/08/19 1214)  BP: 109/66 (11/08/19 1214)  SpO2: 97 % (11/08/19 1214) Vital Signs (24h Range):  Temp:  [97 °F (36.1 °C)-98.9 °F (37.2 °C)] 97.7 °F (36.5 °C)  Pulse:  [] 89  Resp:  [15-20] 18  SpO2:  [95 %-98 %] 97 %  BP: (101-112)/(63-69) 109/66     Date 11/08/19 0700 - 11/09/19 0659   Shift 6712-8816 5941-7970 0678-5284 24 Hour Total   INTAKE   P.O. 400   400   Shift Total(mL/kg) 400(7.1)   400(7.1)   OUTPUT   Urine(mL/kg/hr) 200   200   Shift Total(mL/kg) 200(3.6)   200(3.6)   Weight (kg) 56 56 56 56                        Closed/Suction Drain  11/06/19 0914 Anterior Neck Accordion (Active)   Site Description Unable to view 11/7/2019  7:34 AM   Dressing Type Transparent 11/7/2019  7:34 AM   Dressing Status Clean;Dry;Intact 11/7/2019  7:34 AM   Status To bulb suction 11/7/2019  7:34 AM   Output (mL) 0 mL 11/7/2019  6:20 PM       Neurosurgery Physical Exam   I have seen and examined the patient today and the day before. Exam remains the same as yesterday.   General: well developed, well nourished, no distress.   Head: normocephalic, atraumatic.  Neurologic: Alert and oriented. Thought content appropriate.  GCS: Motor: 6/Verbal: 5/Eyes: 4 GCS Total: 15  Mental Status: Awake, Alert, Oriented x 4  Language: No aphasia  Speech: No dysarthria  Cranial nerves: face symmetric, tongue midline, CN II-XII grossly intact.   Eyes: pupils equal, round, reactive to light with accomodation, EOMI.   Pulmonary: normal respirations, no signs of respiratory distress  Abdomen: soft, non-distended, not tender to palpation  Skin: Skin is warm, dry and intact.  Sensory: intact to light touch throughout     Motor Strength:Moves all extremities spontaneously with good tone.  Full strength upper and lower extremities. No abnormal movements seen.         Reflexes:   Do's: Negative.  Babinski's: Negative.  Clonus: Negative.     Incision: Clean, dry, steri-strips intact. Skin edges well approximated. No surrounding erythema or edema. No drainage or TTP. No underlying hematoma or fluid collection. HV drain in place to full suction.     Significant Labs:  Recent Labs   Lab 11/07/19 0918 11/08/19  0322   * 99   * 135*   K 4.1 4.2    102   CO2 26 24   BUN 26* 29*   CREATININE 0.9 0.9   CALCIUM 8.9 8.6*     Recent Labs   Lab 11/07/19 0918 11/08/19  0322   WBC 8.81 9.28   HGB 8.1* 7.9*   HCT 26.1* 26.2*    241     No results for input(s): LABPT, INR, APTT in the last 48 hours.  Microbiology Results (last 7 days)     Procedure Component Value Units Date/Time     Culture, Anaerobe [[id number]] Collected:  11/06/19 1609    Order Status:  Completed Specimen:  Body Fluid from Neck Updated:  11/08/19 1051     Anaerobic Culture Culture in progress    Narrative:       2.Epidural purulent material    Culture, Anaerobe [[id number]] Collected:  11/06/19 1602    Order Status:  Completed Specimen:  Body Fluid from Neck Updated:  11/08/19 1051     Anaerobic Culture Culture in progress    Narrative:       C6-C7 epidural discectitis    Blood culture [[id number]] Collected:  11/04/19 0843    Order Status:  Completed Specimen:  Blood Updated:  11/08/19 1012     Blood Culture, Routine No Growth to date      No Growth to date      No Growth to date      No Growth to date      No Growth to date    Blood culture [[id number]] Collected:  11/04/19 0845    Order Status:  Completed Specimen:  Blood Updated:  11/08/19 1012     Blood Culture, Routine No Growth to date      No Growth to date      No Growth to date      No Growth to date      No Growth to date    Aerobic culture [[id number]]  (Abnormal)  (Susceptibility) Collected:  11/06/19 1609    Order Status:  Completed Specimen:  Body Fluid from Neck Updated:  11/08/19 1010     Aerobic Bacterial Culture STAPHYLOCOCCUS AUREUS  Few      Narrative:       2.Epidural purulent material    Aerobic culture [[id number]]  (Abnormal)  (Susceptibility) Collected:  11/06/19 1602    Order Status:  Completed Specimen:  Body Fluid from Neck Updated:  11/08/19 1010     Aerobic Bacterial Culture STAPHYLOCOCCUS AUREUS  Moderate      Narrative:       C6-C7 epidural discectitis    AFB Culture & Smear [[id number]] Collected:  11/06/19 1602    Order Status:  Completed Specimen:  Body Fluid from Neck Updated:  11/07/19 2127     AFB Culture & Smear Culture in progress     AFB CULTURE STAIN No acid fast bacilli seen.    Narrative:       C6-C7 epidural discectitis    AFB Culture & Smear [[id number]] Collected:  11/06/19 1609    Order Status:  Completed Specimen:  Body Fluid from  Neck Updated:  11/07/19 2127     AFB Culture & Smear Culture in progress     AFB CULTURE STAIN No acid fast bacilli seen.    Narrative:       2.Epidural purulent material    Gram stain [[id number]] Collected:  11/06/19 1609    Order Status:  Completed Specimen:  Body Fluid from Neck Updated:  11/06/19 2016     Gram Stain Result Few WBC's      No organisms seen    Narrative:       2.Epidural purulent material    Gram stain [[id number]] Collected:  11/06/19 1602    Order Status:  Completed Specimen:  Body Fluid from Neck Updated:  11/06/19 1920     Gram Stain Result No WBC's      No organisms seen    Narrative:       C6-C7 epidural discectitis    Fungus culture [[id number]] Collected:  11/06/19 1602    Order Status:  Sent Specimen:  Body Fluid from Neck Updated:  11/06/19 1635    Fungus culture [[id number]] Collected:  11/06/19 1609    Order Status:  Sent Specimen:  Body Fluid from Neck Updated:  11/06/19 1634    Blood culture (site 1) [[id number]] Collected:  10/31/19 1917    Order Status:  Completed Specimen:  Blood Updated:  11/05/19 2022     Blood Culture, Routine No growth after 5 days.    Narrative:       Site # 1, aerobic and anaerobic    Culture, Anaerobe [[id number]]     Order Status:  No result Specimen:  Body Fluid from Back     Aerobic culture [[id number]]     Order Status:  No result Specimen:  Body Fluid from Back     Gram stain [[id number]]     Order Status:  No result Specimen:  Body Fluid from Back     Blood culture (site 2) [[id number]]  (Abnormal)  (Susceptibility) Collected:  10/31/19 1922    Order Status:  Completed Specimen:  Blood Updated:  11/04/19 1207     Blood Culture, Routine Gram stain aer bottle: Gram positive cocci in clusters resembling Staph       Results called to and read back by: Mela Arreaga RN  11/02/2019        09:12      STAPHYLOCOCCUS AUREUS  ID consult required at [facility],[place],[facility] and [facility].      Narrative:       Site # 2, aerobic only        All  pertinent labs from the last 24 hours have been reviewed.    Significant Diagnostics:  X-ray Cervical Spine Ap And Lateral    Result Date: 11/8/2019  Postoperative and chronic change as above. Electronically signed by: Josh Moralez MD Date:    11/08/2019 Time:    08:05      Assessment/Plan:     * Epidural abscess  53 M with PMH of IVDU (heroin), MSSA bacteremia with septic shock and endocarditis, and HCV with a cervical epidural abscess. Pt s/p C6-7 ACDF with washout on 11/6.     - Neurologically stable on exam  - Post op xrays with hardware in place  - C-collar to be worn when up and OOB. Okay to remove when lying flat. Pt sitting supine in bed without collar on. Discussed again with patient that collar needs to be worn when he is sitting up right, and even at slight incline. Okay to remove only when lying completely flat. He voiced understanding.   - Drain output 0 cc. Drain removed without complication. Pt tolerated well. No underlying hematoma or fluid collection to incision  - OR cultures: Aerobic growing staph aureus. Rest of cultures pending.   - ID: BCx 10/31 with MSSA. Repeat BCx with NGTD. Continue cefazolin 6g continuous.   - Recent IVDU: Recommend addiction psych consult for counseling per primary  - NSGY will continue to follow. Please page with any questions or changes in neuro exam.     Discussed with Dr. Faisal Raymond, PA-C  Neurosurgery  [facility]

## 2019-11-08 NOTE — ASSESSMENT & PLAN NOTE
53 M with PMH of IVDU (heroin), MSSA bacteremia with septic shock and endocarditis, and HCV with a cervical epidural abscess. Pt s/p C6-7 ACDF with washout on 11/6.     - Neurologically stable on exam  - Post op xrays with hardware in place  - C-collar to be worn when up and OOB. Okay to remove when lying flat. Pt sitting supine in bed without collar on. Discussed again with patient that collar needs to be worn when he is sitting up right, and even at slight incline. Okay to remove only when lying completely flat. He voiced understanding.   - Drain output 0 cc. Drain removed without complication. Pt tolerated well. No underlying hematoma or fluid collection to incision  - OR cultures: Aerobic growing staph aureus. Rest of cultures pending.   - ID: BCx 10/31 with MSSA. Repeat BCx with NGTD. Continue cefazolin 6g continuous.   - Recent IVDU: Recommend addiction psych consult for counseling per primary  - NSGY will continue to follow. Please page with any questions or changes in neuro exam.     Discussed with Dr. Malone

## 2019-11-08 NOTE — PROGRESS NOTES
[facility] Hospital Medicine  Progress Note    Patient Name: Ld Bronson  MRN: [id number]  Patient Class: IP- Inpatient   Admission Date: 10/31/2019  Length of Stay: 8 days  Attending Physician: Leonid Rey, *  Primary Care Provider: Kris Artis Jr, MD    [facility] Medicine Team: [facility] [place] [place] 4 Car Lopez MD    Subjective:     Principal Problem:Epidural abscess        HPI:  Mr. Bronson is a 53-year-old man with IVDA (heroin) and Hx MSSA bacteremia with IE, and HCV who presented to the [facility] on 10/23 with fever.     He has had a difficult year, mostly as a result of infectious diseases related to ongoing heroin abuse. He was treated this year for 3-4 weeks for IE prior to leaving [place] but had a more recent admission on 9/21 for chest pain at which time TTE was performed, and he was discharged after blood cultures were negative ruling out ongoing IE. On admission he was hypotensive refractory to fluids and had bilateral cellulitis with abscesses that required bedside drainage by surgery, after which he was admitted to the MICU for septic shock. He has been treated with broad-spectrum antibiotics and levophed via a subclavian line with significant improvement. He was weaned off vasopressors within 24h and stepped down from the ICU six days ago. Since then his blood cultures have been positive for MSSA (first negative result on 10/25), echocardiogram has shown a MV vegetation, and his antibiotics have been narrowed to cefazolin 2g Q8H.      His admission has been complicated by diffuse body pains and joint pains initially thought to be hyperalgesia in the setting of mild opiate withdrawal, eventually evolving into neck and back pain. MRI C-spine showed an epidural abscess at C4. NSGY there recommended cervical decompression, which they are unable to do that there as the hospital is not yet accredited for it per Dr. Artis. He also had an MRI L spine that  showed SI joint effusion that was aspirated by IR with negative cultures. Ortho there did not recommend any additional intervention. Dr. Malone is aware of the patient and agreed to consult. Dr. Artis stated that they would take the patient back upon completion of surgical evaluation/treatment.     Upon presenting to the floor, patient was in NAD and resting comfortably in hospital bed. Patient reports slight lumbar back pain decreased appetite. Patient denies any current N/V/Chills. Patient does state that he has extensive groin abscesses that are quite painful. Patient reports that these [facility] with [facility]. Patient has no further complaints at this time. NSG was contacted and report they will see him tonight however will likely not be going to OR tonight due to active case load tonight.     Overview/Hospital Course:  11/1- Patient doing well overnight with no new complaints; neurosurgery, ID, addiction psych, and nutrition consulted, awaiting neurosurgery recommendation on treatment plan for abscess; HOLLAND ordered for Monday. Patient was evaluated by NSG who would like to repeat MRI last night and now CT of C-Spine. Patient antibiotic regiment changed to continuous infusion of Cefazolin 6g given previous cultures were all MSSA. MRIs showing discitis/osteo at C3-C4, C6-C7 and epidural abscess associated at that point. Patient continues to spike low grade fevers of 100.5 most recently, wound care continues to address the packed ground abscesses. Hgb improved after 1U pRBC. HOLLAND showed mitral but no aortic vegetation. To OR today for washout of epidural abscess. [facility] after the procedure today and manage him there further.  [facility] after drain [facility] and new PICC line placement (needs exchange since current PICC was placed before Bcx grew MSSA)    Interval History: NAEON  Pain free after initiation of tramadol  Drains will be removed  tonight  PICC will be exchanged tomorrow  [facility] to [facility] tomorrow    Review of Systems   Constitutional: Negative for chills and fever.   Respiratory: Negative for cough and shortness of breath.    Cardiovascular: Negative for chest pain.   Gastrointestinal: Negative for abdominal pain, diarrhea, nausea and vomiting.   Endocrine: Negative for cold intolerance and heat intolerance.   Genitourinary: Negative for difficulty urinating.   Musculoskeletal: Negative for back pain.   Neurological: Negative for headaches.   Psychiatric/Behavioral: Negative for agitation.     Objective:     Vital Signs (Most Recent):  Temp: 97.5 °F (36.4 °C) (11/08/19 1530)  Pulse: 96 (11/08/19 1530)  Resp: 18 (11/08/19 1530)  BP: 104/70 (11/08/19 1530)  SpO2: 98 % (11/08/19 1530) Vital Signs (24h Range):  Temp:  [97 °F (36.1 °C)-98.9 °F (37.2 °C)] 97.5 °F (36.4 °C)  Pulse:  [] 96  Resp:  [15-20] 18  SpO2:  [95 %-98 %] 98 %  BP: (101-112)/(63-70) 104/70     Weight: 56 kg (123 lb 6.4 oz)  Body mass index is 16.74 kg/m².    Intake/Output Summary (Last 24 hours) at 11/8/2019 1617  Last data filed at 11/8/2019 1436  Gross per 24 hour   Intake 760 ml   Output 2350 ml   Net -1590 ml      Physical Exam   Constitutional: He is oriented to person, place, and time. No distress.   Cachectic  C-collar in place   HENT:   Head: Normocephalic and atraumatic.   Eyes: Pupils are equal, round, and reactive to light. EOM are normal. No scleral icterus.   Neck: Normal range of motion. Neck supple. No JVD present.   Cardiovascular: Normal rate, normal heart sounds and intact distal pulses.   No murmur heard.  Pulmonary/Chest: Effort normal and breath sounds normal. No respiratory distress. He has no wheezes.   Abdominal: Soft. Bowel sounds are normal. He exhibits no distension. There is no tenderness.   Genitourinary:   Genitourinary Comments: Groin abscess - healthy open wound - not indurated or draining pus   Musculoskeletal: Normal range of motion.  "He exhibits no edema or tenderness.   Neurological: He is alert and oriented to person, place, and time. No cranial nerve deficit.   Skin: Skin is warm. Capillary refill takes less than 2 seconds. He is not diaphoretic. No erythema.   Psychiatric: He has a normal mood and affect.   Vitals reviewed.      Significant Labs: All pertinent labs within the past 24 hours have been reviewed.    Significant Imaging: I have reviewed all pertinent imaging results/findings within the past 24 hours.      Assessment/Plan:      * Epidural abscess  Patient with IVDA, MSSA bacteremia w/ endocarditis (MV veg) and epidural abscess at C4 transferred [facility] NSG evaluation. [facility] to [facility]ress however they do not have NSG credentialing so patient was transferred here.   -MRI here showing C3-C4 and C6-C7 osteo/discitis w/ epidural abscess at that point     Plan:  -Cefazolin 6g continuous   - post op day 2   - continue tramadol for pain  - awaiting drain removal for transfer to [facility] tomorrow   - PICC line exchange indicated per ID for source control (Bcx grew MSSA after PICC was placed) - line holiday tonight and new PICC tomorrow before discharge- will need 6 weeks total Abx therapy per ID  - ID recs:  "1. Continue cefazolin 6 grams IV q 24 hours continuous infusion for MSSA  2.  Remove an replace PICC  3. Anticipate long term IV abx therapy - minimum of 6 weeks from date of washout 11/6. (through 12/18)  4.  Rec MRI C spine with contrast in 2-3 weeks to ensure patiens stable/improvng  5.  Weekly ESR, CRP, CBC, CMP  6. [facility] to [facility] - consult ID to follow there"         Hepatitis B antibody positive  History of positive Hep B antibody test      Cachexia  Patient chronically cachectic  -nutrition consulted       Groin abscess  Patient with multiple groin abscess that was I&D at bedside last week. Covering with bs ABx at this time. Wounds packed.     Plan:  -BS Abx  -Wound care consult - recommend supportive care as does not seem " infected  -Obtain records of cultures       Severe protein-calorie malnutrition  Patient with albumin of   Lab Results   Component Value Date    LABPROT 11.3 11/04/2019    ALBUMIN 2.2 (L) 11/05/2019        Plan:  -Nutrition consult, will f/u recs   -Boost with every meal  -Continue to monitor albumin  -time spent counseling patient on increasing PO intake, modalities to help increase protein intake etc.        HCV (hepatitis C virus)  Hx of hep C unsure of treatment, previously on Sofosuvir-Velpatasvir   -however he only filled this once   -Care everywhere showing HCV log 6.24 and RNA >200k    Plan:  -f/u outpatient     Opiate abuse, continuous  See above       Endocarditis due to methicillin susceptible Staphylococcus aureus (MSSA)  Patient with recent history of MSSA bacteremia and endocarditis who presents as a transfer for concern of epidural abscess of C4. Patient was treated for IE for 3-4 weeks and left AMA earlier this month and [facility], BS abx and pressors. Patient was stepped down from ICU and has remained on BS abx on the floor of the [facility] for the past 6 days.   -MSSA (first negative result on 10/25), echocardiogram has shown a MV vegetation, and his antibiotics have been narrowed to cefazolin 2g Q8H.    Plan:  -Given Epidural abscess and concomitant MSSA endocarditis will treat with Cefazolin  -obtain records from [facility], likely not till next week  -HOLLAND and TTE both showing mitral vegetation. HOLLAND ruled out any aortic vegetations which was a concern from TTE   -Repeat cultures (10/31)- Aerobic 1/2 GPC in clusters resembling Staph  -repeat Bcx 11/4- NGTD      IV drug abuse  Chronic IV Heroin use, recently left AMA From outside facility earlier this month with continued heroin use. Reports last IVDU > days prior to his admission to [facility] for [facility].       Plan:  -Addiction psych - follow recs  -patient not currently on MAT         VTE Risk Mitigation (From  admission, onward)         Ordered     enoxaparin injection 40 mg  Daily      11/07/19 1201     heparin (porcine) injection 5,000 Units  Every 8 hours      11/02/19 1049     Place sequential compression device  Until discontinued      10/31/19 1851     IP VTE LOW RISK PATIENT  Once      10/31/19 1851                      Car Lopez MD  Department of Hospital Medicine   [facility]

## 2019-11-08 NOTE — ASSESSMENT & PLAN NOTE
"Patient with IVDA, MSSA bacteremia w/ endocarditis (MV veg) and epidural abscess at C4 transferred for NSG evaluation. At [facility] was to decompress however they do not have NSG credentialing so patient was transferred here.   -MRI here showing C3-C4 and C6-C7 osteo/discitis w/ epidural abscess at that point     Plan:  -Cefazolin 6g continuous   - post op day 2   - continue tramadol for pain  - awaiting drain removal for transfer to [facility] tomorrow   - PICC line exchange indicated per ID for source control (Bcx grew MSSA after PICC was placed) - line holiday tonight and new PICC tomorrow before discharge- will need 6 weeks total Abx therapy per ID  - ID recs:  "1. Continue cefazolin 6 grams IV q 24 hours continuous infusion for MSSA  2.  Remove an replace PICC  3. Anticipate long term IV abx therapy - minimum of 6 weeks from date of washout 11/6. (through 12/18)  4.  Rec MRI C spine with contrast in 2-3 weeks to ensure patiens stable/improvng  5.  Weekly ESR, CRP, CBC, CMP  6. [facility] to [facility] - consult ID to follow there"       "

## 2019-11-08 NOTE — PLAN OF CARE
Pt remains free from falls and injury. Pt provided with PRN analgesic with positive results. Pt walking in fontenot w/o brace on, informed pt he needs to wear brace, pt states he knew that but will be more compliant. Bed low and locked, Call light within reach.

## 2019-11-08 NOTE — ASSESSMENT & PLAN NOTE
54 y/o male  with hx of IVDU (heroin), MSSA endocarditis (partially treated at [facility] before leaving [place]), biltateral groin abscesses s/p I&D who [facility] for cervical epidural abscess. Blood cultures on admit 10/31 + for MSSA. Repeat blood cultures 11/4 NGTD.  HOLLAND showed two mitral valve vegetations.   He is now s/p ACDF C6-7.  Surgical cultures showing MSSA.   Stable, non-septic appearing.  HIV negative.  PICC line apparently in place on 10/31 when was bacteremic.      Plan/recommendations:  1. Continue cefazolin 6 grams IV q 24 hours continuous infusion for MSSA  2.  Remove an replace PICC  3. Anticipate long term IV abx therapy - minimum of 6 weeks from date of washout 11/6. (through 12/18)  4.  Rec MRI C spine with contrast in 2-3 weeks to ensure patiens stable/improvng  5.  Weekly ESR, CRP, CBC, CMP  6. [facility] to [facility] - consult ID to follow there    Data reviewed and plan discussed with ID staff and primary team - will sign off.

## 2019-11-08 NOTE — SUBJECTIVE & OBJECTIVE
Interval History: NAEON  Pain free after initiation of tramadol  Drains will be removed tonight  PICC will be exchanged tomorrow  [facility] tomorrow    Review of Systems   Constitutional: Negative for chills and fever.   Respiratory: Negative for cough and shortness of breath.    Cardiovascular: Negative for chest pain.   Gastrointestinal: Negative for abdominal pain, diarrhea, nausea and vomiting.   Endocrine: Negative for cold intolerance and heat intolerance.   Genitourinary: Negative for difficulty urinating.   Musculoskeletal: Negative for back pain.   Neurological: Negative for headaches.   Psychiatric/Behavioral: Negative for agitation.     Objective:     Vital Signs (Most Recent):  Temp: 97.5 °F (36.4 °C) (11/08/19 1530)  Pulse: 96 (11/08/19 1530)  Resp: 18 (11/08/19 1530)  BP: 104/70 (11/08/19 1530)  SpO2: 98 % (11/08/19 1530) Vital Signs (24h Range):  Temp:  [97 °F (36.1 °C)-98.9 °F (37.2 °C)] 97.5 °F (36.4 °C)  Pulse:  [] 96  Resp:  [15-20] 18  SpO2:  [95 %-98 %] 98 %  BP: (101-112)/(63-70) 104/70     Weight: 56 kg (123 lb 6.4 oz)  Body mass index is 16.74 kg/m².    Intake/Output Summary (Last 24 hours) at 11/8/2019 1617  Last data filed at 11/8/2019 1436  Gross per 24 hour   Intake 760 ml   Output 2350 ml   Net -1590 ml      Physical Exam   Constitutional: He is oriented to person, place, and time. No distress.   Cachectic  C-collar in place   HENT:   Head: Normocephalic and atraumatic.   Eyes: Pupils are equal, round, and reactive to light. EOM are normal. No scleral icterus.   Neck: Normal range of motion. Neck supple. No JVD present.   Cardiovascular: Normal rate, normal heart sounds and intact distal pulses.   No murmur heard.  Pulmonary/Chest: Effort normal and breath sounds normal. No respiratory distress. He has no wheezes.   Abdominal: Soft. Bowel sounds are normal. He exhibits no distension. There is no tenderness.   Genitourinary:   Genitourinary Comments: Groin abscess -  healthy open wound - not indurated or draining pus   Musculoskeletal: Normal range of motion. He exhibits no edema or tenderness.   Neurological: He is alert and oriented to person, place, and time. No cranial nerve deficit.   Skin: Skin is warm. Capillary refill takes less than 2 seconds. He is not diaphoretic. No erythema.   Psychiatric: He has a normal mood and affect.   Vitals reviewed.      Significant Labs: All pertinent labs within the past 24 hours have been reviewed.    Significant Imaging: I have reviewed all pertinent imaging results/findings within the past 24 hours.

## 2019-11-08 NOTE — SUBJECTIVE & OBJECTIVE
Interval History: NAEON. Pt denies difficulty swallowing or voice changes. Denies paresthesias or weakness. Aerobic cultures growing staph, pt currently on ancef. Tolerating po without difficulty.     Medications:  Continuous Infusions:  Scheduled Meds:   acetaminophen  1,000 mg Oral Q8H    ceFAZolin(ANCEF) in D5W 500 mL CONTINUOUS INFUSION  6 g Intravenous Q24H    enoxaparin  40 mg Subcutaneous Daily    ferrous sulfate  325 mg Oral Daily    folic acid  1 mg Oral Daily    polyethylene glycol  17 g Oral Daily     PRN Meds:sodium chloride, Dextrose 10% Bolus, Dextrose 10% Bolus, glucagon (human recombinant), glucose, glucose, loperamide, ondansetron, ramelteon, sodium chloride 0.9%, traMADol     Review of Systems  Objective:     Weight: 56 kg (123 lb 6.4 oz)  Body mass index is 16.74 kg/m².  Vital Signs (Most Recent):  Temp: 97.7 °F (36.5 °C) (11/08/19 1214)  Pulse: 89 (11/08/19 1214)  Resp: 18 (11/08/19 1214)  BP: 109/66 (11/08/19 1214)  SpO2: 97 % (11/08/19 1214) Vital Signs (24h Range):  Temp:  [97 °F (36.1 °C)-98.9 °F (37.2 °C)] 97.7 °F (36.5 °C)  Pulse:  [] 89  Resp:  [15-20] 18  SpO2:  [95 %-98 %] 97 %  BP: (101-112)/(63-69) 109/66     Date 11/08/19 0700 - 11/09/19 0659   Shift 2347-5670 0180-5728 0370-8673 24 Hour Total   INTAKE   P.O. 400   400   Shift Total(mL/kg) 400(7.1)   400(7.1)   OUTPUT   Urine(mL/kg/hr) 200   200   Shift Total(mL/kg) 200(3.6)   200(3.6)   Weight (kg) 56 56 56 56                        Closed/Suction Drain 11/06/19 0914 Anterior Neck Accordion (Active)   Site Description Unable to view 11/7/2019  7:34 AM   Dressing Type Transparent 11/7/2019  7:34 AM   Dressing Status Clean;Dry;Intact 11/7/2019  7:34 AM   Status To bulb suction 11/7/2019  7:34 AM   Output (mL) 0 mL 11/7/2019  6:20 PM       Neurosurgery Physical Exam   I have seen and examined the patient today and the day before. Exam remains the same as yesterday.   General: well developed, well nourished, no distress.    Head: normocephalic, atraumatic.  Neurologic: Alert and oriented. Thought content appropriate.  GCS: Motor: 6/Verbal: 5/Eyes: 4 GCS Total: 15  Mental Status: Awake, Alert, Oriented x 4  Language: No aphasia  Speech: No dysarthria  Cranial nerves: face symmetric, tongue midline, CN II-XII grossly intact.   Eyes: pupils equal, round, reactive to light with accomodation, EOMI.   Pulmonary: normal respirations, no signs of respiratory distress  Abdomen: soft, non-distended, not tender to palpation  Skin: Skin is warm, dry and intact.  Sensory: intact to light touch throughout     Motor Strength:Moves all extremities spontaneously with good tone.  Full strength upper and lower extremities. No abnormal movements seen.         Reflexes:   Do's: Negative.  Babinski's: Negative.  Clonus: Negative.     Incision: Clean, dry, steri-strips intact. Skin edges well approximated. No surrounding erythema or edema. No drainage or TTP. No underlying hematoma or fluid collection. HV drain in place to full suction.     Significant Labs:  Recent Labs   Lab 11/07/19 0918 11/08/19 0322   * 99   * 135*   K 4.1 4.2    102   CO2 26 24   BUN 26* 29*   CREATININE 0.9 0.9   CALCIUM 8.9 8.6*     Recent Labs   Lab 11/07/19 0918 11/08/19  0322   WBC 8.81 9.28   HGB 8.1* 7.9*   HCT 26.1* 26.2*    241     No results for input(s): LABPT, INR, APTT in the last 48 hours.  Microbiology Results (last 7 days)     Procedure Component Value Units Date/Time    Culture, Anaerobe [[id number]] Collected:  11/06/19 1609    Order Status:  Completed Specimen:  Body Fluid from Neck Updated:  11/08/19 1051     Anaerobic Culture Culture in progress    Narrative:       2.Epidural purulent material    Culture, Anaerobe [[id number]] Collected:  11/06/19 1602    Order Status:  Completed Specimen:  Body Fluid from Neck Updated:  11/08/19 1051     Anaerobic Culture Culture in progress    Narrative:       C6-C7 epidural discectitis    Blood  culture [[id number]] Collected:  11/04/19 0843    Order Status:  Completed Specimen:  Blood Updated:  11/08/19 1012     Blood Culture, Routine No Growth to date      No Growth to date      No Growth to date      No Growth to date      No Growth to date    Blood culture [[id number]] Collected:  11/04/19 0845    Order Status:  Completed Specimen:  Blood Updated:  11/08/19 1012     Blood Culture, Routine No Growth to date      No Growth to date      No Growth to date      No Growth to date      No Growth to date    Aerobic culture [[id number]]  (Abnormal)  (Susceptibility) Collected:  11/06/19 1609    Order Status:  Completed Specimen:  Body Fluid from Neck Updated:  11/08/19 1010     Aerobic Bacterial Culture STAPHYLOCOCCUS AUREUS  Few      Narrative:       2.Epidural purulent material    Aerobic culture [[id number]]  (Abnormal)  (Susceptibility) Collected:  11/06/19 1602    Order Status:  Completed Specimen:  Body Fluid from Neck Updated:  11/08/19 1010     Aerobic Bacterial Culture STAPHYLOCOCCUS AUREUS  Moderate      Narrative:       C6-C7 epidural discectitis    AFB Culture & Smear [[id number]] Collected:  11/06/19 1602    Order Status:  Completed Specimen:  Body Fluid from Neck Updated:  11/07/19 2127     AFB Culture & Smear Culture in progress     AFB CULTURE STAIN No acid fast bacilli seen.    Narrative:       C6-C7 epidural discectitis    AFB Culture & Smear [[id number]] Collected:  11/06/19 1609    Order Status:  Completed Specimen:  Body Fluid from Neck Updated:  11/07/19 2127     AFB Culture & Smear Culture in progress     AFB CULTURE STAIN No acid fast bacilli seen.    Narrative:       2.Epidural purulent material    Gram stain [[id number]] Collected:  11/06/19 1609    Order Status:  Completed Specimen:  Body Fluid from Neck Updated:  11/06/19 2016     Gram Stain Result Few WBC's      No organisms seen    Narrative:       2.Epidural purulent material    Gram stain [[id number]] Collected:  11/06/19 1602    Order  Status:  Completed Specimen:  Body Fluid from Neck Updated:  11/06/19 1920     Gram Stain Result No WBC's      No organisms seen    Narrative:       C6-C7 epidural discectitis    Fungus culture [[id number]] Collected:  11/06/19 1602    Order Status:  Sent Specimen:  Body Fluid from Neck Updated:  11/06/19 1635    Fungus culture [[id number]] Collected:  11/06/19 1609    Order Status:  Sent Specimen:  Body Fluid from Neck Updated:  11/06/19 1634    Blood culture (site 1) [[id number]] Collected:  10/31/19 1917    Order Status:  Completed Specimen:  Blood Updated:  11/05/19 2022     Blood Culture, Routine No growth after 5 days.    Narrative:       Site # 1, aerobic and anaerobic    Culture, Anaerobe [[id number]]     Order Status:  No result Specimen:  Body Fluid from Back     Aerobic culture [[id number]]     Order Status:  No result Specimen:  Body Fluid from Back     Gram stain [[id number]]     Order Status:  No result Specimen:  Body Fluid from Back     Blood culture (site 2) [[id number]]  (Abnormal)  (Susceptibility) Collected:  10/31/19 1922    Order Status:  Completed Specimen:  Blood Updated:  11/04/19 1207     Blood Culture, Routine Gram stain aer bottle: Gram positive cocci in clusters resembling Staph       Results called to and read back by: Mela Arreaga RN  11/02/2019        09:12      STAPHYLOCOCCUS AUREUS  ID consult required at [facility].Critical access hospital,[place],[facility] and [facility]   locations.      Narrative:       Site # 2, aerobic only        All pertinent labs from the last 24 hours have been reviewed.    Significant Diagnostics:  X-ray Cervical Spine Ap And Lateral    Result Date: 11/8/2019  Postoperative and chronic change as above. Electronically signed by: Josh Moralez MD Date:    11/08/2019 Time:    08:05

## 2019-11-09 VITALS
WEIGHT: 121 LBS | HEIGHT: 72 IN | OXYGEN SATURATION: 98 % | RESPIRATION RATE: 14 BRPM | SYSTOLIC BLOOD PRESSURE: 110 MMHG | DIASTOLIC BLOOD PRESSURE: 63 MMHG | HEART RATE: 100 BPM | TEMPERATURE: 96 F | BODY MASS INDEX: 16.39 KG/M2

## 2019-11-09 LAB
ANION GAP SERPL CALC-SCNC: 8 MMOL/L (ref 8–16)
BACTERIA BLD CULT: NORMAL
BACTERIA BLD CULT: NORMAL
BASOPHILS # BLD AUTO: 0.03 K/UL (ref 0–0.2)
BASOPHILS NFR BLD: 0.4 % (ref 0–1.9)
BUN SERPL-MCNC: 29 MG/DL (ref 6–20)
CALCIUM SERPL-MCNC: 9.3 MG/DL (ref 8.7–10.5)
CHLORIDE SERPL-SCNC: 101 MMOL/L (ref 95–110)
CO2 SERPL-SCNC: 25 MMOL/L (ref 23–29)
CREAT SERPL-MCNC: 0.9 MG/DL (ref 0.5–1.4)
DIFFERENTIAL METHOD: ABNORMAL
EOSINOPHIL # BLD AUTO: 0.1 K/UL (ref 0–0.5)
EOSINOPHIL NFR BLD: 1 % (ref 0–8)
ERYTHROCYTE [DISTWIDTH] IN BLOOD BY AUTOMATED COUNT: 16.4 % (ref 11.5–14.5)
EST. GFR  (AFRICAN AMERICAN): >60 ML/MIN/1.73 M^2
EST. GFR  (NON AFRICAN AMERICAN): >60 ML/MIN/1.73 M^2
GLUCOSE SERPL-MCNC: 96 MG/DL (ref 70–110)
HCT VFR BLD AUTO: 27.3 % (ref 40–54)
HGB BLD-MCNC: 8.3 G/DL (ref 14–18)
IMM GRANULOCYTES # BLD AUTO: 0.04 K/UL (ref 0–0.04)
IMM GRANULOCYTES NFR BLD AUTO: 0.5 % (ref 0–0.5)
LYMPHOCYTES # BLD AUTO: 1.5 K/UL (ref 1–4.8)
LYMPHOCYTES NFR BLD: 19 % (ref 18–48)
MCH RBC QN AUTO: 25.9 PG (ref 27–31)
MCHC RBC AUTO-ENTMCNC: 30.4 G/DL (ref 32–36)
MCV RBC AUTO: 85 FL (ref 82–98)
MONOCYTES # BLD AUTO: 0.4 K/UL (ref 0.3–1)
MONOCYTES NFR BLD: 5.7 % (ref 4–15)
NEUTROPHILS # BLD AUTO: 5.7 K/UL (ref 1.8–7.7)
NEUTROPHILS NFR BLD: 73.4 % (ref 38–73)
NRBC BLD-RTO: 0 /100 WBC
PLATELET # BLD AUTO: 251 K/UL (ref 150–350)
PMV BLD AUTO: 9.5 FL (ref 9.2–12.9)
POTASSIUM SERPL-SCNC: 4.2 MMOL/L (ref 3.5–5.1)
RBC # BLD AUTO: 3.21 M/UL (ref 4.6–6.2)
SODIUM SERPL-SCNC: 134 MMOL/L (ref 136–145)
WBC # BLD AUTO: 7.75 K/UL (ref 3.9–12.7)

## 2019-11-09 PROCEDURE — 63600175 PHARM REV CODE 636 W HCPCS: Performed by: STUDENT IN AN ORGANIZED HEALTH CARE EDUCATION/TRAINING PROGRAM

## 2019-11-09 PROCEDURE — 85025 COMPLETE CBC W/AUTO DIFF WBC: CPT

## 2019-11-09 PROCEDURE — 80048 BASIC METABOLIC PNL TOTAL CA: CPT

## 2019-11-09 PROCEDURE — 25000003 PHARM REV CODE 250: Performed by: STUDENT IN AN ORGANIZED HEALTH CARE EDUCATION/TRAINING PROGRAM

## 2019-11-09 PROCEDURE — 99239 PR HOSPITAL DISCHARGE DAY,>30 MIN: ICD-10-PCS | Mod: ,,, | Performed by: HOSPITALIST

## 2019-11-09 PROCEDURE — 36415 COLL VENOUS BLD VENIPUNCTURE: CPT

## 2019-11-09 PROCEDURE — 99239 HOSP IP/OBS DSCHRG MGMT >30: CPT | Mod: ,,, | Performed by: HOSPITALIST

## 2019-11-09 RX ORDER — FERROUS SULFATE 325(65) MG
325 TABLET, DELAYED RELEASE (ENTERIC COATED) ORAL DAILY
Refills: 0 | Status: ON HOLD
Start: 2019-11-10 | End: 2019-12-31

## 2019-11-09 RX ORDER — ONDANSETRON 2 MG/ML
4 INJECTION INTRAMUSCULAR; INTRAVENOUS EVERY 8 HOURS PRN
Status: ON HOLD
Start: 2019-11-09 | End: 2019-12-31

## 2019-11-09 RX ORDER — FOLIC ACID 1 MG/1
1 TABLET ORAL DAILY
Refills: 0 | Status: ON HOLD
Start: 2019-11-10 | End: 2019-12-31

## 2019-11-09 RX ORDER — POLYETHYLENE GLYCOL 3350 17 G/17G
17 POWDER, FOR SOLUTION ORAL DAILY
Refills: 0 | Status: ON HOLD
Start: 2019-11-10 | End: 2019-12-31

## 2019-11-09 RX ORDER — TRAMADOL HYDROCHLORIDE 50 MG/1
50 TABLET ORAL EVERY 6 HOURS PRN
Status: ON HOLD
Start: 2019-11-09 | End: 2019-12-31

## 2019-11-09 RX ORDER — ENOXAPARIN SODIUM 100 MG/ML
40 INJECTION SUBCUTANEOUS DAILY
Status: ON HOLD
Start: 2019-11-09 | End: 2019-12-31

## 2019-11-09 RX ORDER — RAMELTEON 8 MG/1
8 TABLET ORAL NIGHTLY PRN
Status: ON HOLD
Start: 2019-11-09 | End: 2019-12-31

## 2019-11-09 RX ADMIN — ACETAMINOPHEN 1000 MG: 500 TABLET ORAL at 01:11

## 2019-11-09 RX ADMIN — TRAMADOL HYDROCHLORIDE 50 MG: 50 TABLET ORAL at 05:11

## 2019-11-09 RX ADMIN — TRAMADOL HYDROCHLORIDE 50 MG: 50 TABLET ORAL at 01:11

## 2019-11-09 RX ADMIN — CEFAZOLIN 6 G: 10 INJECTION, POWDER, FOR SOLUTION INTRAVENOUS at 05:11

## 2019-11-09 RX ADMIN — ACETAMINOPHEN 1000 MG: 500 TABLET ORAL at 05:11

## 2019-11-09 NOTE — ASSESSMENT & PLAN NOTE
53 M with PMH of IVDU (heroin), MSSA bacteremia with septic shock and endocarditis, and HCV with a cervical epidural abscess. Pt s/p C6-7 ACDF with washout on 11/6.     - Neurologically stable on exam  - Post op xrays with hardware in place  - C-collar to be worn when up and OOB. Okay to remove when lying flat. Pt sitting supine in bed without collar on. Discussed again with patient that collar needs to be worn when he is sitting up right, and even at slight incline. Okay to remove only when lying completely flat. He voiced understanding.   - Drain output 0 cc. Drain removed without complication. Pt tolerated well. No underlying hematoma or fluid collection to incision  - OR cultures: Aerobic growing staph aureus. Rest of cultures pending.   - ID: BCx 10/31 with MSSA. Repeat BCx with NGTD. Continue cefazolin 6g continuous.   - Recent IVDU: Recommend addiction psych consult for counseling per primary  - NSGY will continue to follow. Please page with any questions or changes in neuro exam.   - follow up in 6 weeks in neurosurgery clinic with cervical spine XR    Discussed with Dr. Malone

## 2019-11-09 NOTE — DISCHARGE SUMMARY
[facility] Hospital Medicine  Discharge Summary      Patient Name: Ld Bronson  MRN: [id number]  Admission Date: 10/31/2019  Hospital Length of Stay: 9 days  Discharge Date and Time:  11/09/2019 10:38 AM  Attending Physician: Leonid Rey, *   Discharging Provider: Car Lopez MD  Primary Care Provider: Kris Artis Jr, MD  Hospital Medicine Team: [facility] HOSP MED 4 Car Lopez MD    HPI:   Mr. Bronson is a 53-year-old man with IVDA (heroin) and Hx MSSA bacteremia with IE, and HCV who presented to the [facility] on 10/23 with fever.     He has had a difficult year, mostly as a result of infectious diseases related to ongoing heroin abuse. He was treated this year for 3-4 weeks for IE prior to leaving [place] but had a more recent admission on 9/21 for chest pain at which time TTE was performed, and he was discharged after blood cultures were negative ruling out ongoing IE. On admission he was hypotensive refractory to fluids and had bilateral cellulitis with abscesses that required bedside drainage by surgery, after which he was admitted to the MICU for septic shock. He has been treated with broad-spectrum antibiotics and levophed via a subclavian line with significant improvement. He was weaned off vasopressors within 24h and stepped down from the ICU six days ago. Since then his blood cultures have been positive for MSSA (first negative result on 10/25), echocardiogram has shown a MV vegetation, and his antibiotics have been narrowed to cefazolin 2g Q8H.      His admission has been complicated by diffuse body pains and joint pains initially thought to be hyperalgesia in the setting of mild opiate withdrawal, eventually evolving into neck and back pain. MRI C-spine showed an epidural abscess at C4. NSGY there recommended cervical decompression, which they are unable to do that there as the hospital is not yet accredited for it per Dr. Artis. He also had an  MRI L spine that showed SI joint effusion that was aspirated by IR with negative cultures. Ortho there did not recommend any additional intervention. Dr. Malone is aware of the patient and agreed to consult. Dr. Artis stated that they would take the patient back upon completion of surgical evaluation/treatment.     Upon presenting to the floor, patient was in NAD and resting comfortably in hospital bed. Patient reports slight lumbar back pain decreased appetite. Patient denies any current N/V/Chills. Patient does state that he has extensive groin abscesses that are quite painful. Patient reports that [facility] with [facility]. Patient has no further complaints at this time. NSG was contacted and report they will see him tonight however will likely not be going to OR tonight due to active case load tonight.     Procedure(s) (LRB):  C6-C7 ACDF w neuromonitoring, Mikayla Doshi for instrumentation (N/A)      Hospital Course:   11/1- Patient doing well overnight with no new complaints; neurosurgery, ID, addiction psych, and nutrition consulted, awaiting neurosurgery recommendation on treatment plan for abscess; HOLLAND ordered for Monday. Patient was evaluated by NSG who would like to repeat MRI last night and now CT of C-Spine. Patient antibiotic regiment changed to continuous infusion of Cefazolin 6g given previous cultures were all MSSA. MRIs showing discitis/osteo at C3-C4, C6-C7 and epidural abscess associated at that point. Patient continues to spike low grade fevers of 100.5 most recently, wound care continues to address the packed ground abscesses. Hgb improved after 1U pRBC. HOLLAND showed mitral but no aortic vegetation. To OR today for washout of epidural abscess. [facility] to [facility] after the procedure today and manage him there further.  Drain removed yesterday- can initiate transfer back to the [facility] and new PICC line placement today- [facility] prior to getting a new P[facility] line - would advise getting a new PICC placed when patient [facility] the [facility] (needs exchange since current PICC was placed before Bcx grew MSSA)      Discharge Recommendations:   Infectious Disease Recommendations-   1. Continue cefazolin 6 grams IV q 24 hours continuous infusion for MSSA  2. Anticipate long term IV abx therapy - minimum of 6 weeks from date of washout 11/6. (through 12/18)  3.  Rec MRI C spine with contrast in 2-3 weeks to ensure patiens stable/improvng  4.  Weekly ESR, CRP, CBC, CMP      5. [facility] - [facility] to follow there    Neurosurgery Recommendations:      1. C-collar to be worn when up and OOB. Okay to remove when lying flat. Pt sitting supine in bed without collar on. Discussed again with patient that collar needs to be worn when he is sitting up right, and even at slight incline. Okay to remove only when lying completely flat.       2.OR cultures: Aerobic culture growing staph aureus. Rest of cultures pending- BCx 10/31 with MSSA. Repeat BCx with NGTD. Continue cefazolin 6g continuous for six weeks total (last dose 12/18)      Recent IVDU:  Addiction psych saw patient at [facility].      Wound Care- Bilateral Groin Abscess Sites:  Packing daily with iodoform gauze was done here at [facility]  [facility] care per [facility]    Physical Exam   Constitutional: He is oriented to person, place, and time.   cachectic   HENT:   Head: Normocephalic and atraumatic.   Mouth/Throat: No oropharyngeal exudate.   Eyes: Pupils are equal, round, and reactive to light. EOM are normal. No scleral icterus.   Neck: Normal range of motion. Neck supple. No JVD present.   Cardiovascular: Normal rate, regular rhythm, normal heart sounds and intact distal pulses.   Pulmonary/Chest: Effort normal and breath sounds normal. No respiratory distress. He has no wheezes. He has no rales.   Abdominal: Soft. Bowel sounds are normal. He exhibits no distension. There is no tenderness.    Musculoskeletal: Normal range of motion. He exhibits no edema or tenderness.   Lymphadenopathy:     He has no cervical adenopathy.   Neurological: He is alert and oriented to person, place, and time. He has normal reflexes.   Sensory intact  Motor 5/5 all Extremities   Skin:   Groin abscess b/l  Clean wounds       Consults:   Consults (From admission, onward)        Status Ordering Provider     Inpatient consult to Infectious Diseases  Once     Provider:  (Not yet assigned)    Completed JOSELIN ISSA     Inpatient consult to Neurosurgery  Once     Provider:  (Not yet assigned)    Completed JOSELIN ISSA     [facility]  Once     Provider:  (Not yet assigned)    Acknowledged MARSHA EDWARDS     Inpatient consult to Psychiatry  Once     Provider:  (Not yet assigned)    Completed CAMERON MONZON     Inpatient consult to Registered Dietitian/Nutritionist  Once     Provider:  (Not yet assigned)    Completed JOSELIN ISSA          No new Assessment & Plan notes have been filed under this hospital service since the last note was generated.  Service: Hospital Medicine    Final Active Diagnoses:    Diagnosis Date Noted POA    PRINCIPAL PROBLEM:  Epidural abscess [G06.2] 10/31/2019 Yes    Endocarditis [I38] 11/07/2019 Unknown    Cachexia [R64] 11/01/2019 Yes    Hepatitis B antibody positive [R76.8] 11/01/2019 Yes    IV drug abuse [F19.10] 10/31/2019 Yes    Endocarditis due to methicillin susceptible Staphylococcus aureus (MSSA) [I33.0, B95.61] 10/31/2019 Yes    Opiate abuse, continuous [F11.10] 10/31/2019 Yes    HCV (hepatitis C virus) [B19.20] 10/31/2019 Yes    Severe protein-calorie malnutrition [E43] 10/31/2019 Yes    Groin abscess [L02.214] 10/31/2019 Yes      Problems Resolved During this Admission:    Diagnosis Date Noted Date Resolved POA    Septic shock [A41.9, R65.21] 11/01/2019 11/01/2019 Yes    MSSA bacteremia [R78.81] 11/01/2019 11/01/2019 Yes        Discharged Condition: good    Disposition: Another Health Care Inst*    Follow Up:  NSG at [facility] will schedule Follow/up visit in 2-3 weeks  [facility] at [facility] while being [facility]    Patient Instructions:   No discharge procedures on file.    Significant Diagnostic Studies: Labs: All labs within the past 24 hours have been reviewed  Microbiology:   Blood Culture   Lab Results   Component Value Date    LABBLOO No growth after 5 days. 11/04/2019       Pending Diagnostic Studies:     Procedure Component Value Units Date/Time    Procalcitonin [[id number]] Collected:  10/31/19 2320    Order Status:  Sent Lab Status:  In process Updated:  10/31/19 2326    Specimen:  Blood          Medications:  Reconciled Home Medications:      Medication List      START taking these medications    dextrose 5 % SolP 500 mL with ceFAZolin 1 gram SolR 6 g  Inject 6 g into the vein once daily. End dose 12/18/19     enoxaparin 40 mg/0.4 mL Syrg  Commonly known as:  LOVENOX  Inject 0.4 mLs (40 mg total) into the skin once daily.     ferrous sulfate 325 (65 FE) MG EC tablet  Take 1 tablet (325 mg total) by mouth once daily.  Start taking on:  November 10, 2019     folic acid 1 MG tablet  Commonly known as:  FOLVITE  Take 1 tablet (1 mg total) by mouth once daily.  Start taking on:  November 10, 2019     ondansetron 4 mg/2 mL Soln  Inject 4 mg into the vein every 8 (eight) hours as needed.     polyethylene glycol 17 gram Pwpk  Commonly known as:  GLYCOLAX  Take 17 g by mouth once daily.  Start taking on:  November 10, 2019     ramelteon 8 mg tablet  Commonly known as:  ROZEREM  Take 1 tablet (8 mg total) by mouth nightly as needed for Insomnia.     traMADol 50 mg tablet  Commonly known as:  ULTRAM  Take 1 tablet (50 mg total) by mouth every 6 (six) hours as needed.            Indwelling Lines/Drains at time of discharge:   Lines/Drains/Airways     None                 Time spent on the discharge of patient: 38 minutes  Patient  was seen and examined on the date of discharge and determined to be suitable for discharge.         Car Lopez MD  Department of Hospital Medicine  [facility]

## 2019-11-09 NOTE — NURSING
Spoke with Pat at the transfer center, who stated she will contact RN when [facility] available [facility].

## 2019-11-09 NOTE — PLAN OF CARE
On call CM requested to fax documents to the [facility] for patient to return.  Progress notes for 2 days, mar, labs and op note faxed to Rajinder at the [facility] as requested per [facility].      [facility] to notify CM once patient has been approved for transfer.    11:35 CM spoke with [facility] who stated Dr. Delgado with the [facility] accepted [facility].  CM spoke with Steffanie Red, Director who agreed to transfer.  Pat with the [facility] to set up ambulance transportation and advise floor nurse to call report once bed is available.

## 2019-11-09 NOTE — CARE UPDATE
Rapid Response Nurse Chart Check     Chart check completed, abnormal VS noted. Isabela, bedside RN  contacted, no concerns verbalized at this time. Instructed RN to place note in displaying pt's refusal for VS. Instructed to call [phone] for further concerns or assistance.

## 2019-11-09 NOTE — SUBJECTIVE & OBJECTIVE
Interval History: NAEON. Pt denies difficulty swallowing or voice changes. Denies paresthesias or weakness. Aerobic cultures growing staph, pt currently on ancef. Tolerating po without difficulty.     Medications:  Continuous Infusions:  Scheduled Meds:   acetaminophen  1,000 mg Oral Q8H    ceFAZolin(ANCEF) in D5W 500 mL CONTINUOUS INFUSION  6 g Intravenous Q24H    enoxaparin  40 mg Subcutaneous Daily    ferrous sulfate  325 mg Oral Daily    folic acid  1 mg Oral Daily    polyethylene glycol  17 g Oral Daily     PRN Meds:sodium chloride, Dextrose 10% Bolus, Dextrose 10% Bolus, glucagon (human recombinant), glucose, glucose, loperamide, ondansetron, ramelteon, sodium chloride 0.9%, traMADol     Review of Systems    Objective:     Weight: 54.9 kg (121 lb)  Body mass index is 16.41 kg/m².  Vital Signs (Most Recent):  Temp: 96.5 °F (35.8 °C) (11/09/19 1610)  Pulse: 94 (11/09/19 1610)  Resp: 18 (11/09/19 1610)  BP: 104/64 (11/09/19 1610)  SpO2: 97 % (11/09/19 1610) Vital Signs (24h Range):  Temp:  [96.5 °F (35.8 °C)-97.8 °F (36.6 °C)] 96.5 °F (35.8 °C)  Pulse:  [] 94  Resp:  [12-18] 18  SpO2:  [95 %-98 %] 97 %  BP: (102-110)/(61-67) 104/64                          Closed/Suction Drain 11/06/19 0914 Anterior Neck Accordion (Active)   Site Description Unable to view 11/7/2019  7:34 AM   Dressing Type Transparent 11/7/2019  7:34 AM   Dressing Status Clean;Dry;Intact 11/7/2019  7:34 AM   Status To bulb suction 11/7/2019  7:34 AM   Output (mL) 0 mL 11/7/2019  6:20 PM       Neurosurgery Physical Exam     I have seen and examined the patient today and the day before. Exam remains the same as yesterday.   General: well developed, well nourished, no distress.   Head: normocephalic, atraumatic.  Neurologic: Alert and oriented. Thought content appropriate.  GCS: Motor: 6/Verbal: 5/Eyes: 4 GCS Total: 15  Mental Status: Awake, Alert, Oriented x 4  Language: No aphasia  Speech: No dysarthria  Cranial nerves: face  symmetric, tongue midline, CN II-XII grossly intact.   Eyes: pupils equal, round, reactive to light with accomodation, EOMI.   Pulmonary: normal respirations, no signs of respiratory distress  Abdomen: soft, non-distended, not tender to palpation  Skin: Skin is warm, dry and intact.  Sensory: intact to light touch throughout     Motor Strength:Moves all extremities spontaneously with good tone.  Full strength upper and lower extremities. No abnormal movements seen.         Reflexes:   Do's: Negative.  Babinski's: Negative.  Clonus: Negative.     Incision: Clean, dry, steri-strips intact. Skin edges well approximated. No surrounding erythema or edema. No drainage or TTP. No underlying hematoma or fluid collection. HV drain in place to full suction.     Significant Labs:  Recent Labs   Lab 11/08/19  0322 11/09/19  0246   GLU 99 96   * 134*   K 4.2 4.2    101   CO2 24 25   BUN 29* 29*   CREATININE 0.9 0.9   CALCIUM 8.6* 9.3     Recent Labs   Lab 11/08/19  0322 11/09/19  0246   WBC 9.28 7.75   HGB 7.9* 8.3*   HCT 26.2* 27.3*    251     No results for input(s): LABPT, INR, APTT in the last 48 hours.  Microbiology Results (last 7 days)     Procedure Component Value Units Date/Time    Blood culture [[id number]] Collected:  11/04/19 0843    Order Status:  Completed Specimen:  Blood Updated:  11/09/19 1012     Blood Culture, Routine No growth after 5 days.    Blood culture [[id number]] Collected:  11/04/19 0845    Order Status:  Completed Specimen:  Blood Updated:  11/09/19 1012     Blood Culture, Routine No growth after 5 days.    Culture, Anaerobe [[id number]] Collected:  11/06/19 1609    Order Status:  Completed Specimen:  Body Fluid from Neck Updated:  11/08/19 1051     Anaerobic Culture Culture in progress    Narrative:       2.Epidural purulent material    Culture, Anaerobe [[id number]] Collected:  11/06/19 1602    Order Status:  Completed Specimen:  Body Fluid from Neck Updated:  11/08/19 1051      Anaerobic Culture Culture in progress    Narrative:       C6-C7 epidural discectitis    Aerobic culture [[id number]]  (Abnormal)  (Susceptibility) Collected:  11/06/19 1609    Order Status:  Completed Specimen:  Body Fluid from Neck Updated:  11/08/19 1010     Aerobic Bacterial Culture STAPHYLOCOCCUS AUREUS  Few      Narrative:       2.Epidural purulent material    Aerobic culture [[id number]]  (Abnormal)  (Susceptibility) Collected:  11/06/19 1602    Order Status:  Completed Specimen:  Body Fluid from Neck Updated:  11/08/19 1010     Aerobic Bacterial Culture STAPHYLOCOCCUS AUREUS  Moderate      Narrative:       C6-C7 epidural discectitis    AFB Culture & Smear [[id number]] Collected:  11/06/19 1602    Order Status:  Completed Specimen:  Body Fluid from Neck Updated:  11/07/19 2127     AFB Culture & Smear Culture in progress     AFB CULTURE STAIN No acid fast bacilli seen.    Narrative:       C6-C7 epidural discectitis    AFB Culture & Smear [[id number]] Collected:  11/06/19 1609    Order Status:  Completed Specimen:  Body Fluid from Neck Updated:  11/07/19 2127     AFB Culture & Smear Culture in progress     AFB CULTURE STAIN No acid fast bacilli seen.    Narrative:       2.Epidural purulent material    Gram stain [[id number]] Collected:  11/06/19 1609    Order Status:  Completed Specimen:  Body Fluid from Neck Updated:  11/06/19 2016     Gram Stain Result Few WBC's      No organisms seen    Narrative:       2.Epidural purulent material    Gram stain [[id number]] Collected:  11/06/19 1602    Order Status:  Completed Specimen:  Body Fluid from Neck Updated:  11/06/19 1920     Gram Stain Result No WBC's      No organisms seen    Narrative:       C6-C7 epidural discectitis    Fungus culture [[id number]] Collected:  11/06/19 1602    Order Status:  Sent Specimen:  Body Fluid from Neck Updated:  11/06/19 1635    Fungus culture [[id number]] Collected:  11/06/19 1609    Order Status:  Sent Specimen:  Body Fluid from Neck Updated:   11/06/19 1634    Blood culture (site 1) [[id number]] Collected:  10/31/19 1917    Order Status:  Completed Specimen:  Blood Updated:  11/05/19 2022     Blood Culture, Routine No growth after 5 days.    Narrative:       Site # 1, aerobic and anaerobic    Culture, Anaerobe [[id number]]     Order Status:  No result Specimen:  Body Fluid from Back     Aerobic culture [[id number]]     Order Status:  No result Specimen:  Body Fluid from Back     Gram stain [[id number]]     Order Status:  No result Specimen:  Body Fluid from Back     Blood culture (site 2) [[id number]]  (Abnormal)  (Susceptibility) Collected:  10/31/19 1922    Order Status:  Completed Specimen:  Blood Updated:  11/04/19 1207     Blood Culture, Routine Gram stain aer bottle: Gram positive cocci in clusters resembling Staph       Results called to and read back by: Mela Arreaga RN  11/02/2019        09:12      STAPHYLOCOCCUS AUREUS  ID consult required at [facility].      Narrative:       Site # 2, aerobic only        All pertinent labs from the last 24 hours have been reviewed.    Significant Diagnostics:  X-ray Cervical Spine Ap And Lateral    Result Date: 11/8/2019  Postoperative and chronic change as above. Electronically signed by: Josh Moralez MD Date:    11/08/2019 Time:    08:05

## 2019-11-09 NOTE — PLAN OF CARE
Went over plan of care - all questions answered. No complaints voiced. No falls. Will continue to monitor

## 2019-11-09 NOTE — PROGRESS NOTES
[facility]  Neurosurgery  Progress Note    Subjective:     History of Present Illness: 53 M with PMH of IVDU (heroin), MSSA bacteremia with septic shock and endocarditis, and HCV. Was treated at OSH for septic shock, after complaining of back pain was found to have a cervical spine epidural abscess and transferred to [facility].     Post-Op Info:  Procedure(s) (LRB):  C6-C7 ACDF w neuromonitoring, Mikayla Doshi for instrumentation (N/A)   3 Days Post-Op     Interval History: NAEON. Pt denies difficulty swallowing or voice changes. Denies paresthesias or weakness. Aerobic cultures growing staph, pt currently on ancef. Tolerating po without difficulty.     Medications:  Continuous Infusions:  Scheduled Meds:   acetaminophen  1,000 mg Oral Q8H    ceFAZolin(ANCEF) in D5W 500 mL CONTINUOUS INFUSION  6 g Intravenous Q24H    enoxaparin  40 mg Subcutaneous Daily    ferrous sulfate  325 mg Oral Daily    folic acid  1 mg Oral Daily    polyethylene glycol  17 g Oral Daily     PRN Meds:sodium chloride, Dextrose 10% Bolus, Dextrose 10% Bolus, glucagon (human recombinant), glucose, glucose, loperamide, ondansetron, ramelteon, sodium chloride 0.9%, traMADol     Review of Systems    Objective:     Weight: 54.9 kg (121 lb)  Body mass index is 16.41 kg/m².  Vital Signs (Most Recent):  Temp: 96.5 °F (35.8 °C) (11/09/19 1610)  Pulse: 94 (11/09/19 1610)  Resp: 18 (11/09/19 1610)  BP: 104/64 (11/09/19 1610)  SpO2: 97 % (11/09/19 1610) Vital Signs (24h Range):  Temp:  [96.5 °F (35.8 °C)-97.8 °F (36.6 °C)] 96.5 °F (35.8 °C)  Pulse:  [] 94  Resp:  [12-18] 18  SpO2:  [95 %-98 %] 97 %  BP: (102-110)/(61-67) 104/64                          Closed/Suction Drain 11/06/19 0914 Anterior Neck Accordion (Active)   Site Description Unable to view 11/7/2019  7:34 AM   Dressing Type Transparent 11/7/2019  7:34 AM   Dressing Status Clean;Dry;Intact 11/7/2019  7:34 AM   Status To bulb suction 11/7/2019  7:34 AM   Output (mL) 0 mL  11/7/2019  6:20 PM       Neurosurgery Physical Exam     I have seen and examined the patient today and the day before. Exam remains the same as yesterday.   General: well developed, well nourished, no distress.   Head: normocephalic, atraumatic.  Neurologic: Alert and oriented. Thought content appropriate.  GCS: Motor: 6/Verbal: 5/Eyes: 4 GCS Total: 15  Mental Status: Awake, Alert, Oriented x 4  Language: No aphasia  Speech: No dysarthria  Cranial nerves: face symmetric, tongue midline, CN II-XII grossly intact.   Eyes: pupils equal, round, reactive to light with accomodation, EOMI.   Pulmonary: normal respirations, no signs of respiratory distress  Abdomen: soft, non-distended, not tender to palpation  Skin: Skin is warm, dry and intact.  Sensory: intact to light touch throughout     Motor Strength:Moves all extremities spontaneously with good tone.  Full strength upper and lower extremities. No abnormal movements seen.         Reflexes:   Do's: Negative.  Babinski's: Negative.  Clonus: Negative.     Incision: Clean, dry, steri-strips intact. Skin edges well approximated. No surrounding erythema or edema. No drainage or TTP. No underlying hematoma or fluid collection. HV drain in place to full suction.     Significant Labs:  Recent Labs   Lab 11/08/19  0322 11/09/19  0246   GLU 99 96   * 134*   K 4.2 4.2    101   CO2 24 25   BUN 29* 29*   CREATININE 0.9 0.9   CALCIUM 8.6* 9.3     Recent Labs   Lab 11/08/19  0322 11/09/19  0246   WBC 9.28 7.75   HGB 7.9* 8.3*   HCT 26.2* 27.3*    251     No results for input(s): LABPT, INR, APTT in the last 48 hours.  Microbiology Results (last 7 days)     Procedure Component Value Units Date/Time    Blood culture [[id number]] Collected:  11/04/19 0843    Order Status:  Completed Specimen:  Blood Updated:  11/09/19 1012     Blood Culture, Routine No growth after 5 days.    Blood culture [[id number]] Collected:  11/04/19 0845    Order Status:  Completed  Specimen:  Blood Updated:  11/09/19 1012     Blood Culture, Routine No growth after 5 days.    Culture, Anaerobe [[id number]] Collected:  11/06/19 1609    Order Status:  Completed Specimen:  Body Fluid from Neck Updated:  11/08/19 1051     Anaerobic Culture Culture in progress    Narrative:       2.Epidural purulent material    Culture, Anaerobe [[id number]] Collected:  11/06/19 1602    Order Status:  Completed Specimen:  Body Fluid from Neck Updated:  11/08/19 1051     Anaerobic Culture Culture in progress    Narrative:       C6-C7 epidural discectitis    Aerobic culture [[id number]]  (Abnormal)  (Susceptibility) Collected:  11/06/19 1609    Order Status:  Completed Specimen:  Body Fluid from Neck Updated:  11/08/19 1010     Aerobic Bacterial Culture STAPHYLOCOCCUS AUREUS  Few      Narrative:       2.Epidural purulent material    Aerobic culture [[id number]]  (Abnormal)  (Susceptibility) Collected:  11/06/19 1602    Order Status:  Completed Specimen:  Body Fluid from Neck Updated:  11/08/19 1010     Aerobic Bacterial Culture STAPHYLOCOCCUS AUREUS  Moderate      Narrative:       C6-C7 epidural discectitis    AFB Culture & Smear [[id number]] Collected:  11/06/19 1602    Order Status:  Completed Specimen:  Body Fluid from Neck Updated:  11/07/19 2127     AFB Culture & Smear Culture in progress     AFB CULTURE STAIN No acid fast bacilli seen.    Narrative:       C6-C7 epidural discectitis    AFB Culture & Smear [[id number]] Collected:  11/06/19 1609    Order Status:  Completed Specimen:  Body Fluid from Neck Updated:  11/07/19 2127     AFB Culture & Smear Culture in progress     AFB CULTURE STAIN No acid fast bacilli seen.    Narrative:       2.Epidural purulent material    Gram stain [[id number]] Collected:  11/06/19 1609    Order Status:  Completed Specimen:  Body Fluid from Neck Updated:  11/06/19 2016     Gram Stain Result Few WBC's      No organisms seen    Narrative:       2.Epidural purulent material    Gram stain  [[id number]] Collected:  11/06/19 1602    Order Status:  Completed Specimen:  Body Fluid from Neck Updated:  11/06/19 1920     Gram Stain Result No WBC's      No organisms seen    Narrative:       C6-C7 epidural discectitis    Fungus culture [[id number]] Collected:  11/06/19 1602    Order Status:  Sent Specimen:  Body Fluid from Neck Updated:  11/06/19 1635    Fungus culture [[id number]] Collected:  11/06/19 1609    Order Status:  Sent Specimen:  Body Fluid from Neck Updated:  11/06/19 1634    Blood culture (site 1) [[id number]] Collected:  10/31/19 1917    Order Status:  Completed Specimen:  Blood Updated:  11/05/19 2022     Blood Culture, Routine No growth after 5 days.    Narrative:       Site # 1, aerobic and anaerobic    Culture, Anaerobe [[id number]]     Order Status:  No result Specimen:  Body Fluid from Back     Aerobic culture [[id number]]     Order Status:  No result Specimen:  Body Fluid from Back     Gram stain [[id number]]     Order Status:  No result Specimen:  Body Fluid from Back     Blood culture (site 2) [[id number]]  (Abnormal)  (Susceptibility) Collected:  10/31/19 1922    Order Status:  Completed Specimen:  Blood Updated:  11/04/19 1207     Blood Culture, Routine Gram stain aer bottle: Gram positive cocci in clusters resembling Staph       Results called to and read back by: Mela Arreaga RN  11/02/2019        09:12      STAPHYLOCOCCUS AUREUS  ID consult required at [facility] and [facility].      Narrative:       Site # 2, aerobic only        All pertinent labs from the last 24 hours have been reviewed.    Significant Diagnostics:  X-ray Cervical Spine Ap And Lateral    Result Date: 11/8/2019  Postoperative and chronic change as above. Electronically signed by: Josh Moralez MD Date:    11/08/2019 Time:    08:05      Assessment/Plan:     * Epidural abscess  53 M with PMH of IVDU (heroin), MSSA bacteremia with septic shock and endocarditis, and HCV with a cervical  epidural abscess. Pt s/p C6-7 ACDF with washout on 11/6.     - Neurologically stable on exam  - Post op xrays with hardware in place  - C-collar to be worn when up and OOB. Okay to remove when lying flat. Pt sitting supine in bed without collar on. Discussed again with patient that collar needs to be worn when he is sitting up right, and even at slight incline. Okay to remove only when lying completely flat. He voiced understanding.   - Drain output 0 cc. Drain removed without complication. Pt tolerated well. No underlying hematoma or fluid collection to incision  - OR cultures: Aerobic growing staph aureus. Rest of cultures pending.   - ID: BCx 10/31 with MSSA. Repeat BCx with NGTD. Continue cefazolin 6g continuous.   - Recent IVDU: Recommend addiction psych consult for counseling per primary  - NSGY will continue to follow. Please page with any questions or changes in neuro exam.   - follow up in 6 weeks in neurosurgery clinic with cervical spine XR    Discussed with Dr. Faisal Puente MD  Neurosurgery  [facility]

## 2019-11-10 NOTE — NURSING
Pt transferred to [facility] per ambulance techs. Antibiotic infusing at 20.8 cc hr to R wrist. All pts belongings in bag and sent with pt. No complaints voiced

## 2019-11-10 NOTE — NURSING
Called report to [facility] [phone], unit secretary asked for RN to call back as RN is in shift change.  Receiving RN Maribel.  Will notify georgie Bardales RN.

## 2019-11-10 NOTE — NURSING
Called receiving location and gave report to nurse Maribel. She stated to leave IV in and send with antibiotic running.

## 2019-11-11 ENCOUNTER — TELEPHONE (OUTPATIENT)
Dept: NEUROSURGERY | Facility: CLINIC | Age: 54
End: 2019-11-11

## 2019-11-11 LAB
BACTERIA SPEC ANAEROBE CULT: NORMAL
BACTERIA SPEC ANAEROBE CULT: NORMAL

## 2019-11-11 NOTE — TELEPHONE ENCOUNTER
----- Message from Anish Puente MD sent at 11/9/2019 10:49 AM CST -----  Schedule follow up 6 weeks with XR cervical spine

## 2019-11-11 NOTE — OP NOTE
DATE OF PROCEDURE: 11/6/2019     PREOPERATIVE DIAGNOSES:   C6-C7 Epidural abscess.    POSTOPERATIVE DIAGNOSES:   C6-C7 Epidural abscess.    PROCEDURES PERFORMED:   1. Anterior approach for A C6-C7 diskectomy and arthrodesis.   2.  Evacuation of epidural abscess at C6-C7.  3. Placement of PEEK interbody cage at C7-C7.   4. Anterior instrumentation of C6-C7.   5. Use of morselized allograft.     Surgeon(s) and Role:     * Becca Malone MD - Primary     * Jered Felton MD - assisting     * Anish Kim MD - resident, assisting    Two staff neurosurgeons were necessary for the case due to the fact that the resident assist was then in turn in not able to meaningfully participate in the case.    ANESTHESIA: General    ESTIMATED BLOOD LOSS: 50 mL.    INDICATION FOR PROCEDURE: Ld Bronson is a 53 y.o. male with a history of IV drug abuse.  He presented with MSSA bacteremia and septic shock as well as endocarditis.  He complained of neck pain and and imaging studies showed significant epidural abscess throughout the cervical spine.  Repeat imaging studies showed decreased epidural abscess at the C3-4 level but persistent collection at C6-7.  Given his persistent bacteremia, the decision was made to take the patient to the operating room for possible source control.  We elected to perform a C6-7 ACDF with epidural abscess evacuation.    OPERATIVE NOTE: After obtaining informed consent, the patient was brought into   the Operating Room. he was intubated and anesthetized by Anesthesia.   Preoperative antibiotics as well as dexamethasone were administered.   Neuromonitoring needles were placed and baselines were obtained. Fluoroscopy   was brought into the field and used to confirm the appropriate level. Based on   this localization, a skin incision was marked. The neck was then prepped and   draped in a standard sterile fashion. A transverse incision was made using the   #15 blade. Dissection was carried down through the  platysma using Bovie   electrocautery and using a combination of blunt and sharp dissection, the   platysma was undermined both superiorly and inferiorly. An avascular plane was   developed medial to the sternocleidomastoid muscle. The carotid sheath and its contents were   mobilized laterally. The trachea and esophagus were mobilized medially. We   were able to identify the prevertebral fascia and what was believed to be the   C6-C7 disk space. A spinal needle was placed into the disk space and an x-ray was taken to confirm the level. Once the level was confirmed, we used both blunt and sharp dissection to expose both the C6 and C7 vertebral bodies anteriorly. The longus colli muscles were then mobilized bilaterally. A self-retaining retractor was put into place. Two distraction pins were put into the bodies of C6 and C7 in order to distract the disk space open. We cut into the disk space using the #15 blade and at this point, the operating microscope was brought into the field. Using microsurgical technique, the C6-C7 diskectomy was performed in the standard fashion using curettes and Kerrison rongeurs. We used the disk space  to get a good distraction of the disk space. When we got down on to the posterior portion of the disk space, there were fairly significant osteophytic ridges but these were able to be removed using Kerrison rongeurs. We got down to the level of the PLL.  There was significant pus in anterior to the PLL.  This was cultured and sent for routine studies.  We used a small nerve hook to elevate the PLL and removed the PLL. We were able to adequately decompress the thecal sac and performed good bilateral foraminotomies.  There was more pus posterior to the PLL in the epidural space.  This was also evacuated using copious irrigation as well as suction.  Once we felt we had good decompression of the thecal sac and the exiting nerve roots, a small nerve hook was passed into the epidural space  and out laterally into the foramen to ensure   the surrounding structures were free from compression. The endplates were then   prepped. We used a 12 x 14 x 7 mm 6-degree lordotic PEEK graft, which was packed with morselized allograft. The PEEK device was then placed into the disk space. We used   fluoroscopy to confirm our position within the disk space. Once we were happy   with the position of our graft, we turned our attention to the anterior   instrumentation. We used two metal plates to pass through the cage, one into   the body of C6 and one into the body of C7. These two plates served to plate our instrumentation anteriorly at C6-C7 and served as our anterior instrumentation at the C6-C7 level. Placement of these plates was done using   fluoroscopic guidance. Once all of our instrumentation was in place, we   confirmed the position of all our hardware using both AP and lateral   fluoroscopy, which showed good positioning of all of our grafts and plates. The   wound was then copiously irrigated. The distraction pins were removed.   Hemostasis was obtained using bone wax, FloSeal, and bipolar electrocautery. A   drain was left in place. The wound was closed in layers. A sterile   dressing was put into place. The patient was extubated by Anesthesia and   brought to the Recovery Room in stable condition. All counts were correct at   the end of the case and neuromonitoring remained stable throughout the case.

## 2019-12-03 ENCOUNTER — TELEPHONE (OUTPATIENT)
Dept: NEUROSURGERY | Facility: CLINIC | Age: 54
End: 2019-12-03

## 2019-12-10 LAB
FUNGUS SPEC CULT: NORMAL
FUNGUS SPEC CULT: NORMAL

## 2019-12-25 ENCOUNTER — HOSPITAL ENCOUNTER (INPATIENT)
Facility: HOSPITAL | Age: 54
LOS: 12 days | Discharge: LEFT BEFORE DISCHARGE | DRG: 871 | End: 2020-01-06
Attending: INTERNAL MEDICINE | Admitting: INTERNAL MEDICINE
Payer: MEDICAID

## 2019-12-25 DIAGNOSIS — A41.9 SEPTIC SHOCK: ICD-10-CM

## 2019-12-25 DIAGNOSIS — F19.10 IV DRUG ABUSE: ICD-10-CM

## 2019-12-25 DIAGNOSIS — R00.0 TACHYCARDIA: ICD-10-CM

## 2019-12-25 DIAGNOSIS — F11.93 OPIOID WITHDRAWAL: ICD-10-CM

## 2019-12-25 DIAGNOSIS — I38 ENDOCARDITIS: ICD-10-CM

## 2019-12-25 DIAGNOSIS — R00.0 HEART RATE FAST: ICD-10-CM

## 2019-12-25 DIAGNOSIS — F11.20 OPIOID USE DISORDER, SEVERE, DEPENDENCE: Chronic | ICD-10-CM

## 2019-12-25 DIAGNOSIS — I33.0 ENDOCARDITIS DUE TO METHICILLIN SUSCEPTIBLE STAPHYLOCOCCUS AUREUS (MSSA): ICD-10-CM

## 2019-12-25 DIAGNOSIS — R64 CACHEXIA: ICD-10-CM

## 2019-12-25 DIAGNOSIS — A41.9 SEPSIS: ICD-10-CM

## 2019-12-25 DIAGNOSIS — B95.61 ENDOCARDITIS DUE TO METHICILLIN SUSCEPTIBLE STAPHYLOCOCCUS AUREUS (MSSA): ICD-10-CM

## 2019-12-25 DIAGNOSIS — I38 ENDOCARDITIS, UNSPECIFIED CHRONICITY, UNSPECIFIED ENDOCARDITIS TYPE: ICD-10-CM

## 2019-12-25 DIAGNOSIS — R65.21 SEPTIC SHOCK: ICD-10-CM

## 2019-12-25 PROBLEM — N17.9 AKI (ACUTE KIDNEY INJURY): Status: ACTIVE | Noted: 2019-12-25

## 2019-12-25 PROBLEM — R79.89 ELEVATED TROPONIN: Status: ACTIVE | Noted: 2019-12-25

## 2019-12-25 LAB
ALBUMIN SERPL BCP-MCNC: 2.1 G/DL (ref 3.5–5.2)
ALBUMIN SERPL BCP-MCNC: 2.3 G/DL (ref 3.5–5.2)
ALLENS TEST: ABNORMAL
ALP SERPL-CCNC: 72 U/L (ref 55–135)
ALP SERPL-CCNC: 79 U/L (ref 55–135)
ALT SERPL W/O P-5'-P-CCNC: 12 U/L (ref 10–44)
ALT SERPL W/O P-5'-P-CCNC: 12 U/L (ref 10–44)
AMORPH CRY UR QL COMP ASSIST: ABNORMAL
ANION GAP SERPL CALC-SCNC: 13 MMOL/L (ref 8–16)
ANION GAP SERPL CALC-SCNC: 15 MMOL/L (ref 8–16)
AST SERPL-CCNC: 30 U/L (ref 10–40)
AST SERPL-CCNC: 41 U/L (ref 10–40)
BACTERIA #/AREA URNS AUTO: ABNORMAL /HPF
BASOPHILS # BLD AUTO: 0.03 K/UL (ref 0–0.2)
BASOPHILS # BLD AUTO: 0.05 K/UL (ref 0–0.2)
BASOPHILS NFR BLD: 0.1 % (ref 0–1.9)
BASOPHILS NFR BLD: 0.2 % (ref 0–1.9)
BILIRUB SERPL-MCNC: 0.4 MG/DL (ref 0.1–1)
BILIRUB SERPL-MCNC: 0.5 MG/DL (ref 0.1–1)
BILIRUB UR QL STRIP: NEGATIVE
BUN SERPL-MCNC: 55 MG/DL (ref 6–20)
BUN SERPL-MCNC: 56 MG/DL (ref 6–20)
C DIFF GDH STL QL: NEGATIVE
C DIFF TOX A+B STL QL IA: NEGATIVE
C TRACH DNA SPEC QL NAA+PROBE: NOT DETECTED
CALCIUM SERPL-MCNC: 7.1 MG/DL (ref 8.7–10.5)
CALCIUM SERPL-MCNC: 7.8 MG/DL (ref 8.7–10.5)
CHLORIDE SERPL-SCNC: 105 MMOL/L (ref 95–110)
CHLORIDE SERPL-SCNC: 107 MMOL/L (ref 95–110)
CLARITY UR REFRACT.AUTO: ABNORMAL
CO2 SERPL-SCNC: 14 MMOL/L (ref 23–29)
CO2 SERPL-SCNC: 15 MMOL/L (ref 23–29)
COLOR UR AUTO: YELLOW
CREAT SERPL-MCNC: 3.6 MG/DL (ref 0.5–1.4)
CREAT SERPL-MCNC: 3.8 MG/DL (ref 0.5–1.4)
DELSYS: ABNORMAL
DIFFERENTIAL METHOD: ABNORMAL
DIFFERENTIAL METHOD: ABNORMAL
EOSINOPHIL # BLD AUTO: 0 K/UL (ref 0–0.5)
EOSINOPHIL # BLD AUTO: 0 K/UL (ref 0–0.5)
EOSINOPHIL NFR BLD: 0 % (ref 0–8)
EOSINOPHIL NFR BLD: 0 % (ref 0–8)
ERYTHROCYTE [DISTWIDTH] IN BLOOD BY AUTOMATED COUNT: 17.3 % (ref 11.5–14.5)
ERYTHROCYTE [DISTWIDTH] IN BLOOD BY AUTOMATED COUNT: 17.4 % (ref 11.5–14.5)
EST. GFR  (AFRICAN AMERICAN): 19.4 ML/MIN/1.73 M^2
EST. GFR  (AFRICAN AMERICAN): 20.7 ML/MIN/1.73 M^2
EST. GFR  (NON AFRICAN AMERICAN): 16.8 ML/MIN/1.73 M^2
EST. GFR  (NON AFRICAN AMERICAN): 17.9 ML/MIN/1.73 M^2
GLUCOSE SERPL-MCNC: 138 MG/DL (ref 70–110)
GLUCOSE SERPL-MCNC: 150 MG/DL (ref 70–110)
GLUCOSE UR QL STRIP: NEGATIVE
HCO3 UR-SCNC: 18.2 MMOL/L (ref 24–28)
HCT VFR BLD AUTO: 28.5 % (ref 40–54)
HCT VFR BLD AUTO: 29.8 % (ref 40–54)
HGB BLD-MCNC: 8.7 G/DL (ref 14–18)
HGB BLD-MCNC: 9.3 G/DL (ref 14–18)
HGB UR QL STRIP: ABNORMAL
HYALINE CASTS UR QL AUTO: 6 /LPF
IMM GRANULOCYTES # BLD AUTO: 0.3 K/UL (ref 0–0.04)
IMM GRANULOCYTES # BLD AUTO: 0.36 K/UL (ref 0–0.04)
IMM GRANULOCYTES NFR BLD AUTO: 1.1 % (ref 0–0.5)
IMM GRANULOCYTES NFR BLD AUTO: 1.1 % (ref 0–0.5)
INR PPP: 1.3 (ref 0.8–1.2)
KETONES UR QL STRIP: NEGATIVE
LACTATE SERPL-SCNC: 1.8 MMOL/L (ref 0.5–2.2)
LEUKOCYTE ESTERASE UR QL STRIP: ABNORMAL
LYMPHOCYTES # BLD AUTO: 0.8 K/UL (ref 1–4.8)
LYMPHOCYTES # BLD AUTO: 1.3 K/UL (ref 1–4.8)
LYMPHOCYTES NFR BLD: 2.5 % (ref 18–48)
LYMPHOCYTES NFR BLD: 4.6 % (ref 18–48)
MAGNESIUM SERPL-MCNC: 1.6 MG/DL (ref 1.6–2.6)
MAGNESIUM SERPL-MCNC: 1.7 MG/DL (ref 1.6–2.6)
MCH RBC QN AUTO: 27.4 PG (ref 27–31)
MCH RBC QN AUTO: 27.4 PG (ref 27–31)
MCHC RBC AUTO-ENTMCNC: 30.5 G/DL (ref 32–36)
MCHC RBC AUTO-ENTMCNC: 31.2 G/DL (ref 32–36)
MCV RBC AUTO: 88 FL (ref 82–98)
MCV RBC AUTO: 90 FL (ref 82–98)
MICROSCOPIC COMMENT: ABNORMAL
MONOCYTES # BLD AUTO: 0.5 K/UL (ref 0.3–1)
MONOCYTES # BLD AUTO: 0.5 K/UL (ref 0.3–1)
MONOCYTES NFR BLD: 1.6 % (ref 4–15)
MONOCYTES NFR BLD: 1.9 % (ref 4–15)
N GONORRHOEA DNA SPEC QL NAA+PROBE: NOT DETECTED
NEUTROPHILS # BLD AUTO: 26 K/UL (ref 1.8–7.7)
NEUTROPHILS # BLD AUTO: 29.8 K/UL (ref 1.8–7.7)
NEUTROPHILS NFR BLD: 92.3 % (ref 38–73)
NEUTROPHILS NFR BLD: 94.6 % (ref 38–73)
NITRITE UR QL STRIP: NEGATIVE
NRBC BLD-RTO: 0 /100 WBC
NRBC BLD-RTO: 0 /100 WBC
PCO2 BLDA: 35 MMHG (ref 35–45)
PH SMN: 7.32 [PH] (ref 7.35–7.45)
PH UR STRIP: 5 [PH] (ref 5–8)
PHOSPHATE SERPL-MCNC: 5.9 MG/DL (ref 2.7–4.5)
PHOSPHATE SERPL-MCNC: 6.1 MG/DL (ref 2.7–4.5)
PLATELET # BLD AUTO: 181 K/UL (ref 150–350)
PLATELET # BLD AUTO: 204 K/UL (ref 150–350)
PMV BLD AUTO: 10 FL (ref 9.2–12.9)
PMV BLD AUTO: 10.1 FL (ref 9.2–12.9)
PO2 BLDA: 36 MMHG (ref 40–60)
POC BE: -8 MMOL/L
POC SATURATED O2: 65 % (ref 95–100)
POC TCO2: 19 MMOL/L (ref 24–29)
POTASSIUM SERPL-SCNC: 3.5 MMOL/L (ref 3.5–5.1)
POTASSIUM SERPL-SCNC: 4.3 MMOL/L (ref 3.5–5.1)
PROT SERPL-MCNC: 6.9 G/DL (ref 6–8.4)
PROT SERPL-MCNC: 7.1 G/DL (ref 6–8.4)
PROT UR QL STRIP: ABNORMAL
PROTHROMBIN TIME: 13.3 SEC (ref 9–12.5)
RBC # BLD AUTO: 3.18 M/UL (ref 4.6–6.2)
RBC # BLD AUTO: 3.39 M/UL (ref 4.6–6.2)
RBC #/AREA URNS AUTO: 12 /HPF (ref 0–4)
SAMPLE: ABNORMAL
SITE: ABNORMAL
SODIUM SERPL-SCNC: 134 MMOL/L (ref 136–145)
SODIUM SERPL-SCNC: 135 MMOL/L (ref 136–145)
SP GR UR STRIP: 1.01 (ref 1–1.03)
SQUAMOUS #/AREA URNS AUTO: 1 /HPF
TROPONIN I SERPL DL<=0.01 NG/ML-MCNC: 0.09 NG/ML (ref 0–0.03)
TROPONIN I SERPL DL<=0.01 NG/ML-MCNC: 0.15 NG/ML (ref 0–0.03)
URN SPEC COLLECT METH UR: ABNORMAL
VANCOMYCIN SERPL-MCNC: 14.1 UG/ML
WBC # BLD AUTO: 28.18 K/UL (ref 3.9–12.7)
WBC # BLD AUTO: 31.52 K/UL (ref 3.9–12.7)
WBC #/AREA URNS AUTO: >100 /HPF (ref 0–5)

## 2019-12-25 PROCEDURE — 93005 ELECTROCARDIOGRAM TRACING: CPT

## 2019-12-25 PROCEDURE — 85610 PROTHROMBIN TIME: CPT

## 2019-12-25 PROCEDURE — 27000221 HC OXYGEN, UP TO 24 HOURS

## 2019-12-25 PROCEDURE — 87086 URINE CULTURE/COLONY COUNT: CPT

## 2019-12-25 PROCEDURE — 25000003 PHARM REV CODE 250

## 2019-12-25 PROCEDURE — 25000003 PHARM REV CODE 250: Performed by: STUDENT IN AN ORGANIZED HEALTH CARE EDUCATION/TRAINING PROGRAM

## 2019-12-25 PROCEDURE — 85025 COMPLETE CBC W/AUTO DIFF WBC: CPT

## 2019-12-25 PROCEDURE — 63600175 PHARM REV CODE 636 W HCPCS: Performed by: NURSE PRACTITIONER

## 2019-12-25 PROCEDURE — 20000000 HC ICU ROOM

## 2019-12-25 PROCEDURE — 86592 SYPHILIS TEST NON-TREP QUAL: CPT

## 2019-12-25 PROCEDURE — 81001 URINALYSIS AUTO W/SCOPE: CPT

## 2019-12-25 PROCEDURE — 87491 CHLMYD TRACH DNA AMP PROBE: CPT

## 2019-12-25 PROCEDURE — 63600175 PHARM REV CODE 636 W HCPCS: Performed by: INTERNAL MEDICINE

## 2019-12-25 PROCEDURE — 99900035 HC TECH TIME PER 15 MIN (STAT)

## 2019-12-25 PROCEDURE — 87449 NOS EACH ORGANISM AG IA: CPT

## 2019-12-25 PROCEDURE — 99291 CRITICAL CARE FIRST HOUR: CPT | Mod: ,,, | Performed by: INTERNAL MEDICINE

## 2019-12-25 PROCEDURE — 80053 COMPREHEN METABOLIC PANEL: CPT

## 2019-12-25 PROCEDURE — 87040 BLOOD CULTURE FOR BACTERIA: CPT | Mod: 59

## 2019-12-25 PROCEDURE — 86703 HIV-1/HIV-2 1 RESULT ANTBDY: CPT

## 2019-12-25 PROCEDURE — 93010 ELECTROCARDIOGRAM REPORT: CPT | Mod: ,,, | Performed by: INTERNAL MEDICINE

## 2019-12-25 PROCEDURE — 87324 CLOSTRIDIUM AG IA: CPT

## 2019-12-25 PROCEDURE — 83735 ASSAY OF MAGNESIUM: CPT | Mod: 91

## 2019-12-25 PROCEDURE — 83735 ASSAY OF MAGNESIUM: CPT

## 2019-12-25 PROCEDURE — 83605 ASSAY OF LACTIC ACID: CPT

## 2019-12-25 PROCEDURE — 80202 ASSAY OF VANCOMYCIN: CPT

## 2019-12-25 PROCEDURE — 93010 EKG 12-LEAD: ICD-10-PCS | Mod: ,,, | Performed by: INTERNAL MEDICINE

## 2019-12-25 PROCEDURE — 80053 COMPREHEN METABOLIC PANEL: CPT | Mod: 91

## 2019-12-25 PROCEDURE — 63600175 PHARM REV CODE 636 W HCPCS: Performed by: STUDENT IN AN ORGANIZED HEALTH CARE EDUCATION/TRAINING PROGRAM

## 2019-12-25 PROCEDURE — 94761 N-INVAS EAR/PLS OXIMETRY MLT: CPT

## 2019-12-25 PROCEDURE — 84100 ASSAY OF PHOSPHORUS: CPT

## 2019-12-25 PROCEDURE — 84484 ASSAY OF TROPONIN QUANT: CPT | Mod: 91

## 2019-12-25 PROCEDURE — 99291 PR CRITICAL CARE, E/M 30-74 MINUTES: ICD-10-PCS | Mod: ,,, | Performed by: INTERNAL MEDICINE

## 2019-12-25 PROCEDURE — 84484 ASSAY OF TROPONIN QUANT: CPT

## 2019-12-25 PROCEDURE — 84100 ASSAY OF PHOSPHORUS: CPT | Mod: 91

## 2019-12-25 RX ORDER — NOREPINEPHRINE BITARTRATE/D5W 4MG/250ML
PLASTIC BAG, INJECTION (ML) INTRAVENOUS
Status: COMPLETED
Start: 2019-12-25 | End: 2019-12-25

## 2019-12-25 RX ORDER — ONDANSETRON 2 MG/ML
4 INJECTION INTRAMUSCULAR; INTRAVENOUS EVERY 8 HOURS PRN
Status: DISCONTINUED | OUTPATIENT
Start: 2019-12-25 | End: 2019-12-31

## 2019-12-25 RX ORDER — ENOXAPARIN SODIUM 100 MG/ML
30 INJECTION SUBCUTANEOUS EVERY 24 HOURS
Status: DISCONTINUED | OUTPATIENT
Start: 2019-12-25 | End: 2019-12-27

## 2019-12-25 RX ORDER — SODIUM CHLORIDE 0.9 % (FLUSH) 0.9 %
10 SYRINGE (ML) INJECTION
Status: DISCONTINUED | OUTPATIENT
Start: 2019-12-25 | End: 2020-01-06 | Stop reason: HOSPADM

## 2019-12-25 RX ORDER — ENOXAPARIN SODIUM 100 MG/ML
40 INJECTION SUBCUTANEOUS EVERY 24 HOURS
Status: DISCONTINUED | OUTPATIENT
Start: 2019-12-25 | End: 2019-12-25

## 2019-12-25 RX ORDER — PROCHLORPERAZINE EDISYLATE 5 MG/ML
5 INJECTION INTRAMUSCULAR; INTRAVENOUS EVERY 6 HOURS PRN
Status: DISCONTINUED | OUTPATIENT
Start: 2019-12-25 | End: 2020-01-06 | Stop reason: HOSPADM

## 2019-12-25 RX ORDER — NOREPINEPHRINE BITARTRATE/D5W 4MG/250ML
0.02 PLASTIC BAG, INJECTION (ML) INTRAVENOUS CONTINUOUS
Status: DISCONTINUED | OUTPATIENT
Start: 2019-12-25 | End: 2019-12-30

## 2019-12-25 RX ADMIN — Medication 4000 MCG: at 12:12

## 2019-12-25 RX ADMIN — PIPERACILLIN AND TAZOBACTAM 4.5 G: 4; .5 INJECTION, POWDER, FOR SOLUTION INTRAVENOUS at 06:12

## 2019-12-25 RX ADMIN — Medication 0.1 MCG/KG/MIN: at 06:12

## 2019-12-25 RX ADMIN — PIPERACILLIN AND TAZOBACTAM 4.5 G: 4; .5 INJECTION, POWDER, FOR SOLUTION INTRAVENOUS at 07:12

## 2019-12-25 RX ADMIN — SODIUM CHLORIDE 1000 ML: 0.9 INJECTION, SOLUTION INTRAVENOUS at 02:12

## 2019-12-25 RX ADMIN — VANCOMYCIN HYDROCHLORIDE 1250 MG: 1.25 INJECTION, POWDER, LYOPHILIZED, FOR SOLUTION INTRAVENOUS at 09:12

## 2019-12-25 RX ADMIN — Medication 0.18 MCG/KG/MIN: at 09:12

## 2019-12-25 RX ADMIN — ENOXAPARIN SODIUM 30 MG: 100 INJECTION SUBCUTANEOUS at 05:12

## 2019-12-25 NOTE — PROGRESS NOTES
Pharmacokinetic Initial Assessment: IV Vancomycin    Assessment/Plan:    Initiate intravenous vancomycin with loading dose of 1500 mg once with subsequent doses when random concentrations are less than 20 mcg/mL  Desired empiric serum trough concentration is 15 to 20 mcg/mL  Draw vancomycin random level on 12/25 at 1900.  Pharmacy will continue to follow and monitor vancomycin.      Please contact pharmacy at extension [phone] with any questions regarding this assessment.     Thank you for the consult,   Brandi Corrales       Patient brief summary:  Ld Bronson is a 53 y.o. male initiated on antimicrobial therapy with IV Vancomycin for treatment of suspected sepsis    Drug Allergies:   Review of patient's allergies indicates:  No Known Allergies    Actual Body Weight:   62kg    Renal Function:   Estimated Creatinine Clearance: 17.4 mL/min (A) (based on SCr of 4.2 mg/dL (H)).,     Dialysis Method (if applicable):  N/A    CBC (last 72 hours):  Recent Labs   Lab Result Units 12/24/19  1811   WBC K/uL 25.00*   Hemoglobin g/dL 8.2*   Hematocrit % 25.4*   Platelets K/uL 239   Gran% % 80.0*   Lymph% % 10.0*   Mono% % 2.0*   Eosinophil% % 0.0   Basophil% % 0.0   Differential Method  Manual       Metabolic Panel (last 72 hours):  Recent Labs   Lab Result Units 12/24/19  1811 12/24/19  1953   Sodium mmol/L 131*  --    Potassium mmol/L 3.9  --    Chloride mmol/L 93*  --    CO2 mmol/L 19*  --    Glucose mg/dL 128*  --    Glucose, UA   --  Negative   BUN, Bld mg/dL 56*  --    Creatinine mg/dL 4.2*  --    Albumin g/dL 3.2*  --    Total Bilirubin mg/dL 0.8  --    Alkaline Phosphatase U/L 71  --    AST U/L 36  --    ALT U/L 17  --        Drug levels (last 3 results):  No results for input(s): VANCOMYCINRA, VANCOMYCINPE, VANCOMYCINTR in the last 72 hours.    Microbiologic Results:  Microbiology Results (last 7 days)     Procedure Component Value Units Date/Time    Blood culture [[id number]]     Order Status:  No result Specimen:   Blood     Blood culture [[id number]]     Order Status:  No result Specimen:  Blood

## 2019-12-25 NOTE — ASSESSMENT & PLAN NOTE
Baseline sCr 0.9 beginning of 11/2019, now 4. 2 on admit. Improved with IV fluids. Likely multifactorial including hypotension, possibly element of septic ATN. Consider emboli to the kidneys from infective endocarditis.    - Avoid nephrotoxic meds/substances  - Strict Is and Os, daily weights  - Leung precaution  - Follow-up US RP  - Monitor daily renal function

## 2019-12-25 NOTE — PROGRESS NOTES
Pt arrived to unit lethargic and nonverbal. Efren and NS gtt infusing. Pt connected to continuous bedside monitors. Pupils are pinpoint and sluggish. Labs drawn, blood cultures and urine samples obtained. Bedside ekg and vbg completed. Pt started on levo gtt. Head CT obtained. Levo titrated to keep map >65. MD made aware of all lab results and assessment findings throughout the shift.

## 2019-12-25 NOTE — ASSESSMENT & PLAN NOTE
Patient presented with generalized weakness and fatigue, vital signs concerning for shock with hypotension and tachycardia. Recently completed course of cefazolin for MSSA endocarditis, though UDS positive for amphetamines suggests patient may still be using IV drugs. With recent hospitalization would also be concerned about possible HAP. Urinalysis with 3+ leukocytes, 12 RBCs, >100 WBC and many bacteria - certainly concerning for urinary source. WCC elevated to 25. Initial lactate 7.9.   S/p 3L fluid resuscitation and BSABx with improvement in lactate to 1.8  Repeat CT head here without evidence of acute process  CXR unremarkable, no signs of consolidation    - Continue vancomycin and zosyn - de-escalate as cultures return  - Follow-up BCx  And UCx  - Follow-up CT abdo/pelvis read  - Consider MRI brain for evaluation of possible infarcts given poor mentation

## 2019-12-25 NOTE — ASSESSMENT & PLAN NOTE
History of mitral valve MSSA endocarditis s/p 6 week course of cefazolin ending 12/18. Concern for septic emboli leading to infarcts of spleen, potentially renal infarcts.    - Follow-up repeat echo  - Consider MRI brain if concern for septic emboli to brain  - Continue broad spectrum ABx with vanc and zosyn  - Consider having ID on board once cultures start to return  - Monitor daily CBC

## 2019-12-25 NOTE — SUBJECTIVE & OBJECTIVE
No past medical history on file.    Past Surgical History:   Procedure Laterality Date    ANTERIOR CERVICAL DISCECTOMY W/ FUSION N/A 11/6/2019    Procedure: C6-C7 ACDF w neuromonitoring, Mikayla Doshi for instrumentation;  Surgeon: Becca Malone MD;  Location: [facility] OR [address] [place] [address];  Service: Neurosurgery;  Laterality: N/A;    FRACTURE SURGERY      TRANSESOPHAGEAL ECHOCARDIOGRAPHY N/A 11/4/2019    Procedure: ECHOCARDIOGRAM, TRANSESOPHAGEAL;  Surgeon: Dosc Diagnostic Provider;  Location: [facility] EP LAB;  Service: Anesthesiology;  Laterality: N/A;       Review of patient's allergies indicates:  No Known Allergies    Family History     None        Tobacco Use    Smoking status: Current Some Day Smoker     Types: Cigarettes    Smokeless tobacco: Former User   Substance and Sexual Activity    Alcohol use: Not on file    Drug use: Yes     Types: Heroin    Sexual activity: Not Currently      Review of Systems   Unable to perform ROS: Mental status change     Objective:     Vital Signs (Most Recent):  Temp: 97.6 °F (36.4 °C) (12/25/19 0300)  Pulse: 78 (12/25/19 0630)  Resp: (!) 24 (12/25/19 0630)  BP: (!) 68/40 (12/25/19 0630)  SpO2: 99 % (12/25/19 0630) Vital Signs (24h Range):  Temp:  [97.6 °F (36.4 °C)-99.1 °F (37.3 °C)] 97.6 °F (36.4 °C)  Pulse:  [] 78  Resp:  [8-130] 24  SpO2:  [80 %-100 %] 99 %  BP: ()/(29-71) 68/40   Weight: 62 kg (136 lb 11 oz)  Body mass index is 18.54 kg/m².      Intake/Output Summary (Last 24 hours) at 12/25/2019 0640  Last data filed at 12/25/2019 0620  Gross per 24 hour   Intake 1719 ml   Output 255 ml   Net 1464 ml       Physical Exam   Constitutional: He appears well-developed. No distress.   HENT:   Head: Atraumatic.   Mouth/Throat: No oropharyngeal exudate.   Eyes: Conjunctivae and EOM are normal. No scleral icterus.   Pinpoint pupils   Neck: Normal range of motion. Neck supple.   Cardiovascular: Normal rate and regular rhythm.   Pulmonary/Chest: Effort normal and breath sounds  normal. No stridor. No respiratory distress.   Abdominal: Soft. Bowel sounds are normal. He exhibits no distension. There is no tenderness. There is no guarding.   Musculoskeletal: He exhibits no edema or tenderness.   Neurological: No cranial nerve deficit.   Difficult to arouse, moves all extremities.    Skin: Skin is warm and dry. No rash noted. He is not diaphoretic. No erythema.   Nursing note and vitals reviewed.      Vents:     Lines/Drains/Airways     Drain                 Urethral Catheter 12/24/19 1954 Non-latex 16 Fr. less than 1 day          Peripheral Intravenous Line                 Peripheral IV - Single Lumen 12/24/19 1812 18 G Right Upper Arm less than 1 day         Peripheral IV - Single Lumen 12/24/19 1923 20 G Left Forearm less than 1 day         Peripheral IV - Single Lumen 12/24/19 1940 18 G Left Upper Arm less than 1 day              Significant Labs:    CBC/Anemia Profile:  Recent Labs   Lab 12/24/19 1811 12/25/19 0108 12/25/19 0432   WBC 25.00* 31.52* 28.18*   HGB 8.2* 9.3* 8.7*   HCT 25.4* 29.8* 28.5*    204 181   MCV 86 88 90   RDW 18.0* 17.3* 17.4*        Chemistries:  Recent Labs   Lab 12/24/19 1811 12/25/19 0108 12/25/19  0432   * 135* 134*   K 3.9 3.5 4.3   CL 93* 105 107   CO2 19* 15* 14*   BUN 56* 55* 56*   CREATININE 4.2* 3.8* 3.6*   CALCIUM 9.0 7.8* 7.1*   ALBUMIN 3.2* 2.3* 2.1*   PROT 8.8* 7.1 6.9   BILITOT 0.8 0.5 0.4   ALKPHOS 71 79 72   ALT 17 12 12   AST 36 30 41*   MG  --  1.6 1.7   PHOS  --  5.9* 6.1*       ABGs:   Recent Labs   Lab 12/25/19  0045   PH 7.324*   PCO2 35.0   HCO3 18.2*   POCSATURATED 65*   BE -8     CMP:   Recent Labs   Lab 12/24/19 1811 12/25/19 0108 12/25/19  0432   * 135* 134*   K 3.9 3.5 4.3   CL 93* 105 107   CO2 19* 15* 14*   * 138* 150*   BUN 56* 55* 56*   CREATININE 4.2* 3.8* 3.6*   CALCIUM 9.0 7.8* 7.1*   PROT 8.8* 7.1 6.9   ALBUMIN 3.2* 2.3* 2.1*   BILITOT 0.8 0.5 0.4   ALKPHOS 71 79 72   AST 36 30 41*   ALT 17 12 12    ANIONGAP 19* 15 13   EGFRNONAA 14.9* 16.8* 17.9*     Troponin:   Recent Labs   Lab 12/24/19  1811 12/25/19  0108   TROPONINI 0.20* 0.148*     Urine Studies:   Recent Labs   Lab 12/24/19  1953 12/25/19  0140   COLORU Yellow Yellow   APPEARANCEUA Clear Cloudy*   PHUR 5.0 5.0   SPECGRAV 1.025 1.010   PROTEINUA 2+* 1+*   GLUCUA Negative Negative   KETONESU Negative Negative   BILIRUBINUA Negative Negative   OCCULTUA 3+* 3+*   NITRITE Negative Negative   UROBILINOGEN Negative  --    LEUKOCYTESUR 1+* 3+*   RBCUA 5* 12*   WBCUA >100* >100*   BACTERIA Moderate* Many*   SQUAMEPITHEL  --  1   HYALINECASTS 0 6*       Significant Imaging: I have reviewed all pertinent imaging results/findings within the past 24 hours.     CT Head Without Contrast  Narrative: EXAMINATION:  CT HEAD WITHOUT CONTRAST    CLINICAL HISTORY:  Syncope/fainting;    TECHNIQUE:  Axial images obtained of brain without contrast    COMPARISON:  CT head 11/18/2016.    FINDINGS:  Study degraded by motion artifact.  Subtle area of decreased density right occipital lobe raises possibility of infarct of uncertain age.  MRI to better assess.    No acute intracranial hemorrhage.  The ventricular system and cortical sulci are normal size.  No intracranial space-occupying mass, mass effect or other focal parenchymal abnormality is seen.    Visualized paranasal sinuses and mastoid air cells are clear.  Impression: Subtle area of decreased density right occipital lobe raising possibility of infarct of indeterminate age.  Follow-up MRI to better assess.    No other focal parenchymal abnormality is seen.    Preliminary report provided by [facility] physician at time of exam.    This report was flagged in Epic as abnormal.    Electronically signed by: Marialuisa Brown MD  Date:    12/25/2019  Time:    07:39  CT Head Without Contrast  Narrative: EXAMINATION:  CT HEAD WITHOUT CONTRAST    CLINICAL HISTORY:  Confusion/delirium, altered LOC, unexplained;    TECHNIQUE:  Low dose axial  images were obtained through the head.  Coronal and sagittal reformations were also performed. Contrast was not administered.    COMPARISON:  CT head 12/24/2019    FINDINGS:  There is mild generalized cerebral volume loss.  No convincing evidence of acute intracranial hemorrhage or midline shift.  No CT evidence to suggest acute major vascular territory infarct.  No definite extra-axial collections appreciated.  The basal cisterns are patent.  The mastoid air cells and paranasal sinuses are clear of acute process. The visualized bones of the calvarium demonstrate no acute osseous abnormality.  Impression: No CT evidence of acute intracranial abnormality. Clinical correlation and further evaluation as warranted.    Electronically signed by: Daron Suarez MD  Date:    12/25/2019  Time:    04:23

## 2019-12-25 NOTE — PLAN OF CARE
Pt currently redting in bed comfortably - VSS. Sats >96% on 2L NC, HR 70-80's SR, MAP >65 (maintained with levo), afebrile, and no complaints of pain. MRI to be obtained tonight - MRI unable to be obtained during AM due to having to close room because of contact precautions.  UO 30-85ml/hr via owusu. 2 watery BM this shift thus far. Current continuous gtts include levo. Pt turned Q2H, AAOx4 since 1100 AM assessment, follows commands, and moves all extremities purpousfuly. POC reviewed with pt - all questions/concerns addressed and answered appropriately. Will continue to monitor.

## 2019-12-25 NOTE — H&P
[facility]  Critical Care Medicine  History & Physical    Patient Name: Ld Bronson  MRN: [id number]  Admission Date: 12/25/2019  Hospital Length of Stay: 0 days  Code Status: Full Code  Attending Physician: Warren Strange MD   Primary Care Provider: Kris Artis Jr, MD   Principal Problem: Septic shock    Subjective:     HPI:  Mr. Bronson is a 53M IVDU with HCV, Hx of MSSA bacteremia and infective endocarditis (s/p 6 weeks of cefazolin 12/18), cervical spine epidural abscess s/p C6-7 diskectomy and arthrodesis 11/15/2019 who presented to [facility] with chest pain, generalized weakness, and shortness of breath. At OSH, he was found to be in septic shock requiring pressor support. He was transferred [place] [facility] [place] higher level of care. On arrival, patient was somnolent and difficult to arouse. Patient did waken with sternal rub and answered questions appropriately, but history largely obtained through chart review.     At OSH ED, patient recieved full 30ccs/kg fluid resuscitation with an additional liter (total ~3L) for hypotension and tachycardia; lactic acid was elevated to 3.4. He received 1 dose each of vancomycin and zosyn prior to transfer.     Hospital/ICU Course:  No notes on file     No past medical history on file.    Past Surgical History:   Procedure Laterality Date    ANTERIOR CERVICAL DISCECTOMY W/ FUSION N/A 11/6/2019    Procedure: C6-C7 ACDF w neuromonitoring, Mikayla Doshi for instrumentation;  Surgeon: Becca Malone MD;  Location: [facility] OR [address] [place] [address];  Service: Neurosurgery;  Laterality: N/A;    FRACTURE SURGERY      TRANSESOPHAGEAL ECHOCARDIOGRAPHY N/A 11/4/2019    Procedure: ECHOCARDIOGRAM, TRANSESOPHAGEAL;  Surgeon: [facility] Diagnostic Provider;  Location: [facility] EP LAB;  Service: Anesthesiology;  Laterality: N/A;       Review of patient's allergies indicates:  No Known Allergies    Family History     None        Tobacco Use    Smoking status: Current Some Day  Smoker     Types: Cigarettes    Smokeless tobacco: Former User   Substance and Sexual Activity    Alcohol use: Not on file    Drug use: Yes     Types: Heroin    Sexual activity: Not Currently      Review of Systems   Unable to perform ROS: Mental status change     Objective:     Vital Signs (Most Recent):  Temp: 97.6 °F (36.4 °C) (12/25/19 0300)  Pulse: 78 (12/25/19 0630)  Resp: (!) 24 (12/25/19 0630)  BP: (!) 68/40 (12/25/19 0630)  SpO2: 99 % (12/25/19 0630) Vital Signs (24h Range):  Temp:  [97.6 °F (36.4 °C)-99.1 °F (37.3 °C)] 97.6 °F (36.4 °C)  Pulse:  [] 78  Resp:  [8-130] 24  SpO2:  [80 %-100 %] 99 %  BP: ()/(29-71) 68/40   Weight: 62 kg (136 lb 11 oz)  Body mass index is 18.54 kg/m².      Intake/Output Summary (Last 24 hours) at 12/25/2019 0640  Last data filed at 12/25/2019 0620  Gross per 24 hour   Intake 1719 ml   Output 255 ml   Net 1464 ml       Physical Exam   Constitutional: He appears well-developed. No distress.   HENT:   Head: Atraumatic.   Mouth/Throat: No oropharyngeal exudate.   Eyes: Conjunctivae and EOM are normal. No scleral icterus.   Pinpoint pupils   Neck: Normal range of motion. Neck supple.   Cardiovascular: Normal rate and regular rhythm.   Pulmonary/Chest: Effort normal and breath sounds normal. No stridor. No respiratory distress.   Abdominal: Soft. Bowel sounds are normal. He exhibits no distension. There is no tenderness. There is no guarding.   Musculoskeletal: He exhibits no edema or tenderness.   Neurological: No cranial nerve deficit.   Difficult to arouse, moves all extremities.    Skin: Skin is warm and dry. No rash noted. He is not diaphoretic. No erythema.   Nursing note and vitals reviewed.      Vents:     Lines/Drains/Airways     Drain                 Urethral Catheter 12/24/19 1954 Non-latex 16 Fr. less than 1 day          Peripheral Intravenous Line                 Peripheral IV - Single Lumen 12/24/19 1812 18 G Right Upper Arm less than 1 day          Peripheral IV - Single Lumen 12/24/19 1923 20 G Left Forearm less than 1 day         Peripheral IV - Single Lumen 12/24/19 1940 18 G Left Upper Arm less than 1 day              Significant Labs:    CBC/Anemia Profile:  Recent Labs   Lab 12/24/19 1811 12/25/19 0108 12/25/19  0432   WBC 25.00* 31.52* 28.18*   HGB 8.2* 9.3* 8.7*   HCT 25.4* 29.8* 28.5*    204 181   MCV 86 88 90   RDW 18.0* 17.3* 17.4*        Chemistries:  Recent Labs   Lab 12/24/19 1811 12/25/19 0108 12/25/19  0432   * 135* 134*   K 3.9 3.5 4.3   CL 93* 105 107   CO2 19* 15* 14*   BUN 56* 55* 56*   CREATININE 4.2* 3.8* 3.6*   CALCIUM 9.0 7.8* 7.1*   ALBUMIN 3.2* 2.3* 2.1*   PROT 8.8* 7.1 6.9   BILITOT 0.8 0.5 0.4   ALKPHOS 71 79 72   ALT 17 12 12   AST 36 30 41*   MG  --  1.6 1.7   PHOS  --  5.9* 6.1*       ABGs:   Recent Labs   Lab 12/25/19  0045   PH 7.324*   PCO2 35.0   HCO3 18.2*   POCSATURATED 65*   BE -8     CMP:   Recent Labs   Lab 12/24/19 1811 12/25/19 0108 12/25/19  0432   * 135* 134*   K 3.9 3.5 4.3   CL 93* 105 107   CO2 19* 15* 14*   * 138* 150*   BUN 56* 55* 56*   CREATININE 4.2* 3.8* 3.6*   CALCIUM 9.0 7.8* 7.1*   PROT 8.8* 7.1 6.9   ALBUMIN 3.2* 2.3* 2.1*   BILITOT 0.8 0.5 0.4   ALKPHOS 71 79 72   AST 36 30 41*   ALT 17 12 12   ANIONGAP 19* 15 13   EGFRNONAA 14.9* 16.8* 17.9*     Troponin:   Recent Labs   Lab 12/24/19 1811 12/25/19  0108   TROPONINI 0.20* 0.148*     Urine Studies:   Recent Labs   Lab 12/24/19 1953 12/25/19  0140   COLORU Yellow Yellow   APPEARANCEUA Clear Cloudy*   PHUR 5.0 5.0   SPECGRAV 1.025 1.010   PROTEINUA 2+* 1+*   GLUCUA Negative Negative   KETONESU Negative Negative   BILIRUBINUA Negative Negative   OCCULTUA 3+* 3+*   NITRITE Negative Negative   UROBILINOGEN Negative  --    LEUKOCYTESUR 1+* 3+*   RBCUA 5* 12*   WBCUA >100* >100*   BACTERIA Moderate* Many*   SQUAMEPITHEL  --  1   HYALINECASTS 0 6*       Significant Imaging: I have reviewed all pertinent imaging results/findings  within the past 24 hours.     CT Head Without Contrast  Narrative: EXAMINATION:  CT HEAD WITHOUT CONTRAST    CLINICAL HISTORY:  Syncope/fainting;    TECHNIQUE:  Axial images obtained of brain without contrast    COMPARISON:  CT head 11/18/2016.    FINDINGS:  Study degraded by motion artifact.  Subtle area of decreased density right occipital lobe raises possibility of infarct of uncertain age.  MRI to better assess.    No acute intracranial hemorrhage.  The ventricular system and cortical sulci are normal size.  No intracranial space-occupying mass, mass effect or other focal parenchymal abnormality is seen.    Visualized paranasal sinuses and mastoid air cells are clear.  Impression: Subtle area of decreased density right occipital lobe raising possibility of infarct of indeterminate age.  Follow-up MRI to better assess.    No other focal parenchymal abnormality is seen.    Preliminary report provided by [facility] physician at time of exam.    This report was flagged in Epic as abnormal.    Electronically signed by: Marialuisa Brown MD  Date:    12/25/2019  Time:    07:39  CT Head Without Contrast  Narrative: EXAMINATION:  CT HEAD WITHOUT CONTRAST    CLINICAL HISTORY:  Confusion/delirium, altered LOC, unexplained;    TECHNIQUE:  Low dose axial images were obtained through the head.  Coronal and sagittal reformations were also performed. Contrast was not administered.    COMPARISON:  CT head 12/24/2019    FINDINGS:  There is mild generalized cerebral volume loss.  No convincing evidence of acute intracranial hemorrhage or midline shift.  No CT evidence to suggest acute major vascular territory infarct.  No definite extra-axial collections appreciated.  The basal cisterns are patent.  The mastoid air cells and paranasal sinuses are clear of acute process. The visualized bones of the calvarium demonstrate no acute osseous abnormality.  Impression: No CT evidence of acute intracranial abnormality. Clinical correlation and  further evaluation as warranted.    Electronically signed by: Daron Suarez MD  Date:    12/25/2019  Time:    04:23        Assessment/Plan:     Psychiatric  IV drug abuse  - Monitor for signs of withdrawal    Cardiac/Vascular  Elevated troponin  0.2 to 0.148. Likely 2/2 demand ischemia.    - Trend to peak    Endocarditis due to methicillin susceptible Staphylococcus aureus (MSSA)  History of mitral valve MSSA endocarditis s/p 6 week course of cefazolin ending 12/18. Concern for septic emboli leading to infarcts of spleen, potentially renal infarcts.    - Follow-up repeat echo  - Consider MRI brain if concern for septic emboli to brain  - Continue broad spectrum ABx with vanc and zosyn  - Consider having ID on board once cultures start to return  - Monitor daily CBC    Renal/  RJ (acute kidney injury)  Baseline sCr 0.9 beginning of 11/2019, now 4. 2 on admit. Improved with IV fluids. Likely multifactorial including hypotension, possibly element of septic ATN. Consider emboli to the kidneys from infective endocarditis.    - Avoid nephrotoxic meds/substances  - Strict Is and Os, daily weights  - Leung precaution  - Follow-up US RP  - Monitor daily renal function    ID  * Septic shock  Patient presented with generalized weakness and fatigue, vital signs concerning for shock with hypotension and tachycardia. Recently completed course of cefazolin for MSSA endocarditis, though UDS positive for amphetamines suggests patient may still be using IV drugs. With recent hospitalization would also be concerned about possible HAP. Urinalysis with 3+ leukocytes, 12 RBCs, >100 WBC and many bacteria - certainly concerning for urinary source. WCC elevated to 25. Initial lactate 7.9.   S/p 3L fluid resuscitation and BSABx with improvement in lactate to 1.8  Repeat CT head here without evidence of acute process  CXR unremarkable, no signs of consolidation    - Continue vancomycin and zosyn - de-escalate as cultures return  -  Follow-up BCx  And UCx  - Follow-up CT abdo/pelvis read  - Consider MRI brain for evaluation of possible infarcts given poor mentation    Other  Cachexia  Nutrition consult once more awake       Critical secondary to Patient has a condition that poses threat to life and bodily function: Acute Renal Failure and septic shock     Critical care was time spent personally by me on the following activities: development of treatment plan with patient or surrogate and bedside caregivers, discussions with consultants, evaluation of patient's response to treatment, examination of patient, ordering and performing treatments and interventions, ordering and review of laboratory studies, ordering and review of radiographic studies, pulse oximetry, re-evaluation of patient's condition. This critical care time did not overlap with that of any other provider or involve time for any procedures.     Nia Kern MD  Critical Care Medicine  [facility]

## 2019-12-25 NOTE — PLAN OF CARE
(Physician in Lead of Transfers)   Outside Transfer Acceptance Note / Regional Referral Center      Transferring Physician: Dr. Gabriel    Accepting Physician: Fili Clayton MD    Date of Acceptance: 12/24/2019    Transferring Facility: [facility]     Reason for Transfer: septic shock; subspeciality unavailability     Report from Transferring Physician/Hospital course: 52 y/o male with PMH of IVDU, HCV, with recent endocarditis. He was admitted to [facility] at the end of October with septic shock, endocarditis, MSSA bacteremia, and epidural abscess with C6-7 anterior cervical discectomy and fusion. He completed a course of cefazolin x 6 weeks. He presented today with chest pain and generalized weakness and found to be in septic shock. Required initiation of pressors with Efren. Given zosyn and vanc. Initial lactic acid was 7.9 and now down to 3.4. UA suggestive of UTI. Also with new marck; cr of 4.2 but previously normal. Hospitalist at [facility] uncomfortable admitting this patient without any subsepcialist support and is requesting transfer to [facility] ICU for his reason. Case was discussed with eICU and ICU fellow at [facility].       Labs & Radiographs: see EPIC      To Do List:   1) Admit per ICU protocols  2) Possible ID consult      Fili Clayton MD  Hospital Medicine Staff

## 2019-12-25 NOTE — HPI
Mr. Bronson is a 53M IVDU with HCV, Hx of MSSA bacteremia and infective endocarditis (s/p 6 weeks of cefazolin 12/18), cervical spine epidural abscess s/p C6-7 diskectomy and arthrodesis 11/15/2019 who presented to [facility] with chest pain, generalized weakness, and shortness of breath. At OSH, he was found to be in septic shock requiring pressor support. He was transferred to [facility] [place] level of care. On arrival, patient was somnolent and difficult to arouse. Patient did waken with sternal rub and answered questions appropriately, but history largely obtained through chart review.     At OSH ED, patient recieved full 30ccs/kg fluid resuscitation with an additional liter (total ~3L) for hypotension and tachycardia; lactic acid was elevated to 3.4. He received 1 dose each of vancomycin and zosyn prior to transfer.

## 2019-12-26 DIAGNOSIS — I38 ENDOCARDITIS, UNSPECIFIED CHRONICITY, UNSPECIFIED ENDOCARDITIS TYPE: Primary | ICD-10-CM

## 2019-12-26 LAB
ALBUMIN SERPL BCP-MCNC: 2.1 G/DL (ref 3.5–5.2)
ALP SERPL-CCNC: 58 U/L (ref 55–135)
ALT SERPL W/O P-5'-P-CCNC: 10 U/L (ref 10–44)
ANION GAP SERPL CALC-SCNC: 11 MMOL/L (ref 8–16)
ASCENDING AORTA: 3.27 CM
AST SERPL-CCNC: 19 U/L (ref 10–40)
AV INDEX (PROSTH): 1.07
AV MEAN GRADIENT: 3 MMHG
AV PEAK GRADIENT: 6 MMHG
AV VALVE AREA: 3.55 CM2
AV VELOCITY RATIO: 0.85
BACTERIA UR CULT: NO GROWTH
BASOPHILS # BLD AUTO: 0.01 K/UL (ref 0–0.2)
BASOPHILS NFR BLD: 0.1 % (ref 0–1.9)
BILIRUB SERPL-MCNC: 0.3 MG/DL (ref 0.1–1)
BSA FOR ECHO PROCEDURE: 1.77 M2
BUN SERPL-MCNC: 58 MG/DL (ref 6–20)
CALCIUM SERPL-MCNC: 7.3 MG/DL (ref 8.7–10.5)
CHLORIDE SERPL-SCNC: 110 MMOL/L (ref 95–110)
CO2 SERPL-SCNC: 17 MMOL/L (ref 23–29)
CREAT SERPL-MCNC: 2.6 MG/DL (ref 0.5–1.4)
CV ECHO LV RWT: 0.42 CM
DIFFERENTIAL METHOD: ABNORMAL
DOP CALC AO PEAK VEL: 1.23 M/S
DOP CALC AO VTI: 18.17 CM
DOP CALC LVOT AREA: 3.3 CM2
DOP CALC LVOT DIAMETER: 2.06 CM
DOP CALC LVOT PEAK VEL: 1.04 M/S
DOP CALC LVOT STROKE VOLUME: 64.59 CM3
DOP CALCLVOT PEAK VEL VTI: 19.39 CM
ECHO LV POSTERIOR WALL: 0.98 CM (ref 0.6–1.1)
EOSINOPHIL # BLD AUTO: 0 K/UL (ref 0–0.5)
EOSINOPHIL NFR BLD: 0.1 % (ref 0–8)
ERYTHROCYTE [DISTWIDTH] IN BLOOD BY AUTOMATED COUNT: 17.5 % (ref 11.5–14.5)
EST. GFR  (AFRICAN AMERICAN): 30.7 ML/MIN/1.73 M^2
EST. GFR  (NON AFRICAN AMERICAN): 26.6 ML/MIN/1.73 M^2
FRACTIONAL SHORTENING: 26 % (ref 28–44)
GLUCOSE SERPL-MCNC: 120 MG/DL (ref 70–110)
HCT VFR BLD AUTO: 25.3 % (ref 40–54)
HGB BLD-MCNC: 7.7 G/DL (ref 14–18)
HIV 1+2 AB+HIV1 P24 AG SERPL QL IA: NEGATIVE
IMM GRANULOCYTES # BLD AUTO: 0.05 K/UL (ref 0–0.04)
IMM GRANULOCYTES NFR BLD AUTO: 0.5 % (ref 0–0.5)
INTERVENTRICULAR SEPTUM: 1.42 CM (ref 0.6–1.1)
LA MAJOR: 5.86 CM
LA MINOR: 5.65 CM
LA WIDTH: 3.87 CM
LEFT ATRIUM SIZE: 3.61 CM
LEFT ATRIUM VOLUME INDEX: 37.8 ML/M2
LEFT ATRIUM VOLUME: 68.32 CM3
LEFT INTERNAL DIMENSION IN SYSTOLE: 3.5 CM (ref 2.1–4)
LEFT VENTRICLE DIASTOLIC VOLUME INDEX: 56.99 ML/M2
LEFT VENTRICLE DIASTOLIC VOLUME: 103.06 ML
LEFT VENTRICLE MASS INDEX: 118 G/M2
LEFT VENTRICLE SYSTOLIC VOLUME INDEX: 28.1 ML/M2
LEFT VENTRICLE SYSTOLIC VOLUME: 50.9 ML
LEFT VENTRICULAR INTERNAL DIMENSION IN DIASTOLE: 4.71 CM (ref 3.5–6)
LEFT VENTRICULAR MASS: 212.71 G
LYMPHOCYTES # BLD AUTO: 0.4 K/UL (ref 1–4.8)
LYMPHOCYTES NFR BLD: 4.8 % (ref 18–48)
MAGNESIUM SERPL-MCNC: 1.7 MG/DL (ref 1.6–2.6)
MCH RBC QN AUTO: 27.1 PG (ref 27–31)
MCHC RBC AUTO-ENTMCNC: 30.4 G/DL (ref 32–36)
MCV RBC AUTO: 89 FL (ref 82–98)
MONOCYTES # BLD AUTO: 0.2 K/UL (ref 0.3–1)
MONOCYTES NFR BLD: 2.6 % (ref 4–15)
NEUTROPHILS # BLD AUTO: 8.4 K/UL (ref 1.8–7.7)
NEUTROPHILS NFR BLD: 91.9 % (ref 38–73)
NRBC BLD-RTO: 0 /100 WBC
PHOSPHATE SERPL-MCNC: 3.9 MG/DL (ref 2.7–4.5)
PISA TR MAX VEL: 2.52 M/S
PLATELET # BLD AUTO: 127 K/UL (ref 150–350)
PMV BLD AUTO: 10.9 FL (ref 9.2–12.9)
POTASSIUM SERPL-SCNC: 3.2 MMOL/L (ref 3.5–5.1)
PROT SERPL-MCNC: 6.1 G/DL (ref 6–8.4)
RA MAJOR: 4.99 CM
RA PRESSURE: 3 MMHG
RA WIDTH: 3.21 CM
RBC # BLD AUTO: 2.84 M/UL (ref 4.6–6.2)
RIGHT VENTRICULAR END-DIASTOLIC DIMENSION: 3.87 CM
RPR SER QL: NORMAL
SINUS: 3.27 CM
SODIUM SERPL-SCNC: 138 MMOL/L (ref 136–145)
STJ: 3.27 CM
TDI LATERAL: 0.14 M/S
TDI SEPTAL: 0.09 M/S
TDI: 0.12 M/S
TR MAX PG: 25 MMHG
TRICUSPID ANNULAR PLANE SYSTOLIC EXCURSION: 2.69 CM
TV REST PULMONARY ARTERY PRESSURE: 28 MMHG
VANCOMYCIN SERPL-MCNC: 29.4 UG/ML
WBC # BLD AUTO: 9.16 K/UL (ref 3.9–12.7)

## 2019-12-26 PROCEDURE — 25000003 PHARM REV CODE 250: Performed by: STUDENT IN AN ORGANIZED HEALTH CARE EDUCATION/TRAINING PROGRAM

## 2019-12-26 PROCEDURE — 83735 ASSAY OF MAGNESIUM: CPT

## 2019-12-26 PROCEDURE — 84100 ASSAY OF PHOSPHORUS: CPT

## 2019-12-26 PROCEDURE — 99223 1ST HOSP IP/OBS HIGH 75: CPT | Mod: ,,, | Performed by: INTERNAL MEDICINE

## 2019-12-26 PROCEDURE — 87077 CULTURE AEROBIC IDENTIFY: CPT

## 2019-12-26 PROCEDURE — 63600175 PHARM REV CODE 636 W HCPCS: Performed by: STUDENT IN AN ORGANIZED HEALTH CARE EDUCATION/TRAINING PROGRAM

## 2019-12-26 PROCEDURE — 99223 1ST HOSP IP/OBS HIGH 75: CPT | Mod: ,,, | Performed by: PHYSICIAN ASSISTANT

## 2019-12-26 PROCEDURE — 99233 SBSQ HOSP IP/OBS HIGH 50: CPT | Mod: ,,, | Performed by: INTERNAL MEDICINE

## 2019-12-26 PROCEDURE — 36556 INSERT NON-TUNNEL CV CATH: CPT | Mod: ,,, | Performed by: NURSE PRACTITIONER

## 2019-12-26 PROCEDURE — 94761 N-INVAS EAR/PLS OXIMETRY MLT: CPT

## 2019-12-26 PROCEDURE — 99223 PR INITIAL HOSPITAL CARE,LEVL III: ICD-10-PCS | Mod: ,,, | Performed by: PHYSICIAN ASSISTANT

## 2019-12-26 PROCEDURE — 20000000 HC ICU ROOM

## 2019-12-26 PROCEDURE — 85025 COMPLETE CBC W/AUTO DIFF WBC: CPT

## 2019-12-26 PROCEDURE — 36556 PR INSERT NON-TUNNEL CV CATH 5+ YRS OLD: ICD-10-PCS | Mod: ,,, | Performed by: NURSE PRACTITIONER

## 2019-12-26 PROCEDURE — 99223 PR INITIAL HOSPITAL CARE,LEVL III: ICD-10-PCS | Mod: ,,, | Performed by: INTERNAL MEDICINE

## 2019-12-26 PROCEDURE — 80053 COMPREHEN METABOLIC PANEL: CPT

## 2019-12-26 PROCEDURE — 63600175 PHARM REV CODE 636 W HCPCS: Performed by: NURSE PRACTITIONER

## 2019-12-26 PROCEDURE — 99233 PR SUBSEQUENT HOSPITAL CARE,LEVL III: ICD-10-PCS | Mod: ,,, | Performed by: INTERNAL MEDICINE

## 2019-12-26 PROCEDURE — 87040 BLOOD CULTURE FOR BACTERIA: CPT

## 2019-12-26 PROCEDURE — 87186 SC STD MICRODIL/AGAR DIL: CPT

## 2019-12-26 PROCEDURE — 80202 ASSAY OF VANCOMYCIN: CPT

## 2019-12-26 RX ORDER — POTASSIUM CHLORIDE 14.9 MG/ML
60 INJECTION INTRAVENOUS
Status: DISCONTINUED | OUTPATIENT
Start: 2019-12-26 | End: 2019-12-26

## 2019-12-26 RX ORDER — OXYCODONE HYDROCHLORIDE 10 MG/1
10 TABLET ORAL EVERY 6 HOURS PRN
Status: DISCONTINUED | OUTPATIENT
Start: 2019-12-26 | End: 2020-01-03

## 2019-12-26 RX ORDER — MAGNESIUM SULFATE HEPTAHYDRATE 40 MG/ML
4 INJECTION, SOLUTION INTRAVENOUS
Status: DISCONTINUED | OUTPATIENT
Start: 2019-12-26 | End: 2019-12-26

## 2019-12-26 RX ORDER — MAGNESIUM SULFATE HEPTAHYDRATE 40 MG/ML
2 INJECTION, SOLUTION INTRAVENOUS
Status: DISCONTINUED | OUTPATIENT
Start: 2019-12-26 | End: 2019-12-26

## 2019-12-26 RX ORDER — OXYCODONE HYDROCHLORIDE 5 MG/1
5 TABLET ORAL EVERY 6 HOURS PRN
Status: DISCONTINUED | OUTPATIENT
Start: 2019-12-26 | End: 2020-01-03

## 2019-12-26 RX ORDER — POTASSIUM CHLORIDE 20 MEQ/15ML
40 SOLUTION ORAL ONCE
Status: COMPLETED | OUTPATIENT
Start: 2019-12-26 | End: 2019-12-26

## 2019-12-26 RX ORDER — POTASSIUM CHLORIDE 29.8 MG/ML
40 INJECTION INTRAVENOUS
Status: DISCONTINUED | OUTPATIENT
Start: 2019-12-26 | End: 2019-12-26

## 2019-12-26 RX ORDER — POTASSIUM CHLORIDE 29.8 MG/ML
80 INJECTION INTRAVENOUS
Status: DISCONTINUED | OUTPATIENT
Start: 2019-12-26 | End: 2019-12-26

## 2019-12-26 RX ORDER — ACETAMINOPHEN 325 MG/1
650 TABLET ORAL EVERY 4 HOURS PRN
Status: DISCONTINUED | OUTPATIENT
Start: 2019-12-26 | End: 2020-01-06 | Stop reason: HOSPADM

## 2019-12-26 RX ADMIN — PROCHLORPERAZINE EDISYLATE 5 MG: 5 INJECTION INTRAMUSCULAR; INTRAVENOUS at 07:12

## 2019-12-26 RX ADMIN — OXYCODONE HYDROCHLORIDE 10 MG: 10 TABLET ORAL at 08:12

## 2019-12-26 RX ADMIN — PIPERACILLIN AND TAZOBACTAM 4.5 G: 4; .5 INJECTION, POWDER, FOR SOLUTION INTRAVENOUS at 07:12

## 2019-12-26 RX ADMIN — OXACILLIN SODIUM 12 G: 10 INJECTION, POWDER, FOR SOLUTION INTRAVENOUS at 04:12

## 2019-12-26 RX ADMIN — ACETAMINOPHEN 650 MG: 325 TABLET ORAL at 08:12

## 2019-12-26 RX ADMIN — ENOXAPARIN SODIUM 30 MG: 100 INJECTION SUBCUTANEOUS at 04:12

## 2019-12-26 RX ADMIN — ONDANSETRON 4 MG: 2 INJECTION INTRAMUSCULAR; INTRAVENOUS at 02:12

## 2019-12-26 RX ADMIN — POTASSIUM CHLORIDE 40 MEQ: 20 SOLUTION ORAL at 08:12

## 2019-12-26 RX ADMIN — Medication 0.14 MCG/KG/MIN: at 05:12

## 2019-12-26 NOTE — SUBJECTIVE & OBJECTIVE
No past medical history on file.    Past Surgical History:   Procedure Laterality Date    ANTERIOR CERVICAL DISCECTOMY W/ FUSION N/A 11/6/2019    Procedure: C6-C7 ACDF w neuromonitoring, Mikayla Doshi for instrumentation;  Surgeon: Becca Malone MD;  Location: [facility] OR [address] [place] [address];  Service: Neurosurgery;  Laterality: N/A;    FRACTURE SURGERY      TRANSESOPHAGEAL ECHOCARDIOGRAPHY N/A 11/4/2019    Procedure: ECHOCARDIOGRAM, TRANSESOPHAGEAL;  Surgeon: Radha Diagnostic Provider;  Location: [facility] EP LAB;  Service: Anesthesiology;  Laterality: N/A;       Review of patient's allergies indicates:  No Known Allergies    Family History     None        Tobacco Use    Smoking status: Current Some Day Smoker     Types: Cigarettes    Smokeless tobacco: Former User   Substance and Sexual Activity    Alcohol use: Not on file    Drug use: Yes     Types: Heroin    Sexual activity: Not Currently      Review of Systems   Constitutional: Negative for chills and fever.   HENT: Negative for congestion, rhinorrhea and sore throat.    Eyes: Negative for visual disturbance.   Respiratory: Negative for cough, shortness of breath and stridor.    Cardiovascular: Positive for chest pain (pain with deep breaths). Negative for leg swelling.   Gastrointestinal: Negative for abdominal distention, abdominal pain, blood in stool, nausea and vomiting.   Genitourinary: Negative for dysuria and hematuria.   Musculoskeletal: Positive for back pain and neck pain (pain since ACDF). Negative for myalgias.   Skin: Negative for color change and rash.   Neurological: Negative for dizziness, light-headedness and headaches.   Psychiatric/Behavioral: Negative for agitation and confusion.     Objective:     Vital Signs (Most Recent):  Temp: 99 °F (37.2 °C) (12/26/19 1100)  Pulse: 104 (12/26/19 1315)  Resp: (!) 26 (12/26/19 1315)  BP: 100/60 (12/26/19 1315)  SpO2: 97 % (12/26/19 1315) Vital Signs (24h Range):  Temp:  [97.7 °F (36.5 °C)-99 °F (37.2 °C)] 99 °F (37.2  °C)  Pulse:  [] 104  Resp:  [13-36] 26  SpO2:  [94 %-100 %] 97 %  BP: ()/(42-67) 100/60   Weight: 61.7 kg (136 lb)  Body mass index is 18.44 kg/m².      Intake/Output Summary (Last 24 hours) at 12/26/2019 1400  Last data filed at 12/26/2019 1300  Gross per 24 hour   Intake 2518 ml   Output 3035 ml   Net -517 ml       Physical Exam   Constitutional: He is oriented to person, place, and time. He appears well-developed. No distress.   HENT:   Head: Atraumatic.   Mouth/Throat: No oropharyngeal exudate.   Eyes: Conjunctivae and EOM are normal. No scleral icterus.   Pinpoint pupils   Neck: Normal range of motion. Neck supple.   Cardiovascular: Normal rate and regular rhythm.   Murmur heard.  Pulmonary/Chest: Effort normal and breath sounds normal. No stridor. No respiratory distress. He has no wheezes.   Abdominal: Soft. Bowel sounds are normal. He exhibits no distension. There is no tenderness. There is no guarding.   Musculoskeletal: He exhibits tenderness (cervical and lumbar tenderness). He exhibits no edema.   Neurological: He is alert and oriented to person, place, and time. No cranial nerve deficit.   Skin: Skin is warm and dry. No rash noted. He is not diaphoretic. No erythema.   Nursing note and vitals reviewed.      Vents:     Lines/Drains/Airways     Central Venous Catheter Line                 Percutaneous Central Line Insertion/Assessment - triple lumen  12/26/19 0230 right internal jugular less than 1 day          Drain                 Urethral Catheter 12/24/19 1954 Non-latex 16 Fr. 1 day          Peripheral Intravenous Line                 Peripheral IV - Single Lumen 12/24/19 1812 18 G Right Upper Arm 1 day         Peripheral IV - Single Lumen 12/24/19 1923 20 G Left Forearm 1 day         Peripheral IV - Single Lumen 12/24/19 1940 18 G Left Upper Arm 1 day              Significant Labs:    CBC/Anemia Profile:  Recent Labs   Lab 12/25/19  0108 12/25/19  0432 12/26/19  0524   WBC 31.52* 28.18*  9.16   HGB 9.3* 8.7* 7.7*   HCT 29.8* 28.5* 25.3*    181 127*   MCV 88 90 89   RDW 17.3* 17.4* 17.5*        Chemistries:  Recent Labs   Lab 12/25/19 0108 12/25/19  0432 12/26/19  0632   * 134* 138   K 3.5 4.3 3.2*    107 110   CO2 15* 14* 17*   BUN 55* 56* 58*   CREATININE 3.8* 3.6* 2.6*   CALCIUM 7.8* 7.1* 7.3*   ALBUMIN 2.3* 2.1* 2.1*   PROT 7.1 6.9 6.1   BILITOT 0.5 0.4 0.3   ALKPHOS 79 72 58   ALT 12 12 10   AST 30 41* 19   MG 1.6 1.7 1.7   PHOS 5.9* 6.1* 3.9       ABGs:   Recent Labs   Lab 12/25/19  0045   PH 7.324*   PCO2 35.0   HCO3 18.2*   POCSATURATED 65*   BE -8     CMP:   Recent Labs   Lab 12/25/19 0108 12/25/19  0432 12/26/19  0632   * 134* 138   K 3.5 4.3 3.2*    107 110   CO2 15* 14* 17*   * 150* 120*   BUN 55* 56* 58*   CREATININE 3.8* 3.6* 2.6*   CALCIUM 7.8* 7.1* 7.3*   PROT 7.1 6.9 6.1   ALBUMIN 2.3* 2.1* 2.1*   BILITOT 0.5 0.4 0.3   ALKPHOS 79 72 58   AST 30 41* 19   ALT 12 12 10   ANIONGAP 15 13 11   EGFRNONAA 16.8* 17.9* 26.6*     Troponin:   Recent Labs   Lab 12/24/19  1811 12/25/19  0108 12/25/19  0834   TROPONINI 0.20* 0.148* 0.092*     Urine Studies:   Recent Labs   Lab 12/24/19 1953 12/25/19  0140   COLORU Yellow Yellow   APPEARANCEUA Clear Cloudy*   PHUR 5.0 5.0   SPECGRAV 1.025 1.010   PROTEINUA 2+* 1+*   GLUCUA Negative Negative   KETONESU Negative Negative   BILIRUBINUA Negative Negative   OCCULTUA 3+* 3+*   NITRITE Negative Negative   UROBILINOGEN Negative  --    LEUKOCYTESUR 1+* 3+*   RBCUA 5* 12*   WBCUA >100* >100*   BACTERIA Moderate* Many*   SQUAMEPITHEL  --  1   HYALINECASTS 0 6*       Significant Imaging: I have reviewed all pertinent imaging results/findings within the past 24 hours.     Echo Color Flow Doppler? Yes  · There is anterior mitral leaflet vegetation. There is a moderate-large 7   by 12 mm mobile density that flops on both sides of the mitral valve that   is likely vegetation but could also represent a ruptured part of the    chordal apparatus.  · Low normal left ventricular systolic function. The estimated ejection   fraction is 55%  · Mild eccentric left ventricular hypertrophy.  · Septal wall has abnormal motion.  · Normal LV diastolic function.  · Mild left atrial enlargement.  · Normal right ventricular systolic function.  · Mild right atrial enlargement.  · Moderate mitral sclerosis with echo density as above.  · Moderate-to-severe mitral regurgitation.  · Mild tricuspid regurgitation.  · Normal central venous pressure (3 mm Hg).  · The estimated PA systolic pressure is 28 mm Hg     MRI Brain Without Contrast  Narrative: EXAMINATION:  MRI BRAIN WITHOUT CONTRAST    CLINICAL HISTORY:  L sided infective endocarditis concern for septic emboli with AMS;    TECHNIQUE:  Sagittal and axial T1, axial T2, axial FLAIR, axial gradient and axial diffusion imaging of the whole brain without contrast.    COMPARISON:  CT 12/25/2019    FINDINGS:  There are several scattered punctate to small sized foci of restricted diffusion within the cerebral hemispheres and cerebellum primarily subcortical within the posterior frontal parietal lobes bilaterally and right posterior temporal occipital lobes.  There are few scattered areas of punctate to small sized susceptibility associated with several of the foci.  Configuration concerning for recent areas of infarction.  In light of history differential consideration to include septic emboli however posterior reversible encephalopathy not excluded.    There is scattered T2 FLAIR signal hyperintensity associated with the areas of diffusion signal abnormality.  There is no midline shift or significant mass effect.  Ventricles stable from CT without hydrocephalus.    Please note there is vasogenic edema within the right occipital lobe without diffusion signal abnormality concerning for possible subacute age vascular event versus cerebritis.    The major intracranial T2 flow voids are present.  Heterogeneous  hypointense T1 bone marrow signal throughout the calvarium which may represent component of red marrow reconversion.    Case discussed with CK Faye 12/26/2019 At 8:17 am    This report was flagged in Epic as abnormal.  Impression: Numerous scattered punctate to small sized foci of signal abnormality cerebral hemispheres and cerebellum bilaterally.  This is most pronounced involving the posterior frontal and parietal lobes as well as right posterior temporal occipital lobes.  Configuration light of history concerning for evolving areas of infarction which raises concern for possible septic emboli.  Please note alternative differential in light of the pattern includes posterior reversible encephalopathy.    Small foci of susceptibility associated with several the regions concerning for petechial hemorrhage.    There is focal area of signal abnormality within the right parasagittal occipital lobe which is primarily subcortical edema without diffusion signal abnormality which may represent subacute aged infarct with focal cerebritis not excluded in light of history.    Electronically signed by: Gama Tovar DO  Date:    12/26/2019  Time:    08:18  X-Ray Chest 1 View  Narrative: EXAMINATION:  XR CHEST 1 VIEW    CLINICAL HISTORY:  central line placement;    TECHNIQUE:  Single frontal view of the chest was performed.    COMPARISON:  12/25    FINDINGS:  Right jugular line is now present with tip over SVC; no pneumothorax is identified post placement.  Other faint tubes in the area may be oxygen tubing.  The heart size is normal.  Mediastinal structures are midline.  Lungs are expanded.  Minimal, patchy consolidation is present in the left lower lung zone, could indicate edema, pneumonia, aspiration, etc.  No significant pleural fluid is seen.  Visualized upper abdomen shows some distention of the stomach with gas but no obvious free air.  Small metal object projects over lower cervical spine.  Impression: Right jugular  line placement with tip at [facility]    Electronically signed by: Guillermo Magana MD  Date:    12/26/2019  Time:    07:16

## 2019-12-26 NOTE — NURSING
MRI called to set up a time for MRI of spine. MRI stated they are very busy with STAT scans and will give me a call back when their list begins to clear up. Spectra link given to MRI tech.

## 2019-12-26 NOTE — PROGRESS NOTES
[facility]  Critical Care Medicine  Progress Note    Patient Name: Ld Bronson  MRN: [id number]  Admission Date: 12/25/2019  Hospital Length of Stay: 1 days  Code Status: Full Code  Attending Provider: Warren Strange MD  Primary Care Provider: Kris Artis Jr, MD   Principal Problem: Septic shock    Subjective:     HPI:  Mr. Bronson is a 53M IVDU with HCV, Hx of MSSA bacteremia and infective endocarditis (s/p 6 weeks of cefazolin 12/18), cervical spine epidural abscess s/p C6-7 diskectomy and arthrodesis 11/15/2019 who presented to [facility] with chest pain, generalized weakness, and shortness of breath. At OSH, he was found to be in septic shock requiring pressor support. He was transferred to [facility] for higher level of care. On arrival, patient was somnolent and difficult to arouse. Patient did waken with sternal rub and answered questions appropriately, but history largely obtained through chart review.     At OSH ED, patient recieved full 30ccs/kg fluid resuscitation with an additional liter (total ~3L) for hypotension and tachycardia; lactic acid was elevated to 3.4. He received 1 dose each of vancomycin and zosyn prior to transfer.     Hospital/ICU Course:  [facility] after a C6-7 ACDF with washout on 11/6 with end date 12/18/19. [facility] though it appears from transfer note that the patient had completed his full 6 week course at this time. His WCC and lactate have decreased significantly since starting broad spectrum antibiotics with vanc/zosyn and will be continued until sensitivities return. BCxs still positive for Gram positive cocci resembling Staph. Patient c/o low back pain that has been present for a month and chronic neck pain since the anterior cervical diskectomy and fusion. MRI cervical, thoracic, and lumbar ordered to r/o spinal cord compression and abscess.  ID has been consulted for the recurrence of bacteremia and endocarditis, addiction psych consulted for continued heroin use (last use 3 days prior to admission per OSH ED note), and CTS consulted for evaluation of vegetation vs ruptured chord apparatus and worsened MR from prior study 11/04.    No past medical history on file.    Past Surgical History:   Procedure Laterality Date    ANTERIOR CERVICAL DISCECTOMY W/ FUSION N/A 11/6/2019    Procedure: C6-C7 ACDF w neuromonitoring, Mikayla Doshi for instrumentation;  Surgeon: Becca Malone MD;  Location: [facility] OR [address] [place] [address];  Service: Neurosurgery;  Laterality: N/A;    FRACTURE SURGERY      TRANSESOPHAGEAL ECHOCARDIOGRAPHY N/A 11/4/2019    Procedure: ECHOCARDIOGRAM, TRANSESOPHAGEAL;  Surgeon: [facility] Diagnostic Provider;  Location: [facility] EP LAB;  Service: Anesthesiology;  Laterality: N/A;       Review of patient's allergies indicates:  No Known Allergies    Family History     None        Tobacco Use    Smoking status: Current Some Day Smoker     Types: Cigarettes    Smokeless tobacco: Former User   Substance and Sexual Activity    Alcohol use: Not on file    Drug use: Yes     Types: Heroin    Sexual activity: Not Currently      Review of Systems   Constitutional: Negative for chills and fever.   HENT: Negative for congestion, rhinorrhea and sore throat.    Eyes: Negative for visual disturbance.   Respiratory: Negative for cough, shortness of breath and stridor.    Cardiovascular: Positive for chest pain (pain with deep breaths). Negative for leg swelling.   Gastrointestinal: Negative for abdominal distention, abdominal pain, blood in stool, nausea and vomiting.   Genitourinary: Negative for dysuria and hematuria.   Musculoskeletal: Positive for back pain and neck pain (pain since ACDF). Negative for myalgias.   Skin: Negative for color change and rash.   Neurological: Negative for dizziness, light-headedness and headaches.   Psychiatric/Behavioral: Negative for agitation  and confusion.     Objective:     Vital Signs (Most Recent):  Temp: 99 °F (37.2 °C) (12/26/19 1100)  Pulse: 104 (12/26/19 1315)  Resp: (!) 26 (12/26/19 1315)  BP: 100/60 (12/26/19 1315)  SpO2: 97 % (12/26/19 1315) Vital Signs (24h Range):  Temp:  [97.7 °F (36.5 °C)-99 °F (37.2 °C)] 99 °F (37.2 °C)  Pulse:  [] 104  Resp:  [13-36] 26  SpO2:  [94 %-100 %] 97 %  BP: ()/(42-67) 100/60   Weight: 61.7 kg (136 lb)  Body mass index is 18.44 kg/m².      Intake/Output Summary (Last 24 hours) at 12/26/2019 1400  Last data filed at 12/26/2019 1300  Gross per 24 hour   Intake 2518 ml   Output 3035 ml   Net -517 ml       Physical Exam   Constitutional: He is oriented to person, place, and time. He appears well-developed. No distress.   HENT:   Head: Atraumatic.   Mouth/Throat: No oropharyngeal exudate.   Eyes: Conjunctivae and EOM are normal. No scleral icterus.   Pinpoint pupils   Neck: Normal range of motion. Neck supple.   Cardiovascular: Normal rate and regular rhythm.   Murmur heard.  Pulmonary/Chest: Effort normal and breath sounds normal. No stridor. No respiratory distress. He has no wheezes.   Abdominal: Soft. Bowel sounds are normal. He exhibits no distension. There is no tenderness. There is no guarding.   Musculoskeletal: He exhibits tenderness (cervical and lumbar tenderness). He exhibits no edema.   Neurological: He is alert and oriented to person, place, and time. No cranial nerve deficit.   Skin: Skin is warm and dry. No rash noted. He is not diaphoretic. No erythema.   Nursing note and vitals reviewed.      Vents:     Lines/Drains/Airways     Central Venous Catheter Line                 Percutaneous Central Line Insertion/Assessment - triple lumen  12/26/19 0230 right internal jugular less than 1 day          Drain                 Urethral Catheter 12/24/19 1954 Non-latex 16 Fr. 1 day          Peripheral Intravenous Line                 Peripheral IV - Single Lumen 12/24/19 1812 18 G Right Upper Arm 1  day         Peripheral IV - Single Lumen 12/24/19 1923 20 G Left Forearm 1 day         Peripheral IV - Single Lumen 12/24/19 1940 18 G Left Upper Arm 1 day              Significant Labs:    CBC/Anemia Profile:  Recent Labs   Lab 12/25/19 0108 12/25/19  0432 12/26/19  0524   WBC 31.52* 28.18* 9.16   HGB 9.3* 8.7* 7.7*   HCT 29.8* 28.5* 25.3*    181 127*   MCV 88 90 89   RDW 17.3* 17.4* 17.5*        Chemistries:  Recent Labs   Lab 12/25/19 0108 12/25/19  0432 12/26/19  0632   * 134* 138   K 3.5 4.3 3.2*    107 110   CO2 15* 14* 17*   BUN 55* 56* 58*   CREATININE 3.8* 3.6* 2.6*   CALCIUM 7.8* 7.1* 7.3*   ALBUMIN 2.3* 2.1* 2.1*   PROT 7.1 6.9 6.1   BILITOT 0.5 0.4 0.3   ALKPHOS 79 72 58   ALT 12 12 10   AST 30 41* 19   MG 1.6 1.7 1.7   PHOS 5.9* 6.1* 3.9       ABGs:   Recent Labs   Lab 12/25/19  0045   PH 7.324*   PCO2 35.0   HCO3 18.2*   POCSATURATED 65*   BE -8     CMP:   Recent Labs   Lab 12/25/19 0108 12/25/19  0432 12/26/19  0632   * 134* 138   K 3.5 4.3 3.2*    107 110   CO2 15* 14* 17*   * 150* 120*   BUN 55* 56* 58*   CREATININE 3.8* 3.6* 2.6*   CALCIUM 7.8* 7.1* 7.3*   PROT 7.1 6.9 6.1   ALBUMIN 2.3* 2.1* 2.1*   BILITOT 0.5 0.4 0.3   ALKPHOS 79 72 58   AST 30 41* 19   ALT 12 12 10   ANIONGAP 15 13 11   EGFRNONAA 16.8* 17.9* 26.6*     Troponin:   Recent Labs   Lab 12/24/19 1811 12/25/19  0108 12/25/19  0834   TROPONINI 0.20* 0.148* 0.092*     Urine Studies:   Recent Labs   Lab 12/24/19 1953 12/25/19  0140   COLORU Yellow Yellow   APPEARANCEUA Clear Cloudy*   PHUR 5.0 5.0   SPECGRAV 1.025 1.010   PROTEINUA 2+* 1+*   GLUCUA Negative Negative   KETONESU Negative Negative   BILIRUBINUA Negative Negative   OCCULTUA 3+* 3+*   NITRITE Negative Negative   UROBILINOGEN Negative  --    LEUKOCYTESUR 1+* 3+*   RBCUA 5* 12*   WBCUA >100* >100*   BACTERIA Moderate* Many*   SQUAMEPITHEL  --  1   HYALINECASTS 0 6*       Significant Imaging: I have reviewed all pertinent imaging  results/findings within the past 24 hours.     Echo Color Flow Doppler? Yes  · There is anterior mitral leaflet vegetation. There is a moderate-large 7   by 12 mm mobile density that flops on both sides of the mitral valve that   is likely vegetation but could also represent a ruptured part of the   chordal apparatus.  · Low normal left ventricular systolic function. The estimated ejection   fraction is 55%  · Mild eccentric left ventricular hypertrophy.  · Septal wall has abnormal motion.  · Normal LV diastolic function.  · Mild left atrial enlargement.  · Normal right ventricular systolic function.  · Mild right atrial enlargement.  · Moderate mitral sclerosis with echo density as above.  · Moderate-to-severe mitral regurgitation.  · Mild tricuspid regurgitation.  · Normal central venous pressure (3 mm Hg).  · The estimated PA systolic pressure is 28 mm Hg     MRI Brain Without Contrast  Narrative: EXAMINATION:  MRI BRAIN WITHOUT CONTRAST    CLINICAL HISTORY:  L sided infective endocarditis concern for septic emboli with AMS;    TECHNIQUE:  Sagittal and axial T1, axial T2, axial FLAIR, axial gradient and axial diffusion imaging of the whole brain without contrast.    COMPARISON:  CT 12/25/2019    FINDINGS:  There are several scattered punctate to small sized foci of restricted diffusion within the cerebral hemispheres and cerebellum primarily subcortical within the posterior frontal parietal lobes bilaterally and right posterior temporal occipital lobes.  There are few scattered areas of punctate to small sized susceptibility associated with several of the foci.  Configuration concerning for recent areas of infarction.  In light of history differential consideration to include septic emboli however posterior reversible encephalopathy not excluded.    There is scattered T2 FLAIR signal hyperintensity associated with the areas of diffusion signal abnormality.  There is no midline shift or significant mass effect.   Ventricles stable from CT without hydrocephalus.    Please note there is vasogenic edema within the right occipital lobe without diffusion signal abnormality concerning for possible subacute age vascular event versus cerebritis.    The major intracranial T2 flow voids are present.  Heterogeneous hypointense T1 bone marrow signal throughout the calvarium which may represent component of red marrow reconversion.    Case discussed with CK Faye 12/26/2019 At 8:17 am    This report was flagged in Epic as abnormal.  Impression: Numerous scattered punctate to small sized foci of signal abnormality cerebral hemispheres and cerebellum bilaterally.  This is most pronounced involving the posterior frontal and parietal lobes as well as right posterior temporal occipital lobes.  Configuration light of history concerning for evolving areas of infarction which raises concern for possible septic emboli.  Please note alternative differential in light of the pattern includes posterior reversible encephalopathy.    Small foci of susceptibility associated with several the regions concerning for petechial hemorrhage.    There is focal area of signal abnormality within the right parasagittal occipital lobe which is primarily subcortical edema without diffusion signal abnormality which may represent subacute aged infarct with focal cerebritis not excluded in light of history.    Electronically signed by: Gama Tovar DO  Date:    12/26/2019  Time:    08:18  X-Ray Chest 1 View  Narrative: EXAMINATION:  XR CHEST 1 VIEW    CLINICAL HISTORY:  central line placement;    TECHNIQUE:  Single frontal view of the chest was performed.    COMPARISON:  12/25    FINDINGS:  Right jugular line is now present with tip over SVC; no pneumothorax is identified post placement.  Other faint tubes in the area may be oxygen tubing.  The heart size is normal.  Mediastinal structures are midline.  Lungs are expanded.  Minimal, patchy consolidation is present in  the left lower lung zone, could indicate edema, pneumonia, aspiration, etc.  No significant pleural fluid is seen.  Visualized upper abdomen shows some distention of the stomach with gas but no obvious free air.  Small metal object projects over lower cervical spine.  Impression: Right jugular line placement with tip at SVC    Electronically signed by: Guillermo Magana MD  Date:    12/26/2019  Time:    07:16          ABG  Recent Labs   Lab 12/25/19  0045   PH 7.324*   PO2 36*   PCO2 35.0   HCO3 18.2*   BE -8     Assessment/Plan:     Psychiatric  IV drug abuse  - Monitor for signs of withdrawal  - Addiction psychiatry consulted     Cardiac/Vascular  Elevated troponin  0.2 to 0.148. Likely 2/2 demand ischemia.    - Trending down, 0.092 (12/26/19)    Endocarditis due to methicillin susceptible Staphylococcus aureus (MSSA)  History of mitral valve MSSA endocarditis s/p 6 week course of cefazolin ending 12/18. Concern for septic emboli leading to infarcts of spleen, potentially renal infarcts. Repeat echo with vegetation vs ruptured chord apparatus of mitral valve, worsened MR to mod-severe from mild-mod on 11/04. 12/26 MRI brain with multiple punctate foci bilaterally representing septic emboli. AOx4, no FNDs at this time.    - Continue broad spectrum ABx with vanc and zosyn until sensitivities return  - BCxs daily until clear  - ID consulted  - Monitor daily CBC    Renal/  RJ (acute kidney injury)  Baseline sCr 0.9 beginning of 11/2019, now 4. 2 on admit. Improved with IV fluids. Likely multifactorial including hypotension, possibly element of septic ATN. Consider emboli to the kidneys from infective endocarditis. Cr improving, now 2.6 (12/26/19). Likely RJ 2/2 sepsis and hypovolemia.    - Avoid nephrotoxic meds/substances  - Strict Is and Os, daily weights  - Leung precaution  - US RP with no significant abnormalities  - Monitor daily renal function    ID  * Septic shock  Patient presented with generalized  weakness and fatigue, vital signs concerning for shock with hypotension and tachycardia. Recently completed course of cefazolin for MSSA endocarditis, though UDS positive for amphetamines suggests patient may still be using IV drugs. With recent hospitalization would also be concerned about possible HAP. Urinalysis with 3+ leukocytes, 12 RBCs, >100 WBC and many bacteria - certainly concerning for urinary source. WCC elevated to 25. Initial lactate 7.9.   S/p 3L fluid resuscitation and BSABx with improvement in lactate to 1.8  Repeat CT head here without evidence of acute process  CXR unremarkable, no signs of consolidation. CT abdo/pelvis 11/24 shows splenic infarcts as well as cholelithiasis    - Continue vancomycin and zosyn - de-escalate as cultures return  - Continue to follow-up BCx  And UCx  - Wean pressors as tolerated    Other  Cachexia  Full liquid diet at this time  - ADAT       Critical Care Daily Checklist:    A: Awake: RASS Goal/Actual Goal:    Actual: Gonzalez Agitation Sedation Scale (RASS): Alert and calm   B: Spontaneous Breathing Trial Performed?     C: SAT & SBT Coordinated?  n/a                      D: Delirium: CAM-ICU Overall CAM-ICU: Negative   E: Early Mobility Performed? No   F: Feeding Goal:    Status:     Current Diet Order   Procedures    Diet Cardiac      AS: Analgesia/Sedation n/a   T: Thromboembolic Prophylaxis lovenox   H: HOB > 300 Yes   U: Stress Ulcer Prophylaxis (if needed) n/a   G: Glucose Control managing   B: Bowel Function Stool Occurrence: 1   I: Indwelling Catheter (Lines & Leung) Necessity Leung  PIV x3  RIJ central line   D: De-escalation of Antimicrobials/Pharmacotherapies When sensitivities return    Plan for the day/ETD MRI C/T/L to assess lower back pain r/o spinal cord compression/abscess    Code Status:  Family/Goals of Care: Full Code         Critical secondary to Patient has a condition that poses threat to life and bodily function: septic shock requiring pressors       Critical care was time spent personally by me on the following activities: development of treatment plan with patient or surrogate and bedside caregivers, discussions with consultants, evaluation of patient's response to treatment, examination of patient, ordering and performing treatments and interventions, ordering and review of laboratory studies, ordering and review of radiographic studies, pulse oximetry, re-evaluation of patient's condition. This critical care time did not overlap with that of any other provider or involve time for any procedures.     Adeel Wagner MD  Critical Care Medicine  [facility]

## 2019-12-26 NOTE — PROGRESS NOTES
Therapy with Vaocmycin complete and/or consult discontinued by provider.  Pharmacy will sign off, please re-consult as needed.

## 2019-12-26 NOTE — ASSESSMENT & PLAN NOTE
Baseline sCr 0.9 beginning of 11/2019, now 4. 2 on admit. Improved with IV fluids. Likely multifactorial including hypotension, possibly element of septic ATN. Consider emboli to the kidneys from infective endocarditis. Cr improving, now 2.6 (12/26/19). Likely RJ 2/2 sepsis and hypovolemia.    - Avoid nephrotoxic meds/substances  - Strict Is and Os, daily weights  - Leung precaution  - US RP with no significant abnormalities  - Monitor daily renal function

## 2019-12-26 NOTE — PROGRESS NOTES
Pharmacokinetic Assessment Follow Up: IV Vancomycin    Vancomycin serum concentration assessment(s):    · The random level was drawn correctly and can be used to guide therapy at this time. The measurement is below the desired definitive target range of 15 to 20 mcg/mL.    Vancomycin Regimen Plan:    · Vancomycin dose of 1250 mg x 1  · Re-dose when the random level is less than 20 mcg/mL, next level to be drawn at 1000 on 12/26/19    Drug levels (last 3 results):  Recent Labs   Lab Result Units 12/25/19 1951   Vancomycin, Random ug/mL 14.1       Pharmacy will continue to follow and monitor vancomycin.    Please contact pharmacy at extension [phone] for questions regarding this assessment.    Thank you for the consult,   Eugenia Pressley       Patient brief summary:  Ld Bronson is a 53 y.o. male initiated on antimicrobial therapy with IV Vancomycin for treatment of bacteremia    Drug Allergies:   Review of patient's allergies indicates:  No Known Allergies    Actual Body Weight:   62 kg    Renal Function:   Estimated Creatinine Clearance: 20.3 mL/min (A) (based on SCr of 3.6 mg/dL (H)).,    CBC (last 72 hours):  Recent Labs   Lab Result Units 12/24/19 1811 12/25/19 0108 12/25/19  0432   WBC K/uL 25.00* 31.52* 28.18*   Hemoglobin g/dL 8.2* 9.3* 8.7*   Hematocrit % 25.4* 29.8* 28.5*   Platelets K/uL 239 204 181   Gran% % 80.0* 94.6* 92.3*   Lymph% % 10.0* 2.5* 4.6*   Mono% % 2.0* 1.6* 1.9*   Eosinophil% % 0.0 0.0 0.0   Basophil% % 0.0 0.2 0.1   Differential Method  Manual Automated Automated       Metabolic Panel (last 72 hours):  Recent Labs   Lab Result Units 12/24/19 1811 12/24/19 1953 12/25/19 0108 12/25/19  0140 12/25/19  0432   Sodium mmol/L 131*  --  135*  --  134*   Potassium mmol/L 3.9  --  3.5  --  4.3   Chloride mmol/L 93*  --  105  --  107   CO2 mmol/L 19*  --  15*  --  14*   Glucose mg/dL 128*  --  138*  --  150*   Glucose, UA   --  Negative  --  Negative  --    BUN, Bld mg/dL 56*  --  55*  --  56*    Creatinine mg/dL 4.2*  --  3.8*  --  3.6*   Albumin g/dL 3.2*  --  2.3*  --  2.1*   Total Bilirubin mg/dL 0.8  --  0.5  --  0.4   Alkaline Phosphatase U/L 71  --  79  --  72   AST U/L 36  --  30  --  41*   ALT U/L 17  --  12  --  12   Magnesium mg/dL  --   --  1.6  --  1.7   Phosphorus mg/dL  --   --  5.9*  --  6.1*       Vancomycin Administrations:  vancomycin given in the last 96 hours                   vancomycin 1.5 g in  mL IVPB (ready to mix) (mg) 1,500 mg New Bag 12/24/19 1925                Microbiologic Results:  Microbiology Results (last 7 days)     Procedure Component Value Units Date/Time    Blood culture [[id number]] Collected:  12/25/19 0105    Order Status:  Completed Specimen:  Blood from Peripheral, Forearm, Right Updated:  12/25/19 2100     Blood Culture, Routine Gram stain aer bottle: Gram positive cocci in clusters resembling Staph      Results called to and read back by: Wendy Wallace RN. 12/25/2019  20:59    Blood culture [[id number]] Collected:  12/25/19 0124    Order Status:  Completed Specimen:  Blood from Peripheral, Upper Arm, Right Updated:  12/25/19 2100     Blood Culture, Routine Gram stain aer bottle: Gram positive cocci in clusters resembling Staph      Results called to and read back by: Wendy Wallace RN. 12/25/2019  20:59    Clostridium difficile EIA [[id number]] Collected:  12/25/19 1410    Order Status:  Sent Specimen:  Stool Updated:  12/25/19 1531    Urine culture [[id number]] Collected:  12/25/19 0140    Order Status:  No result Specimen:  Urine Updated:  12/25/19 0519    C. trachomatis/N. gonorrhoeae by AMP DNA [facility]; Urine [[id number]] Collected:  12/25/19 0140    Order Status:  Sent Specimen:  Genital Updated:  12/25/19 0631

## 2019-12-26 NOTE — ASSESSMENT & PLAN NOTE
Patient presented with generalized weakness and fatigue, vital signs concerning for shock with hypotension and tachycardia. Recently completed course of cefazolin for MSSA endocarditis, though UDS positive for amphetamines suggests patient may still be using IV drugs. With recent hospitalization would also be concerned about possible HAP. Urinalysis with 3+ leukocytes, 12 RBCs, >100 WBC and many bacteria - certainly concerning for urinary source. WCC elevated to 25. Initial lactate 7.9.   S/p 3L fluid resuscitation and BSABx with improvement in lactate to 1.8  Repeat CT head here without evidence of acute process  CXR unremarkable, no signs of consolidation. CT abdo/pelvis 11/24 shows splenic infarcts as well as cholelithiasis    - Continue vancomycin and zosyn - de-escalate as cultures return  - Continue to follow-up BCx  And UCx  - Wean pressors as tolerated

## 2019-12-26 NOTE — HOSPITAL COURSE
[facility] Br[facility] of [facility]ef[facility] after a C6-7 ACDF with washout on 11/6 with end date 12/18/19. [facility] though it appears from transfer note that the patient had completed his full 6 week course at this time. His WCC and lactate have decreased significantly since starting broad spectrum antibiotics with vanc/zosyn and will be continued until sensitivities return. BCxs still positive for Staph aureus. C diff negative, gonorrhea/chlamydia negative, urine culture no growth. Patient c/o low back pain that has been present for a month and chronic neck pain since the anterior cervical diskectomy and fusion. MRI cervical, thoracic, and lumbar ordered to r/o spinal cord compression and abscess. ID has been consulted for the recurrence of bacteremia and endocarditis, addiction psych consulted for continued heroin use (last use 3 days prior to admission per OSH ED note), and CTS consulted for evaluation of vegetation vs ruptured chord apparatus and worsened MR from prior study 11/04. Dr. Gooden with CTS has tentatively scheduled mitral valve repair, possible mitral valve replacement, possible tricuspid valve repair for Friday, January 3, 2020. CT chest w/o contrast ordered. NSGY consulted to assess for possible osteomyelitis on MRI. Addiction psych consulted who provided motivational interviewing and resources for outpatient rehabilitation to the patient, appreciated. Hgb noted to 6.7 today from 7.0, blood consent was attempted but the patient refused responding he will sign tomorrow. Will hold on transfusion until 12/28. Asymptomatic at this time, no dyspnea, lightheadedness, dizziness. 12/28 transfused 1 u prbc after consent obtained overnight. Requiring minimal pressors to keep MAP >65. Otherwise stable, good po intake, renal function improving. CT chest with small pleural effusions and possible early osteomyelitic changes in T8-T9, T10-T11. Currently  patient is being treated for MSSA with oxacillin, active discussions with Cardiothoracic Surgery regarding valvular repair, however, patient would need further rehabilitation for history of IVDU. He will need a six week course of IV antibiotics with CTS outpatient follow-up for future surgical planning. Addicion psychiatry actively following.

## 2019-12-26 NOTE — PROGRESS NOTES
Pharmacokinetic Assessment Follow Up: IV Vancomycin    Vancomycin Regimen Assessment & Plan:  - Vancomycin level collected ~12h after 1250 mg IV dose resulted as 29.4 mcg/mL.  - Patient with RJ, down trending SCr today.  - Will hold vancomycin dose today. Collect vancomycin level with AM labs tomorrow. Will re-dose as needed vs schedule maintenance regimen depending on elimination rate and renal function, goal 15-20 mcg/mL.    Drug levels (last 3 results):  Recent Labs   Lab Result Units 12/25/19 1951 12/26/19  0923   Vancomycin, Random ug/mL 14.1 29.4     Pharmacy will continue to follow and monitor vancomycin.    Please contact pharmacy at extension [phone] for questions regarding this assessment.    Thank you for the consult,   Nicolas Zimmerman     Patient brief summary:  Ld Bronson is a 53 y.o. male initiated on antimicrobial therapy with IV Vancomycin for treatment of bacteremia    Drug Allergies:   Review of patient's allergies indicates:  No Known Allergies    Actual Body Weight:   61.7 kg    Renal Function:   Estimated Creatinine Clearance: 28 mL/min (A) (based on SCr of 2.6 mg/dL (H)).,     Dialysis Method (if applicable):  N/A

## 2019-12-26 NOTE — PLAN OF CARE
CM at bedside performing discharge planning assessment; patient fell asleep within minutes of assessment; so information obtained from medical records.  Phon number placed on white board and my health packet placed at bedside.      Kris Artis Jr, MD  [address] [facility] [id number]    No Pharmacies Listed    Payor: [employer] / Plan: [employer] / Product Type: Government /     Extended Emergency Contact Information  Primary Emergency Contact: AMINAHEVER HAMMER JR  Mobile Phone: [phone]  Relation: Relative   needed? No    No future appointments.       12/26/19 1446   Discharge Assessment   Assessment Type Discharge Planning Assessment   Confirmed/corrected address and phone number on facesheet? Yes   Assessment information obtained from? Patient   Prior to hospitilization cognitive status: Unable to Assess   Prior to hospitalization functional status:   (unable to assess)   Current cognitive status: Unable to Assess  (patient fell asleep during assessment)   Current Functional Status:   (unable to assess)   Facility Arrived From: [facility]   Able to Return to Prior Arrangements other (see comments)  (TBD)   Is patient able to care for self after discharge? Unable to determine at this time (comments)   Who are your caregiver(s) and their phone number(s)? Ever Parmar Jr. (relative)- [phone]   Patient's perception of discharge disposition other (comments)  (unable to assess)   Readmission Within the Last 30 Days other (see comments)  (transfer from [facility])   Patient currently being followed by outpatient case management? Unable to determine (comments)   Equipment Currently Used at Home other (see comments)  (unable to assess)   Discharge Plan A   (patient's discharge disposition dependent upon patient's prognosis)   DME Needed Upon Discharge  other (see comments)  (TBD)   Patient/Family in Agreement with Plan unable to  assess       Naomi Cristina MPH, RN, CM  Ext. [phone]

## 2019-12-26 NOTE — HPI
Mr. Bronson is a 53 year old ,  known to ID from prior admission, with history of IVDU, HCV, and history of MSSA bacteremia and mitral valve endocarditis, bilateral groin abscessed and SI joint effusion (s/p IR aspiration), cervical epidural abscess (MSSA) s/p C6-7 ACDF with epidural abscess evacuation on 11/6/19 who was recently discharged from [facility] with plans for 6 weeks of IV cefazolin (end date 12/18).  Prior to last admission in November, he had history of incomplete treatment for [facility]ocard[facility] (left AMA prior to completing treatment).  He presented to [facility] on 12/24 with complaints of chest pain, weakness and shortness of breath.  He was found to be in septic shock requiring pressor support and was transferred to [facility] for a higher level of care.   It is unclear from patient whether he actually completed his most recent [facility] the [facility], though per Primary Teams notes it appears from his transfer note that he had completed a full 6 week course.      Micro:  Blood cultures 12/24 and 12/25 positive for Staph Aureus (sensitivities pending).      Imaging:  CXR 12/25 - lungs clear.  Repeat 12/26 shows minimal, patchy consolidation LLL - edema vs PNA vs aspiration  MRI brain 12/26 - numerous scattered punctate to small sized foci of signal abnormality in cerebral hemispheres and cerebellum bilaterallly concerning for possible septic emboli   Renal US - negative  CT Abdomen/pelvis is concerning for possible splenic infarct as well as erosive changes involving the left sacroiliac joint and symphysis pubis  2D echo - Moderate to severe regurgitation. There is anterior leaflet vegetation. There is a moderate-large 7 by 12 mm mobile density that flops on both sides of the mitral valve that is likely vegetation but could also represent a ruptured part of the chordal apparatus.    At time of visit, patient  "is somnolent, but easily arousable.  His primary complaint is that he "hurts all over", in particular, complaining of pain in bilateral toes.  Complains of persistent back pain and neck pain (present since his surgery).  Reports he may have used IV drugs once or twice since [facility].      He is afebrile.  Initial leukocytosis on admission as resolved.  Creatinine downtrending 4.2 >>2.6.         "

## 2019-12-26 NOTE — HPI
Mr. Bronson is a 53M IVDU with HCV, Hx of MSSA bacteremia and infective endocarditis (s/p 6 weeks of cefazolin 12/18), cervical spine epidural abscess s/p C6-7 diskectomy and arthrodesis 11/15/2019 who presented to [facility] with chest pain, generalized weakness, and shortness of breath. At OSH, he was found to be in septic shock requiring pressor support. He was [place] [facility] [place] of [place]. On arrival, patient was somnolent and difficult to arouse. Patient did waken with sternal rub and answered questions appropriately, but history largely obtained through chart review.      At OSH ED, patient recieved full 30ccs/kg fluid resuscitation with an additional liter (total ~3L) for hypotension and tachycardia; lactic acid was elevated to 3.4. He received 1 dose each of vancomycin and zosyn prior to transfer

## 2019-12-26 NOTE — PLAN OF CARE
12/26/19 0936   Post-Acute Status   Post-Acute Authorization Placement;Other   Other Status See Comments     Patient not medically ready at this time, Post acute needs to be determined.   SW will continue to follow up.    Moriah Perez LMSW  [facility]   [id number]

## 2019-12-26 NOTE — NURSING
RN called MRI regarding new pt order. MRI stated that since he was on contact he would have to be scheduled later in the day since the MRI room would have to be closed for 2-4 hours.   Will update CC team about issue.

## 2019-12-26 NOTE — ASSESSMENT & PLAN NOTE
History of mitral valve MSSA endocarditis s/p 6 week course of cefazolin ending 12/18. Concern for septic emboli leading to infarcts of spleen, potentially renal infarcts. Repeat echo with vegetation vs ruptured chord apparatus of mitral valve, worsened MR to mod-severe from mild-mod on 11/04. 12/26 MRI brain with multiple punctate foci bilaterally representing septic emboli. AOx4, no FNDs at this time.    - Continue broad spectrum ABx with vanc and zosyn until sensitivities return  - BCxs daily until clear  - ID consulted  - Monitor daily CBC

## 2019-12-26 NOTE — CONSULTS
[facility]  Cardiothoracic Surgery  Consult Note    Patient Name: Ld Bronson  MRN: [id number]  Admission Date: 12/25/2019  Attending Physician: Warren Strange MD  Referring Provider: Chris Gabriel MD    Patient information was obtained from patient, past medical records and ER records.     Inpatient consult to Cardiothoracic Surgery  Consult performed by: Frances Cuello PA-C  Consult ordered by: Adeel Wagner MD        Subjective:     Principal Problem: Septic shock    History of Present Illness: Mr. Bronson is a 53M IVDU with HCV, Hx of MSSA bacteremia and infective endocarditis (s/p 6 weeks of cefazolin 12/18), cervical spine epidural abscess s/p C6-7 diskectomy and arthrodesis 11/15/2019 who presented to [facility] with chest pain, generalized weakness, and shortness of breath. At OSH, he was found to be in septic shock requiring pressor support. He was transferred to [facility] for higher level of care. On arrival, patient was somnolent and difficult to arouse. Patient did waken with sternal rub and answered questions appropriately, but history largely obtained through chart review.      At OSH ED, patient recieved full 30ccs/kg fluid resuscitation with an additional liter (total ~3L) for hypotension and tachycardia; lactic acid was elevated to 3.4. He received 1 dose each of vancomycin and zosyn prior to transfer    No current facility-administered medications on file prior to encounter.      Current Outpatient Medications on File Prior to Encounter   Medication Sig    dextrose 5 % SolP 500 mL with ceFAZolin 1 gram SolR 6 g Inject 6 g into the vein once daily. End dose 12/18/19    enoxaparin (LOVENOX) 40 mg/0.4 mL Syrg Inject 0.4 mLs (40 mg total) into the skin once daily.    ferrous sulfate 325 (65 FE) MG EC tablet Take 1 tablet (325 mg total) by mouth once daily.    folic acid (FOLVITE) 1 MG tablet Take 1 tablet (1 mg total) by mouth once daily.     ondansetron 4 mg/2 mL Soln Inject 4 mg into the vein every 8 (eight) hours as needed.    polyethylene glycol (GLYCOLAX) 17 gram PwPk Take 17 g by mouth once daily.    ramelteon (ROZEREM) 8 mg tablet Take 1 tablet (8 mg total) by mouth nightly as needed for Insomnia.    traMADol (ULTRAM) 50 mg tablet Take 1 tablet (50 mg total) by mouth every 6 (six) hours as needed.       Review of patient's allergies indicates:  No Known Allergies    No past medical history on file.  Past Surgical History:   Procedure Laterality Date    ANTERIOR CERVICAL DISCECTOMY W/ FUSION N/A 11/6/2019    Procedure: C6-C7 ACDF w neuromonitoring, Mikayla Doshi for instrumentation;  Surgeon: Becca Malone MD;  Location: [facility] OR [address] [place] [address];  Service: Neurosurgery;  Laterality: N/A;    FRACTURE SURGERY      TRANSESOPHAGEAL ECHOCARDIOGRAPHY N/A 11/4/2019    Procedure: ECHOCARDIOGRAM, TRANSESOPHAGEAL;  Surgeon: Dosc Diagnostic Provider;  Location: [facility] EP LAB;  Service: Anesthesiology;  Laterality: N/A;     Family History     None        Tobacco Use    Smoking status: Current Some Day Smoker     Types: Cigarettes    Smokeless tobacco: Former User   Substance and Sexual Activity    Alcohol use: Not on file    Drug use: Yes     Types: Heroin    Sexual activity: Not Currently     Review of Systems   Unable to perform ROS: Acuity of condition     Objective:     Vital Signs (Most Recent):  Temp: 99 °F (37.2 °C) (12/26/19 1100)  Pulse: 103 (12/26/19 1300)  Resp: (!) 30 (12/26/19 1300)  BP: (!) 97/58 (12/26/19 1300)  SpO2: 98 % (12/26/19 1300) Vital Signs (24h Range):  Temp:  [97.7 °F (36.5 °C)-99 °F (37.2 °C)] 99 °F (37.2 °C)  Pulse:  [] 103  Resp:  [13-36] 30  SpO2:  [94 %-100 %] 98 %  BP: ()/(42-67) 97/58     Weight: 61.7 kg (136 lb)  Body mass index is 18.44 kg/m².    SpO2: 98 %  O2 Device (Oxygen Therapy): room air     Intake/Output - Last 3 Shifts       12/24 0700 - 12/25 0659 12/25 0700 - 12/26 0659 12/26 0700 - 12/27 0659    P.O.  780  360    I.V. (mL/kg) 1719 (27.7) 1378 (22.2)     Total Intake(mL/kg) 1719 (27.7) 2158 (34.8) 360 (5.8)    Urine (mL/kg/hr) 280 1830 (1.2) 1385 (3.7)    Stool 0 0     Total Output 280 1830 1385    Net +1439 +328 -1025           Stool Occurrence 4 x 2 x            Lines/Drains/Airways     Central Venous Catheter Line                 Percutaneous Central Line Insertion/Assessment - triple lumen  12/26/19 0230 right internal jugular less than 1 day          Drain                 Urethral Catheter 12/24/19 1954 Non-latex 16 Fr. 1 day          Peripheral Intravenous Line                 Peripheral IV - Single Lumen 12/24/19 1812 18 G Right Upper Arm 1 day         Peripheral IV - Single Lumen 12/24/19 1923 20 G Left Forearm 1 day         Peripheral IV - Single Lumen 12/24/19 1940 18 G Left Upper Arm 1 day                 STS Risk Score: 2.9%     Physical Exam   Constitutional: He is oriented to person, place, and time. No distress.   Cachectic    HENT:   Head: Normocephalic and atraumatic.   Eyes: Pupils are equal, round, and reactive to light. EOM are normal.   Neck: Normal range of motion.   Cardiovascular: Normal rate, regular rhythm and normal pulses.   Murmur heard.  Pulmonary/Chest: Effort normal. No respiratory distress.   Abdominal: Soft.   Musculoskeletal: He exhibits no edema.   Neurological: He is alert and oriented to person, place, and time.   Skin: Skin is warm, dry and intact. Capillary refill takes less than 2 seconds.   Psychiatric:   Sleeping on and off during exam. Answers some questions and not others.    Vitals reviewed.      Significant Labs:  ABGs:   Recent Labs   Lab 12/25/19  0045   PH 7.324*   PCO2 35.0   PO2 36*   HCO3 18.2*   POCSATURATED 65*   BE -8     Amylase: No results for input(s): AMYLASE in the last 48 hours.  BMP:   Recent Labs   Lab 12/26/19  0632   *      K 3.2*      CO2 17*   BUN 58*   CREATININE 2.6*   CALCIUM 7.3*   MG 1.7     Cardiac markers:   Recent Labs   Lab  12/24/19  1944 12/25/19  0834   CPKMB 2.8  --   --    TROPONINI  --    < > 0.092*    < > = values in this interval not displayed.     CBC:   Recent Labs   Lab 12/26/19  0524   WBC 9.16   RBC 2.84*   HGB 7.7*   HCT 25.3*   *   MCV 89   MCH 27.1   MCHC 30.4*     CMP:   Recent Labs   Lab 12/26/19  0632   *   CALCIUM 7.3*   ALBUMIN 2.1*   PROT 6.1      K 3.2*   CO2 17*      BUN 58*   CREATININE 2.6*   ALKPHOS 58   ALT 10   AST 19   BILITOT 0.3     Coagulation:   Recent Labs   Lab 12/25/19  0108   INR 1.3*     Lactic Acid:   Recent Labs   Lab 12/25/19  0046   LACTATE 1.8     LFTs:   Recent Labs   Lab 12/26/19  0632   ALT 10   AST 19   ALKPHOS 58   BILITOT 0.3   PROT 6.1   ALBUMIN 2.1*     Lipase:   Recent Labs   Lab 12/24/19  1811   LIPASE 24       Significant Diagnostics:  TTE 12/26/19  · There is anterior mitral leaflet vegetation. There is a moderate-large 7 by 12 mm mobile density that flops on both sides of the mitral valve that is likely vegetation but could also represent a ruptured part of the chordal apparatus.  · Low normal left ventricular systolic function. The estimated ejection fraction is 55%  · Mild eccentric left ventricular hypertrophy.  · Septal wall has abnormal motion.  · Normal LV diastolic function.  · Mild left atrial enlargement.  · Normal right ventricular systolic function.  · Mild right atrial enlargement.  · Moderate mitral sclerosis with echo density as above.  · Moderate-to-severe mitral regurgitation.  · Mild tricuspid regurgitation.  · Normal central venous pressure (3 mm Hg).  · The estimated PA systolic pressure is 28 mm Hg     MRI Brain 12/26/19  Numerous scattered punctate to small sized foci of signal abnormality cerebral hemispheres and cerebellum bilaterally.  This is most pronounced involving the posterior frontal and parietal lobes as well as right posterior temporal occipital lobes.  Configuration light of history concerning for evolving areas of  infarction which raises concern for possible septic emboli.  Please note alternative differential in light of the pattern includes posterior reversible encephalopathy.    Small foci of susceptibility associated with several the regions concerning for petechial hemorrhage.    There is focal area of signal abnormality within the right parasagittal occipital lobe which is primarily subcortical edema without diffusion signal abnormality which may represent subacute aged infarct with focal cerebritis not excluded in light of history.    CT Abdomen and Pelvis 12/24/19  Splenomegaly with geographic areas of diminished attenuation in the spleen which could represent infarcts.  Other etiologies cannot be excluded.  A CT scan of the abdomen with intravenous contrast could be obtained for further assessment.    Dilated air and fluid-filled loops of large and small bowel in the abdomen and pelvis.  Differential diagnosis would include enterocolitis versus ileus.    Cholelithiasis.    Erosive changes involving the left sacroiliac joint and the symphysis pubis.  This could be correlated with the patient's clinical history for infectious or inflammatory etiologies.    Leung catheter in place.  HOLLAND 11/4/19  · Low normal ejection fraction 50%.  · Normal right ventricular systolic function.  · No thrombus is present in the appendage.  · No Aortic plaque present.  · No vegetation is seen on the Aortic or tricuspid valves.  · There are two very mobile shaggy vegetations seen on the mitral valve: one with the A2 leaflet, measuring 7 x 5 mm and the other with the P2 leaflet, measuring 5 x 3 mm.    Assessment/Plan:     NYHA Score: NYHA I: cardiac disease, but without resulting limitations of physical activity    Endocarditis due to methicillin susceptible Staphylococcus aureus (MSSA)  Mr. Bronson is a 53M IVDU with HCV, Hx of MSSA bacteremia and infective endocarditis (s/p 6 weeks of cefazolin 12/18), cervical spine epidural abscess  s/p C6-7 diskectomy and arthrodesis 11/15/2019 who presented to [facility] with chest pain, generalized weakness, and shortness of breath and found to be in septic shock. Patient with septic emboli to brain, spleen, and possibly kidneys.     MRI from today with possible petechial hemorrhage.     TTE with anterior mitral leaflet vegetation, moderate-large 7 by 12 mm mobile density that flops on both sides of the mitral valve that is likely vegetation but could also represent a ruptured part of the chordal apparatus. Currently on IV abx and Levo 0.14. WBC down to 9 from 28 and creatinine down to 2.6 from 3.6. On Lovenox.     Will likely need 6 weeks of IV antibiotics before surgery. Dr. Gooden to review and staff.         Thank you for your consult. I will follow-up with patient. Please contact us if you have any additional questions.    Frances Cuello PA-C  Cardiothoracic Surgery  [facility]    I have seen the patient and reviewed the physician assistant's note above. I have personally interviewed and examined the patient at bedside and agree with the findings.     Mr. Bronson is a 53 year old IV drug abuser male smoker with hepatitis C and known staph aureus (MSSA) mitral valve endocarditis plus epidural abscess treated with C6-C7 diskectomy (11/15/2019) and 6 week course of antibiotics who restarted use of IV drugs and presented to an outside hospital in sepsis, stroke, acute kidney injury (Cr 4.2) plus worsened endocarditis, and transferred to [facility].  The transthoracic echo shows 55% ejection fraction, moderate to severe mitral regurgitation with 0.7 x 1.2 cm vegetation on the anterior mitral leaflet, and mild tricuspid regurgitation.      He is currently afebrile, on room air, low to moderate dose norepinephrine, -200cc/hr, WBC nl, BCx 12/25 positive for gram positive cocci (BCx 12/26 no growth).  His mental status waxes and wanes.  He can be  clear and talking and then sleeping.    Given the severity of disease, he meets indications for surgery.  However, we will have to obtain further imaging (CT chest noncontrast, carotid ultrasound), neurosurgery consult (to assess status of drained epidural abscess), and have another conversation with him regarding his IV drug abuse.   Seeing as he had known mitral endocarditis and quickly returned to usage of intravenous drugs, we should ask addiction medicine/psychiatry to talk with him.  We have discussed with him that if he wishes to undergo surgery (the guideline recommendation at this point), he must abstain from future IV drug usage and if he re-uses IV drugs and his prosthetic valve is infected, we will not offer him a reoperation.   Due to his age and smoking status, ideally we would obtain a coronary angiogram yet his significant acute kidney injury and the endocarditis prohibit us from doing so.  I have tentatively scheduled him for mitral valve repair, possible mitral valve replacement, possible tricuspid valve repair on Friday, January 3, 2020.        Warren Gooden MD  Cardiothoracic Surgery  [facility]

## 2019-12-26 NOTE — PROCEDURES
"Ld Bronson is a 53 y.o. male patient.    Temp: 98.5 °F (36.9 °C) (12/26/19 0300)  Pulse: 105 (12/26/19 0345)  Resp: (!) 34 (12/26/19 0345)  BP: (!) 97/59 (12/26/19 0345)  SpO2: 100 % (12/26/19 0345)  Weight: 62 kg (136 lb 11 oz) (12/25/19 0021)       Central Line  Date/Time: 12/26/2019 3:57 AM  Location procedure was performed: [facility] SURGICAL ICU (SICU)  Performed by: Tiffanie Tucker NP  Assisting provider: Shawn Fox MD  Pre-operative Diagnosis: septic shock  Post-operative diagnosis: septic shock  Consent Done: Yes  Time out: Immediately prior to procedure a "time out" was called to verify the correct patient, procedure, equipment, support staff and site/side marked as required.  Indications: med administration and vascular access  Anesthesia: local infiltration    Anesthesia:  Local Anesthetic: lidocaine 1% without epinephrine  Anesthetic total: 3 mL  Preparation: skin prepped with ChloraPrep  Skin prep agent dried: skin prep agent completely dried prior to procedure  Sterile barriers: all five maximum sterile barriers used - cap, mask, sterile gown, sterile gloves, and large sterile sheet  Hand hygiene: hand hygiene performed prior to central venous catheter insertion  Location details: right internal jugular  Catheter type: triple lumen  Catheter size: 7 Fr  Catheter Length: 16cm    Ultrasound guidance: yes  Vessel Caliber: large, patent, compressibility normal  Vascular Doppler: not done  Needle advanced into vessel with real time Ultrasound guidance.  Sterile sheath used.  Manometry: Yes  Number of attempts: 1  Assessment: placement verified by x-ray,  no pneumothorax on x-ray and successful placement  Technical procedures used: micro puncture kit  Complications: none  Estimated blood loss (mL): 4  Specimens: No  Implants: No  Post-procedure: line sutured,  chlorhexidine patch,  sterile dressing applied and blood return through all ports  Complications: No          Tiffanie Tucker  12/26/2019  "

## 2019-12-26 NOTE — SUBJECTIVE & OBJECTIVE
No past medical history on file.    Past Surgical History:   Procedure Laterality Date    ANTERIOR CERVICAL DISCECTOMY W/ FUSION N/A 11/6/2019    Procedure: C6-C7 ACDF w neuromonitoring, Mikayla Doshi for instrumentation;  Surgeon: Becca Malone MD;  Location: [facility] OR [address] [place] [address];  Service: Neurosurgery;  Laterality: N/A;    FRACTURE SURGERY      TRANSESOPHAGEAL ECHOCARDIOGRAPHY N/A 11/4/2019    Procedure: ECHOCARDIOGRAM, TRANSESOPHAGEAL;  Surgeon: Radha Diagnostic Provider;  Location: [facility] EP LAB;  Service: Anesthesiology;  Laterality: N/A;       Review of patient's allergies indicates:  No Known Allergies    Medications:  Medications Prior to Admission   Medication Sig    dextrose 5 % SolP 500 mL with ceFAZolin 1 gram SolR 6 g Inject 6 g into the vein once daily. End dose 12/18/19    enoxaparin (LOVENOX) 40 mg/0.4 mL Syrg Inject 0.4 mLs (40 mg total) into the skin once daily.    ferrous sulfate 325 (65 FE) MG EC tablet Take 1 tablet (325 mg total) by mouth once daily.    folic acid (FOLVITE) 1 MG tablet Take 1 tablet (1 mg total) by mouth once daily.    ondansetron 4 mg/2 mL Soln Inject 4 mg into the vein every 8 (eight) hours as needed.    polyethylene glycol (GLYCOLAX) 17 gram PwPk Take 17 g by mouth once daily.    ramelteon (ROZEREM) 8 mg tablet Take 1 tablet (8 mg total) by mouth nightly as needed for Insomnia.    traMADol (ULTRAM) 50 mg tablet Take 1 tablet (50 mg total) by mouth every 6 (six) hours as needed.     Antibiotics (From admission, onward)    Start     Stop Route Frequency Ordered    12/26/19 1630  oxacillin 12 g in  mL CONTINUOUS INFUSION      -- IV Every 24 hours (non-standard times) 12/26/19 1521        Antifungals (From admission, onward)    None        Antivirals (From admission, onward)    None             There is no immunization history on file for this patient.    Family History     None        Social History     Socioeconomic History    Marital status: Single     Spouse name: Not  on file    Number of children: Not on file    Years of education: Not on file    Highest education level: Not on file   Occupational History    Not on file   Social Needs    Financial resource strain: Not on file    Food insecurity:     Worry: Not on file     Inability: Not on file    Transportation needs:     Medical: Not on file     Non-medical: Not on file   Tobacco Use    Smoking status: Current Some Day Smoker     Types: Cigarettes    Smokeless tobacco: Former User   Substance and Sexual Activity    Alcohol use: Not on file    Drug use: Yes     Types: Heroin    Sexual activity: Not Currently   Lifestyle    Physical activity:     Days per week: Not on file     Minutes per session: Not on file    Stress: Not on file   Relationships    Social connections:     Talks on phone: Not on file     Gets together: Not on file     Attends Yazidism service: Not on file     Active member of club or organization: Not on file     Attends meetings of clubs or organizations: Not on file     Relationship status: Not on file   Other Topics Concern    Not on file   Social History Narrative    Not on file     Review of Systems   Constitutional: Negative for activity change, appetite change, chills and fever.   HENT: Negative for congestion, dental problem and trouble swallowing.    Eyes: Negative.    Respiratory: Negative for cough, shortness of breath and stridor.    Cardiovascular: Positive for chest pain (inspiratory). Negative for leg swelling.   Gastrointestinal: Positive for diarrhea. Negative for abdominal distention, abdominal pain, nausea and vomiting.   Genitourinary: Negative for dysuria, flank pain and hematuria.   Musculoskeletal: Positive for arthralgias (bilateral toes), back pain and neck pain (since cervical surgery). Negative for myalgias.   Skin: Negative for color change, rash and wound.   Neurological: Negative for dizziness, speech difficulty and headaches.   Psychiatric/Behavioral: Negative  for agitation and confusion.     Objective:     Vital Signs (Most Recent):  Temp: 99 °F (37.2 °C) (12/26/19 1500)  Pulse: 107 (12/26/19 1700)  Resp: (!) 32 (12/26/19 1700)  BP: 103/65 (12/26/19 1645)  SpO2: 99 % (12/26/19 1700) Vital Signs (24h Range):  Temp:  [97.7 °F (36.5 °C)-99 °F (37.2 °C)] 99 °F (37.2 °C)  Pulse:  [] 107  Resp:  [15-37] 32  SpO2:  [94 %-100 %] 99 %  BP: ()/(42-67) 103/65     Weight: 61.7 kg (136 lb)  Body mass index is 18.44 kg/m².    Estimated Creatinine Clearance: 28 mL/min (A) (based on SCr of 2.6 mg/dL (H)).    Physical Exam   Constitutional: He is oriented to person, place, and time. No distress.   Thin, chronically ill appearing.    HENT:   Head: Normocephalic and atraumatic.   Poor dentition     Eyes: Conjunctivae are normal. No scleral icterus.   Cardiovascular: Regular rhythm. Tachycardia present. Exam reveals no friction rub.   Murmur heard.  Pulmonary/Chest: Effort normal and breath sounds normal. No respiratory distress. He has no wheezes. He has no rales.   Abdominal: Soft. He exhibits no distension. There is no tenderness. There is no guarding.   Musculoskeletal: He exhibits no edema.   Neurological: He is alert and oriented to person, place, and time.   Skin: Skin is warm and dry. He is not diaphoretic.   Multiple tattoos   Vitals reviewed.      Significant Labs:   Blood Culture:   Recent Labs   Lab 11/04/19  0845 12/24/19  1811 12/24/19  1845 12/25/19  0105 12/25/19  0124   LABBLOO No growth after 5 days. Gram stain aer bottle: Gram positive cocci in clusters resembling Staph  Gram stain emili bottle: Gram positive cocci in clusters resembling Staph  Results called to and read back by: Karthik Stoner RN. 12/25/2019  17:16  STAPHYLOCOCCUS AUREUS  Susceptibility pending  ID consult required at [facility].[place] and [place] locations.  * Gram stain aer bottle: Gram positive cocci in clusters resembling Staph  Gram stain emili bottle: Gram positive cocci in clusters  resembling Staph  Results called to and read back by:Karthik Stoner RN. 12/25/2019  17:16  STAPHYLOCOCCUS AUREUS  ID consult required at [facility].[place] and [place].  For susceptibility see order # [id number]  * Gram stain aer bottle: Gram positive cocci in clusters resembling Staph  Results called to and read back by: Wendy Wallace RN. 12/25/2019  20:59 Gram stain aer bottle: Gram positive cocci in clusters resembling Staph  Results called to and read back by: Wendy Wallace RN. 12/25/2019  20:59     CBC:   Recent Labs   Lab 12/25/19 0108 12/25/19 0432 12/26/19  0524   WBC 31.52* 28.18* 9.16   HGB 9.3* 8.7* 7.7*   HCT 29.8* 28.5* 25.3*    181 127*     CMP:   Recent Labs   Lab 12/25/19 0108 12/25/19 0432 12/26/19  0632   * 134* 138   K 3.5 4.3 3.2*    107 110   CO2 15* 14* 17*   * 150* 120*   BUN 55* 56* 58*   CREATININE 3.8* 3.6* 2.6*   CALCIUM 7.8* 7.1* 7.3*   PROT 7.1 6.9 6.1   ALBUMIN 2.3* 2.1* 2.1*   BILITOT 0.5 0.4 0.3   ALKPHOS 79 72 58   AST 30 41* 19   ALT 12 12 10   ANIONGAP 15 13 11   EGFRNONAA 16.8* 17.9* 26.6*     HIV 1/2 Antibodies:   Recent Labs   Lab 12/25/19  0113   [id number] Negative     Urine Culture:   Recent Labs   Lab 12/24/19 1953 12/25/19  0140   LABURIN No growth No growth     Urine Studies:   Recent Labs   Lab 12/24/19 1953 12/25/19  0140   COLORU Yellow Yellow   APPEARANCEUA Clear Cloudy*   PHUR 5.0 5.0   SPECGRAV 1.025 1.010   PROTEINUA 2+* 1+*   GLUCUA Negative Negative   KETONESU Negative Negative   BILIRUBINUA Negative Negative   OCCULTUA 3+* 3+*   NITRITE Negative Negative   UROBILINOGEN Negative  --    LEUKOCYTESUR 1+* 3+*   RBCUA 5* 12*   WBCUA >100* >100*   BACTERIA Moderate* Many*   SQUAMEPITHEL  --  1   HYALINECASTS 0 6*       Significant Imaging: I have reviewed all pertinent imaging results/findings within the past 24 hours.

## 2019-12-26 NOTE — ASSESSMENT & PLAN NOTE
3 year old ,  known to ID from prior admission, with history of IVDU, HCV, and history of MSSA bacteremia and mitral valve endocarditis, bilateral groin abscessed and SI joint effusion (s/p IR aspiration), cervical epidural abscess (MSSA) s/p C6-7 ACDF with epidural abscess evacuation on 11/6/19 who was recently discharged from [facility] on [facility]9 [facility] with plans for 6 weeks of IV cefazolin (end date 12/18).  Prior to last admission in November, he had history of incomplete treatment for [facility]ocard[facility] (left AMA prior to completing treatment).  He presented to [facility] on 12/24 with complaints of chest pain, weakness and shortness of breath.  He was found to be in septic shock requiring pressor support and was transferred to [facility] for a higher level of care.   It is unclear from patient whether he actually completed his most recent course of antibiotics at the [facility], though per Primary Teams notes it appears from his transfer note that he had completed a full 6 week course.      Micro:  Blood cultures 12/24 and 12/25 positive for Staph Aureus (sensitivities pending).      Imaging:  CXR 12/25 - lungs clear.  Repeat 12/26 shows minimal, patchy consolidation LLL - edema vs PNA vs aspiration  MRI brain 12/26 - numerous scattered punctate to small sized foci of signal abnormality in cerebral hemispheres and cerebellum bilaterallly concerning for possible septic emboli   Renal US - negative  CT Abdomen/pelvis is concerning for possible splenic infarct as well as erosive changes involving the left sacroiliac joint and symphysis pubis  2D echo - Moderate to severe regurgitation. There is anterior leaflet vegetation. There is a moderate-large 7 by 12 mm mobile density that flops on both sides of the mitral valve that is likely vegetation but could also represent a ruptured part of the chordal apparatus.     At time of visit, patient is somnolent, but  "easily arousable.  His primary complaint is that he "hurts all over", in particular, complaining of pain in bilateral toes.  Complains of persistent back pain and neck pain (present since his surgery). Reports he may have used IV drugs once or twice since [facility].   He is afebrile.  Initial leukocytosis on admission as resolved.  Creatinine downtrending 4.2 >>2.6.   Tachycardic.  Remains on pressors.  MRI spine is pending.     Plan/recommendations:  1.  Continue IV vancomycin (pharmacy to dose)    2.  Discontinue IV zosyn and start oxacillin 12 grams IV q 24 hours continuous infusion for prior MSSA (better CNS penetration than cefazolin, given concern for septic emboli)  3.  Repeat blood cultures ordered.   4.  Agree with CTS consult  5.  Agree with repeat spinal imaging  6.  Will follow Staph sensitivities and de-escalate antibiotics accordingly.   7.  Will follow.     Patient seen by, and plan discussed with, ID staff  Discussed with Primary Team       "

## 2019-12-26 NOTE — PLAN OF CARE
Pt disoriented to time. VSS. Levo titrated for MAP >65, currently @ 0.14. Taken to MRI. Central line placed to R IJ. 1 watery BM. UO adequate overnight. Advanced to full liquid diet. Pt updated on plan of care throughout shift. See flow sheet for assessment data.

## 2019-12-26 NOTE — PLAN OF CARE
Pt currently redting in bed comfortably - VSS. Sats >96% on 2L NC, HR 70-80's SR, MAP >65 (maintained with levo), afebrile, and no complaints of pain. BC x2 sent today. MRI early AM - showed septic emboli. Echo today showed EF approx. 55%.  UO 30-85ml/hr via owusu. No BM this shift thus far. Current continuous gtts include levo and oxacillin. Pt turned Q2H, AAOx4, follows commands, and moves all extremities purposefully. POC reviewed with pt - all questions/concerns addressed and answered appropriately. Will continue to monitor.

## 2019-12-26 NOTE — PROGRESS NOTES
Central line being placed to R IJ by MD Fox with Tiffanie Tucker NP. VSS. Pt tolerating well. Consent form signed. Will continue to monitor.

## 2019-12-26 NOTE — ASSESSMENT & PLAN NOTE
Mr. Bronson is a 53M IVDU with HCV, Hx of MSSA bacteremia and infective endocarditis (s/p 6 weeks of cefazolin 12/18), cervical spine epidural abscess s/p C6-7 diskectomy and arthrodesis 11/15/2019 who presented to [facility] with chest pain, generalized weakness, and shortness of breath and found to be in septic shock. Patient with septic emboli to brain, spleen, and possibly kidneys.     MRI from today with possible petechial hemorrhage.     TTE with anterior mitral leaflet vegetation, moderate-large 7 by 12 mm mobile density that flops on both sides of the mitral valve that is likely vegetation but could also represent a ruptured part of the chordal apparatus. Currently on IV abx and Levo 0.14. WBC down to 9 from 28 and creatinine down to 2.6 from 3.6. On Lovenox.     Will likely need 6 weeks of IV antibiotics before surgery. Dr. Gooden to review and staff.

## 2019-12-26 NOTE — SUBJECTIVE & OBJECTIVE
No current facility-administered medications on file prior to encounter.      Current Outpatient Medications on File Prior to Encounter   Medication Sig    dextrose 5 % SolP 500 mL with ceFAZolin 1 gram SolR 6 g Inject 6 g into the vein once daily. End dose 12/18/19    enoxaparin (LOVENOX) 40 mg/0.4 mL Syrg Inject 0.4 mLs (40 mg total) into the skin once daily.    ferrous sulfate 325 (65 FE) MG EC tablet Take 1 tablet (325 mg total) by mouth once daily.    folic acid (FOLVITE) 1 MG tablet Take 1 tablet (1 mg total) by mouth once daily.    ondansetron 4 mg/2 mL Soln Inject 4 mg into the vein every 8 (eight) hours as needed.    polyethylene glycol (GLYCOLAX) 17 gram PwPk Take 17 g by mouth once daily.    ramelteon (ROZEREM) 8 mg tablet Take 1 tablet (8 mg total) by mouth nightly as needed for Insomnia.    traMADol (ULTRAM) 50 mg tablet Take 1 tablet (50 mg total) by mouth every 6 (six) hours as needed.       Review of patient's allergies indicates:  No Known Allergies    No past medical history on file.  Past Surgical History:   Procedure Laterality Date    ANTERIOR CERVICAL DISCECTOMY W/ FUSION N/A 11/6/2019    Procedure: C6-C7 ACDF w neuromonitoring, Mikayla Doshi for instrumentation;  Surgeon: Becca Malone MD;  Location: [facility] OR [address] [place] [address];  Service: Neurosurgery;  Laterality: N/A;    FRACTURE SURGERY      TRANSESOPHAGEAL ECHOCARDIOGRAPHY N/A 11/4/2019    Procedure: ECHOCARDIOGRAM, TRANSESOPHAGEAL;  Surgeon: [facility] Diagnostic Provider;  Location: [facility] EP LAB;  Service: Anesthesiology;  Laterality: N/A;     Family History     None        Tobacco Use    Smoking status: Current Some Day Smoker     Types: Cigarettes    Smokeless tobacco: Former User   Substance and Sexual Activity    Alcohol use: Not on file    Drug use: Yes     Types: Heroin    Sexual activity: Not Currently     Review of Systems   Unable to perform ROS: Acuity of condition     Objective:     Vital Signs (Most Recent):  Temp: 99 °F (37.2 °C)  (12/26/19 1100)  Pulse: 103 (12/26/19 1300)  Resp: (!) 30 (12/26/19 1300)  BP: (!) 97/58 (12/26/19 1300)  SpO2: 98 % (12/26/19 1300) Vital Signs (24h Range):  Temp:  [97.7 °F (36.5 °C)-99 °F (37.2 °C)] 99 °F (37.2 °C)  Pulse:  [] 103  Resp:  [13-36] 30  SpO2:  [94 %-100 %] 98 %  BP: ()/(42-67) 97/58     Weight: 61.7 kg (136 lb)  Body mass index is 18.44 kg/m².    SpO2: 98 %  O2 Device (Oxygen Therapy): room air     Intake/Output - Last 3 Shifts       12/24 0700 - 12/25 0659 12/25 0700 - 12/26 0659 12/26 0700 - 12/27 0659    P.O.  780 360    I.V. (mL/kg) 1719 (27.7) 1378 (22.2)     Total Intake(mL/kg) 1719 (27.7) 2158 (34.8) 360 (5.8)    Urine (mL/kg/hr) 280 1830 (1.2) 1385 (3.7)    Stool 0 0     Total Output 280 1830 1385    Net +1439 +328 -1025           Stool Occurrence 4 x 2 x            Lines/Drains/Airways     Central Venous Catheter Line                 Percutaneous Central Line Insertion/Assessment - triple lumen  12/26/19 0230 right internal jugular less than 1 day          Drain                 Urethral Catheter 12/24/19 1954 Non-latex 16 Fr. 1 day          Peripheral Intravenous Line                 Peripheral IV - Single Lumen 12/24/19 1812 18 G Right Upper Arm 1 day         Peripheral IV - Single Lumen 12/24/19 1923 20 G Left Forearm 1 day         Peripheral IV - Single Lumen 12/24/19 1940 18 G Left Upper Arm 1 day                 STS Risk Score: 2.9%     Physical Exam   Constitutional: He is oriented to person, place, and time. No distress.   Cachectic    HENT:   Head: Normocephalic and atraumatic.   Eyes: Pupils are equal, round, and reactive to light. EOM are normal.   Neck: Normal range of motion.   Cardiovascular: Normal rate, regular rhythm and normal pulses.   Murmur heard.  Pulmonary/Chest: Effort normal. No respiratory distress.   Abdominal: Soft.   Musculoskeletal: He exhibits no edema.   Neurological: He is alert and oriented to person, place, and time.   Skin: Skin is warm, dry  and intact. Capillary refill takes less than 2 seconds.   Psychiatric:   Sleeping on and off during exam. Answers some questions and not others.    Vitals reviewed.      Significant Labs:  ABGs:   Recent Labs   Lab 12/25/19  0045   PH 7.324*   PCO2 35.0   PO2 36*   HCO3 18.2*   POCSATURATED 65*   BE -8     Amylase: No results for input(s): AMYLASE in the last 48 hours.  BMP:   Recent Labs   Lab 12/26/19  0632   *      K 3.2*      CO2 17*   BUN 58*   CREATININE 2.6*   CALCIUM 7.3*   MG 1.7     Cardiac markers:   Recent Labs   Lab 12/24/19  1944  12/25/19  0834   CPKMB 2.8  --   --    TROPONINI  --    < > 0.092*    < > = values in this interval not displayed.     CBC:   Recent Labs   Lab 12/26/19  0524   WBC 9.16   RBC 2.84*   HGB 7.7*   HCT 25.3*   *   MCV 89   MCH 27.1   MCHC 30.4*     CMP:   Recent Labs   Lab 12/26/19  0632   *   CALCIUM 7.3*   ALBUMIN 2.1*   PROT 6.1      K 3.2*   CO2 17*      BUN 58*   CREATININE 2.6*   ALKPHOS 58   ALT 10   AST 19   BILITOT 0.3     Coagulation:   Recent Labs   Lab 12/25/19  0108   INR 1.3*     Lactic Acid:   Recent Labs   Lab 12/25/19  0046   LACTATE 1.8     LFTs:   Recent Labs   Lab 12/26/19  0632   ALT 10   AST 19   ALKPHOS 58   BILITOT 0.3   PROT 6.1   ALBUMIN 2.1*     Lipase:   Recent Labs   Lab 12/24/19  1811   LIPASE 24       Significant Diagnostics:  TTE 12/26/19  · There is anterior mitral leaflet vegetation. There is a moderate-large 7 by 12 mm mobile density that flops on both sides of the mitral valve that is likely vegetation but could also represent a ruptured part of the chordal apparatus.  · Low normal left ventricular systolic function. The estimated ejection fraction is 55%  · Mild eccentric left ventricular hypertrophy.  · Septal wall has abnormal motion.  · Normal LV diastolic function.  · Mild left atrial enlargement.  · Normal right ventricular systolic function.  · Mild right atrial enlargement.  · Moderate  mitral sclerosis with echo density as above.  · Moderate-to-severe mitral regurgitation.  · Mild tricuspid regurgitation.  · Normal central venous pressure (3 mm Hg).  · The estimated PA systolic pressure is 28 mm Hg     MRI Brain 12/26/19  Numerous scattered punctate to small sized foci of signal abnormality cerebral hemispheres and cerebellum bilaterally.  This is most pronounced involving the posterior frontal and parietal lobes as well as right posterior temporal occipital lobes.  Configuration light of history concerning for evolving areas of infarction which raises concern for possible septic emboli.  Please note alternative differential in light of the pattern includes posterior reversible encephalopathy.    Small foci of susceptibility associated with several the regions concerning for petechial hemorrhage.    There is focal area of signal abnormality within the right parasagittal occipital lobe which is primarily subcortical edema without diffusion signal abnormality which may represent subacute aged infarct with focal cerebritis not excluded in light of history.    CT Abdomen and Pelvis 12/24/19  Splenomegaly with geographic areas of diminished attenuation in the spleen which could represent infarcts.  Other etiologies cannot be excluded.  A CT scan of the abdomen with intravenous contrast could be obtained for further assessment.    Dilated air and fluid-filled loops of large and small bowel in the abdomen and pelvis.  Differential diagnosis would include enterocolitis versus ileus.    Cholelithiasis.    Erosive changes involving the left sacroiliac joint and the symphysis pubis.  This could be correlated with the patient's clinical history for infectious or inflammatory etiologies.    Leung catheter in place.  HOLLAND 11/4/19  · Low normal ejection fraction 50%.  · Normal right ventricular systolic function.  · No thrombus is present in the appendage.  · No Aortic plaque present.  · No vegetation is seen on  the Aortic or tricuspid valves.  · There are two very mobile shaggy vegetations seen on the mitral valve: one with the A2 leaflet, measuring 7 x 5 mm and the other with the P2 leaflet, measuring 5 x 3 mm.

## 2019-12-26 NOTE — PROGRESS NOTES
Pt transported to MRI on portable monitor and O2 by ICU RN and PCT. VSS. Pt tolerated MRI well. Attached to room monitor and O2 upon return to [facility].

## 2019-12-27 PROBLEM — F11.93 OPIOID WITHDRAWAL: Status: ACTIVE | Noted: 2019-12-27

## 2019-12-27 PROBLEM — F11.20 OPIOID USE DISORDER, SEVERE, DEPENDENCE: Chronic | Status: ACTIVE | Noted: 2019-12-27

## 2019-12-27 PROBLEM — R79.89 ELEVATED TROPONIN: Status: RESOLVED | Noted: 2019-12-25 | Resolved: 2019-12-27

## 2019-12-27 LAB
ABO + RH BLD: NORMAL
ALBUMIN SERPL BCP-MCNC: 1.9 G/DL (ref 3.5–5.2)
ALP SERPL-CCNC: 69 U/L (ref 55–135)
ALT SERPL W/O P-5'-P-CCNC: 10 U/L (ref 10–44)
ANION GAP SERPL CALC-SCNC: 8 MMOL/L (ref 8–16)
AST SERPL-CCNC: 18 U/L (ref 10–40)
BASOPHILS # BLD AUTO: 0.01 K/UL (ref 0–0.2)
BASOPHILS # BLD AUTO: 0.01 K/UL (ref 0–0.2)
BASOPHILS # BLD AUTO: 0.02 K/UL (ref 0–0.2)
BASOPHILS NFR BLD: 0.1 % (ref 0–1.9)
BASOPHILS NFR BLD: 0.1 % (ref 0–1.9)
BASOPHILS NFR BLD: 0.2 % (ref 0–1.9)
BILIRUB SERPL-MCNC: 0.2 MG/DL (ref 0.1–1)
BLD GP AB SCN CELLS X3 SERPL QL: NORMAL
BUN SERPL-MCNC: 46 MG/DL (ref 6–20)
CALCIUM SERPL-MCNC: 7.6 MG/DL (ref 8.7–10.5)
CHLORIDE SERPL-SCNC: 112 MMOL/L (ref 95–110)
CO2 SERPL-SCNC: 19 MMOL/L (ref 23–29)
CREAT SERPL-MCNC: 1.9 MG/DL (ref 0.5–1.4)
DIFFERENTIAL METHOD: ABNORMAL
EOSINOPHIL # BLD AUTO: 0.1 K/UL (ref 0–0.5)
EOSINOPHIL NFR BLD: 0.7 % (ref 0–8)
EOSINOPHIL NFR BLD: 1 % (ref 0–8)
EOSINOPHIL NFR BLD: 1.2 % (ref 0–8)
ERYTHROCYTE [DISTWIDTH] IN BLOOD BY AUTOMATED COUNT: 17.1 % (ref 11.5–14.5)
ERYTHROCYTE [DISTWIDTH] IN BLOOD BY AUTOMATED COUNT: 17.2 % (ref 11.5–14.5)
ERYTHROCYTE [DISTWIDTH] IN BLOOD BY AUTOMATED COUNT: 17.2 % (ref 11.5–14.5)
EST. GFR  (AFRICAN AMERICAN): 44.9 ML/MIN/1.73 M^2
EST. GFR  (NON AFRICAN AMERICAN): 38.8 ML/MIN/1.73 M^2
FIBRINOGEN PPP-MCNC: 473 MG/DL (ref 182–366)
GLUCOSE SERPL-MCNC: 104 MG/DL (ref 70–110)
HAPTOGLOB SERPL-MCNC: 260 MG/DL (ref 30–250)
HCT VFR BLD AUTO: 21.3 % (ref 40–54)
HCT VFR BLD AUTO: 21.5 % (ref 40–54)
HCT VFR BLD AUTO: 23.6 % (ref 40–54)
HGB BLD-MCNC: 6.6 G/DL (ref 14–18)
HGB BLD-MCNC: 6.7 G/DL (ref 14–18)
HGB BLD-MCNC: 7 G/DL (ref 14–18)
IMM GRANULOCYTES # BLD AUTO: 0.07 K/UL (ref 0–0.04)
IMM GRANULOCYTES # BLD AUTO: 0.07 K/UL (ref 0–0.04)
IMM GRANULOCYTES # BLD AUTO: 0.09 K/UL (ref 0–0.04)
IMM GRANULOCYTES NFR BLD AUTO: 0.7 % (ref 0–0.5)
IMM GRANULOCYTES NFR BLD AUTO: 0.9 % (ref 0–0.5)
IMM GRANULOCYTES NFR BLD AUTO: 1 % (ref 0–0.5)
LDH SERPL L TO P-CCNC: 275 U/L (ref 110–260)
LYMPHOCYTES # BLD AUTO: 1 K/UL (ref 1–4.8)
LYMPHOCYTES # BLD AUTO: 1.1 K/UL (ref 1–4.8)
LYMPHOCYTES # BLD AUTO: 1.2 K/UL (ref 1–4.8)
LYMPHOCYTES NFR BLD: 10.4 % (ref 18–48)
LYMPHOCYTES NFR BLD: 12.3 % (ref 18–48)
LYMPHOCYTES NFR BLD: 13.8 % (ref 18–48)
MAGNESIUM SERPL-MCNC: 1.7 MG/DL (ref 1.6–2.6)
MCH RBC QN AUTO: 26.8 PG (ref 27–31)
MCH RBC QN AUTO: 27.6 PG (ref 27–31)
MCH RBC QN AUTO: 27.7 PG (ref 27–31)
MCHC RBC AUTO-ENTMCNC: 29.7 G/DL (ref 32–36)
MCHC RBC AUTO-ENTMCNC: 30.7 G/DL (ref 32–36)
MCHC RBC AUTO-ENTMCNC: 31.5 G/DL (ref 32–36)
MCV RBC AUTO: 88 FL (ref 82–98)
MCV RBC AUTO: 90 FL (ref 82–98)
MCV RBC AUTO: 90 FL (ref 82–98)
MONOCYTES # BLD AUTO: 0.5 K/UL (ref 0.3–1)
MONOCYTES # BLD AUTO: 0.6 K/UL (ref 0.3–1)
MONOCYTES # BLD AUTO: 0.7 K/UL (ref 0.3–1)
MONOCYTES NFR BLD: 6.1 % (ref 4–15)
MONOCYTES NFR BLD: 6.3 % (ref 4–15)
MONOCYTES NFR BLD: 7.1 % (ref 4–15)
NEUTROPHILS # BLD AUTO: 6.3 K/UL (ref 1.8–7.7)
NEUTROPHILS # BLD AUTO: 7.6 K/UL (ref 1.8–7.7)
NEUTROPHILS # BLD AUTO: 7.6 K/UL (ref 1.8–7.7)
NEUTROPHILS NFR BLD: 77.7 % (ref 38–73)
NEUTROPHILS NFR BLD: 79.1 % (ref 38–73)
NEUTROPHILS NFR BLD: 81.3 % (ref 38–73)
NRBC BLD-RTO: 0 /100 WBC
PHOSPHATE SERPL-MCNC: 3.3 MG/DL (ref 2.7–4.5)
PLATELET # BLD AUTO: 101 K/UL (ref 150–350)
PLATELET # BLD AUTO: 104 K/UL (ref 150–350)
PLATELET # BLD AUTO: 98 K/UL (ref 150–350)
PMV BLD AUTO: 10 FL (ref 9.2–12.9)
PMV BLD AUTO: 10.2 FL (ref 9.2–12.9)
PMV BLD AUTO: 10.8 FL (ref 9.2–12.9)
POTASSIUM SERPL-SCNC: 3.8 MMOL/L (ref 3.5–5.1)
PROT SERPL-MCNC: 5.9 G/DL (ref 6–8.4)
RBC # BLD AUTO: 2.39 M/UL (ref 4.6–6.2)
RBC # BLD AUTO: 2.42 M/UL (ref 4.6–6.2)
RBC # BLD AUTO: 2.61 M/UL (ref 4.6–6.2)
SODIUM SERPL-SCNC: 139 MMOL/L (ref 136–145)
VANCOMYCIN SERPL-MCNC: 17.4 UG/ML
WBC # BLD AUTO: 8.09 K/UL (ref 3.9–12.7)
WBC # BLD AUTO: 9.38 K/UL (ref 3.9–12.7)
WBC # BLD AUTO: 9.64 K/UL (ref 3.9–12.7)

## 2019-12-27 PROCEDURE — 85384 FIBRINOGEN ACTIVITY: CPT

## 2019-12-27 PROCEDURE — 25000003 PHARM REV CODE 250: Performed by: STUDENT IN AN ORGANIZED HEALTH CARE EDUCATION/TRAINING PROGRAM

## 2019-12-27 PROCEDURE — 86920 COMPATIBILITY TEST SPIN: CPT

## 2019-12-27 PROCEDURE — 20000000 HC ICU ROOM

## 2019-12-27 PROCEDURE — 93010 EKG 12-LEAD: ICD-10-PCS | Mod: ,,, | Performed by: INTERNAL MEDICINE

## 2019-12-27 PROCEDURE — 99233 PR SUBSEQUENT HOSPITAL CARE,LEVL III: ICD-10-PCS | Mod: AF,HB,S$PBB, | Performed by: PSYCHIATRY & NEUROLOGY

## 2019-12-27 PROCEDURE — 83615 LACTATE (LD) (LDH) ENZYME: CPT

## 2019-12-27 PROCEDURE — 93010 ELECTROCARDIOGRAM REPORT: CPT | Mod: ,,, | Performed by: INTERNAL MEDICINE

## 2019-12-27 PROCEDURE — 80053 COMPREHEN METABOLIC PANEL: CPT

## 2019-12-27 PROCEDURE — 83010 ASSAY OF HAPTOGLOBIN QUANT: CPT

## 2019-12-27 PROCEDURE — 99233 SBSQ HOSP IP/OBS HIGH 50: CPT | Mod: ,,, | Performed by: INTERNAL MEDICINE

## 2019-12-27 PROCEDURE — 63600175 PHARM REV CODE 636 W HCPCS: Performed by: STUDENT IN AN ORGANIZED HEALTH CARE EDUCATION/TRAINING PROGRAM

## 2019-12-27 PROCEDURE — 93005 ELECTROCARDIOGRAM TRACING: CPT

## 2019-12-27 PROCEDURE — 99233 SBSQ HOSP IP/OBS HIGH 50: CPT | Mod: AF,HB,S$PBB, | Performed by: PSYCHIATRY & NEUROLOGY

## 2019-12-27 PROCEDURE — 99233 PR SUBSEQUENT HOSPITAL CARE,LEVL III: ICD-10-PCS | Mod: ,,, | Performed by: INTERNAL MEDICINE

## 2019-12-27 PROCEDURE — 84100 ASSAY OF PHOSPHORUS: CPT

## 2019-12-27 PROCEDURE — 94761 N-INVAS EAR/PLS OXIMETRY MLT: CPT

## 2019-12-27 PROCEDURE — 85025 COMPLETE CBC W/AUTO DIFF WBC: CPT | Mod: 91

## 2019-12-27 PROCEDURE — 63600175 PHARM REV CODE 636 W HCPCS: Performed by: INTERNAL MEDICINE

## 2019-12-27 PROCEDURE — 83735 ASSAY OF MAGNESIUM: CPT

## 2019-12-27 PROCEDURE — 80202 ASSAY OF VANCOMYCIN: CPT

## 2019-12-27 PROCEDURE — 86850 RBC ANTIBODY SCREEN: CPT

## 2019-12-27 PROCEDURE — 87040 BLOOD CULTURE FOR BACTERIA: CPT

## 2019-12-27 RX ORDER — VANCOMYCIN HCL IN 5 % DEXTROSE 1G/250ML
1000 PLASTIC BAG, INJECTION (ML) INTRAVENOUS
Status: DISCONTINUED | OUTPATIENT
Start: 2019-12-27 | End: 2019-12-28

## 2019-12-27 RX ORDER — ENOXAPARIN SODIUM 100 MG/ML
40 INJECTION SUBCUTANEOUS EVERY 24 HOURS
Status: DISCONTINUED | OUTPATIENT
Start: 2019-12-27 | End: 2020-01-06 | Stop reason: HOSPADM

## 2019-12-27 RX ORDER — HYDROCODONE BITARTRATE AND ACETAMINOPHEN 500; 5 MG/1; MG/1
TABLET ORAL
Status: DISCONTINUED | OUTPATIENT
Start: 2019-12-27 | End: 2019-12-27

## 2019-12-27 RX ADMIN — Medication 0.09 MCG/KG/MIN: at 01:12

## 2019-12-27 RX ADMIN — Medication 0.06 MCG/KG/MIN: at 05:12

## 2019-12-27 RX ADMIN — OXACILLIN SODIUM 12 G: 10 INJECTION, POWDER, FOR SOLUTION INTRAVENOUS at 05:12

## 2019-12-27 RX ADMIN — VANCOMYCIN HYDROCHLORIDE 1000 MG: 1 INJECTION, POWDER, LYOPHILIZED, FOR SOLUTION INTRAVENOUS at 10:12

## 2019-12-27 NOTE — ASSESSMENT & PLAN NOTE
History of mitral valve MSSA endocarditis s/p 6 week course of cefazolin ending 12/18. Concern for septic emboli leading to infarcts of spleen, potentially renal infarcts. Repeat echo with vegetation vs ruptured chord apparatus of mitral valve, worsened MR to mod-severe from mild-mod on 11/04. 12/26 MRI brain with multiple punctate foci bilaterally representing septic emboli. AOx4, no FNDs at this time. CTS tentatively planning mitral valve repair, possible mitral valve replacement, possible tricuspid valve repair for Friday, January 3, 2020. Emphasized to patient that if there is continued IVDU then there will be no re-operation if it becomes infected again.    - Oxacillin and vanc  - BCxs daily until clear  - ID following  - Monitor daily CBC  - F/u NSGY recs

## 2019-12-27 NOTE — PT/OT/SLP PROGRESS
Physical Therapy      Patient Name:  Ld Bronson   MRN:  [id number]    Patient not seen today. Pt adamantly refused despite encouragement and education on negative effects of bedrest. Will follow-up when appropriate      Chelsea Daigle PT, DPT  12/27/2019  [phone]  '
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
## 2019-12-27 NOTE — PLAN OF CARE
Pt's HR: 90s-100s. MAPs maintained >65.  Pt on Levo gtt currently @ 0.06mcg/kg/min. & 24hr Oxacillin gtt.  UOP adequate. Pt had 1 BM.  Pt transported to MRI overnight for Spinal MRI.  Pt turned independently.  Plan of care reviewed w/pt; all questions answered.

## 2019-12-27 NOTE — ASSESSMENT & PLAN NOTE
Patient presented with generalized weakness and fatigue, vital signs concerning for shock with hypotension and tachycardia. Recently completed course of cefazolin for MSSA endocarditis, though UDS positive for amphetamines suggests patient may still be using IV drugs. With recent hospitalization would also be concerned about possible HAP. Urinalysis with 3+ leukocytes, 12 RBCs, >100 WBC and many bacteria - certainly concerning for urinary source. WCC elevated to 25. Initial lactate 7.9.   S/p 3L fluid resuscitation and BSABx with improvement in lactate to 1.8  Repeat CT head here without evidence of acute process  CXR unremarkable, no signs of consolidation. CT abdo/pelvis 11/24 shows splenic infarcts as well as cholelithiasis    - De-escalated to oxacillin and vanc per ID recs, appreciate help  - Continue to follow-up BCx  And UCx  - Now off of pressors maintaining MAP >65

## 2019-12-27 NOTE — PLAN OF CARE
ID Follow Up note:    Patient not seen today.  Chart reviewed.      T Max 100.3.  Blood cultures remain persistently positive for Staph Aureus (susceptibilties still pending).  Suspect likely MSSA as previously, but currently on IV vancomycin for MRSA and oxacillin for MSSA    MRI C/T/L spine shows stable changes in cervical spine without apparent worsening of known osteomyelitis at this level and no fluid collections.  There are subtle endplate changes at T8-T9 and T10-11 and L2-3, L4-5, and L5-S1 concerning for possible early osteomyelitis.     CT chest - mild pulmonary edema with small bilateral dependent pleural effusions with compressive atelectasis vs consolidation of lung bases.  Indeterminate bilateral solid pulmonary nodules noted (likely septic emboli in this setting) without definite cavitation.       CTS has evaluated patient and has tentatively planned mitral valve repair, possible mitral valve replacement and possible TV repair for 1/3/20. Neurosurgery also consulted to evaluate spinal osteo on MRI.  Addiction Psych also on board.      Plan/recommendations:  1.  Continue IV oxacillin and vancomycin pending Staph Aureus susceptibilities.   2.  Repeat blood cultures daily until clear.   3.  Will follow up this weekend.      Thank you.   Please call for any questions or concerns.  KENNY Terry, ANP-C  [phone] pager, [facility] [phone]

## 2019-12-27 NOTE — PT/OT/SLP PROGRESS
Occupational Therapy      Patient Name:  Ld Bronson   MRN:  [id number]    Patient not seen today secondary to adamantly refused despite encouragement and education on negative effects of bedrest  . Will follow-up Monday; anticipate that pt will no need OT services.    FAUSTO Clemens  12/27/2019

## 2019-12-27 NOTE — SUBJECTIVE & OBJECTIVE
Interval History:  Repeat blood cultures 12/27 NGTD. CTS following, recommend 6 weeks of IV abx and f/u with CTS on 2/5/20     Review of Systems   Constitutional: Negative for activity change, appetite change, chills and fever.   HENT: Negative for congestion, dental problem and trouble swallowing.    Eyes: Negative.    Respiratory: Negative for cough, shortness of breath and stridor.    Cardiovascular: Negative for chest pain and leg swelling.   Gastrointestinal: Negative for abdominal distention, abdominal pain, diarrhea, nausea and vomiting.   Genitourinary: Negative for dysuria, flank pain and hematuria.   Musculoskeletal: Positive for arthralgias (bilateral toes), back pain and neck pain (since cervical surgery ). Negative for myalgias.   Skin: Positive for wound ( left groin). Negative for color change and rash.   Neurological: Negative for dizziness, speech difficulty and headaches.   Psychiatric/Behavioral: Negative for agitation and confusion.     Objective:     Vital Signs (Most Recent):  Temp: 100.3 °F (37.9 °C) (12/27/19 0700)  Pulse: (!) 113 (12/27/19 0930)  Resp: (!) 29 (12/27/19 0930)  BP: (!) 74/41 (12/27/19 0930)  SpO2: 97 % (12/27/19 0930) Vital Signs (24h Range):  Temp:  [98.3 °F (36.8 °C)-100.3 °F (37.9 °C)] 100.3 °F (37.9 °C)  Pulse:  [] 113  Resp:  [13-41] 29  SpO2:  [90 %-100 %] 97 %  BP: ()/(41-97) 74/41     Weight: 58 kg (127 lb 13.9 oz)  Body mass index is 17.34 kg/m².    Estimated Creatinine Clearance: 36 mL/min (A) (based on SCr of 1.9 mg/dL (H)).    Physical Exam   Constitutional: He is oriented to person, place, and time. No distress.   Thin, chronically ill appearing.    HENT:   Head: Normocephalic and atraumatic.   Poor dentition     Eyes: Conjunctivae are normal. No scleral icterus.   Cardiovascular: Regular rhythm. Tachycardia present. Exam reveals no friction rub.   Murmur heard.  Pulmonary/Chest: Effort normal and breath sounds normal. No respiratory distress. He has  no wheezes. He has no rales.   Abdominal: Soft. He exhibits no distension. There is no tenderness. There is no guarding.   Musculoskeletal: He exhibits no edema.   Neurological: He is alert and oriented to person, place, and time.   Skin: Skin is warm and dry. He is not diaphoretic.   Multiple tattoos  Left groin wound c/d/i. No purulent or redness   Vitals reviewed.      Significant Labs:   Blood Culture:   Recent Labs   Lab 12/24/19  1845 12/25/19  0105 12/25/19  0124 12/26/19  1259 12/26/19  1335   LABBLOO Gram stain aer bottle: Gram positive cocci in clusters resembling Staph  Gram stain emili bottle: Gram positive cocci in clusters resembling Staph  Results called to and read back by:Karthik Stoner RN. 12/25/2019  17:16  STAPHYLOCOCCUS AUREUS  ID consult required at [facility] and [facility] locations.  For susceptibility see order # [id number]  * Gram stain aer bottle: Gram positive cocci in clusters resembling Staph  Results called to and read back by: Wendy Wallace RN. 12/25/2019  20:59 Gram stain aer bottle: Gram positive cocci in clusters resembling Staph  Results called to and read back by: Wendy Wallace RN. 12/25/2019  20:59 Gram stain aer bottle: Gram positive cocci in clusters resembling Staph   Results called to and read back by:Ashlie Rick RN 12/27/2019  08:58 No Growth to date     CBC:   Recent Labs   Lab 12/26/19  0524 12/27/19  0345 12/27/19  0605   WBC 9.16 8.09 9.38   HGB 7.7* 6.6* 7.0*   HCT 25.3* 21.5* 23.6*   * 101* 98*     CMP:   Recent Labs   Lab 12/26/19  0632 12/27/19  0345    139   K 3.2* 3.8    112*   CO2 17* 19*   * 104   BUN 58* 46*   CREATININE 2.6* 1.9*   CALCIUM 7.3* 7.6*   PROT 6.1 5.9*   ALBUMIN 2.1* 1.9*   BILITOT 0.3 0.2   ALKPHOS 58 69   AST 19 18   ALT 10 10   ANIONGAP 11 8   EGFRNONAA 26.6* 38.8*     HIV Rapid: No results for input(s): HIVRAPID in the last 48 hours.    Significant Imaging: I have reviewed all pertinent imaging  results/findings within the past 24 hours.

## 2019-12-27 NOTE — PROGRESS NOTES
[facility]  Critical Care Medicine  Progress Note    Patient Name: Ld Bronson  MRN: [id number]  Admission Date: 12/25/2019  Hospital Length of Stay: 2 days  Code Status: Full Code  Attending Provider: Warren Strange MD  Primary Care Provider: Kris Artis Jr, MD   Principal Problem: Endocarditis due to methicillin susceptible Staphylococcus aureus (MSSA)    Subjective:     HPI:  Mr. Bronson is a 53M IVDU with HCV, Hx of MSSA bacteremia and infective endocarditis (s/p 6 weeks of cefazolin 12/18), cervical spine epidural abscess s/p C6-7 diskectomy and arthrodesis 11/15/2019 who presented to [facility] with chest pain, generalized weakness, and shortness of breath. At OSH, he was found to be in septic shock requiring pressor support. [place] was [place] [facility] [place] level of care. On arrival, patient was somnolent and difficult to arouse. Patient did waken with sternal rub and answered questions appropriately, but history largely obtained through chart review.     At OSH ED, patient recieved full 30ccs/kg fluid resuscitation with an additional liter (total ~3L) for hypotension and tachycardia; lactic acid was elevated to 3.4. He received 1 dose each of vancomycin and zosyn prior to transfer.     Hospital/ICU Course:  [facility] after a C6-7 ACDF with washout on 11/6 with end date 12/18/19. [facility] though it appears from transfer note that the patient had completed his full 6 week course at this time. His WCC and lactate have decreased significantly since starting broad spectrum antibiotics with vanc/zosyn and will be continued until sensitivities return. BCxs still positive for Staph aureus. C diff negative, gonorrhea/chlamydia negative, urine culture no growth. Patient c/o low back pain that has been present for a month and chronic neck pain since the anterior cervical  diskectomy and fusion. MRI cervical, thoracic, and lumbar ordered to r/o spinal cord compression and abscess. ID has been consulted for the recurrence of bacteremia and endocarditis, addiction psych consulted for continued heroin use (last use 3 days prior to admission per OSH ED note), and CTS consulted for evaluation of vegetation vs ruptured chord apparatus and worsened MR from prior study 11/04. Dr. Gooden with CTS has tentatively scheduled mitral valve repair, possible mitral valve replacement, possible tricuspid valve repair for Friday, January 3, 2020. CT chest w/o contrast ordered. NSGY consulted to assess for possible osteomyelitis on MRI. Addiction psych consulted who provided motivational interviewing and resources for outpatient rehabilitation to the patient, appreciated. Hgb noted to 6.7 today from 7.0, blood consent was attempted but the patient refused responding he will sign tomorrow. Will hold on transfusion until 12/28. Asymptomatic at this time, no dyspnea, lightheadedness, dizziness.    History reviewed. No pertinent past medical history.    Past Surgical History:   Procedure Laterality Date    ANTERIOR CERVICAL DISCECTOMY W/ FUSION N/A 11/6/2019    Procedure: C6-C7 ACDF w neuromonitoring, Mikayla Doshi for instrumentation;  Surgeon: Becca Malone MD;  Location: [facility] OR [address] [place] [address];  Service: Neurosurgery;  Laterality: N/A;    FRACTURE SURGERY      TRANSESOPHAGEAL ECHOCARDIOGRAPHY N/A 11/4/2019    Procedure: ECHOCARDIOGRAM, TRANSESOPHAGEAL;  Surgeon: Radha Diagnostic Provider;  Location: [facility] EP LAB;  Service: Anesthesiology;  Laterality: N/A;       Review of patient's allergies indicates:  No Known Allergies    Family History     None        Tobacco Use    Smoking status: Current Some Day Smoker     Types: Cigarettes    Smokeless tobacco: Former User   Substance and Sexual Activity    Alcohol use: Not on file    Drug use: Yes     Types: Heroin    Sexual activity: Not Currently      Review of  Systems   Constitutional: Negative for chills and fever.   HENT: Negative for congestion, rhinorrhea and sore throat.    Eyes: Negative for visual disturbance.   Respiratory: Negative for cough, shortness of breath and stridor.    Cardiovascular: Positive for chest pain (pain with deep breaths). Negative for leg swelling.   Gastrointestinal: Negative for abdominal distention, abdominal pain, blood in stool, nausea and vomiting.   Genitourinary: Negative for dysuria and hematuria.   Musculoskeletal: Positive for back pain and neck pain (pain since ACDF). Negative for myalgias.   Skin: Negative for color change and rash.   Neurological: Negative for dizziness, light-headedness and headaches.   Psychiatric/Behavioral: Negative for agitation and confusion.     Objective:     Vital Signs (Most Recent):  Temp: 99.9 °F (37.7 °C) (12/27/19 1100)  Pulse: 99 (12/27/19 1300)  Resp: (!) 24 (12/27/19 1300)  BP: 97/61 (12/27/19 1300)  SpO2: 98 % (12/27/19 1300) Vital Signs (24h Range):  Temp:  [98.3 °F (36.8 °C)-100.3 °F (37.9 °C)] 99.9 °F (37.7 °C)  Pulse:  [] 99  Resp:  [13-41] 24  SpO2:  [90 %-100 %] 98 %  BP: ()/(41-97) 97/61   Weight: 58 kg (127 lb 13.9 oz)  Body mass index is 17.34 kg/m².      Intake/Output Summary (Last 24 hours) at 12/27/2019 1357  Last data filed at 12/27/2019 1300  Gross per 24 hour   Intake 2158 ml   Output 5075 ml   Net -2917 ml       Physical Exam   Constitutional: He is oriented to person, place, and time. He appears well-developed. No distress.   HENT:   Head: Atraumatic.   Mouth/Throat: No oropharyngeal exudate.   Eyes: Conjunctivae and EOM are normal. No scleral icterus.   Pinpoint pupils   Neck: Normal range of motion. Neck supple.   Cardiovascular: Normal rate and regular rhythm.   Murmur heard.  Pulmonary/Chest: Effort normal and breath sounds normal. No stridor. No respiratory distress. He has no wheezes.   Abdominal: Soft. Bowel sounds are normal. He exhibits no distension. There  is no tenderness. There is no guarding.   Musculoskeletal: He exhibits no edema or tenderness.   Neurological: He is alert and oriented to person, place, and time. No cranial nerve deficit.   Skin: Skin is warm and dry. No rash noted. He is not diaphoretic. No erythema.   Nursing note and vitals reviewed.      Vents:     Lines/Drains/Airways     Central Venous Catheter Line                 Percutaneous Central Line Insertion/Assessment - triple lumen  12/26/19 0230 right internal jugular 1 day          Drain                 Urethral Catheter 12/24/19 1954 Non-latex 16 Fr. 2 days          Peripheral Intravenous Line                 Peripheral IV - Single Lumen 12/24/19 1812 18 G Right Upper Arm 2 days         Peripheral IV - Single Lumen 12/24/19 1923 20 G Left Forearm 2 days         Peripheral IV - Single Lumen 12/24/19 1940 18 G Left Upper Arm 2 days              Significant Labs:    CBC/Anemia Profile:  Recent Labs   Lab 12/27/19  0345 12/27/19  0605 12/27/19  1200   WBC 8.09 9.38 9.64   HGB 6.6* 7.0* 6.7*   HCT 21.5* 23.6* 21.3*   * 98* 104*   MCV 90 90 88   RDW 17.2* 17.1* 17.2*        Chemistries:  Recent Labs   Lab 12/26/19  0632 12/27/19  0345    139   K 3.2* 3.8    112*   CO2 17* 19*   BUN 58* 46*   CREATININE 2.6* 1.9*   CALCIUM 7.3* 7.6*   ALBUMIN 2.1* 1.9*   PROT 6.1 5.9*   BILITOT 0.3 0.2   ALKPHOS 58 69   ALT 10 10   AST 19 18   MG 1.7 1.7   PHOS 3.9 3.3       ABGs:   No results for input(s): PH, PCO2, HCO3, POCSATURATED, BE in the last 48 hours.  CMP:   Recent Labs   Lab 12/26/19  0632 12/27/19  0345    139   K 3.2* 3.8    112*   CO2 17* 19*   * 104   BUN 58* 46*   CREATININE 2.6* 1.9*   CALCIUM 7.3* 7.6*   PROT 6.1 5.9*   ALBUMIN 2.1* 1.9*   BILITOT 0.3 0.2   ALKPHOS 58 69   AST 19 18   ALT 10 10   ANIONGAP 11 8   EGFRNONAA 26.6* 38.8*     Troponin:   No results for input(s): TROPONINI in the last 48 hours.  Urine Studies:   No results for input(s): COLORU,  APPEARANCEUA, PHUR, SPECGRAV, PROTEINUA, GLUCUA, KETONESU, BILIRUBINUA, OCCULTUA, NITRITE, UROBILINOGEN, LEUKOCYTESUR, RBCUA, WBCUA, BACTERIA, SQUAMEPITHEL, HYALINECASTS in the last 48 hours.    Invalid input(s): NARAYAN    Significant Imaging: I have reviewed all pertinent imaging results/findings within the past 24 hours.     CT Chest Without Contrast  Narrative: EXAMINATION:  CT CHEST WITHOUT CONTRAST    CLINICAL HISTORY:  pre op valve replacement;    TECHNIQUE:  Low dose axial images, sagittal and coronal reformations were obtained from the thoracic inlet to the lung bases. Contrast was not administered.    COMPARISON:  Radiograph 12/26/2019.    FINDINGS:  Structures at the base of the neck are unremarkable.    There is a left-sided aortic arch with 3 branch vessels.  The thoracic aorta tapers normally without atherosclerotic calcification.    Pulmonary arteries are prominent in caliber.  There are 4 pulmonary veins.    Heart is slightly prominent in size.  No pericardial effusion.  Right-sided central venous catheter with tip within the SVC.    There is an increased number of slightly prominent left axillary lymph nodes.  No supraclavicular or mediastinal lymphadenopathy.  Hilar contours are not well evaluated.    Esophagus is normal in caliber and course.    Visualized upper abdominal structures demonstrate no significant abnormalities.    There is a paucity of subcutaneous fat.    Partially visualized postoperative changes of the cervical spine noted.  There is vertebral endplate irregularity at T8-T9 and T10-T11    Trachea and proximal airways are patent.  Vocal cords are opposed, presumably from breath-holding.    Lung volumes are normal and symmetric.  There are small bilateral dependent pleural effusions with associated compressive atelectasis versus consolidation of the adjacent lung bases.  There is mild interlobular septal thickening within the bilateral lung apices.  Patchy ground-glass attenuation  and interlobular septal thickening noted within the bilateral lower lobes.  There are bilateral solid pulmonary nodules, the largest of which measures 0.5 cm and is located within the anterior segment of the right upper lobe (series 4, image 185).  No pneumothorax.  Impression: 1. Mild pulmonary edema and small bilateral dependent pleural effusions with associated compressive atelectasis versus consolidation of the adjacent lung bases.  2. Bilateral solid pulmonary nodules, indeterminate.  Follow-up CT recommended to assess stability and rule out developing septic emboli given patient's reported history of IVDU.  No definite cavitating pulmonary nodules.  3. Borderline cardiomegaly.  4. Vertebral endplate irregularity at T8-T9 and T10-T11, possibly related to early osteomyelitis given appearance on recent MRI and patient's known history of IVDU.  5. Paucity of subcutaneous fat in keeping with cachexia.  This report was flagged in Epic as abnormal.    Electronically signed by: Oseas Martinez MD  Date:    12/27/2019  Time:    12:54  MRI Thoracic Spine Without Contrast  Narrative: EXAMINATION:  MRI CERVICAL SPINE WITHOUT CONTRAST; MRI LUMBAR SPINE WITHOUT CONTRAST; MRI THORACIC SPINE WITHOUT CONTRAST    CLINICAL HISTORY:  IVDU, +Staph bacteremia. Recent epidural abscess. Recurrent infection; Low back pain, <6wks, no red flags, no prior management; r/o epidural abscess    TECHNIQUE:  Multiplanar, multisequence MR images of the cervical spine were performed without the administration of contrast.    COMPARISON:  MRI cervical spine with and without contrast 11/02/2019, CT abdomen and pelvis without contrast 12/24/2019.    FINDINGS:  Cervical spine:    Postoperative changes of C6-C7 anterior instrumented disc replacement.    C1-C2: Dens is intact. Pre dens space is maintained.    Alignment: Straightening of the normal cervical lordosis.  No spondylolisthesis.    Vertebrae: Vertebral body heights are well maintained  without evidence of fracture.  There is disc space narrowing and mild endplate edema at C3-C4, and less so at C6-C7, in this patient with known history of osteomyelitis.  Overall, appearance of endplates and disc at this level is unchanged when compared to MRI cervical spine 11/02/2019.  There is mild prevertebral soft tissue edema, decreased in conspicuity when compared to MRI cervical spine 11/02/2019.    Mild edema in the facet joints at C6-C7 bilaterally, favoring postoperative change.    No new areas of edema to suggest osteomyelitis.  No definite epidural abscess or phlegmon, noting that this was a non contrasted study.    Discs: Multilevel intervertebral disc space narrowing and disc desiccation throughout the visualized cervical spine.    Cord: Cord maintains normal signal intensity and contour.    Skull base and craniocervical junction: Normal.    Degenerative findings:    C2-T1: No spinal canal stenosis or neural foraminal narrowing.    Thoracic spine:    Alignment: Alignment is well maintained.    Vertebrae: Vertebral body heights are well maintained without evidence of fracture.  At T8-T9 and T10-T11, there is subtle adjacent endplate edema as well as edema in the disc spaces.  No evidence of phlegmon or abscess.  These findings are concerning for early changes of osteomyelitis in this patient with history of osteomyelitis, with active degenerative joint disease also a consideration but felt less likely.    Discs: Interval disc spaces are otherwise maintained.    Cord: Cord maintains normal signal intensity and contour.    Degenerative findings:    No significant spinal canal stenosis or neural foraminal narrowing throughout the visualized thoracic spine.    Paraspinal muscles and soft tissues: Trace bilateral pleural effusions with associated atelectasis.    Lumbar spine:    Alignment: Straightening of the normal lumbar lordosis.  No spondylolisthesis.    Vertebrae: Vertebral body heights are well  maintained.  No evidence of fracture.    At L2-L3, L4-L5, and L5-S1, there is subtle adjacent endplate edema as well as edema in the disc space.  No evidence of phlegmon or abscess.  These findings are concerning for early changes of osteomyelitis in this patient with history of osteomyelitis, with active degenerative disc disease also a consideration but felt less likely.    Discs: Multilevel intervertebral disc space narrowing and disc desiccation.    Cord: Visualized cord maintains normal signal intensity and contour.  Conus terminates at the L1 level.    Degenerative findings:    T12-L1: No spinal canal stenosis or neural foraminal narrowing.    L1-L2: No spinal canal stenosis or neural foraminal narrowing.    L2-L3: No spinal canal stenosis or neural foraminal narrowing.    L3-L4: No spinal canal stenosis or neural foraminal narrowing.    L4-L5: Circumferential disc bulge with annular fissure with no spinal canal stenosis or neural foraminal narrowing.    L5-S1: No spinal canal stenosis or neural foraminal narrowing.    Paraspinal muscles and soft tissues: There is a subcentimeter T1 hyperintense, T2 hypointense lesion in the right kidney, which may represent a hemorrhagic cyst.  Impression: Disc space narrowing and mild endplate edema at C3-C4, and less so at C6-C7, in this patient with history of osteomyelitis.  Overall, appearance of endplates and disc at these levels is unchanged when compared to MRI cervical spine 11/02/2019.  No new areas of edema in the cervical spine to suggest osteomyelitis.  No prevertebral abscess or phlegmon in this non contrasted study.    Subtle endplate edema as well as edema in the disc spaces at T8-T9 and T10-T11, concerning for early changes of osteomyelitis, with active degenerative joint disease also a consideration but felt less likely.    Subtle endplate edema as well as edema in the disc spaces at L2-L3, L4-L5, and L5-S1, concerning for early changes of osteomyelitis,  with active degenerative joint disease also a consideration but felt less likely.    Trace bilateral pleural effusions with associated atelectasis.    Postoperative change of C6-C7 anterior instrumented disc replacement.    This report was flagged in Epic as abnormal.    Electronically signed by resident: Regulo Jimenes  Date:    12/27/2019  Time:    08:03    Electronically signed by: Cassidy Jacinto MD  Date:    12/27/2019  Time:    11:45  MRI Lumbar Spine Without Contrast  Narrative: EXAMINATION:  MRI CERVICAL SPINE WITHOUT CONTRAST; MRI LUMBAR SPINE WITHOUT CONTRAST; MRI THORACIC SPINE WITHOUT CONTRAST    CLINICAL HISTORY:  IVDU, +Staph bacteremia. Recent epidural abscess. Recurrent infection; Low back pain, <6wks, no red flags, no prior management; r/o epidural abscess    TECHNIQUE:  Multiplanar, multisequence MR images of the cervical spine were performed without the administration of contrast.    COMPARISON:  MRI cervical spine with and without contrast 11/02/2019, CT abdomen and pelvis without contrast 12/24/2019.    FINDINGS:  Cervical spine:    Postoperative changes of C6-C7 anterior instrumented disc replacement.    C1-C2: Dens is intact. Pre dens space is maintained.    Alignment: Straightening of the normal cervical lordosis.  No spondylolisthesis.    Vertebrae: Vertebral body heights are well maintained without evidence of fracture.  There is disc space narrowing and mild endplate edema at C3-C4, and less so at C6-C7, in this patient with known history of osteomyelitis.  Overall, appearance of endplates and disc at this level is unchanged when compared to MRI cervical spine 11/02/2019.  There is mild prevertebral soft tissue edema, decreased in conspicuity when compared to MRI cervical spine 11/02/2019.    Mild edema in the facet joints at C6-C7 bilaterally, favoring postoperative change.    No new areas of edema to suggest osteomyelitis.  No definite epidural abscess or phlegmon, noting that this was  a non contrasted study.    Discs: Multilevel intervertebral disc space narrowing and disc desiccation throughout the visualized cervical spine.    Cord: Cord maintains normal signal intensity and contour.    Skull base and craniocervical junction: Normal.    Degenerative findings:    C2-T1: No spinal canal stenosis or neural foraminal narrowing.    Thoracic spine:    Alignment: Alignment is well maintained.    Vertebrae: Vertebral body heights are well maintained without evidence of fracture.  At T8-T9 and T10-T11, there is subtle adjacent endplate edema as well as edema in the disc spaces.  No evidence of phlegmon or abscess.  These findings are concerning for early changes of osteomyelitis in this patient with history of osteomyelitis, with active degenerative joint disease also a consideration but felt less likely.    Discs: Interval disc spaces are otherwise maintained.    Cord: Cord maintains normal signal intensity and contour.    Degenerative findings:    No significant spinal canal stenosis or neural foraminal narrowing throughout the visualized thoracic spine.    Paraspinal muscles and soft tissues: Trace bilateral pleural effusions with associated atelectasis.    Lumbar spine:    Alignment: Straightening of the normal lumbar lordosis.  No spondylolisthesis.    Vertebrae: Vertebral body heights are well maintained.  No evidence of fracture.    At L2-L3, L4-L5, and L5-S1, there is subtle adjacent endplate edema as well as edema in the disc space.  No evidence of phlegmon or abscess.  These findings are concerning for early changes of osteomyelitis in this patient with history of osteomyelitis, with active degenerative disc disease also a consideration but felt less likely.    Discs: Multilevel intervertebral disc space narrowing and disc desiccation.    Cord: Visualized cord maintains normal signal intensity and contour.  Conus terminates at the L1 level.    Degenerative findings:    T12-L1: No spinal canal  stenosis or neural foraminal narrowing.    L1-L2: No spinal canal stenosis or neural foraminal narrowing.    L2-L3: No spinal canal stenosis or neural foraminal narrowing.    L3-L4: No spinal canal stenosis or neural foraminal narrowing.    L4-L5: Circumferential disc bulge with annular fissure with no spinal canal stenosis or neural foraminal narrowing.    L5-S1: No spinal canal stenosis or neural foraminal narrowing.    Paraspinal muscles and soft tissues: There is a subcentimeter T1 hyperintense, T2 hypointense lesion in the right kidney, which may represent a hemorrhagic cyst.  Impression: Disc space narrowing and mild endplate edema at C3-C4, and less so at C6-C7, in this patient with history of osteomyelitis.  Overall, appearance of endplates and disc at these levels is unchanged when compared to MRI cervical spine 11/02/2019.  No new areas of edema in the cervical spine to suggest osteomyelitis.  No prevertebral abscess or phlegmon in this non contrasted study.    Subtle endplate edema as well as edema in the disc spaces at T8-T9 and T10-T11, concerning for early changes of osteomyelitis, with active degenerative joint disease also a consideration but felt less likely.    Subtle endplate edema as well as edema in the disc spaces at L2-L3, L4-L5, and L5-S1, concerning for early changes of osteomyelitis, with active degenerative joint disease also a consideration but felt less likely.    Trace bilateral pleural effusions with associated atelectasis.    Postoperative change of C6-C7 anterior instrumented disc replacement.    This report was flagged in Epic as abnormal.    Electronically signed by resident: Regulo Jimenes  Date:    12/27/2019  Time:    08:03    Electronically signed by: Cassidy Jacinto MD  Date:    12/27/2019  Time:    11:45  MRI Cervical Spine Without Contrast  Narrative: EXAMINATION:  MRI CERVICAL SPINE WITHOUT CONTRAST; MRI LUMBAR SPINE WITHOUT CONTRAST; MRI THORACIC SPINE WITHOUT  CONTRAST    CLINICAL HISTORY:  IVDU, +Staph bacteremia. Recent epidural abscess. Recurrent infection; Low back pain, <6wks, no red flags, no prior management; r/o epidural abscess    TECHNIQUE:  Multiplanar, multisequence MR images of the cervical spine were performed without the administration of contrast.    COMPARISON:  MRI cervical spine with and without contrast 11/02/2019, CT abdomen and pelvis without contrast 12/24/2019.    FINDINGS:  Cervical spine:    Postoperative changes of C6-C7 anterior instrumented disc replacement.    C1-C2: Dens is intact. Pre dens space is maintained.    Alignment: Straightening of the normal cervical lordosis.  No spondylolisthesis.    Vertebrae: Vertebral body heights are well maintained without evidence of fracture.  There is disc space narrowing and mild endplate edema at C3-C4, and less so at C6-C7, in this patient with known history of osteomyelitis.  Overall, appearance of endplates and disc at this level is unchanged when compared to MRI cervical spine 11/02/2019.  There is mild prevertebral soft tissue edema, decreased in conspicuity when compared to MRI cervical spine 11/02/2019.    Mild edema in the facet joints at C6-C7 bilaterally, favoring postoperative change.    No new areas of edema to suggest osteomyelitis.  No definite epidural abscess or phlegmon, noting that this was a non contrasted study.    Discs: Multilevel intervertebral disc space narrowing and disc desiccation throughout the visualized cervical spine.    Cord: Cord maintains normal signal intensity and contour.    Skull base and craniocervical junction: Normal.    Degenerative findings:    C2-T1: No spinal canal stenosis or neural foraminal narrowing.    Thoracic spine:    Alignment: Alignment is well maintained.    Vertebrae: Vertebral body heights are well maintained without evidence of fracture.  At T8-T9 and T10-T11, there is subtle adjacent endplate edema as well as edema in the disc spaces.  No  evidence of phlegmon or abscess.  These findings are concerning for early changes of osteomyelitis in this patient with history of osteomyelitis, with active degenerative joint disease also a consideration but felt less likely.    Discs: Interval disc spaces are otherwise maintained.    Cord: Cord maintains normal signal intensity and contour.    Degenerative findings:    No significant spinal canal stenosis or neural foraminal narrowing throughout the visualized thoracic spine.    Paraspinal muscles and soft tissues: Trace bilateral pleural effusions with associated atelectasis.    Lumbar spine:    Alignment: Straightening of the normal lumbar lordosis.  No spondylolisthesis.    Vertebrae: Vertebral body heights are well maintained.  No evidence of fracture.    At L2-L3, L4-L5, and L5-S1, there is subtle adjacent endplate edema as well as edema in the disc space.  No evidence of phlegmon or abscess.  These findings are concerning for early changes of osteomyelitis in this patient with history of osteomyelitis, with active degenerative disc disease also a consideration but felt less likely.    Discs: Multilevel intervertebral disc space narrowing and disc desiccation.    Cord: Visualized cord maintains normal signal intensity and contour.  Conus terminates at the L1 level.    Degenerative findings:    T12-L1: No spinal canal stenosis or neural foraminal narrowing.    L1-L2: No spinal canal stenosis or neural foraminal narrowing.    L2-L3: No spinal canal stenosis or neural foraminal narrowing.    L3-L4: No spinal canal stenosis or neural foraminal narrowing.    L4-L5: Circumferential disc bulge with annular fissure with no spinal canal stenosis or neural foraminal narrowing.    L5-S1: No spinal canal stenosis or neural foraminal narrowing.    Paraspinal muscles and soft tissues: There is a subcentimeter T1 hyperintense, T2 hypointense lesion in the right kidney, which may represent a hemorrhagic cyst.  Impression:  Disc space narrowing and mild endplate edema at C3-C4, and less so at C6-C7, in this patient with history of osteomyelitis.  Overall, appearance of endplates and disc at these levels is unchanged when compared to MRI cervical spine 11/02/2019.  No new areas of edema in the cervical spine to suggest osteomyelitis.  No prevertebral abscess or phlegmon in this non contrasted study.    Subtle endplate edema as well as edema in the disc spaces at T8-T9 and T10-T11, concerning for early changes of osteomyelitis, with active degenerative joint disease also a consideration but felt less likely.    Subtle endplate edema as well as edema in the disc spaces at L2-L3, L4-L5, and L5-S1, concerning for early changes of osteomyelitis, with active degenerative joint disease also a consideration but felt less likely.    Trace bilateral pleural effusions with associated atelectasis.    Postoperative change of C6-C7 anterior instrumented disc replacement.    This report was flagged in Epic as abnormal.    Electronically signed by resident: Regulo Jimenes  Date:    12/27/2019  Time:    08:03    Electronically signed by: Cassidy Jacinto MD  Date:    12/27/2019  Time:    11:45          ABG  Recent Labs   Lab 12/25/19  0045   PH 7.324*   PO2 36*   PCO2 35.0   HCO3 18.2*   BE -8     Assessment/Plan:     Psychiatric  IV drug abuse  - Monitor for signs of withdrawal  - Addiction psychiatry consulted - motivational interviewing and resources provided, appreciate help    Cardiac/Vascular  * Endocarditis due to methicillin susceptible Staphylococcus aureus (MSSA)  History of mitral valve MSSA endocarditis s/p 6 week course of cefazolin ending 12/18. Concern for septic emboli leading to infarcts of spleen, potentially renal infarcts. Repeat echo with vegetation vs ruptured chord apparatus of mitral valve, worsened MR to mod-severe from mild-mod on 11/04. 12/26 MRI brain with multiple punctate foci bilaterally representing septic emboli. AOx4,  no FNDs at this time. CTS tentatively planning mitral valve repair, possible mitral valve replacement, possible tricuspid valve repair for Friday, January 3, 2020. Emphasized to patient that if there is continued IVDU then there will be no re-operation if it becomes infected again.    - Oxacillin and vanc  - BCxs daily until clear  - ID following  - Monitor daily CBC  - F/u NSGY recs    Renal/  RJ (acute kidney injury)  Baseline sCr 0.9 beginning of 11/2019, now 4. 2 on admit. Improved with IV fluids. Likely multifactorial including hypotension, possibly element of septic ATN. Consider emboli to the kidneys from infective endocarditis. Cr improving, now 2.6 (12/26/19). Likely RJ 2/2 sepsis and hypovolemia. 12/27 continuing to resolve, Cr 1.9.     - Avoid nephrotoxic meds/substances  - Strict Is and Os, daily weights  - Leung precaution  - US RP with no significant abnormalities  - Monitor daily renal function    ID  Septic shock  Patient presented with generalized weakness and fatigue, vital signs concerning for shock with hypotension and tachycardia. Recently completed course of cefazolin for MSSA endocarditis, though UDS positive for amphetamines suggests patient may still be using IV drugs. With recent hospitalization would also be concerned about possible HAP. Urinalysis with 3+ leukocytes, 12 RBCs, >100 WBC and many bacteria - certainly concerning for urinary source. WCC elevated to 25. Initial lactate 7.9.   S/p 3L fluid resuscitation and BSABx with improvement in lactate to 1.8  Repeat CT head here without evidence of acute process  CXR unremarkable, no signs of consolidation. CT abdo/pelvis 11/24 shows splenic infarcts as well as cholelithiasis    - De-escalated to oxacillin and vanc per ID recs, appreciate help  - Continue to follow-up BCx  And UCx  - Now off of pressors maintaining MAP >65    Other  Cachexia  Cardiac diet      Critical secondary to Patient has a condition that poses threat to life and  bodily function: Infective endocarditis with bacteremia      Critical care was time spent personally by me on the following activities: development of treatment plan with patient or surrogate and bedside caregivers, discussions with consultants, evaluation of patient's response to treatment, examination of patient, ordering and performing treatments and interventions, ordering and review of laboratory studies, ordering and review of radiographic studies, pulse oximetry, re-evaluation of patient's condition. This critical care time did not overlap with that of any other provider or involve time for any procedures.     Adeel Wagner MD  Critical Care Medicine  [facility]

## 2019-12-27 NOTE — ASSESSMENT & PLAN NOTE
Baseline sCr 0.9 beginning of 11/2019, now 4. 2 on admit. Improved with IV fluids. Likely multifactorial including hypotension, possibly element of septic ATN. Consider emboli to the kidneys from infective endocarditis. Cr improving, now 2.6 (12/26/19). Likely RJ 2/2 sepsis and hypovolemia. 12/27 continuing to resolve, Cr 1.9.     - Avoid nephrotoxic meds/substances  - Strict Is and Os, daily weights  - Leung precaution  - US RP with no significant abnormalities  - Monitor daily renal function

## 2019-12-27 NOTE — SUBJECTIVE & OBJECTIVE
History reviewed. No pertinent past medical history.    Past Surgical History:   Procedure Laterality Date    ANTERIOR CERVICAL DISCECTOMY W/ FUSION N/A 11/6/2019    Procedure: C6-C7 ACDF w neuromonitoring, Mikayla Doshi for instrumentation;  Surgeon: Becca Malone MD;  Location: [facility] OR [address] [place] [address];  Service: Neurosurgery;  Laterality: N/A;    FRACTURE SURGERY      TRANSESOPHAGEAL ECHOCARDIOGRAPHY N/A 11/4/2019    Procedure: ECHOCARDIOGRAM, TRANSESOPHAGEAL;  Surgeon: Radha Diagnostic Provider;  Location: [facility] EP LAB;  Service: Anesthesiology;  Laterality: N/A;       Review of patient's allergies indicates:  No Known Allergies    Family History     None        Tobacco Use    Smoking status: Current Some Day Smoker     Types: Cigarettes    Smokeless tobacco: Former User   Substance and Sexual Activity    Alcohol use: Not on file    Drug use: Yes     Types: Heroin    Sexual activity: Not Currently      Review of Systems   Constitutional: Negative for chills and fever.   HENT: Negative for congestion, rhinorrhea and sore throat.    Eyes: Negative for visual disturbance.   Respiratory: Negative for cough, shortness of breath and stridor.    Cardiovascular: Positive for chest pain (pain with deep breaths). Negative for leg swelling.   Gastrointestinal: Negative for abdominal distention, abdominal pain, blood in stool, nausea and vomiting.   Genitourinary: Negative for dysuria and hematuria.   Musculoskeletal: Positive for back pain and neck pain (pain since ACDF). Negative for myalgias.   Skin: Negative for color change and rash.   Neurological: Negative for dizziness, light-headedness and headaches.   Psychiatric/Behavioral: Negative for agitation and confusion.     Objective:     Vital Signs (Most Recent):  Temp: 99.9 °F (37.7 °C) (12/27/19 1100)  Pulse: 99 (12/27/19 1300)  Resp: (!) 24 (12/27/19 1300)  BP: 97/61 (12/27/19 1300)  SpO2: 98 % (12/27/19 1300) Vital Signs (24h Range):  Temp:  [98.3 °F (36.8 °C)-100.3 °F  (37.9 °C)] 99.9 °F (37.7 °C)  Pulse:  [] 99  Resp:  [13-41] 24  SpO2:  [90 %-100 %] 98 %  BP: ()/(41-97) 97/61   Weight: 58 kg (127 lb 13.9 oz)  Body mass index is 17.34 kg/m².      Intake/Output Summary (Last 24 hours) at 12/27/2019 1357  Last data filed at 12/27/2019 1300  Gross per 24 hour   Intake 2158 ml   Output 5075 ml   Net -2917 ml       Physical Exam   Constitutional: He is oriented to person, place, and time. He appears well-developed. No distress.   HENT:   Head: Atraumatic.   Mouth/Throat: No oropharyngeal exudate.   Eyes: Conjunctivae and EOM are normal. No scleral icterus.   Pinpoint pupils   Neck: Normal range of motion. Neck supple.   Cardiovascular: Normal rate and regular rhythm.   Murmur heard.  Pulmonary/Chest: Effort normal and breath sounds normal. No stridor. No respiratory distress. He has no wheezes.   Abdominal: Soft. Bowel sounds are normal. He exhibits no distension. There is no tenderness. There is no guarding.   Musculoskeletal: He exhibits no edema or tenderness.   Neurological: He is alert and oriented to person, place, and time. No cranial nerve deficit.   Skin: Skin is warm and dry. No rash noted. He is not diaphoretic. No erythema.   Nursing note and vitals reviewed.      Vents:     Lines/Drains/Airways     Central Venous Catheter Line                 Percutaneous Central Line Insertion/Assessment - triple lumen  12/26/19 0230 right internal jugular 1 day          Drain                 Urethral Catheter 12/24/19 1954 Non-latex 16 Fr. 2 days          Peripheral Intravenous Line                 Peripheral IV - Single Lumen 12/24/19 1812 18 G Right Upper Arm 2 days         Peripheral IV - Single Lumen 12/24/19 1923 20 G Left Forearm 2 days         Peripheral IV - Single Lumen 12/24/19 1940 18 G Left Upper Arm 2 days              Significant Labs:    CBC/Anemia Profile:  Recent Labs   Lab 12/27/19  0345 12/27/19  0605 12/27/19  1200   WBC 8.09 9.38 9.64   HGB 6.6* 7.0*  6.7*   HCT 21.5* 23.6* 21.3*   * 98* 104*   MCV 90 90 88   RDW 17.2* 17.1* 17.2*        Chemistries:  Recent Labs   Lab 12/26/19  0632 12/27/19  0345    139   K 3.2* 3.8    112*   CO2 17* 19*   BUN 58* 46*   CREATININE 2.6* 1.9*   CALCIUM 7.3* 7.6*   ALBUMIN 2.1* 1.9*   PROT 6.1 5.9*   BILITOT 0.3 0.2   ALKPHOS 58 69   ALT 10 10   AST 19 18   MG 1.7 1.7   PHOS 3.9 3.3       ABGs:   No results for input(s): PH, PCO2, HCO3, POCSATURATED, BE in the last 48 hours.  CMP:   Recent Labs   Lab 12/26/19  0632 12/27/19  0345    139   K 3.2* 3.8    112*   CO2 17* 19*   * 104   BUN 58* 46*   CREATININE 2.6* 1.9*   CALCIUM 7.3* 7.6*   PROT 6.1 5.9*   ALBUMIN 2.1* 1.9*   BILITOT 0.3 0.2   ALKPHOS 58 69   AST 19 18   ALT 10 10   ANIONGAP 11 8   EGFRNONAA 26.6* 38.8*     Troponin:   No results for input(s): TROPONINI in the last 48 hours.  Urine Studies:   No results for input(s): COLORU, APPEARANCEUA, PHUR, SPECGRAV, PROTEINUA, GLUCUA, KETONESU, BILIRUBINUA, OCCULTUA, NITRITE, UROBILINOGEN, LEUKOCYTESUR, RBCUA, WBCUA, BACTERIA, SQUAMEPITHEL, HYALINECASTS in the last 48 hours.    Invalid input(s): WRIGHTSUR    Significant Imaging: I have reviewed all pertinent imaging results/findings within the past 24 hours.     CT Chest Without Contrast  Narrative: EXAMINATION:  CT CHEST WITHOUT CONTRAST    CLINICAL HISTORY:  pre op valve replacement;    TECHNIQUE:  Low dose axial images, sagittal and coronal reformations were obtained from the thoracic inlet to the lung bases. Contrast was not administered.    COMPARISON:  Radiograph 12/26/2019.    FINDINGS:  Structures at the base of the neck are unremarkable.    There is a left-sided aortic arch with 3 branch vessels.  The thoracic aorta tapers normally without atherosclerotic calcification.    Pulmonary arteries are prominent in caliber.  There are 4 pulmonary veins.    Heart is slightly prominent in size.  No pericardial effusion.  Right-sided central  venous catheter with tip within the SVC.    There is an increased number of slightly prominent left axillary lymph nodes.  No supraclavicular or mediastinal lymphadenopathy.  Hilar contours are not well evaluated.    Esophagus is normal in caliber and course.    Visualized upper abdominal structures demonstrate no significant abnormalities.    There is a paucity of subcutaneous fat.    Partially visualized postoperative changes of the cervical spine noted.  There is vertebral endplate irregularity at T8-T9 and T10-T11    Trachea and proximal airways are patent.  Vocal cords are opposed, presumably from breath-holding.    Lung volumes are normal and symmetric.  There are small bilateral dependent pleural effusions with associated compressive atelectasis versus consolidation of the adjacent lung bases.  There is mild interlobular septal thickening within the bilateral lung apices.  Patchy ground-glass attenuation and interlobular septal thickening noted within the bilateral lower lobes.  There are bilateral solid pulmonary nodules, the largest of which measures 0.5 cm and is located within the anterior segment of the right upper lobe (series 4, image 185).  No pneumothorax.  Impression: 1. Mild pulmonary edema and small bilateral dependent pleural effusions with associated compressive atelectasis versus consolidation of the adjacent lung bases.  2. Bilateral solid pulmonary nodules, indeterminate.  Follow-up CT recommended to assess stability and rule out developing septic emboli given patient's reported history of IVDU.  No definite cavitating pulmonary nodules.  3. Borderline cardiomegaly.  4. Vertebral endplate irregularity at T8-T9 and T10-T11, possibly related to early osteomyelitis given appearance on recent MRI and patient's known history of IVDU.  5. Paucity of subcutaneous fat in keeping with cachexia.  This report was flagged in Epic as abnormal.    Electronically signed by: Oseas Martinze  MD  Date:    12/27/2019  Time:    12:54  MRI Thoracic Spine Without Contrast  Narrative: EXAMINATION:  MRI CERVICAL SPINE WITHOUT CONTRAST; MRI LUMBAR SPINE WITHOUT CONTRAST; MRI THORACIC SPINE WITHOUT CONTRAST    CLINICAL HISTORY:  IVDU, +Staph bacteremia. Recent epidural abscess. Recurrent infection; Low back pain, <6wks, no red flags, no prior management; r/o epidural abscess    TECHNIQUE:  Multiplanar, multisequence MR images of the cervical spine were performed without the administration of contrast.    COMPARISON:  MRI cervical spine with and without contrast 11/02/2019, CT abdomen and pelvis without contrast 12/24/2019.    FINDINGS:  Cervical spine:    Postoperative changes of C6-C7 anterior instrumented disc replacement.    C1-C2: Dens is intact. Pre dens space is maintained.    Alignment: Straightening of the normal cervical lordosis.  No spondylolisthesis.    Vertebrae: Vertebral body heights are well maintained without evidence of fracture.  There is disc space narrowing and mild endplate edema at C3-C4, and less so at C6-C7, in this patient with known history of osteomyelitis.  Overall, appearance of endplates and disc at this level is unchanged when compared to MRI cervical spine 11/02/2019.  There is mild prevertebral soft tissue edema, decreased in conspicuity when compared to MRI cervical spine 11/02/2019.    Mild edema in the facet joints at C6-C7 bilaterally, favoring postoperative change.    No new areas of edema to suggest osteomyelitis.  No definite epidural abscess or phlegmon, noting that this was a non contrasted study.    Discs: Multilevel intervertebral disc space narrowing and disc desiccation throughout the visualized cervical spine.    Cord: Cord maintains normal signal intensity and contour.    Skull base and craniocervical junction: Normal.    Degenerative findings:    C2-T1: No spinal canal stenosis or neural foraminal narrowing.    Thoracic spine:    Alignment: Alignment is well  maintained.    Vertebrae: Vertebral body heights are well maintained without evidence of fracture.  At T8-T9 and T10-T11, there is subtle adjacent endplate edema as well as edema in the disc spaces.  No evidence of phlegmon or abscess.  These findings are concerning for early changes of osteomyelitis in this patient with history of osteomyelitis, with active degenerative joint disease also a consideration but felt less likely.    Discs: Interval disc spaces are otherwise maintained.    Cord: Cord maintains normal signal intensity and contour.    Degenerative findings:    No significant spinal canal stenosis or neural foraminal narrowing throughout the visualized thoracic spine.    Paraspinal muscles and soft tissues: Trace bilateral pleural effusions with associated atelectasis.    Lumbar spine:    Alignment: Straightening of the normal lumbar lordosis.  No spondylolisthesis.    Vertebrae: Vertebral body heights are well maintained.  No evidence of fracture.    At L2-L3, L4-L5, and L5-S1, there is subtle adjacent endplate edema as well as edema in the disc space.  No evidence of phlegmon or abscess.  These findings are concerning for early changes of osteomyelitis in this patient with history of osteomyelitis, with active degenerative disc disease also a consideration but felt less likely.    Discs: Multilevel intervertebral disc space narrowing and disc desiccation.    Cord: Visualized cord maintains normal signal intensity and contour.  Conus terminates at the L1 level.    Degenerative findings:    T12-L1: No spinal canal stenosis or neural foraminal narrowing.    L1-L2: No spinal canal stenosis or neural foraminal narrowing.    L2-L3: No spinal canal stenosis or neural foraminal narrowing.    L3-L4: No spinal canal stenosis or neural foraminal narrowing.    L4-L5: Circumferential disc bulge with annular fissure with no spinal canal stenosis or neural foraminal narrowing.    L5-S1: No spinal canal stenosis or  neural foraminal narrowing.    Paraspinal muscles and soft tissues: There is a subcentimeter T1 hyperintense, T2 hypointense lesion in the right kidney, which may represent a hemorrhagic cyst.  Impression: Disc space narrowing and mild endplate edema at C3-C4, and less so at C6-C7, in this patient with history of osteomyelitis.  Overall, appearance of endplates and disc at these levels is unchanged when compared to MRI cervical spine 11/02/2019.  No new areas of edema in the cervical spine to suggest osteomyelitis.  No prevertebral abscess or phlegmon in this non contrasted study.    Subtle endplate edema as well as edema in the disc spaces at T8-T9 and T10-T11, concerning for early changes of osteomyelitis, with active degenerative joint disease also a consideration but felt less likely.    Subtle endplate edema as well as edema in the disc spaces at L2-L3, L4-L5, and L5-S1, concerning for early changes of osteomyelitis, with active degenerative joint disease also a consideration but felt less likely.    Trace bilateral pleural effusions with associated atelectasis.    Postoperative change of C6-C7 anterior instrumented disc replacement.    This report was flagged in Epic as abnormal.    Electronically signed by resident: Regulo Jimenes  Date:    12/27/2019  Time:    08:03    Electronically signed by: Cassidy Jacinto MD  Date:    12/27/2019  Time:    11:45  MRI Lumbar Spine Without Contrast  Narrative: EXAMINATION:  MRI CERVICAL SPINE WITHOUT CONTRAST; MRI LUMBAR SPINE WITHOUT CONTRAST; MRI THORACIC SPINE WITHOUT CONTRAST    CLINICAL HISTORY:  IVDU, +Staph bacteremia. Recent epidural abscess. Recurrent infection; Low back pain, <6wks, no red flags, no prior management; r/o epidural abscess    TECHNIQUE:  Multiplanar, multisequence MR images of the cervical spine were performed without the administration of contrast.    COMPARISON:  MRI cervical spine with and without contrast 11/02/2019, CT abdomen and pelvis  without contrast 12/24/2019.    FINDINGS:  Cervical spine:    Postoperative changes of C6-C7 anterior instrumented disc replacement.    C1-C2: Dens is intact. Pre dens space is maintained.    Alignment: Straightening of the normal cervical lordosis.  No spondylolisthesis.    Vertebrae: Vertebral body heights are well maintained without evidence of fracture.  There is disc space narrowing and mild endplate edema at C3-C4, and less so at C6-C7, in this patient with known history of osteomyelitis.  Overall, appearance of endplates and disc at this level is unchanged when compared to MRI cervical spine 11/02/2019.  There is mild prevertebral soft tissue edema, decreased in conspicuity when compared to MRI cervical spine 11/02/2019.    Mild edema in the facet joints at C6-C7 bilaterally, favoring postoperative change.    No new areas of edema to suggest osteomyelitis.  No definite epidural abscess or phlegmon, noting that this was a non contrasted study.    Discs: Multilevel intervertebral disc space narrowing and disc desiccation throughout the visualized cervical spine.    Cord: Cord maintains normal signal intensity and contour.    Skull base and craniocervical junction: Normal.    Degenerative findings:    C2-T1: No spinal canal stenosis or neural foraminal narrowing.    Thoracic spine:    Alignment: Alignment is well maintained.    Vertebrae: Vertebral body heights are well maintained without evidence of fracture.  At T8-T9 and T10-T11, there is subtle adjacent endplate edema as well as edema in the disc spaces.  No evidence of phlegmon or abscess.  These findings are concerning for early changes of osteomyelitis in this patient with history of osteomyelitis, with active degenerative joint disease also a consideration but felt less likely.    Discs: Interval disc spaces are otherwise maintained.    Cord: Cord maintains normal signal intensity and contour.    Degenerative findings:    No significant spinal canal  stenosis or neural foraminal narrowing throughout the visualized thoracic spine.    Paraspinal muscles and soft tissues: Trace bilateral pleural effusions with associated atelectasis.    Lumbar spine:    Alignment: Straightening of the normal lumbar lordosis.  No spondylolisthesis.    Vertebrae: Vertebral body heights are well maintained.  No evidence of fracture.    At L2-L3, L4-L5, and L5-S1, there is subtle adjacent endplate edema as well as edema in the disc space.  No evidence of phlegmon or abscess.  These findings are concerning for early changes of osteomyelitis in this patient with history of osteomyelitis, with active degenerative disc disease also a consideration but felt less likely.    Discs: Multilevel intervertebral disc space narrowing and disc desiccation.    Cord: Visualized cord maintains normal signal intensity and contour.  Conus terminates at the L1 level.    Degenerative findings:    T12-L1: No spinal canal stenosis or neural foraminal narrowing.    L1-L2: No spinal canal stenosis or neural foraminal narrowing.    L2-L3: No spinal canal stenosis or neural foraminal narrowing.    L3-L4: No spinal canal stenosis or neural foraminal narrowing.    L4-L5: Circumferential disc bulge with annular fissure with no spinal canal stenosis or neural foraminal narrowing.    L5-S1: No spinal canal stenosis or neural foraminal narrowing.    Paraspinal muscles and soft tissues: There is a subcentimeter T1 hyperintense, T2 hypointense lesion in the right kidney, which may represent a hemorrhagic cyst.  Impression: Disc space narrowing and mild endplate edema at C3-C4, and less so at C6-C7, in this patient with history of osteomyelitis.  Overall, appearance of endplates and disc at these levels is unchanged when compared to MRI cervical spine 11/02/2019.  No new areas of edema in the cervical spine to suggest osteomyelitis.  No prevertebral abscess or phlegmon in this non contrasted study.    Subtle endplate  edema as well as edema in the disc spaces at T8-T9 and T10-T11, concerning for early changes of osteomyelitis, with active degenerative joint disease also a consideration but felt less likely.    Subtle endplate edema as well as edema in the disc spaces at L2-L3, L4-L5, and L5-S1, concerning for early changes of osteomyelitis, with active degenerative joint disease also a consideration but felt less likely.    Trace bilateral pleural effusions with associated atelectasis.    Postoperative change of C6-C7 anterior instrumented disc replacement.    This report was flagged in Epic as abnormal.    Electronically signed by resident: Regulo Jimenes  Date:    12/27/2019  Time:    08:03    Electronically signed by: Cassidy Jacinto MD  Date:    12/27/2019  Time:    11:45  MRI Cervical Spine Without Contrast  Narrative: EXAMINATION:  MRI CERVICAL SPINE WITHOUT CONTRAST; MRI LUMBAR SPINE WITHOUT CONTRAST; MRI THORACIC SPINE WITHOUT CONTRAST    CLINICAL HISTORY:  IVDU, +Staph bacteremia. Recent epidural abscess. Recurrent infection; Low back pain, <6wks, no red flags, no prior management; r/o epidural abscess    TECHNIQUE:  Multiplanar, multisequence MR images of the cervical spine were performed without the administration of contrast.    COMPARISON:  MRI cervical spine with and without contrast 11/02/2019, CT abdomen and pelvis without contrast 12/24/2019.    FINDINGS:  Cervical spine:    Postoperative changes of C6-C7 anterior instrumented disc replacement.    C1-C2: Dens is intact. Pre dens space is maintained.    Alignment: Straightening of the normal cervical lordosis.  No spondylolisthesis.    Vertebrae: Vertebral body heights are well maintained without evidence of fracture.  There is disc space narrowing and mild endplate edema at C3-C4, and less so at C6-C7, in this patient with known history of osteomyelitis.  Overall, appearance of endplates and disc at this level is unchanged when compared to MRI cervical spine  11/02/2019.  There is mild prevertebral soft tissue edema, decreased in conspicuity when compared to MRI cervical spine 11/02/2019.    Mild edema in the facet joints at C6-C7 bilaterally, favoring postoperative change.    No new areas of edema to suggest osteomyelitis.  No definite epidural abscess or phlegmon, noting that this was a non contrasted study.    Discs: Multilevel intervertebral disc space narrowing and disc desiccation throughout the visualized cervical spine.    Cord: Cord maintains normal signal intensity and contour.    Skull base and craniocervical junction: Normal.    Degenerative findings:    C2-T1: No spinal canal stenosis or neural foraminal narrowing.    Thoracic spine:    Alignment: Alignment is well maintained.    Vertebrae: Vertebral body heights are well maintained without evidence of fracture.  At T8-T9 and T10-T11, there is subtle adjacent endplate edema as well as edema in the disc spaces.  No evidence of phlegmon or abscess.  These findings are concerning for early changes of osteomyelitis in this patient with history of osteomyelitis, with active degenerative joint disease also a consideration but felt less likely.    Discs: Interval disc spaces are otherwise maintained.    Cord: Cord maintains normal signal intensity and contour.    Degenerative findings:    No significant spinal canal stenosis or neural foraminal narrowing throughout the visualized thoracic spine.    Paraspinal muscles and soft tissues: Trace bilateral pleural effusions with associated atelectasis.    Lumbar spine:    Alignment: Straightening of the normal lumbar lordosis.  No spondylolisthesis.    Vertebrae: Vertebral body heights are well maintained.  No evidence of fracture.    At L2-L3, L4-L5, and L5-S1, there is subtle adjacent endplate edema as well as edema in the disc space.  No evidence of phlegmon or abscess.  These findings are concerning for early changes of osteomyelitis in this patient with history of  osteomyelitis, with active degenerative disc disease also a consideration but felt less likely.    Discs: Multilevel intervertebral disc space narrowing and disc desiccation.    Cord: Visualized cord maintains normal signal intensity and contour.  Conus terminates at the L1 level.    Degenerative findings:    T12-L1: No spinal canal stenosis or neural foraminal narrowing.    L1-L2: No spinal canal stenosis or neural foraminal narrowing.    L2-L3: No spinal canal stenosis or neural foraminal narrowing.    L3-L4: No spinal canal stenosis or neural foraminal narrowing.    L4-L5: Circumferential disc bulge with annular fissure with no spinal canal stenosis or neural foraminal narrowing.    L5-S1: No spinal canal stenosis or neural foraminal narrowing.    Paraspinal muscles and soft tissues: There is a subcentimeter T1 hyperintense, T2 hypointense lesion in the right kidney, which may represent a hemorrhagic cyst.  Impression: Disc space narrowing and mild endplate edema at C3-C4, and less so at C6-C7, in this patient with history of osteomyelitis.  Overall, appearance of endplates and disc at these levels is unchanged when compared to MRI cervical spine 11/02/2019.  No new areas of edema in the cervical spine to suggest osteomyelitis.  No prevertebral abscess or phlegmon in this non contrasted study.    Subtle endplate edema as well as edema in the disc spaces at T8-T9 and T10-T11, concerning for early changes of osteomyelitis, with active degenerative joint disease also a consideration but felt less likely.    Subtle endplate edema as well as edema in the disc spaces at L2-L3, L4-L5, and L5-S1, concerning for early changes of osteomyelitis, with active degenerative joint disease also a consideration but felt less likely.    Trace bilateral pleural effusions with associated atelectasis.    Postoperative change of C6-C7 anterior instrumented disc replacement.    This report was flagged in Epic as  abnormal.    Electronically signed by resident: Regulo Jimenes  Date:    12/27/2019  Time:    08:03    Electronically signed by: Cassidy Jacinto MD  Date:    12/27/2019  Time:    11:45

## 2019-12-27 NOTE — PROGRESS NOTES
Dr. Fox notified of pt acutely becoming tachy w/HR in 140s, Hypertensive w/SBP in 170s, levo gtt turned off, pt tachypneic, & shivering.  Pt complaining of being cold after drinking milk. MD orders to obtain EKG.

## 2019-12-27 NOTE — CONSULTS
[facility]  Infectious Disease  Consult Note    Patient Name: Ld Bronson  MRN: [id number]  Admission Date: 12/25/2019  Hospital Length of Stay: 1 days  Attending Physician: Warren Strange MD  Primary Care Provider: Kris Artis Jr, MD     Isolation Status: No active isolations    Patient information was obtained from patient, past medical records and ER records.      Inpatient consult to Infectious Diseases  Consult performed by: Isha Francis APRN, ANP  Consult ordered by: Greg Tomlin MD  Reason for consult: recurrent staph bacteremia. +MV endocarditis. Failed prior therapy?        Assessment/Plan:     Endocarditis due to methicillin susceptible Staphylococcus aureus (MSSA)     3 year old ,  known to ID from prior admission, with history of IVDU, HCV, and history of MSSA bacteremia and mitral valve endocarditis, bilateral groin abscessed and SI joint effusion (s/p IR aspiration), cervical epidural abscess (MSSA) s/p C6-7 ACDF with epidural abscess evacuation on 11/6/19 who was recently [facility] with plans for 6 weeks of IV cefazolin (end date 12/18).  Prior to last admission in November, he had history of incomplete treatment [facility] ([facility] prior to completing treatment).  He presented to [facility] on 12/24 with complaints of chest pain, weakness and shortness of breath.  He was found to be in septic shock requiring pressor support and was transferred [place] [facility] [place] higher level of care.   It is unclear from patient whether he actually completed his most recent [facility], though per Primary Teams notes it appears from his transfer note that he had completed a full 6 week course.      Micro:  Blood cultures 12/24 and 12/25 positive for Staph Aureus (sensitivities pending).      Imaging:  CXR 12/25 - lungs clear.  Repeat 12/26 shows minimal,  "patchy consolidation LLL - edema vs PNA vs aspiration  MRI brain 12/26 - numerous scattered punctate to small sized foci of signal abnormality in cerebral hemispheres and cerebellum bilaterallly concerning for possible septic emboli   Renal US - negative  CT Abdomen/pelvis is concerning for possible splenic infarct as well as erosive changes involving the left sacroiliac joint and symphysis pubis  2D echo - Moderate to severe regurgitation. There is anterior leaflet vegetation. There is a moderate-large 7 by 12 mm mobile density that flops on both sides of the mitral valve that is likely vegetation but could also represent a ruptured part of the chordal apparatus.     At time of visit, patient is somnolent, but easily arousable.  His primary complaint is that he "hurts all over", in particular, complaining of pain in bilateral toes.  Complains of persistent back pain and neck pain (present since his surgery). Reports he may have used IV drugs once or twice [facility].   He is afebrile.  Initial leukocytosis on admission as resolved.  Creatinine downtrending 4.2 >>2.6.   Tachycardic.  Remains on pressors.  MRI spine is pending.     Plan/recommendations:  1.  Continue IV vancomycin (pharmacy to dose)    2.  Discontinue IV zosyn and start oxacillin 12 grams IV q 24 hours continuous infusion for prior MSSA (better CNS penetration than cefazolin, given concern for septic emboli)  3.  Repeat blood cultures ordered.   4.  Agree with CTS consult  5.  Agree with repeat spinal imaging  6.  Will follow Staph sensitivities and de-escalate antibiotics accordingly.   7.  Will follow.     Patient seen by, and plan discussed with, ID staff  Discussed with Primary Team       Thank you.   Please call for any questions or concerns.  KENNY Terry, ANP-C  [phone] pager, [phone] [phone]  Subjective:     Principal Problem: Septic shock    HPI:   Mr. Bronson is a 53 year old ,  known to ID from prior admission, " "with history of IVDU, HCV, and history of MSSA bacteremia and mitral valve endocarditis, bilateral groin abscessed and SI joint effusion (s/p IR aspiration), cervical epidural abscess (MSSA) s/p C6-7 ACDF with epidural abscess evacuation on 11/6/19 who was recently discharged from [facility] on 11[facility]9 [facility] with plans for 6 weeks of IV cefazolin (end date 12/18).  Prior to last admission in November, he had history of incomplete treatment for [facility] endocard[facility] (left AMA prior to completing treatment).  He presented to [facility] on 12/24 with complaints of chest pain, weakness and shortness of breath.  He was found to be in septic shock requiring pressor support and was transferred to [facility] for a higher level of care.   It is unclear from patient whether he actually completed his most recent [facility], though per Primary Teams notes it appears from his transfer note that he had completed a full 6 week course.      Micro:  Blood cultures 12/24 and 12/25 positive for Staph Aureus (sensitivities pending).      Imaging:  CXR 12/25 - lungs clear.  Repeat 12/26 shows minimal, patchy consolidation LLL - edema vs PNA vs aspiration  MRI brain 12/26 - numerous scattered punctate to small sized foci of signal abnormality in cerebral hemispheres and cerebellum bilaterallly concerning for possible septic emboli   Renal US - negative  CT Abdomen/pelvis is concerning for possible splenic infarct as well as erosive changes involving the left sacroiliac joint and symphysis pubis  2D echo - Moderate to severe regurgitation. There is anterior leaflet vegetation. There is a moderate-large 7 by 12 mm mobile density that flops on both sides of the mitral valve that is likely vegetation but could also represent a ruptured part of the chordal apparatus.    At time of visit, patient is somnolent, but easily arousable.  His primary complaint is that he "hurts " "all over", in particular, complaining of pain in bilateral toes.  Complains of persistent back pain and neck pain (present since his surgery).  Reports he may have used IV drugs once or twice [facility].      He is afebrile.  Initial leukocytosis on admission as resolved.  Creatinine downtrending 4.2 >>2.6.           No past medical history on file.    Past Surgical History:   Procedure Laterality Date    ANTERIOR CERVICAL DISCECTOMY W/ FUSION N/A 11/6/2019    Procedure: C6-C7 ACDF w neuromonitoring, Mikayla Doshi for instrumentation;  Surgeon: Becca Malone MD;  Location: [facility] OR [address] [place] [address];  Service: Neurosurgery;  Laterality: N/A;    FRACTURE SURGERY      TRANSESOPHAGEAL ECHOCARDIOGRAPHY N/A 11/4/2019    Procedure: ECHOCARDIOGRAM, TRANSESOPHAGEAL;  Surgeon: Radha Diagnostic Provider;  Location: [facility] EP LAB;  Service: Anesthesiology;  Laterality: N/A;       Review of patient's allergies indicates:  No Known Allergies    Medications:  Medications Prior to Admission   Medication Sig    dextrose 5 % SolP 500 mL with ceFAZolin 1 gram SolR 6 g Inject 6 g into the vein once daily. End dose 12/18/19    enoxaparin (LOVENOX) 40 mg/0.4 mL Syrg Inject 0.4 mLs (40 mg total) into the skin once daily.    ferrous sulfate 325 (65 FE) MG EC tablet Take 1 tablet (325 mg total) by mouth once daily.    folic acid (FOLVITE) 1 MG tablet Take 1 tablet (1 mg total) by mouth once daily.    ondansetron 4 mg/2 mL Soln Inject 4 mg into the vein every 8 (eight) hours as needed.    polyethylene glycol (GLYCOLAX) 17 gram PwPk Take 17 g by mouth once daily.    ramelteon (ROZEREM) 8 mg tablet Take 1 tablet (8 mg total) by mouth nightly as needed for Insomnia.    traMADol (ULTRAM) 50 mg tablet Take 1 tablet (50 mg total) by mouth every 6 (six) hours as needed.     Antibiotics (From admission, onward)    Start     Stop Route Frequency Ordered    12/26/19 1630  oxacillin 12 g in  mL CONTINUOUS INFUSION      -- IV Every 24 hours " (non-standard times) 12/26/19 1521        Antifungals (From admission, onward)    None        Antivirals (From admission, onward)    None             There is no immunization history on file for this patient.    Family History     None        Social History     Socioeconomic History    Marital status: Single     Spouse name: Not on file    Number of children: Not on file    Years of education: Not on file    Highest education level: Not on file   Occupational History    Not on file   Social Needs    Financial resource strain: Not on file    Food insecurity:     Worry: Not on file     Inability: Not on file    Transportation needs:     Medical: Not on file     Non-medical: Not on file   Tobacco Use    Smoking status: Current Some Day Smoker     Types: Cigarettes    Smokeless tobacco: Former User   Substance and Sexual Activity    Alcohol use: Not on file    Drug use: Yes     Types: Heroin    Sexual activity: Not Currently   Lifestyle    Physical activity:     Days per week: Not on file     Minutes per session: Not on file    Stress: Not on file   Relationships    Social connections:     Talks on phone: Not on file     Gets together: Not on file     Attends Mu-ism service: Not on file     Active member of club or organization: Not on file     Attends meetings of clubs or organizations: Not on file     Relationship status: Not on file   Other Topics Concern    Not on file   Social History Narrative    Not on file     Review of Systems   Constitutional: Negative for activity change, appetite change, chills and fever.   HENT: Negative for congestion, dental problem and trouble swallowing.    Eyes: Negative.    Respiratory: Negative for cough, shortness of breath and stridor.    Cardiovascular: Positive for chest pain (inspiratory). Negative for leg swelling.   Gastrointestinal: Positive for diarrhea. Negative for abdominal distention, abdominal pain, nausea and vomiting.   Genitourinary: Negative for  dysuria, flank pain and hematuria.   Musculoskeletal: Positive for arthralgias (bilateral toes), back pain and neck pain (since cervical surgery). Negative for myalgias.   Skin: Negative for color change, rash and wound.   Neurological: Negative for dizziness, speech difficulty and headaches.   Psychiatric/Behavioral: Negative for agitation and confusion.     Objective:     Vital Signs (Most Recent):  Temp: 99 °F (37.2 °C) (12/26/19 1500)  Pulse: 107 (12/26/19 1700)  Resp: (!) 32 (12/26/19 1700)  BP: 103/65 (12/26/19 1645)  SpO2: 99 % (12/26/19 1700) Vital Signs (24h Range):  Temp:  [97.7 °F (36.5 °C)-99 °F (37.2 °C)] 99 °F (37.2 °C)  Pulse:  [] 107  Resp:  [15-37] 32  SpO2:  [94 %-100 %] 99 %  BP: ()/(42-67) 103/65     Weight: 61.7 kg (136 lb)  Body mass index is 18.44 kg/m².    Estimated Creatinine Clearance: 28 mL/min (A) (based on SCr of 2.6 mg/dL (H)).    Physical Exam   Constitutional: He is oriented to person, place, and time. No distress.   Thin, chronically ill appearing.    HENT:   Head: Normocephalic and atraumatic.   Poor dentition     Eyes: Conjunctivae are normal. No scleral icterus.   Cardiovascular: Regular rhythm. Tachycardia present. Exam reveals no friction rub.   Murmur heard.  Pulmonary/Chest: Effort normal and breath sounds normal. No respiratory distress. He has no wheezes. He has no rales.   Abdominal: Soft. He exhibits no distension. There is no tenderness. There is no guarding.   Musculoskeletal: He exhibits no edema.   Neurological: He is alert and oriented to person, place, and time.   Skin: Skin is warm and dry. He is not diaphoretic.   Multiple tattoos   Vitals reviewed.      Significant Labs:   Blood Culture:   Recent Labs   Lab 11/04/19  0845 12/24/19  1811 12/24/19  1845 12/25/19  0105 12/25/19  0124   LABBLOO No growth after 5 days. Gram stain aer bottle: Gram positive cocci in clusters resembling Staph  Gram stain emili bottle: Gram positive cocci in clusters resembling  Staph  Results called to and read back by: Karthik Stoner RN. 12/25/2019  17:16  STAPHYLOCOCCUS AUREUS  Susceptibility pending  ID consult required at [facility].  * Gram stain aer bottle: Gram positive cocci in clusters resembling Staph  Gram stain emili bottle: Gram positive cocci in clusters resembling Staph  Results called to and read back by:Karthik Stoner RN. 12/25/2019  17:16  STAPHYLOCOCCUS AUREUS  ID consult required at [facility].  For susceptibility see order # [id number]  * Gram stain aer bottle: Gram positive cocci in clusters resembling Staph  Results called to and read back by: Wendy Wallace RN. 12/25/2019  20:59 Gram stain aer bottle: Gram positive cocci in clusters resembling Staph  Results called to and read back by: Wendy Wallace RN. 12/25/2019  20:59     CBC:   Recent Labs   Lab 12/25/19 0108 12/25/19 0432 12/26/19  0524   WBC 31.52* 28.18* 9.16   HGB 9.3* 8.7* 7.7*   HCT 29.8* 28.5* 25.3*    181 127*     CMP:   Recent Labs   Lab 12/25/19 0108 12/25/19 0432 12/26/19  0632   * 134* 138   K 3.5 4.3 3.2*    107 110   CO2 15* 14* 17*   * 150* 120*   BUN 55* 56* 58*   CREATININE 3.8* 3.6* 2.6*   CALCIUM 7.8* 7.1* 7.3*   PROT 7.1 6.9 6.1   ALBUMIN 2.3* 2.1* 2.1*   BILITOT 0.5 0.4 0.3   ALKPHOS 79 72 58   AST 30 41* 19   ALT 12 12 10   ANIONGAP 15 13 11   EGFRNONAA 16.8* 17.9* 26.6*     HIV 1/2 Antibodies:   Recent Labs   Lab 12/25/19  0113   HDC10MHCG Negative     Urine Culture:   Recent Labs   Lab 12/24/19 1953 12/25/19  0140   LABURIN No growth No growth     Urine Studies:   Recent Labs   Lab 12/24/19 1953 12/25/19  0140   COLORU Yellow Yellow   APPEARANCEUA Clear Cloudy*   PHUR 5.0 5.0   SPECGRAV 1.025 1.010   PROTEINUA 2+* 1+*   GLUCUA Negative Negative   KETONESU Negative Negative   BILIRUBINUA Negative Negative   OCCULTUA 3+* 3+*   NITRITE Negative Negative   UROBILINOGEN Negative  --    LEUKOCYTESUR 1+*  3+*   RBCUA 5* 12*   WBCUA >100* >100*   BACTERIA Moderate* Many*   SQUAMEPITHEL  --  1   HYALINECASTS 0 6*       Significant Imaging: I have reviewed all pertinent imaging results/findings within the past 24 hours.

## 2019-12-27 NOTE — SUBJECTIVE & OBJECTIVE
Patient History           Medical as of 12/30/2019    Past Medical History: Patient provided no pertinent medical history.           Surgical as of 12/30/2019     Past Surgical History     Procedure Laterality Date Comments Source    FRACTURE SURGERY -- -- -- Provider    TRANSESOPHAGEAL ECHOCARDIOGRAPHY N/A 11/4/2019 Procedure: ECHOCARDIOGRAM, TRANSESOPHAGEAL;  Surgeon: Radha Diagnostic Provider;  Location: [facility] EP LAB;  Service: Anesthesiology;  Laterality: N/A; Provider    ANTERIOR CERVICAL DISCECTOMY W/ FUSION N/A 11/6/2019 Procedure: C6-C7 ACDF w neuromonitoring, Mikayla Doshi for instrumentation;  Surgeon: Becca Malone MD;  Location: [facility] OR [address] [place] [address];  Service: Neurosurgery;  Laterality: N/A; Provider                  Family as of 12/30/2019    None           Tobacco Use as of 12/30/2019     Smoking Status Smoking Start Date Smoking Quit Date Packs/Day Years Used    Current Some Day Smoker -- -- -- --    Types Comments Smokeless Tobacco Status Smokeless Tobacco Quit Date Source     Cigarettes -- Former User -- Provider            Alcohol Use as of 12/30/2019     Alcohol Use Drinks/Week Alcohol/Week Comments Source    -- -- -- -- Provider    Frequency Standard Drinks Binge Drinking        -- -- --              Drug Use as of 12/30/2019     Drug Use Types Frequency Comments Source    Yes  Heroin -- -- Provider            Sexual Activity as of 12/30/2019     Sexually Active Birth Control Partners Comments Source    Not Currently -- -- -- Provider            Activities of Daily Living as of 12/30/2019    None           Social Documentation as of 12/30/2019    None           Occupational as of 12/30/2019    None           Socioeconomic as of 12/30/2019     Marital Status Spouse Name Number of Children Years Education Education Level Preferred Language Ethnicity Race Source    Single -- -- -- -- English /White White --    Financial Resource Strain Food Insecurity: Worry Food Insecurity: Inability  Transportation Needs: Medical Transportation Needs: Non-medical    -- -- -- -- --            Pertinent History     Question Response Comments    Lives with -- --    Place in Birth Order -- --    Lives in -- --    Number of Siblings -- --    Raised by -- --    Legal Involvement -- --    Childhood Trauma -- --    Criminal History of -- --    Financial Status -- --    Highest Level of Education -- --    Does patient have access to a firearm? -- --     Service -- --    Primary Leisure Activity -- --    Spirituality -- --        History reviewed. No pertinent past medical history.  Past Surgical History:   Procedure Laterality Date    ANTERIOR CERVICAL DISCECTOMY W/ FUSION N/A 11/6/2019    Procedure: C6-C7 ACDF w neuromonitoring, Mikayla Doshi for instrumentation;  Surgeon: Becca Malone MD;  Location: [facility] OR [address] [place] [address];  Service: Neurosurgery;  Laterality: N/A;    FRACTURE SURGERY      TRANSESOPHAGEAL ECHOCARDIOGRAPHY N/A 11/4/2019    Procedure: ECHOCARDIOGRAM, TRANSESOPHAGEAL;  Surgeon: Radha Diagnostic Provider;  Location: [facility] EP LAB;  Service: Anesthesiology;  Laterality: N/A;     Family History     None        Tobacco Use    Smoking status: Current Some Day Smoker     Types: Cigarettes    Smokeless tobacco: Former User   Substance and Sexual Activity    Alcohol use: Not on file    Drug use: Yes     Types: Heroin    Sexual activity: Not Currently     Review of patient's allergies indicates:  No Known Allergies    No current facility-administered medications on file prior to encounter.      Current Outpatient Medications on File Prior to Encounter   Medication Sig    dextrose 5 % SolP 500 mL with ceFAZolin 1 gram SolR 6 g Inject 6 g into the vein once daily. End dose 12/18/19    enoxaparin (LOVENOX) 40 mg/0.4 mL Syrg Inject 0.4 mLs (40 mg total) into the skin once daily.    ferrous sulfate 325 (65 FE) MG EC tablet Take 1 tablet (325 mg total) by mouth once daily.    folic acid (FOLVITE) 1 MG tablet Take  "1 tablet (1 mg total) by mouth once daily.    ondansetron 4 mg/2 mL Soln Inject 4 mg into the vein every 8 (eight) hours as needed.    polyethylene glycol (GLYCOLAX) 17 gram PwPk Take 17 g by mouth once daily.    ramelteon (ROZEREM) 8 mg tablet Take 1 tablet (8 mg total) by mouth nightly as needed for Insomnia.    traMADol (ULTRAM) 50 mg tablet Take 1 tablet (50 mg total) by mouth every 6 (six) hours as needed.     Psychotherapeutics (From admission, onward)    None            Objective:     Vital Signs (Most Recent):  Temp: 97.6 °F (36.4 °C) (12/30/19 0305)  Pulse: 72 (12/30/19 0600)  Resp: 17 (12/30/19 0600)  BP: 102/63 (12/30/19 0600)  SpO2: 100 % (12/30/19 0600) Vital Signs (24h Range):  Temp:  [97.6 °F (36.4 °C)-101.7 °F (38.7 °C)] 97.6 °F (36.4 °C)  Pulse:  [] 72  Resp:  [12-48] 17  SpO2:  [85 %-100 %] 100 %  BP: ()/() 102/63     Height: 6' (182.9 cm)  Weight: 62.8 kg (138 lb 7.2 oz)  Body mass index is 18.78 kg/m².      Intake/Output Summary (Last 24 hours) at 12/30/2019 1045  Last data filed at 12/30/2019 0500  Gross per 24 hour   Intake 1661 ml   Output 3333 ml   Net -1672 ml       Physical Exam     PAIN   3/10     CONSTITUTIONAL  General Appearance and Manner: clean-shaven, fair hygiene     MUSCULOSKELETAL  Abnormal Involuntary Movements: none observed  Gait and Station: deferred     PSYCHIATRIC   Orientation: AAOx4  Speech: normal rate, rhythm, volume  Language: fluent english  Mood: "okay"  Affect: irritable  Thought Process: linear, goal-directed  Associations: intact  Thought Content: denies SI/HI/AVH, paranoia, delusions  Memory: immediate and delayed recall intact  Attention and Concentration: intact to conversation  Fund of Knowledge: aware of current events  Insight: fair  Judgment: poor    Significant Labs: All pertinent labs within the past 24 hours have been reviewed.    Significant Imaging: I have reviewed all pertinent imaging results/findings within the past 24 hours.  "

## 2019-12-27 NOTE — PROGRESS NOTES
Pharmacokinetic Assessment Follow Up: IV Vancomycin    Vancomycin Regimen Assessment & Plan:  - Vancomycin level went from 29.4 --> 17.4 mcg/mL over ~18h. Calculated half life ~24h and RJ continues to resolve  - Scheduling maintenance regimen vancomycin 1000 mg IV q24h.  - Next trough on 12/29 @0800 prior to 3rd dose of this regimen, goal 15-20 mcg/mL.    Drug levels (last 3 results):  Recent Labs   Lab Result Units 12/25/19  1951 12/26/19  0923 12/27/19  0345   Vancomycin, Random ug/mL 14.1 29.4 17.4     Pharmacy will continue to follow and monitor vancomycin.    Please contact pharmacy at extension [phone] for questions regarding this assessment.    Thank you for the consult,   Nicolas Zimmerman     Patient brief summary:  Ld Bronson is a 53 y.o. male initiated on antimicrobial therapy with IV Vancomycin for treatment of bacteremia    Drug Allergies:   Review of patient's allergies indicates:  No Known Allergies    Actual Body Weight:   61.7 kg    Renal Function:   Estimated Creatinine Clearance: 36 mL/min (A) (based on SCr of 1.9 mg/dL (H)).,     Dialysis Method (if applicable):  N/A
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
## 2019-12-28 LAB
ALBUMIN SERPL BCP-MCNC: 2 G/DL (ref 3.5–5.2)
ALP SERPL-CCNC: 72 U/L (ref 55–135)
ALT SERPL W/O P-5'-P-CCNC: 11 U/L (ref 10–44)
ANION GAP SERPL CALC-SCNC: 12 MMOL/L (ref 8–16)
ANION GAP SERPL CALC-SCNC: 5 MMOL/L (ref 8–16)
ANION GAP SERPL CALC-SCNC: 8 MMOL/L (ref 8–16)
AST SERPL-CCNC: 19 U/L (ref 10–40)
BACTERIA BLD CULT: ABNORMAL
BASOPHILS # BLD AUTO: 0.02 K/UL (ref 0–0.2)
BASOPHILS # BLD AUTO: 0.02 K/UL (ref 0–0.2)
BASOPHILS NFR BLD: 0.2 % (ref 0–1.9)
BASOPHILS NFR BLD: 0.2 % (ref 0–1.9)
BILIRUB SERPL-MCNC: 0.2 MG/DL (ref 0.1–1)
BLD PROD TYP BPU: NORMAL
BLOOD UNIT EXPIRATION DATE: NORMAL
BLOOD UNIT TYPE CODE: 5100
BLOOD UNIT TYPE: NORMAL
BUN SERPL-MCNC: 40 MG/DL (ref 6–20)
BUN SERPL-MCNC: 40 MG/DL (ref 6–20)
BUN SERPL-MCNC: 41 MG/DL (ref 6–20)
CALCIUM SERPL-MCNC: 8 MG/DL (ref 8.7–10.5)
CALCIUM SERPL-MCNC: 8.2 MG/DL (ref 8.7–10.5)
CALCIUM SERPL-MCNC: 8.7 MG/DL (ref 8.7–10.5)
CHLORIDE SERPL-SCNC: 106 MMOL/L (ref 95–110)
CHLORIDE SERPL-SCNC: 107 MMOL/L (ref 95–110)
CHLORIDE SERPL-SCNC: 108 MMOL/L (ref 95–110)
CO2 SERPL-SCNC: 20 MMOL/L (ref 23–29)
CO2 SERPL-SCNC: 21 MMOL/L (ref 23–29)
CO2 SERPL-SCNC: 22 MMOL/L (ref 23–29)
CODING SYSTEM: NORMAL
CREAT SERPL-MCNC: 1.5 MG/DL (ref 0.5–1.4)
CREAT SERPL-MCNC: 1.5 MG/DL (ref 0.5–1.4)
CREAT SERPL-MCNC: 1.6 MG/DL (ref 0.5–1.4)
DIFFERENTIAL METHOD: ABNORMAL
DIFFERENTIAL METHOD: ABNORMAL
DISPENSE STATUS: NORMAL
EOSINOPHIL # BLD AUTO: 0.1 K/UL (ref 0–0.5)
EOSINOPHIL # BLD AUTO: 0.1 K/UL (ref 0–0.5)
EOSINOPHIL NFR BLD: 0.9 % (ref 0–8)
EOSINOPHIL NFR BLD: 1.3 % (ref 0–8)
ERYTHROCYTE [DISTWIDTH] IN BLOOD BY AUTOMATED COUNT: 16.5 % (ref 11.5–14.5)
ERYTHROCYTE [DISTWIDTH] IN BLOOD BY AUTOMATED COUNT: 16.7 % (ref 11.5–14.5)
EST. GFR  (AFRICAN AMERICAN): 55.2 ML/MIN/1.73 M^2
EST. GFR  (AFRICAN AMERICAN): 59.7 ML/MIN/1.73 M^2
EST. GFR  (AFRICAN AMERICAN): 59.7 ML/MIN/1.73 M^2
EST. GFR  (NON AFRICAN AMERICAN): 47.8 ML/MIN/1.73 M^2
EST. GFR  (NON AFRICAN AMERICAN): 51.7 ML/MIN/1.73 M^2
EST. GFR  (NON AFRICAN AMERICAN): 51.7 ML/MIN/1.73 M^2
GLUCOSE SERPL-MCNC: 102 MG/DL (ref 70–110)
GLUCOSE SERPL-MCNC: 104 MG/DL (ref 70–110)
GLUCOSE SERPL-MCNC: 99 MG/DL (ref 70–110)
HCT VFR BLD AUTO: 22.9 % (ref 40–54)
HCT VFR BLD AUTO: 25 % (ref 40–54)
HGB BLD-MCNC: 6.9 G/DL (ref 14–18)
HGB BLD-MCNC: 7.8 G/DL (ref 14–18)
IMM GRANULOCYTES # BLD AUTO: 0.07 K/UL (ref 0–0.04)
IMM GRANULOCYTES # BLD AUTO: 0.15 K/UL (ref 0–0.04)
IMM GRANULOCYTES NFR BLD AUTO: 0.7 % (ref 0–0.5)
IMM GRANULOCYTES NFR BLD AUTO: 1.2 % (ref 0–0.5)
LYMPHOCYTES # BLD AUTO: 1.4 K/UL (ref 1–4.8)
LYMPHOCYTES # BLD AUTO: 1.7 K/UL (ref 1–4.8)
LYMPHOCYTES NFR BLD: 13.6 % (ref 18–48)
LYMPHOCYTES NFR BLD: 13.7 % (ref 18–48)
MAGNESIUM SERPL-MCNC: 1.5 MG/DL (ref 1.6–2.6)
MAGNESIUM SERPL-MCNC: 1.7 MG/DL (ref 1.6–2.6)
MAGNESIUM SERPL-MCNC: 1.8 MG/DL (ref 1.6–2.6)
MCH RBC QN AUTO: 26.7 PG (ref 27–31)
MCH RBC QN AUTO: 27.7 PG (ref 27–31)
MCHC RBC AUTO-ENTMCNC: 30.1 G/DL (ref 32–36)
MCHC RBC AUTO-ENTMCNC: 31.2 G/DL (ref 32–36)
MCV RBC AUTO: 89 FL (ref 82–98)
MCV RBC AUTO: 89 FL (ref 82–98)
MONOCYTES # BLD AUTO: 0.9 K/UL (ref 0.3–1)
MONOCYTES # BLD AUTO: 1.2 K/UL (ref 0.3–1)
MONOCYTES NFR BLD: 8.9 % (ref 4–15)
MONOCYTES NFR BLD: 9.3 % (ref 4–15)
NEUTROPHILS # BLD AUTO: 7.8 K/UL (ref 1.8–7.7)
NEUTROPHILS # BLD AUTO: 9.5 K/UL (ref 1.8–7.7)
NEUTROPHILS NFR BLD: 74.7 % (ref 38–73)
NEUTROPHILS NFR BLD: 75.3 % (ref 38–73)
NRBC BLD-RTO: 0 /100 WBC
NRBC BLD-RTO: 0 /100 WBC
PHOSPHATE SERPL-MCNC: 3.7 MG/DL (ref 2.7–4.5)
PHOSPHATE SERPL-MCNC: 4 MG/DL (ref 2.7–4.5)
PLATELET # BLD AUTO: 118 K/UL (ref 150–350)
PLATELET # BLD AUTO: 132 K/UL (ref 150–350)
PMV BLD AUTO: 10.2 FL (ref 9.2–12.9)
PMV BLD AUTO: 10.3 FL (ref 9.2–12.9)
POTASSIUM SERPL-SCNC: 4.1 MMOL/L (ref 3.5–5.1)
POTASSIUM SERPL-SCNC: 4.6 MMOL/L (ref 3.5–5.1)
POTASSIUM SERPL-SCNC: 5.4 MMOL/L (ref 3.5–5.1)
PROT SERPL-MCNC: 6.2 G/DL (ref 6–8.4)
RBC # BLD AUTO: 2.58 M/UL (ref 4.6–6.2)
RBC # BLD AUTO: 2.82 M/UL (ref 4.6–6.2)
SODIUM SERPL-SCNC: 135 MMOL/L (ref 136–145)
SODIUM SERPL-SCNC: 136 MMOL/L (ref 136–145)
SODIUM SERPL-SCNC: 138 MMOL/L (ref 136–145)
TRANS ERYTHROCYTES VOL PATIENT: NORMAL ML
WBC # BLD AUTO: 10.3 K/UL (ref 3.9–12.7)
WBC # BLD AUTO: 12.74 K/UL (ref 3.9–12.7)

## 2019-12-28 PROCEDURE — 83735 ASSAY OF MAGNESIUM: CPT | Mod: 91

## 2019-12-28 PROCEDURE — 80048 BASIC METABOLIC PNL TOTAL CA: CPT | Mod: 91

## 2019-12-28 PROCEDURE — 93010 ELECTROCARDIOGRAM REPORT: CPT | Mod: ,,, | Performed by: INTERNAL MEDICINE

## 2019-12-28 PROCEDURE — 84100 ASSAY OF PHOSPHORUS: CPT | Mod: 91

## 2019-12-28 PROCEDURE — 25000003 PHARM REV CODE 250: Performed by: STUDENT IN AN ORGANIZED HEALTH CARE EDUCATION/TRAINING PROGRAM

## 2019-12-28 PROCEDURE — 63600175 PHARM REV CODE 636 W HCPCS: Performed by: INTERNAL MEDICINE

## 2019-12-28 PROCEDURE — 99233 SBSQ HOSP IP/OBS HIGH 50: CPT | Mod: ,,, | Performed by: INTERNAL MEDICINE

## 2019-12-28 PROCEDURE — 80048 BASIC METABOLIC PNL TOTAL CA: CPT

## 2019-12-28 PROCEDURE — 84100 ASSAY OF PHOSPHORUS: CPT

## 2019-12-28 PROCEDURE — 85025 COMPLETE CBC W/AUTO DIFF WBC: CPT | Mod: 91

## 2019-12-28 PROCEDURE — 80053 COMPREHEN METABOLIC PANEL: CPT

## 2019-12-28 PROCEDURE — 93010 EKG 12-LEAD: ICD-10-PCS | Mod: ,,, | Performed by: INTERNAL MEDICINE

## 2019-12-28 PROCEDURE — 93005 ELECTROCARDIOGRAM TRACING: CPT

## 2019-12-28 PROCEDURE — P9021 RED BLOOD CELLS UNIT: HCPCS

## 2019-12-28 PROCEDURE — 63600175 PHARM REV CODE 636 W HCPCS: Performed by: NURSE PRACTITIONER

## 2019-12-28 PROCEDURE — 63600175 PHARM REV CODE 636 W HCPCS: Performed by: STUDENT IN AN ORGANIZED HEALTH CARE EDUCATION/TRAINING PROGRAM

## 2019-12-28 PROCEDURE — 36430 TRANSFUSION BLD/BLD COMPNT: CPT

## 2019-12-28 PROCEDURE — 99233 PR SUBSEQUENT HOSPITAL CARE,LEVL III: ICD-10-PCS | Mod: ,,, | Performed by: INTERNAL MEDICINE

## 2019-12-28 PROCEDURE — 20000000 HC ICU ROOM

## 2019-12-28 PROCEDURE — 83735 ASSAY OF MAGNESIUM: CPT

## 2019-12-28 RX ORDER — HYDROCODONE BITARTRATE AND ACETAMINOPHEN 500; 5 MG/1; MG/1
TABLET ORAL
Status: DISCONTINUED | OUTPATIENT
Start: 2019-12-28 | End: 2019-12-31

## 2019-12-28 RX ORDER — MAGNESIUM SULFATE HEPTAHYDRATE 40 MG/ML
2 INJECTION, SOLUTION INTRAVENOUS ONCE
Status: COMPLETED | OUTPATIENT
Start: 2019-12-28 | End: 2019-12-28

## 2019-12-28 RX ADMIN — MAGNESIUM SULFATE IN WATER 2 G: 40 INJECTION, SOLUTION INTRAVENOUS at 07:12

## 2019-12-28 RX ADMIN — OXYCODONE HYDROCHLORIDE 5 MG: 5 TABLET ORAL at 07:12

## 2019-12-28 RX ADMIN — SODIUM CHLORIDE, SODIUM LACTATE, POTASSIUM CHLORIDE, AND CALCIUM CHLORIDE 500 ML: .6; .31; .03; .02 INJECTION, SOLUTION INTRAVENOUS at 09:12

## 2019-12-28 RX ADMIN — OXACILLIN SODIUM 12 G: 10 INJECTION, POWDER, FOR SOLUTION INTRAVENOUS at 04:12

## 2019-12-28 RX ADMIN — VANCOMYCIN HYDROCHLORIDE 1000 MG: 1 INJECTION, POWDER, LYOPHILIZED, FOR SOLUTION INTRAVENOUS at 07:12

## 2019-12-28 RX ADMIN — ENOXAPARIN SODIUM 40 MG: 100 INJECTION SUBCUTANEOUS at 04:12

## 2019-12-28 NOTE — PLAN OF CARE
Plan of care reviewed with patient. Remains on room air with 02 sats 100%.  Levo gtt titrated to maintain MAP>65, currently at 0.04 mcg/kg/min. Oxacillin gtt also continued.  UOP adequate, 200-400 cc/hr.  CT chest this afternoon in preparation for possible CTS sx next week.  Blood cultures sent x2.  No acute changes this shift, see flowsheet for full assessment.

## 2019-12-28 NOTE — PROGRESS NOTES
Therapy with Vancomycin complete and/or consult discontinued by provider.  Pharmacy will sign off, please re-consult as needed.     Thank you for the consult,   Libertad Wagner  [phone]

## 2019-12-28 NOTE — ASSESSMENT & PLAN NOTE
- Monitor for signs of withdrawal  - Addiction psychiatry consulted - motivational interviewing and resources provided, appreciate help

## 2019-12-28 NOTE — ASSESSMENT & PLAN NOTE
Baseline sCr 0.9 beginning of 11/2019, now 4. 2 on admit. Improved with IV fluids. Likely multifactorial including hypotension, possibly element of septic ATN. Consider emboli to the kidneys from infective endocarditis. Cr improving, now 2.6 (12/26/19). Likely RJ 2/2 sepsis and hypovolemia. 12/27 continuing to resolve, Cr 1.9.     - Avoid nephrotoxic meds/substances  - Strict Is and Os, daily weights  - Leung precaution  - US RP with no significant abnormalities  - Monitor daily renal function  - Improving 12/28 Cr 1.5

## 2019-12-28 NOTE — SUBJECTIVE & OBJECTIVE
History reviewed. No pertinent past medical history.    Past Surgical History:   Procedure Laterality Date    ANTERIOR CERVICAL DISCECTOMY W/ FUSION N/A 11/6/2019    Procedure: C6-C7 ACDF w neuromonitoring, Mikayla Doshi for instrumentation;  Surgeon: Becca Malone MD;  Location: [facility] OR [address] [place] [address];  Service: Neurosurgery;  Laterality: N/A;    FRACTURE SURGERY      TRANSESOPHAGEAL ECHOCARDIOGRAPHY N/A 11/4/2019    Procedure: ECHOCARDIOGRAM, TRANSESOPHAGEAL;  Surgeon: Radha Diagnostic Provider;  Location: [facility] EP LAB;  Service: Anesthesiology;  Laterality: N/A;       Review of patient's allergies indicates:  No Known Allergies    Family History     None        Tobacco Use    Smoking status: Current Some Day Smoker     Types: Cigarettes    Smokeless tobacco: Former User   Substance and Sexual Activity    Alcohol use: Not on file    Drug use: Yes     Types: Heroin    Sexual activity: Not Currently      Review of Systems   Constitutional: Negative for chills and fever.   HENT: Negative for congestion, rhinorrhea and sore throat.    Eyes: Negative for visual disturbance.   Respiratory: Negative for cough, shortness of breath and stridor.    Cardiovascular: Positive for chest pain (pain with deep breaths). Negative for leg swelling.   Gastrointestinal: Negative for abdominal distention, abdominal pain, blood in stool, nausea and vomiting.   Genitourinary: Negative for dysuria and hematuria.   Musculoskeletal: Positive for back pain and neck pain (pain since ACDF). Negative for myalgias.   Skin: Negative for color change and rash.   Neurological: Negative for dizziness, light-headedness and headaches.   Psychiatric/Behavioral: Negative for agitation and confusion.     Objective:     Vital Signs (Most Recent):  Temp: 99.3 °F (37.4 °C) (12/28/19 1100)  Pulse: 99 (12/28/19 1300)  Resp: (!) 24 (12/28/19 1300)  BP: (!) 91/51 (12/28/19 1300)  SpO2: 95 % (12/28/19 1300) Vital Signs (24h Range):  Temp:  [98.4 °F (36.9 °C)-100.5 °F  (38.1 °C)] 99.3 °F (37.4 °C)  Pulse:  [] 99  Resp:  [20-40] 24  SpO2:  [75 %-100 %] 95 %  BP: ()/(49-87) 91/51   Weight: 61.4 kg (135 lb 5.8 oz)  Body mass index is 18.36 kg/m².      Intake/Output Summary (Last 24 hours) at 12/28/2019 1402  Last data filed at 12/28/2019 1100  Gross per 24 hour   Intake 6513.87 ml   Output 7170 ml   Net -656.13 ml       Physical Exam   Constitutional: He is oriented to person, place, and time. He appears well-developed. No distress.   HENT:   Head: Atraumatic.   Mouth/Throat: No oropharyngeal exudate.   Eyes: Conjunctivae and EOM are normal. No scleral icterus.   Pinpoint pupils   Neck: Normal range of motion. Neck supple.   Cardiovascular: Normal rate and regular rhythm.   Murmur heard.  Pulmonary/Chest: Effort normal and breath sounds normal. No stridor. No respiratory distress. He has no wheezes.   Abdominal: Soft. Bowel sounds are normal. He exhibits no distension. There is no tenderness. There is no guarding.   Musculoskeletal: He exhibits no edema or tenderness.   Neurological: He is alert and oriented to person, place, and time. No cranial nerve deficit.   Skin: Skin is warm and dry. No rash noted. He is not diaphoretic. No erythema.   Nursing note and vitals reviewed.      Vents:     Lines/Drains/Airways     Central Venous Catheter Line                 Percutaneous Central Line Insertion/Assessment - triple lumen  12/26/19 0230 right internal jugular 2 days          Drain                 Urethral Catheter 12/24/19 1954 Non-latex 16 Fr. 3 days          Peripheral Intravenous Line                 Peripheral IV - Single Lumen 12/24/19 1812 18 G Right Upper Arm 3 days         Peripheral IV - Single Lumen 12/24/19 1923 20 G Left Forearm 3 days         Peripheral IV - Single Lumen 12/24/19 1940 18 G Left Upper Arm 3 days              Significant Labs:    CBC/Anemia Profile:  Recent Labs   Lab 12/27/19  0605 12/27/19  1200 12/28/19  0412   WBC 9.38 9.64 10.30   HGB 7.0*  6.7* 6.9*   HCT 23.6* 21.3* 22.9*   PLT 98* 104* 118*   MCV 90 88 89   RDW 17.1* 17.2* 16.7*        Chemistries:  Recent Labs   Lab 12/27/19  0345 12/28/19  0412    135*   K 3.8 4.6   * 108   CO2 19* 22*   BUN 46* 40*   CREATININE 1.9* 1.5*   CALCIUM 7.6* 8.2*   ALBUMIN 1.9* 2.0*   PROT 5.9* 6.2   BILITOT 0.2 0.2   ALKPHOS 69 72   ALT 10 11   AST 18 19   MG 1.7 1.5*   PHOS 3.3 3.7       ABGs:   No results for input(s): PH, PCO2, HCO3, POCSATURATED, BE in the last 48 hours.  CMP:   Recent Labs   Lab 12/27/19  0345 12/28/19  0412    135*   K 3.8 4.6   * 108   CO2 19* 22*    102   BUN 46* 40*   CREATININE 1.9* 1.5*   CALCIUM 7.6* 8.2*   PROT 5.9* 6.2   ALBUMIN 1.9* 2.0*   BILITOT 0.2 0.2   ALKPHOS 69 72   AST 18 19   ALT 10 11   ANIONGAP 8 5*   EGFRNONAA 38.8* 51.7*     Troponin:   No results for input(s): TROPONINI in the last 48 hours.  Urine Studies:   No results for input(s): COLORU, APPEARANCEUA, PHUR, SPECGRAV, PROTEINUA, GLUCUA, KETONESU, BILIRUBINUA, OCCULTUA, NITRITE, UROBILINOGEN, LEUKOCYTESUR, RBCUA, WBCUA, BACTERIA, SQUAMEPITHEL, HYALINECASTS in the last 48 hours.    Invalid input(s): WRIGHTSUR    Significant Imaging: I have reviewed all pertinent imaging results/findings within the past 24 hours.     CT Chest Without Contrast  Narrative: EXAMINATION:  CT CHEST WITHOUT CONTRAST    CLINICAL HISTORY:  pre op valve replacement;    TECHNIQUE:  Low dose axial images, sagittal and coronal reformations were obtained from the thoracic inlet to the lung bases. Contrast was not administered.    COMPARISON:  Radiograph 12/26/2019.    FINDINGS:  Structures at the base of the neck are unremarkable.    There is a left-sided aortic arch with 3 branch vessels.  The thoracic aorta tapers normally without atherosclerotic calcification.    Pulmonary arteries are prominent in caliber.  There are 4 pulmonary veins.    Heart is slightly prominent in size.  No pericardial effusion.  Right-sided  central venous catheter with tip within the SVC.    There is an increased number of slightly prominent left axillary lymph nodes.  No supraclavicular or mediastinal lymphadenopathy.  Hilar contours are not well evaluated.    Esophagus is normal in caliber and course.    Visualized upper abdominal structures demonstrate no significant abnormalities.    There is a paucity of subcutaneous fat.    Partially visualized postoperative changes of the cervical spine noted.  There is vertebral endplate irregularity at T8-T9 and T10-T11    Trachea and proximal airways are patent.  Vocal cords are opposed, presumably from breath-holding.    Lung volumes are normal and symmetric.  There are small bilateral dependent pleural effusions with associated compressive atelectasis versus consolidation of the adjacent lung bases.  There is mild interlobular septal thickening within the bilateral lung apices.  Patchy ground-glass attenuation and interlobular septal thickening noted within the bilateral lower lobes.  There are bilateral solid pulmonary nodules, the largest of which measures 0.5 cm and is located within the anterior segment of the right upper lobe (series 4, image 185).  No pneumothorax.  Impression: 1. Mild pulmonary edema and small bilateral dependent pleural effusions with associated compressive atelectasis versus consolidation of the adjacent lung bases.  2. Bilateral solid pulmonary nodules, indeterminate.  Follow-up CT recommended to assess stability and rule out developing septic emboli given patient's reported history of IVDU.  No definite cavitating pulmonary nodules.  3. Borderline cardiomegaly.  4. Vertebral endplate irregularity at T8-T9 and T10-T11, possibly related to early osteomyelitis given appearance on recent MRI and patient's known history of IVDU.  5. Paucity of subcutaneous fat in keeping with cachexia.  This report was flagged in Epic as abnormal.    Electronically signed by: Oseas Martinez  MD  Date:    12/27/2019  Time:    12:54  MRI Thoracic Spine Without Contrast  Narrative: EXAMINATION:  MRI CERVICAL SPINE WITHOUT CONTRAST; MRI LUMBAR SPINE WITHOUT CONTRAST; MRI THORACIC SPINE WITHOUT CONTRAST    CLINICAL HISTORY:  IVDU, +Staph bacteremia. Recent epidural abscess. Recurrent infection; Low back pain, <6wks, no red flags, no prior management; r/o epidural abscess    TECHNIQUE:  Multiplanar, multisequence MR images of the cervical spine were performed without the administration of contrast.    COMPARISON:  MRI cervical spine with and without contrast 11/02/2019, CT abdomen and pelvis without contrast 12/24/2019.    FINDINGS:  Cervical spine:    Postoperative changes of C6-C7 anterior instrumented disc replacement.    C1-C2: Dens is intact. Pre dens space is maintained.    Alignment: Straightening of the normal cervical lordosis.  No spondylolisthesis.    Vertebrae: Vertebral body heights are well maintained without evidence of fracture.  There is disc space narrowing and mild endplate edema at C3-C4, and less so at C6-C7, in this patient with known history of osteomyelitis.  Overall, appearance of endplates and disc at this level is unchanged when compared to MRI cervical spine 11/02/2019.  There is mild prevertebral soft tissue edema, decreased in conspicuity when compared to MRI cervical spine 11/02/2019.    Mild edema in the facet joints at C6-C7 bilaterally, favoring postoperative change.    No new areas of edema to suggest osteomyelitis.  No definite epidural abscess or phlegmon, noting that this was a non contrasted study.    Discs: Multilevel intervertebral disc space narrowing and disc desiccation throughout the visualized cervical spine.    Cord: Cord maintains normal signal intensity and contour.    Skull base and craniocervical junction: Normal.    Degenerative findings:    C2-T1: No spinal canal stenosis or neural foraminal narrowing.    Thoracic spine:    Alignment: Alignment is well  maintained.    Vertebrae: Vertebral body heights are well maintained without evidence of fracture.  At T8-T9 and T10-T11, there is subtle adjacent endplate edema as well as edema in the disc spaces.  No evidence of phlegmon or abscess.  These findings are concerning for early changes of osteomyelitis in this patient with history of osteomyelitis, with active degenerative joint disease also a consideration but felt less likely.    Discs: Interval disc spaces are otherwise maintained.    Cord: Cord maintains normal signal intensity and contour.    Degenerative findings:    No significant spinal canal stenosis or neural foraminal narrowing throughout the visualized thoracic spine.    Paraspinal muscles and soft tissues: Trace bilateral pleural effusions with associated atelectasis.    Lumbar spine:    Alignment: Straightening of the normal lumbar lordosis.  No spondylolisthesis.    Vertebrae: Vertebral body heights are well maintained.  No evidence of fracture.    At L2-L3, L4-L5, and L5-S1, there is subtle adjacent endplate edema as well as edema in the disc space.  No evidence of phlegmon or abscess.  These findings are concerning for early changes of osteomyelitis in this patient with history of osteomyelitis, with active degenerative disc disease also a consideration but felt less likely.    Discs: Multilevel intervertebral disc space narrowing and disc desiccation.    Cord: Visualized cord maintains normal signal intensity and contour.  Conus terminates at the L1 level.    Degenerative findings:    T12-L1: No spinal canal stenosis or neural foraminal narrowing.    L1-L2: No spinal canal stenosis or neural foraminal narrowing.    L2-L3: No spinal canal stenosis or neural foraminal narrowing.    L3-L4: No spinal canal stenosis or neural foraminal narrowing.    L4-L5: Circumferential disc bulge with annular fissure with no spinal canal stenosis or neural foraminal narrowing.    L5-S1: No spinal canal stenosis or  neural foraminal narrowing.    Paraspinal muscles and soft tissues: There is a subcentimeter T1 hyperintense, T2 hypointense lesion in the right kidney, which may represent a hemorrhagic cyst.  Impression: Disc space narrowing and mild endplate edema at C3-C4, and less so at C6-C7, in this patient with history of osteomyelitis.  Overall, appearance of endplates and disc at these levels is unchanged when compared to MRI cervical spine 11/02/2019.  No new areas of edema in the cervical spine to suggest osteomyelitis.  No prevertebral abscess or phlegmon in this non contrasted study.    Subtle endplate edema as well as edema in the disc spaces at T8-T9 and T10-T11, concerning for early changes of osteomyelitis, with active degenerative joint disease also a consideration but felt less likely.    Subtle endplate edema as well as edema in the disc spaces at L2-L3, L4-L5, and L5-S1, concerning for early changes of osteomyelitis, with active degenerative joint disease also a consideration but felt less likely.    Trace bilateral pleural effusions with associated atelectasis.    Postoperative change of C6-C7 anterior instrumented disc replacement.    This report was flagged in Epic as abnormal.    Electronically signed by resident: Regulo Jimenes  Date:    12/27/2019  Time:    08:03    Electronically signed by: Cassidy Jacinto MD  Date:    12/27/2019  Time:    11:45  MRI Lumbar Spine Without Contrast  Narrative: EXAMINATION:  MRI CERVICAL SPINE WITHOUT CONTRAST; MRI LUMBAR SPINE WITHOUT CONTRAST; MRI THORACIC SPINE WITHOUT CONTRAST    CLINICAL HISTORY:  IVDU, +Staph bacteremia. Recent epidural abscess. Recurrent infection; Low back pain, <6wks, no red flags, no prior management; r/o epidural abscess    TECHNIQUE:  Multiplanar, multisequence MR images of the cervical spine were performed without the administration of contrast.    COMPARISON:  MRI cervical spine with and without contrast 11/02/2019, CT abdomen and pelvis  without contrast 12/24/2019.    FINDINGS:  Cervical spine:    Postoperative changes of C6-C7 anterior instrumented disc replacement.    C1-C2: Dens is intact. Pre dens space is maintained.    Alignment: Straightening of the normal cervical lordosis.  No spondylolisthesis.    Vertebrae: Vertebral body heights are well maintained without evidence of fracture.  There is disc space narrowing and mild endplate edema at C3-C4, and less so at C6-C7, in this patient with known history of osteomyelitis.  Overall, appearance of endplates and disc at this level is unchanged when compared to MRI cervical spine 11/02/2019.  There is mild prevertebral soft tissue edema, decreased in conspicuity when compared to MRI cervical spine 11/02/2019.    Mild edema in the facet joints at C6-C7 bilaterally, favoring postoperative change.    No new areas of edema to suggest osteomyelitis.  No definite epidural abscess or phlegmon, noting that this was a non contrasted study.    Discs: Multilevel intervertebral disc space narrowing and disc desiccation throughout the visualized cervical spine.    Cord: Cord maintains normal signal intensity and contour.    Skull base and craniocervical junction: Normal.    Degenerative findings:    C2-T1: No spinal canal stenosis or neural foraminal narrowing.    Thoracic spine:    Alignment: Alignment is well maintained.    Vertebrae: Vertebral body heights are well maintained without evidence of fracture.  At T8-T9 and T10-T11, there is subtle adjacent endplate edema as well as edema in the disc spaces.  No evidence of phlegmon or abscess.  These findings are concerning for early changes of osteomyelitis in this patient with history of osteomyelitis, with active degenerative joint disease also a consideration but felt less likely.    Discs: Interval disc spaces are otherwise maintained.    Cord: Cord maintains normal signal intensity and contour.    Degenerative findings:    No significant spinal canal  stenosis or neural foraminal narrowing throughout the visualized thoracic spine.    Paraspinal muscles and soft tissues: Trace bilateral pleural effusions with associated atelectasis.    Lumbar spine:    Alignment: Straightening of the normal lumbar lordosis.  No spondylolisthesis.    Vertebrae: Vertebral body heights are well maintained.  No evidence of fracture.    At L2-L3, L4-L5, and L5-S1, there is subtle adjacent endplate edema as well as edema in the disc space.  No evidence of phlegmon or abscess.  These findings are concerning for early changes of osteomyelitis in this patient with history of osteomyelitis, with active degenerative disc disease also a consideration but felt less likely.    Discs: Multilevel intervertebral disc space narrowing and disc desiccation.    Cord: Visualized cord maintains normal signal intensity and contour.  Conus terminates at the L1 level.    Degenerative findings:    T12-L1: No spinal canal stenosis or neural foraminal narrowing.    L1-L2: No spinal canal stenosis or neural foraminal narrowing.    L2-L3: No spinal canal stenosis or neural foraminal narrowing.    L3-L4: No spinal canal stenosis or neural foraminal narrowing.    L4-L5: Circumferential disc bulge with annular fissure with no spinal canal stenosis or neural foraminal narrowing.    L5-S1: No spinal canal stenosis or neural foraminal narrowing.    Paraspinal muscles and soft tissues: There is a subcentimeter T1 hyperintense, T2 hypointense lesion in the right kidney, which may represent a hemorrhagic cyst.  Impression: Disc space narrowing and mild endplate edema at C3-C4, and less so at C6-C7, in this patient with history of osteomyelitis.  Overall, appearance of endplates and disc at these levels is unchanged when compared to MRI cervical spine 11/02/2019.  No new areas of edema in the cervical spine to suggest osteomyelitis.  No prevertebral abscess or phlegmon in this non contrasted study.    Subtle endplate  edema as well as edema in the disc spaces at T8-T9 and T10-T11, concerning for early changes of osteomyelitis, with active degenerative joint disease also a consideration but felt less likely.    Subtle endplate edema as well as edema in the disc spaces at L2-L3, L4-L5, and L5-S1, concerning for early changes of osteomyelitis, with active degenerative joint disease also a consideration but felt less likely.    Trace bilateral pleural effusions with associated atelectasis.    Postoperative change of C6-C7 anterior instrumented disc replacement.    This report was flagged in Epic as abnormal.    Electronically signed by resident: Regulo Jimenes  Date:    12/27/2019  Time:    08:03    Electronically signed by: Cassidy Jacinto MD  Date:    12/27/2019  Time:    11:45  MRI Cervical Spine Without Contrast  Narrative: EXAMINATION:  MRI CERVICAL SPINE WITHOUT CONTRAST; MRI LUMBAR SPINE WITHOUT CONTRAST; MRI THORACIC SPINE WITHOUT CONTRAST    CLINICAL HISTORY:  IVDU, +Staph bacteremia. Recent epidural abscess. Recurrent infection; Low back pain, <6wks, no red flags, no prior management; r/o epidural abscess    TECHNIQUE:  Multiplanar, multisequence MR images of the cervical spine were performed without the administration of contrast.    COMPARISON:  MRI cervical spine with and without contrast 11/02/2019, CT abdomen and pelvis without contrast 12/24/2019.    FINDINGS:  Cervical spine:    Postoperative changes of C6-C7 anterior instrumented disc replacement.    C1-C2: Dens is intact. Pre dens space is maintained.    Alignment: Straightening of the normal cervical lordosis.  No spondylolisthesis.    Vertebrae: Vertebral body heights are well maintained without evidence of fracture.  There is disc space narrowing and mild endplate edema at C3-C4, and less so at C6-C7, in this patient with known history of osteomyelitis.  Overall, appearance of endplates and disc at this level is unchanged when compared to MRI cervical spine  11/02/2019.  There is mild prevertebral soft tissue edema, decreased in conspicuity when compared to MRI cervical spine 11/02/2019.    Mild edema in the facet joints at C6-C7 bilaterally, favoring postoperative change.    No new areas of edema to suggest osteomyelitis.  No definite epidural abscess or phlegmon, noting that this was a non contrasted study.    Discs: Multilevel intervertebral disc space narrowing and disc desiccation throughout the visualized cervical spine.    Cord: Cord maintains normal signal intensity and contour.    Skull base and craniocervical junction: Normal.    Degenerative findings:    C2-T1: No spinal canal stenosis or neural foraminal narrowing.    Thoracic spine:    Alignment: Alignment is well maintained.    Vertebrae: Vertebral body heights are well maintained without evidence of fracture.  At T8-T9 and T10-T11, there is subtle adjacent endplate edema as well as edema in the disc spaces.  No evidence of phlegmon or abscess.  These findings are concerning for early changes of osteomyelitis in this patient with history of osteomyelitis, with active degenerative joint disease also a consideration but felt less likely.    Discs: Interval disc spaces are otherwise maintained.    Cord: Cord maintains normal signal intensity and contour.    Degenerative findings:    No significant spinal canal stenosis or neural foraminal narrowing throughout the visualized thoracic spine.    Paraspinal muscles and soft tissues: Trace bilateral pleural effusions with associated atelectasis.    Lumbar spine:    Alignment: Straightening of the normal lumbar lordosis.  No spondylolisthesis.    Vertebrae: Vertebral body heights are well maintained.  No evidence of fracture.    At L2-L3, L4-L5, and L5-S1, there is subtle adjacent endplate edema as well as edema in the disc space.  No evidence of phlegmon or abscess.  These findings are concerning for early changes of osteomyelitis in this patient with history of  osteomyelitis, with active degenerative disc disease also a consideration but felt less likely.    Discs: Multilevel intervertebral disc space narrowing and disc desiccation.    Cord: Visualized cord maintains normal signal intensity and contour.  Conus terminates at the L1 level.    Degenerative findings:    T12-L1: No spinal canal stenosis or neural foraminal narrowing.    L1-L2: No spinal canal stenosis or neural foraminal narrowing.    L2-L3: No spinal canal stenosis or neural foraminal narrowing.    L3-L4: No spinal canal stenosis or neural foraminal narrowing.    L4-L5: Circumferential disc bulge with annular fissure with no spinal canal stenosis or neural foraminal narrowing.    L5-S1: No spinal canal stenosis or neural foraminal narrowing.    Paraspinal muscles and soft tissues: There is a subcentimeter T1 hyperintense, T2 hypointense lesion in the right kidney, which may represent a hemorrhagic cyst.  Impression: Disc space narrowing and mild endplate edema at C3-C4, and less so at C6-C7, in this patient with history of osteomyelitis.  Overall, appearance of endplates and disc at these levels is unchanged when compared to MRI cervical spine 11/02/2019.  No new areas of edema in the cervical spine to suggest osteomyelitis.  No prevertebral abscess or phlegmon in this non contrasted study.    Subtle endplate edema as well as edema in the disc spaces at T8-T9 and T10-T11, concerning for early changes of osteomyelitis, with active degenerative joint disease also a consideration but felt less likely.    Subtle endplate edema as well as edema in the disc spaces at L2-L3, L4-L5, and L5-S1, concerning for early changes of osteomyelitis, with active degenerative joint disease also a consideration but felt less likely.    Trace bilateral pleural effusions with associated atelectasis.    Postoperative change of C6-C7 anterior instrumented disc replacement.    This report was flagged in Epic as  abnormal.    Electronically signed by resident: Regulo Jimenes  Date:    12/27/2019  Time:    08:03    Electronically signed by: Cassidy Jacinto MD  Date:    12/27/2019  Time:    11:45

## 2019-12-28 NOTE — PLAN OF CARE
Pt's HR: 90s-100s. MAPs maintained >65.  Pt on Levo gtt currently @ 0.03mcg/kg/min. & 24hr Oxacillin gtt.  UOP adequate, 250-500cc/hr.  Pt turned independently.  Plan of care reviewed w/pt; all questions answered

## 2019-12-28 NOTE — ASSESSMENT & PLAN NOTE
History of mitral valve MSSA endocarditis s/p 6 week course of cefazolin ending 12/18. Concern for septic emboli leading to infarcts of spleen, potentially renal infarcts. Repeat echo with vegetation vs ruptured chord apparatus of mitral valve, worsened MR to mod-severe from mild-mod on 11/04. 12/26 MRI brain with multiple punctate foci bilaterally representing septic emboli. AOx4, no FNDs at this time. CTS tentatively planning mitral valve repair, possible mitral valve replacement, possible tricuspid valve repair for Friday, January 3, 2020. Emphasized to patient that if there is continued IVDU then there will be no re-operation if it becomes infected again.    - Oxacillin only, vanc d/c'd per sensitivities  - BCxs daily until clear  - ID following  - Monitor daily CBC  - F/u NSGY recs

## 2019-12-28 NOTE — PLAN OF CARE
Pt blood cxs positive 12/24-12/26.  12/24 now showingMSSA. Tmax F 100.5.  White count 12.74    -Discontinue Vanc  -Continue Oxacillin  -Repeat blood cultures daily until clear.

## 2019-12-28 NOTE — ASSESSMENT & PLAN NOTE
Patient presented with generalized weakness and fatigue, vital signs concerning for shock with hypotension and tachycardia. Recently completed course of cefazolin for MSSA endocarditis, though UDS positive for amphetamines suggests patient may still be using IV drugs. With recent hospitalization would also be concerned about possible HAP. Urinalysis with 3+ leukocytes, 12 RBCs, >100 WBC and many bacteria - certainly concerning for urinary source. WCC elevated to 25. Initial lactate 7.9.   S/p 3L fluid resuscitation and BSABx with improvement in lactate to 1.8  Repeat CT head here without evidence of acute process  CXR unremarkable, no signs of consolidation. CT abdo/pelvis 11/24 shows splenic infarcts as well as cholelithiasis    - De-escalated to oxacillin only 112/28  - Continue to follow-up BCx  And UCx  - 12/28 minimal levophed started overnight to maintain MAP >65, STEPHANIA

## 2019-12-28 NOTE — PROGRESS NOTES
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 Bronson is a 53M IVDU with HCV, Hx of MSSA bacteremia and infective endocarditis (s/p 6 weeks of cefazolin 12/18), cervical spine epidural abscess s/p C6-7 diskectomy and arthrodesis 11/15/2019 who [place] with chest pain, generalized weakness, and shortness of breath. At OSH, he was found to be in septic shock requiring pressor support. He was transferred to [facility] [place], [place] for higher level of care. On arrival, patient was somnolent and difficult to arouse. Patient did waken with sternal rub and answered questions appropriately, but history largely obtained through chart review.     At OSH ED, patient recieved full 30ccs/kg fluid resuscitation with an additional liter (total ~3L) for hypotension and tachycardia; lactic acid was elevated to 3.4. He received 1 dose each of vancomycin and zosyn prior to transfer.     Hospital/ICU Course:  [facility] after a C6-7 ACDF with washout on 11/6 with end date 12/18/19. [facility] though it appears from transfer note that the patient had completed his full 6 week course at this time. His WCC and lactate have decreased significantly since starting broad spectrum antibiotics with vanc/zosyn and will be continued until sensitivities return. BCxs still positive for Staph aureus. C diff negative, gonorrhea/chlamydia negative, urine culture no growth. Patient c/o low back pain that has been present for a month and chronic neck pain since the anterior cervical  diskectomy and fusion. MRI cervical, thoracic, and lumbar ordered to r/o spinal cord compression and abscess. ID has been consulted for the recurrence of bacteremia and endocarditis, addiction psych consulted for continued heroin use (last use 3 days prior to admission per OSH ED note), and CTS consulted for evaluation of vegetation vs ruptured chord apparatus and worsened MR from prior study 11/04. Dr. Gooden with CTS has tentatively scheduled mitral valve repair, possible mitral valve replacement, possible tricuspid valve repair for Friday, January 3, 2020. CT chest w/o contrast ordered. NSGY consulted to assess for possible osteomyelitis on MRI. Addiction psych consulted who provided motivational interviewing and resources for outpatient rehabilitation to the patient, appreciated. Hgb noted to 6.7 today from 7.0, blood consent was attempted but the patient refused responding he will sign tomorrow. Will hold on transfusion until 12/28. Asymptomatic at this time, no dyspnea, lightheadedness, dizziness. 12/28 transfused 1 u prbc after consent obtained overnight. Requiring minimal pressors to keep MAP >65. Otherwise stable, good po intake, renal function improving. CT chest with small pleural effusions and possible early osteomyelitic changes in T8-T9, T10-T11. Staph aureus sensitive to oxacillin, vanc d/c'd.    History reviewed. No pertinent past medical history.    Past Surgical History:   Procedure Laterality Date    ANTERIOR CERVICAL DISCECTOMY W/ FUSION N/A 11/6/2019    Procedure: C6-C7 ACDF w neuromonitoring, Mikayla Doshi for instrumentation;  Surgeon: Becca Malone MD;  Location: [facility] OR [address] [place] [address];  Service: Neurosurgery;  Laterality: N/A;    FRACTURE SURGERY      TRANSESOPHAGEAL ECHOCARDIOGRAPHY N/A 11/4/2019    Procedure: ECHOCARDIOGRAM, TRANSESOPHAGEAL;  Surgeon: aRdha Diagnostic Provider;  Location: [facility] EP LAB;  Service: Anesthesiology;  Laterality: N/A;       Review of patient's allergies indicates:  No  Known Allergies    Family History     None        Tobacco Use    Smoking status: Current Some Day Smoker     Types: Cigarettes    Smokeless tobacco: Former User   Substance and Sexual Activity    Alcohol use: Not on file    Drug use: Yes     Types: Heroin    Sexual activity: Not Currently      Review of Systems   Constitutional: Negative for chills and fever.   HENT: Negative for congestion, rhinorrhea and sore throat.    Eyes: Negative for visual disturbance.   Respiratory: Negative for cough, shortness of breath and stridor.    Cardiovascular: Positive for chest pain (pain with deep breaths). Negative for leg swelling.   Gastrointestinal: Negative for abdominal distention, abdominal pain, blood in stool, nausea and vomiting.   Genitourinary: Negative for dysuria and hematuria.   Musculoskeletal: Positive for back pain and neck pain (pain since ACDF). Negative for myalgias.   Skin: Negative for color change and rash.   Neurological: Negative for dizziness, light-headedness and headaches.   Psychiatric/Behavioral: Negative for agitation and confusion.     Objective:     Vital Signs (Most Recent):  Temp: 99.3 °F (37.4 °C) (12/28/19 1100)  Pulse: 99 (12/28/19 1300)  Resp: (!) 24 (12/28/19 1300)  BP: (!) 91/51 (12/28/19 1300)  SpO2: 95 % (12/28/19 1300) Vital Signs (24h Range):  Temp:  [98.4 °F (36.9 °C)-100.5 °F (38.1 °C)] 99.3 °F (37.4 °C)  Pulse:  [] 99  Resp:  [20-40] 24  SpO2:  [75 %-100 %] 95 %  BP: ()/(49-87) 91/51   Weight: 61.4 kg (135 lb 5.8 oz)  Body mass index is 18.36 kg/m².      Intake/Output Summary (Last 24 hours) at 12/28/2019 1402  Last data filed at 12/28/2019 1100  Gross per 24 hour   Intake 6513.87 ml   Output 7170 ml   Net -656.13 ml       Physical Exam   Constitutional: He is oriented to person, place, and time. He appears well-developed. No distress.   HENT:   Head: Atraumatic.   Mouth/Throat: No oropharyngeal exudate.   Eyes: Conjunctivae and EOM are normal. No scleral icterus.    Pinpoint pupils   Neck: Normal range of motion. Neck supple.   Cardiovascular: Normal rate and regular rhythm.   Murmur heard.  Pulmonary/Chest: Effort normal and breath sounds normal. No stridor. No respiratory distress. He has no wheezes.   Abdominal: Soft. Bowel sounds are normal. He exhibits no distension. There is no tenderness. There is no guarding.   Musculoskeletal: He exhibits no edema or tenderness.   Neurological: He is alert and oriented to person, place, and time. No cranial nerve deficit.   Skin: Skin is warm and dry. No rash noted. He is not diaphoretic. No erythema.   Nursing note and vitals reviewed.      Vents:     Lines/Drains/Airways     Central Venous Catheter Line                 Percutaneous Central Line Insertion/Assessment - triple lumen  12/26/19 0230 right internal jugular 2 days          Drain                 Urethral Catheter 12/24/19 1954 Non-latex 16 Fr. 3 days          Peripheral Intravenous Line                 Peripheral IV - Single Lumen 12/24/19 1812 18 G Right Upper Arm 3 days         Peripheral IV - Single Lumen 12/24/19 1923 20 G Left Forearm 3 days         Peripheral IV - Single Lumen 12/24/19 1940 18 G Left Upper Arm 3 days              Significant Labs:    CBC/Anemia Profile:  Recent Labs   Lab 12/27/19  0605 12/27/19  1200 12/28/19  0412   WBC 9.38 9.64 10.30   HGB 7.0* 6.7* 6.9*   HCT 23.6* 21.3* 22.9*   PLT 98* 104* 118*   MCV 90 88 89   RDW 17.1* 17.2* 16.7*        Chemistries:  Recent Labs   Lab 12/27/19  0345 12/28/19  0412    135*   K 3.8 4.6   * 108   CO2 19* 22*   BUN 46* 40*   CREATININE 1.9* 1.5*   CALCIUM 7.6* 8.2*   ALBUMIN 1.9* 2.0*   PROT 5.9* 6.2   BILITOT 0.2 0.2   ALKPHOS 69 72   ALT 10 11   AST 18 19   MG 1.7 1.5*   PHOS 3.3 3.7       ABGs:   No results for input(s): PH, PCO2, HCO3, POCSATURATED, BE in the last 48 hours.  CMP:   Recent Labs   Lab 12/27/19  0345 12/28/19  0412    135*   K 3.8 4.6   * 108   CO2 19* 22*    102    BUN 46* 40*   CREATININE 1.9* 1.5*   CALCIUM 7.6* 8.2*   PROT 5.9* 6.2   ALBUMIN 1.9* 2.0*   BILITOT 0.2 0.2   ALKPHOS 69 72   AST 18 19   ALT 10 11   ANIONGAP 8 5*   EGFRNONAA 38.8* 51.7*     Troponin:   No results for input(s): TROPONINI in the last 48 hours.  Urine Studies:   No results for input(s): COLORU, APPEARANCEUA, PHUR, SPECGRAV, PROTEINUA, GLUCUA, KETONESU, BILIRUBINUA, OCCULTUA, NITRITE, UROBILINOGEN, LEUKOCYTESUR, RBCUA, WBCUA, BACTERIA, SQUAMEPITHEL, HYALINECASTS in the last 48 hours.    Invalid input(s): WRIGHTSUR    Significant Imaging: I have reviewed all pertinent imaging results/findings within the past 24 hours.     CT Chest Without Contrast  Narrative: EXAMINATION:  CT CHEST WITHOUT CONTRAST    CLINICAL HISTORY:  pre op valve replacement;    TECHNIQUE:  Low dose axial images, sagittal and coronal reformations were obtained from the thoracic inlet to the lung bases. Contrast was not administered.    COMPARISON:  Radiograph 12/26/2019.    FINDINGS:  Structures at the base of the neck are unremarkable.    There is a left-sided aortic arch with 3 branch vessels.  The thoracic aorta tapers normally without atherosclerotic calcification.    Pulmonary arteries are prominent in caliber.  There are 4 pulmonary veins.    Heart is slightly prominent in size.  No pericardial effusion.  Right-sided central venous catheter with tip within the SVC.    There is an increased number of slightly prominent left axillary lymph nodes.  No supraclavicular or mediastinal lymphadenopathy.  Hilar contours are not well evaluated.    Esophagus is normal in caliber and course.    Visualized upper abdominal structures demonstrate no significant abnormalities.    There is a paucity of subcutaneous fat.    Partially visualized postoperative changes of the cervical spine noted.  There is vertebral endplate irregularity at T8-T9 and T10-T11    Trachea and proximal airways are patent.  Vocal cords are opposed, presumably from  breath-holding.    Lung volumes are normal and symmetric.  There are small bilateral dependent pleural effusions with associated compressive atelectasis versus consolidation of the adjacent lung bases.  There is mild interlobular septal thickening within the bilateral lung apices.  Patchy ground-glass attenuation and interlobular septal thickening noted within the bilateral lower lobes.  There are bilateral solid pulmonary nodules, the largest of which measures 0.5 cm and is located within the anterior segment of the right upper lobe (series 4, image 185).  No pneumothorax.  Impression: 1. Mild pulmonary edema and small bilateral dependent pleural effusions with associated compressive atelectasis versus consolidation of the adjacent lung bases.  2. Bilateral solid pulmonary nodules, indeterminate.  Follow-up CT recommended to assess stability and rule out developing septic emboli given patient's reported history of IVDU.  No definite cavitating pulmonary nodules.  3. Borderline cardiomegaly.  4. Vertebral endplate irregularity at T8-T9 and T10-T11, possibly related to early osteomyelitis given appearance on recent MRI and patient's known history of IVDU.  5. Paucity of subcutaneous fat in keeping with cachexia.  This report was flagged in Epic as abnormal.    Electronically signed by: Oseas Martinez MD  Date:    12/27/2019  Time:    12:54  MRI Thoracic Spine Without Contrast  Narrative: EXAMINATION:  MRI CERVICAL SPINE WITHOUT CONTRAST; MRI LUMBAR SPINE WITHOUT CONTRAST; MRI THORACIC SPINE WITHOUT CONTRAST    CLINICAL HISTORY:  IVDU, +Staph bacteremia. Recent epidural abscess. Recurrent infection; Low back pain, <6wks, no red flags, no prior management; r/o epidural abscess    TECHNIQUE:  Multiplanar, multisequence MR images of the cervical spine were performed without the administration of contrast.    COMPARISON:  MRI cervical spine with and without contrast 11/02/2019, CT abdomen and pelvis without contrast  12/24/2019.    FINDINGS:  Cervical spine:    Postoperative changes of C6-C7 anterior instrumented disc replacement.    C1-C2: Dens is intact. Pre dens space is maintained.    Alignment: Straightening of the normal cervical lordosis.  No spondylolisthesis.    Vertebrae: Vertebral body heights are well maintained without evidence of fracture.  There is disc space narrowing and mild endplate edema at C3-C4, and less so at C6-C7, in this patient with known history of osteomyelitis.  Overall, appearance of endplates and disc at this level is unchanged when compared to MRI cervical spine 11/02/2019.  There is mild prevertebral soft tissue edema, decreased in conspicuity when compared to MRI cervical spine 11/02/2019.    Mild edema in the facet joints at C6-C7 bilaterally, favoring postoperative change.    No new areas of edema to suggest osteomyelitis.  No definite epidural abscess or phlegmon, noting that this was a non contrasted study.    Discs: Multilevel intervertebral disc space narrowing and disc desiccation throughout the visualized cervical spine.    Cord: Cord maintains normal signal intensity and contour.    Skull base and craniocervical junction: Normal.    Degenerative findings:    C2-T1: No spinal canal stenosis or neural foraminal narrowing.    Thoracic spine:    Alignment: Alignment is well maintained.    Vertebrae: Vertebral body heights are well maintained without evidence of fracture.  At T8-T9 and T10-T11, there is subtle adjacent endplate edema as well as edema in the disc spaces.  No evidence of phlegmon or abscess.  These findings are concerning for early changes of osteomyelitis in this patient with history of osteomyelitis, with active degenerative joint disease also a consideration but felt less likely.    Discs: Interval disc spaces are otherwise maintained.    Cord: Cord maintains normal signal intensity and contour.    Degenerative findings:    No significant spinal canal stenosis or neural  foraminal narrowing throughout the visualized thoracic spine.    Paraspinal muscles and soft tissues: Trace bilateral pleural effusions with associated atelectasis.    Lumbar spine:    Alignment: Straightening of the normal lumbar lordosis.  No spondylolisthesis.    Vertebrae: Vertebral body heights are well maintained.  No evidence of fracture.    At L2-L3, L4-L5, and L5-S1, there is subtle adjacent endplate edema as well as edema in the disc space.  No evidence of phlegmon or abscess.  These findings are concerning for early changes of osteomyelitis in this patient with history of osteomyelitis, with active degenerative disc disease also a consideration but felt less likely.    Discs: Multilevel intervertebral disc space narrowing and disc desiccation.    Cord: Visualized cord maintains normal signal intensity and contour.  Conus terminates at the L1 level.    Degenerative findings:    T12-L1: No spinal canal stenosis or neural foraminal narrowing.    L1-L2: No spinal canal stenosis or neural foraminal narrowing.    L2-L3: No spinal canal stenosis or neural foraminal narrowing.    L3-L4: No spinal canal stenosis or neural foraminal narrowing.    L4-L5: Circumferential disc bulge with annular fissure with no spinal canal stenosis or neural foraminal narrowing.    L5-S1: No spinal canal stenosis or neural foraminal narrowing.    Paraspinal muscles and soft tissues: There is a subcentimeter T1 hyperintense, T2 hypointense lesion in the right kidney, which may represent a hemorrhagic cyst.  Impression: Disc space narrowing and mild endplate edema at C3-C4, and less so at C6-C7, in this patient with history of osteomyelitis.  Overall, appearance of endplates and disc at these levels is unchanged when compared to MRI cervical spine 11/02/2019.  No new areas of edema in the cervical spine to suggest osteomyelitis.  No prevertebral abscess or phlegmon in this non contrasted study.    Subtle endplate edema as well as edema  in the disc spaces at T8-T9 and T10-T11, concerning for early changes of osteomyelitis, with active degenerative joint disease also a consideration but felt less likely.    Subtle endplate edema as well as edema in the disc spaces at L2-L3, L4-L5, and L5-S1, concerning for early changes of osteomyelitis, with active degenerative joint disease also a consideration but felt less likely.    Trace bilateral pleural effusions with associated atelectasis.    Postoperative change of C6-C7 anterior instrumented disc replacement.    This report was flagged in Epic as abnormal.    Electronically signed by resident: Regulo Jimenes  Date:    12/27/2019  Time:    08:03    Electronically signed by: Cassidy Jacinto MD  Date:    12/27/2019  Time:    11:45  MRI Lumbar Spine Without Contrast  Narrative: EXAMINATION:  MRI CERVICAL SPINE WITHOUT CONTRAST; MRI LUMBAR SPINE WITHOUT CONTRAST; MRI THORACIC SPINE WITHOUT CONTRAST    CLINICAL HISTORY:  IVDU, +Staph bacteremia. Recent epidural abscess. Recurrent infection; Low back pain, <6wks, no red flags, no prior management; r/o epidural abscess    TECHNIQUE:  Multiplanar, multisequence MR images of the cervical spine were performed without the administration of contrast.    COMPARISON:  MRI cervical spine with and without contrast 11/02/2019, CT abdomen and pelvis without contrast 12/24/2019.    FINDINGS:  Cervical spine:    Postoperative changes of C6-C7 anterior instrumented disc replacement.    C1-C2: Dens is intact. Pre dens space is maintained.    Alignment: Straightening of the normal cervical lordosis.  No spondylolisthesis.    Vertebrae: Vertebral body heights are well maintained without evidence of fracture.  There is disc space narrowing and mild endplate edema at C3-C4, and less so at C6-C7, in this patient with known history of osteomyelitis.  Overall, appearance of endplates and disc at this level is unchanged when compared to MRI cervical spine 11/02/2019.  There is mild  prevertebral soft tissue edema, decreased in conspicuity when compared to MRI cervical spine 11/02/2019.    Mild edema in the facet joints at C6-C7 bilaterally, favoring postoperative change.    No new areas of edema to suggest osteomyelitis.  No definite epidural abscess or phlegmon, noting that this was a non contrasted study.    Discs: Multilevel intervertebral disc space narrowing and disc desiccation throughout the visualized cervical spine.    Cord: Cord maintains normal signal intensity and contour.    Skull base and craniocervical junction: Normal.    Degenerative findings:    C2-T1: No spinal canal stenosis or neural foraminal narrowing.    Thoracic spine:    Alignment: Alignment is well maintained.    Vertebrae: Vertebral body heights are well maintained without evidence of fracture.  At T8-T9 and T10-T11, there is subtle adjacent endplate edema as well as edema in the disc spaces.  No evidence of phlegmon or abscess.  These findings are concerning for early changes of osteomyelitis in this patient with history of osteomyelitis, with active degenerative joint disease also a consideration but felt less likely.    Discs: Interval disc spaces are otherwise maintained.    Cord: Cord maintains normal signal intensity and contour.    Degenerative findings:    No significant spinal canal stenosis or neural foraminal narrowing throughout the visualized thoracic spine.    Paraspinal muscles and soft tissues: Trace bilateral pleural effusions with associated atelectasis.    Lumbar spine:    Alignment: Straightening of the normal lumbar lordosis.  No spondylolisthesis.    Vertebrae: Vertebral body heights are well maintained.  No evidence of fracture.    At L2-L3, L4-L5, and L5-S1, there is subtle adjacent endplate edema as well as edema in the disc space.  No evidence of phlegmon or abscess.  These findings are concerning for early changes of osteomyelitis in this patient with history of osteomyelitis, with active  degenerative disc disease also a consideration but felt less likely.    Discs: Multilevel intervertebral disc space narrowing and disc desiccation.    Cord: Visualized cord maintains normal signal intensity and contour.  Conus terminates at the L1 level.    Degenerative findings:    T12-L1: No spinal canal stenosis or neural foraminal narrowing.    L1-L2: No spinal canal stenosis or neural foraminal narrowing.    L2-L3: No spinal canal stenosis or neural foraminal narrowing.    L3-L4: No spinal canal stenosis or neural foraminal narrowing.    L4-L5: Circumferential disc bulge with annular fissure with no spinal canal stenosis or neural foraminal narrowing.    L5-S1: No spinal canal stenosis or neural foraminal narrowing.    Paraspinal muscles and soft tissues: There is a subcentimeter T1 hyperintense, T2 hypointense lesion in the right kidney, which may represent a hemorrhagic cyst.  Impression: Disc space narrowing and mild endplate edema at C3-C4, and less so at C6-C7, in this patient with history of osteomyelitis.  Overall, appearance of endplates and disc at these levels is unchanged when compared to MRI cervical spine 11/02/2019.  No new areas of edema in the cervical spine to suggest osteomyelitis.  No prevertebral abscess or phlegmon in this non contrasted study.    Subtle endplate edema as well as edema in the disc spaces at T8-T9 and T10-T11, concerning for early changes of osteomyelitis, with active degenerative joint disease also a consideration but felt less likely.    Subtle endplate edema as well as edema in the disc spaces at L2-L3, L4-L5, and L5-S1, concerning for early changes of osteomyelitis, with active degenerative joint disease also a consideration but felt less likely.    Trace bilateral pleural effusions with associated atelectasis.    Postoperative change of C6-C7 anterior instrumented disc replacement.    This report was flagged in Epic as abnormal.    Electronically signed by resident:  Regulo Jimenes  Date:    12/27/2019  Time:    08:03    Electronically signed by: Cassidy Jacinto MD  Date:    12/27/2019  Time:    11:45  MRI Cervical Spine Without Contrast  Narrative: EXAMINATION:  MRI CERVICAL SPINE WITHOUT CONTRAST; MRI LUMBAR SPINE WITHOUT CONTRAST; MRI THORACIC SPINE WITHOUT CONTRAST    CLINICAL HISTORY:  IVDU, +Staph bacteremia. Recent epidural abscess. Recurrent infection; Low back pain, <6wks, no red flags, no prior management; r/o epidural abscess    TECHNIQUE:  Multiplanar, multisequence MR images of the cervical spine were performed without the administration of contrast.    COMPARISON:  MRI cervical spine with and without contrast 11/02/2019, CT abdomen and pelvis without contrast 12/24/2019.    FINDINGS:  Cervical spine:    Postoperative changes of C6-C7 anterior instrumented disc replacement.    C1-C2: Dens is intact. Pre dens space is maintained.    Alignment: Straightening of the normal cervical lordosis.  No spondylolisthesis.    Vertebrae: Vertebral body heights are well maintained without evidence of fracture.  There is disc space narrowing and mild endplate edema at C3-C4, and less so at C6-C7, in this patient with known history of osteomyelitis.  Overall, appearance of endplates and disc at this level is unchanged when compared to MRI cervical spine 11/02/2019.  There is mild prevertebral soft tissue edema, decreased in conspicuity when compared to MRI cervical spine 11/02/2019.    Mild edema in the facet joints at C6-C7 bilaterally, favoring postoperative change.    No new areas of edema to suggest osteomyelitis.  No definite epidural abscess or phlegmon, noting that this was a non contrasted study.    Discs: Multilevel intervertebral disc space narrowing and disc desiccation throughout the visualized cervical spine.    Cord: Cord maintains normal signal intensity and contour.    Skull base and craniocervical junction: Normal.    Degenerative findings:    C2-T1: No spinal  canal stenosis or neural foraminal narrowing.    Thoracic spine:    Alignment: Alignment is well maintained.    Vertebrae: Vertebral body heights are well maintained without evidence of fracture.  At T8-T9 and T10-T11, there is subtle adjacent endplate edema as well as edema in the disc spaces.  No evidence of phlegmon or abscess.  These findings are concerning for early changes of osteomyelitis in this patient with history of osteomyelitis, with active degenerative joint disease also a consideration but felt less likely.    Discs: Interval disc spaces are otherwise maintained.    Cord: Cord maintains normal signal intensity and contour.    Degenerative findings:    No significant spinal canal stenosis or neural foraminal narrowing throughout the visualized thoracic spine.    Paraspinal muscles and soft tissues: Trace bilateral pleural effusions with associated atelectasis.    Lumbar spine:    Alignment: Straightening of the normal lumbar lordosis.  No spondylolisthesis.    Vertebrae: Vertebral body heights are well maintained.  No evidence of fracture.    At L2-L3, L4-L5, and L5-S1, there is subtle adjacent endplate edema as well as edema in the disc space.  No evidence of phlegmon or abscess.  These findings are concerning for early changes of osteomyelitis in this patient with history of osteomyelitis, with active degenerative disc disease also a consideration but felt less likely.    Discs: Multilevel intervertebral disc space narrowing and disc desiccation.    Cord: Visualized cord maintains normal signal intensity and contour.  Conus terminates at the L1 level.    Degenerative findings:    T12-L1: No spinal canal stenosis or neural foraminal narrowing.    L1-L2: No spinal canal stenosis or neural foraminal narrowing.    L2-L3: No spinal canal stenosis or neural foraminal narrowing.    L3-L4: No spinal canal stenosis or neural foraminal narrowing.    L4-L5: Circumferential disc bulge with annular fissure with  no spinal canal stenosis or neural foraminal narrowing.    L5-S1: No spinal canal stenosis or neural foraminal narrowing.    Paraspinal muscles and soft tissues: There is a subcentimeter T1 hyperintense, T2 hypointense lesion in the right kidney, which may represent a hemorrhagic cyst.  Impression: Disc space narrowing and mild endplate edema at C3-C4, and less so at C6-C7, in this patient with history of osteomyelitis.  Overall, appearance of endplates and disc at these levels is unchanged when compared to MRI cervical spine 11/02/2019.  No new areas of edema in the cervical spine to suggest osteomyelitis.  No prevertebral abscess or phlegmon in this non contrasted study.    Subtle endplate edema as well as edema in the disc spaces at T8-T9 and T10-T11, concerning for early changes of osteomyelitis, with active degenerative joint disease also a consideration but felt less likely.    Subtle endplate edema as well as edema in the disc spaces at L2-L3, L4-L5, and L5-S1, concerning for early changes of osteomyelitis, with active degenerative joint disease also a consideration but felt less likely.    Trace bilateral pleural effusions with associated atelectasis.    Postoperative change of C6-C7 anterior instrumented disc replacement.    This report was flagged in Epic as abnormal.    Electronically signed by resident: Regulo Jimenes  Date:    12/27/2019  Time:    08:03    Electronically signed by: Cassidy Jacinto MD  Date:    12/27/2019  Time:    11:45          ABG  Recent Labs   Lab 12/25/19  0045   PH 7.324*   PO2 36*   PCO2 35.0   HCO3 18.2*   BE -8     Assessment/Plan:     Psychiatric  IV drug abuse  - Monitor for signs of withdrawal  - Addiction psychiatry consulted - motivational interviewing and resources provided, appreciate help    Cardiac/Vascular  * Endocarditis due to methicillin susceptible Staphylococcus aureus (MSSA)  History of mitral valve MSSA endocarditis s/p 6 week course of cefazolin ending 12/18.  Concern for septic emboli leading to infarcts of spleen, potentially renal infarcts. Repeat echo with vegetation vs ruptured chord apparatus of mitral valve, worsened MR to mod-severe from mild-mod on 11/04. 12/26 MRI brain with multiple punctate foci bilaterally representing septic emboli. AOx4, no FNDs at this time. CTS tentatively planning mitral valve repair, possible mitral valve replacement, possible tricuspid valve repair for Friday, January 3, 2020. Emphasized to patient that if there is continued IVDU then there will be no re-operation if it becomes infected again.    - Oxacillin only, vanc d/c'd per sensitivities  - BCxs daily until clear  - ID following  - Monitor daily CBC  - F/u NSGY recs    Renal/  RJ (acute kidney injury)  Baseline sCr 0.9 beginning of 11/2019, now 4. 2 on admit. Improved with IV fluids. Likely multifactorial including hypotension, possibly element of septic ATN. Consider emboli to the kidneys from infective endocarditis. Cr improving, now 2.6 (12/26/19). Likely RJ 2/2 sepsis and hypovolemia. 12/27 continuing to resolve, Cr 1.9.     - Avoid nephrotoxic meds/substances  - Strict Is and Os, daily weights  - Leung precaution  - US RP with no significant abnormalities  - Monitor daily renal function  - Improving 12/28 Cr 1.5    ID  Septic shock  Patient presented with generalized weakness and fatigue, vital signs concerning for shock with hypotension and tachycardia. Recently completed course of cefazolin for MSSA endocarditis, though UDS positive for amphetamines suggests patient may still be using IV drugs. With recent hospitalization would also be concerned about possible HAP. Urinalysis with 3+ leukocytes, 12 RBCs, >100 WBC and many bacteria - certainly concerning for urinary source. WCC elevated to 25. Initial lactate 7.9.   S/p 3L fluid resuscitation and BSABx with improvement in lactate to 1.8  Repeat CT head here without evidence of acute process  CXR unremarkable, no signs of  consolidation. CT abdo/pelvis 11/24 shows splenic infarcts as well as cholelithiasis    - De-escalated to oxacillin only 112/28  - Continue to follow-up BCx  And UCx  - 12/28 minimal levophed started overnight to maintain MAP >65, STEPHANIA    Other  Cachexia  Adult regular diet. Good po intake at this time.        Critical secondary to Patient has a condition that poses threat to life and bodily function: Septic shock 2/2 bacterial endocarditis/bacteremia requiring pressors     Critical care was time spent personally by me on the following activities: development of treatment plan with patient or surrogate and bedside caregivers, discussions with consultants, evaluation of patient's response to treatment, examination of patient, ordering and performing treatments and interventions, ordering and review of laboratory studies, ordering and review of radiographic studies, pulse oximetry, re-evaluation of patient's condition. This critical care time did not overlap with that of any other provider or involve time for any procedures.     Adeel Wagner MD  Critical Care Medicine  [facility]

## 2019-12-29 LAB
ALBUMIN SERPL BCP-MCNC: 2.1 G/DL (ref 3.5–5.2)
ALBUMIN/CREAT UR: 323.1 UG/MG (ref 0–30)
ALLENS TEST: ABNORMAL
ALLENS TEST: ABNORMAL
ALP SERPL-CCNC: 72 U/L (ref 55–135)
ALT SERPL W/O P-5'-P-CCNC: 12 U/L (ref 10–44)
ANION GAP SERPL CALC-SCNC: 10 MMOL/L (ref 8–16)
ANION GAP SERPL CALC-SCNC: 9 MMOL/L (ref 8–16)
AST SERPL-CCNC: 18 U/L (ref 10–40)
BACTERIA #/AREA URNS AUTO: ABNORMAL /HPF
BACTERIA BLD CULT: ABNORMAL
BASOPHILS # BLD AUTO: 0.03 K/UL (ref 0–0.2)
BASOPHILS # BLD AUTO: 0.03 K/UL (ref 0–0.2)
BASOPHILS NFR BLD: 0.2 % (ref 0–1.9)
BASOPHILS NFR BLD: 0.2 % (ref 0–1.9)
BILIRUB SERPL-MCNC: 0.3 MG/DL (ref 0.1–1)
BILIRUB UR QL STRIP: NEGATIVE
BUN SERPL-MCNC: 34 MG/DL (ref 6–20)
BUN SERPL-MCNC: 36 MG/DL (ref 6–20)
CALCIUM SERPL-MCNC: 7.6 MG/DL (ref 8.7–10.5)
CALCIUM SERPL-MCNC: 8.5 MG/DL (ref 8.7–10.5)
CHLORIDE SERPL-SCNC: 109 MMOL/L (ref 95–110)
CHLORIDE SERPL-SCNC: 110 MMOL/L (ref 95–110)
CLARITY UR REFRACT.AUTO: CLEAR
CO2 SERPL-SCNC: 20 MMOL/L (ref 23–29)
CO2 SERPL-SCNC: 21 MMOL/L (ref 23–29)
COLOR UR AUTO: ABNORMAL
CREAT SERPL-MCNC: 1.5 MG/DL (ref 0.5–1.4)
CREAT SERPL-MCNC: 1.5 MG/DL (ref 0.5–1.4)
CREAT UR-MCNC: 10 MG/DL (ref 23–375)
CREAT UR-MCNC: 13 MG/DL (ref 23–375)
CREAT UR-MCNC: 13 MG/DL (ref 23–375)
DIFFERENTIAL METHOD: ABNORMAL
DIFFERENTIAL METHOD: ABNORMAL
EOSINOPHIL # BLD AUTO: 0.1 K/UL (ref 0–0.5)
EOSINOPHIL # BLD AUTO: 0.2 K/UL (ref 0–0.5)
EOSINOPHIL NFR BLD: 0.9 % (ref 0–8)
EOSINOPHIL NFR BLD: 1.1 % (ref 0–8)
ERYTHROCYTE [DISTWIDTH] IN BLOOD BY AUTOMATED COUNT: 16.3 % (ref 11.5–14.5)
ERYTHROCYTE [DISTWIDTH] IN BLOOD BY AUTOMATED COUNT: 16.5 % (ref 11.5–14.5)
EST. GFR  (AFRICAN AMERICAN): 59.7 ML/MIN/1.73 M^2
EST. GFR  (AFRICAN AMERICAN): 59.7 ML/MIN/1.73 M^2
EST. GFR  (NON AFRICAN AMERICAN): 51.7 ML/MIN/1.73 M^2
EST. GFR  (NON AFRICAN AMERICAN): 51.7 ML/MIN/1.73 M^2
GLUCOSE SERPL-MCNC: 108 MG/DL (ref 70–110)
GLUCOSE SERPL-MCNC: 98 MG/DL (ref 70–110)
GLUCOSE UR QL STRIP: ABNORMAL
HCO3 UR-SCNC: 19.3 MMOL/L (ref 24–28)
HCT VFR BLD AUTO: 26 % (ref 40–54)
HCT VFR BLD AUTO: 27.7 % (ref 40–54)
HGB BLD-MCNC: 8.1 G/DL (ref 14–18)
HGB BLD-MCNC: 8.4 G/DL (ref 14–18)
HGB UR QL STRIP: ABNORMAL
IMM GRANULOCYTES # BLD AUTO: 0.13 K/UL (ref 0–0.04)
IMM GRANULOCYTES # BLD AUTO: 0.18 K/UL (ref 0–0.04)
IMM GRANULOCYTES NFR BLD AUTO: 0.9 % (ref 0–0.5)
IMM GRANULOCYTES NFR BLD AUTO: 1.1 % (ref 0–0.5)
KETONES UR QL STRIP: NEGATIVE
LACTATE SERPL-SCNC: 1.7 MMOL/L (ref 0.5–2.2)
LDH SERPL L TO P-CCNC: 1.49 MMOL/L (ref 0.36–1.25)
LEUKOCYTE ESTERASE UR QL STRIP: NEGATIVE
LYMPHOCYTES # BLD AUTO: 1.6 K/UL (ref 1–4.8)
LYMPHOCYTES # BLD AUTO: 2.9 K/UL (ref 1–4.8)
LYMPHOCYTES NFR BLD: 11.2 % (ref 18–48)
LYMPHOCYTES NFR BLD: 18 % (ref 18–48)
MAGNESIUM SERPL-MCNC: 1.6 MG/DL (ref 1.6–2.6)
MCH RBC QN AUTO: 27 PG (ref 27–31)
MCH RBC QN AUTO: 27.5 PG (ref 27–31)
MCHC RBC AUTO-ENTMCNC: 30.3 G/DL (ref 32–36)
MCHC RBC AUTO-ENTMCNC: 31.2 G/DL (ref 32–36)
MCV RBC AUTO: 88 FL (ref 82–98)
MCV RBC AUTO: 89 FL (ref 82–98)
MICROALBUMIN UR DL<=1MG/L-MCNC: 42 UG/ML
MICROSCOPIC COMMENT: ABNORMAL
MONOCYTES # BLD AUTO: 0.8 K/UL (ref 0.3–1)
MONOCYTES # BLD AUTO: 1.2 K/UL (ref 0.3–1)
MONOCYTES NFR BLD: 5 % (ref 4–15)
MONOCYTES NFR BLD: 8.5 % (ref 4–15)
NEUTROPHILS # BLD AUTO: 11.2 K/UL (ref 1.8–7.7)
NEUTROPHILS # BLD AUTO: 11.9 K/UL (ref 1.8–7.7)
NEUTROPHILS NFR BLD: 74.6 % (ref 38–73)
NEUTROPHILS NFR BLD: 78.3 % (ref 38–73)
NITRITE UR QL STRIP: NEGATIVE
NRBC BLD-RTO: 0 /100 WBC
NRBC BLD-RTO: 0 /100 WBC
OSMOLALITY SERPL: 292 MOSM/KG (ref 280–300)
OSMOLALITY SERPL: 295 MOSM/KG (ref 280–300)
OSMOLALITY UR: 193 MOSM/KG (ref 50–1200)
OSMOLALITY UR: 256 MOSM/KG (ref 50–1200)
PCO2 BLDA: 28.2 MMHG (ref 35–45)
PH SMN: 7.44 [PH] (ref 7.35–7.45)
PH UR STRIP: 6 [PH] (ref 5–8)
PHOSPHATE SERPL-MCNC: 4 MG/DL (ref 2.7–4.5)
PLATELET # BLD AUTO: 152 K/UL (ref 150–350)
PLATELET # BLD AUTO: 200 K/UL (ref 150–350)
PMV BLD AUTO: 10.3 FL (ref 9.2–12.9)
PMV BLD AUTO: 10.9 FL (ref 9.2–12.9)
PO2 BLDA: 103 MMHG (ref 80–100)
POC BE: -5 MMOL/L
POC SATURATED O2: 98 % (ref 95–100)
POC TCO2: 20 MMOL/L (ref 23–27)
POTASSIUM SERPL-SCNC: 4.1 MMOL/L (ref 3.5–5.1)
POTASSIUM SERPL-SCNC: 4.3 MMOL/L (ref 3.5–5.1)
PROT SERPL-MCNC: 6.3 G/DL (ref 6–8.4)
PROT UR QL STRIP: NEGATIVE
PROT UR-MCNC: 27 MG/DL (ref 0–15)
PROT/CREAT UR: 2.08 MG/G{CREAT} (ref 0–0.2)
RBC # BLD AUTO: 2.95 M/UL (ref 4.6–6.2)
RBC # BLD AUTO: 3.11 M/UL (ref 4.6–6.2)
RBC #/AREA URNS AUTO: 20 /HPF (ref 0–4)
SAMPLE: ABNORMAL
SAMPLE: ABNORMAL
SITE: ABNORMAL
SITE: ABNORMAL
SODIUM SERPL-SCNC: 139 MMOL/L (ref 136–145)
SODIUM SERPL-SCNC: 140 MMOL/L (ref 136–145)
SODIUM UR-SCNC: 53 MMOL/L (ref 20–250)
SODIUM UR-SCNC: 68 MMOL/L (ref 20–250)
SP GR UR STRIP: 1 (ref 1–1.03)
URN SPEC COLLECT METH UR: ABNORMAL
UUN UR-MCNC: 168 MG/DL (ref 140–1050)
UUN UR-MCNC: 184 MG/DL (ref 140–1050)
WBC # BLD AUTO: 14.27 K/UL (ref 3.9–12.7)
WBC # BLD AUTO: 15.92 K/UL (ref 3.9–12.7)
WBC #/AREA URNS AUTO: 3 /HPF (ref 0–5)

## 2019-12-29 PROCEDURE — 99233 PR SUBSEQUENT HOSPITAL CARE,LEVL III: ICD-10-PCS | Mod: ,,, | Performed by: INTERNAL MEDICINE

## 2019-12-29 PROCEDURE — 87040 BLOOD CULTURE FOR BACTERIA: CPT

## 2019-12-29 PROCEDURE — 63600175 PHARM REV CODE 636 W HCPCS: Performed by: INTERNAL MEDICINE

## 2019-12-29 PROCEDURE — 20000000 HC ICU ROOM

## 2019-12-29 PROCEDURE — 80048 BASIC METABOLIC PNL TOTAL CA: CPT

## 2019-12-29 PROCEDURE — 81001 URINALYSIS AUTO W/SCOPE: CPT

## 2019-12-29 PROCEDURE — 83930 ASSAY OF BLOOD OSMOLALITY: CPT | Mod: 91

## 2019-12-29 PROCEDURE — 82043 UR ALBUMIN QUANTITATIVE: CPT

## 2019-12-29 PROCEDURE — 25000003 PHARM REV CODE 250: Performed by: STUDENT IN AN ORGANIZED HEALTH CARE EDUCATION/TRAINING PROGRAM

## 2019-12-29 PROCEDURE — 83930 ASSAY OF BLOOD OSMOLALITY: CPT

## 2019-12-29 PROCEDURE — 83735 ASSAY OF MAGNESIUM: CPT

## 2019-12-29 PROCEDURE — 99900035 HC TECH TIME PER 15 MIN (STAT)

## 2019-12-29 PROCEDURE — 36600 WITHDRAWAL OF ARTERIAL BLOOD: CPT

## 2019-12-29 PROCEDURE — 63600175 PHARM REV CODE 636 W HCPCS: Performed by: EMERGENCY MEDICINE

## 2019-12-29 PROCEDURE — 93005 ELECTROCARDIOGRAM TRACING: CPT

## 2019-12-29 PROCEDURE — 84100 ASSAY OF PHOSPHORUS: CPT

## 2019-12-29 PROCEDURE — 83935 ASSAY OF URINE OSMOLALITY: CPT

## 2019-12-29 PROCEDURE — 84540 ASSAY OF URINE/UREA-N: CPT

## 2019-12-29 PROCEDURE — 83605 ASSAY OF LACTIC ACID: CPT

## 2019-12-29 PROCEDURE — 93010 EKG 12-LEAD: ICD-10-PCS | Mod: ,,, | Performed by: INTERNAL MEDICINE

## 2019-12-29 PROCEDURE — 82570 ASSAY OF URINE CREATININE: CPT

## 2019-12-29 PROCEDURE — 84156 ASSAY OF PROTEIN URINE: CPT

## 2019-12-29 PROCEDURE — 63600175 PHARM REV CODE 636 W HCPCS: Performed by: STUDENT IN AN ORGANIZED HEALTH CARE EDUCATION/TRAINING PROGRAM

## 2019-12-29 PROCEDURE — 99233 SBSQ HOSP IP/OBS HIGH 50: CPT | Mod: ,,, | Performed by: INTERNAL MEDICINE

## 2019-12-29 PROCEDURE — 63600175 PHARM REV CODE 636 W HCPCS: Performed by: NURSE PRACTITIONER

## 2019-12-29 PROCEDURE — 80053 COMPREHEN METABOLIC PANEL: CPT

## 2019-12-29 PROCEDURE — 84300 ASSAY OF URINE SODIUM: CPT | Mod: 91

## 2019-12-29 PROCEDURE — 83935 ASSAY OF URINE OSMOLALITY: CPT | Mod: 91

## 2019-12-29 PROCEDURE — 93010 ELECTROCARDIOGRAM REPORT: CPT | Mod: ,,, | Performed by: INTERNAL MEDICINE

## 2019-12-29 PROCEDURE — 84540 ASSAY OF URINE/UREA-N: CPT | Mod: 91

## 2019-12-29 PROCEDURE — 84300 ASSAY OF URINE SODIUM: CPT

## 2019-12-29 PROCEDURE — 85025 COMPLETE CBC W/AUTO DIFF WBC: CPT

## 2019-12-29 PROCEDURE — 82803 BLOOD GASES ANY COMBINATION: CPT

## 2019-12-29 RX ORDER — MAGNESIUM SULFATE HEPTAHYDRATE 40 MG/ML
2 INJECTION, SOLUTION INTRAVENOUS ONCE
Status: COMPLETED | OUTPATIENT
Start: 2019-12-29 | End: 2019-12-29

## 2019-12-29 RX ADMIN — Medication 0.04 MCG/KG/MIN: at 09:12

## 2019-12-29 RX ADMIN — SODIUM CHLORIDE, SODIUM LACTATE, POTASSIUM CHLORIDE, AND CALCIUM CHLORIDE 500 ML: .6; .31; .03; .02 INJECTION, SOLUTION INTRAVENOUS at 05:12

## 2019-12-29 RX ADMIN — PIPERACILLIN AND TAZOBACTAM 4.5 G: 4; .5 INJECTION, POWDER, LYOPHILIZED, FOR SOLUTION INTRAVENOUS; PARENTERAL at 06:12

## 2019-12-29 RX ADMIN — ACETAMINOPHEN 650 MG: 325 TABLET ORAL at 09:12

## 2019-12-29 RX ADMIN — MAGNESIUM SULFATE IN WATER 2 G: 40 INJECTION, SOLUTION INTRAVENOUS at 04:12

## 2019-12-29 RX ADMIN — SODIUM CHLORIDE, SODIUM LACTATE, POTASSIUM CHLORIDE, AND CALCIUM CHLORIDE 1000 ML: .6; .31; .03; .02 INJECTION, SOLUTION INTRAVENOUS at 12:12

## 2019-12-29 RX ADMIN — OXACILLIN SODIUM 12 G: 10 INJECTION, POWDER, FOR SOLUTION INTRAVENOUS at 05:12

## 2019-12-29 RX ADMIN — SODIUM CHLORIDE 1000 ML: 0.9 INJECTION, SOLUTION INTRAVENOUS at 05:12

## 2019-12-29 RX ADMIN — OXYCODONE HYDROCHLORIDE 5 MG: 5 TABLET ORAL at 09:12

## 2019-12-29 RX ADMIN — Medication 0.12 MCG/KG/MIN: at 12:12

## 2019-12-29 RX ADMIN — ENOXAPARIN SODIUM 40 MG: 100 INJECTION SUBCUTANEOUS at 05:12

## 2019-12-29 RX ADMIN — ACETAMINOPHEN 650 MG: 325 TABLET ORAL at 04:12

## 2019-12-29 RX ADMIN — SODIUM CHLORIDE, SODIUM LACTATE, POTASSIUM CHLORIDE, AND CALCIUM CHLORIDE 500 ML: .6; .31; .03; .02 INJECTION, SOLUTION INTRAVENOUS at 12:12

## 2019-12-29 NOTE — PROGRESS NOTES
MD called for patients increasing heart rate into the 140's. Pt complains of chills. Oral temperature is 99.7. Orders to give 1L NS. Will continue to monitor.

## 2019-12-29 NOTE — NURSING
CK Tucker notified of CVP reading and uop being > 300 cc/hr. since 1100.  Orders received and implemented for 500 cc LR bolus.  See flowsheets for more details.  Will continue to monitor.

## 2019-12-29 NOTE — NURSING
Critical care at bedside r/t acute episode of hypothermia, HTN, tachycardia, low sats.  Orders received and implemented for labs and 12-lead EKG.  See flowsheets for more details.  Will continue to monitor.

## 2019-12-29 NOTE — PLAN OF CARE
Dr. Strange at the bedside to discuss the plan of care in regards to continuation of hemodynamic monitoring, and antibiotic therapy, administration of 1 unit of PRBC's, advancement of diet and possible titration of levophed infusion to off.  All questions and concerns were answered and addressed.  Patient is in agreement and verbalize understanding to plan of care

## 2019-12-29 NOTE — SUBJECTIVE & OBJECTIVE
Interval History/Significant Events: put out 2-3 L UOP last night, urine studies without electrolyte abnormality, Na ok on BMP. No medication diuresis.     Review of Systems   Constitutional: Negative for chills and fever.   HENT: Negative for congestion, rhinorrhea and sore throat.    Eyes: Negative for visual disturbance.   Respiratory: Negative for cough, shortness of breath and stridor.    Cardiovascular: Positive for chest pain (pain with deep breaths). Negative for leg swelling.        Pain ULQ when she breathes in deeply.    Gastrointestinal: Negative for abdominal distention, abdominal pain, blood in stool, nausea and vomiting.   Endocrine: Positive for polyuria.   Genitourinary: Negative for dysuria and hematuria.   Musculoskeletal: Positive for back pain and neck pain (pain since ACDF). Negative for myalgias.   Skin: Negative for rash.   Neurological: Negative for dizziness, light-headedness and headaches.   Psychiatric/Behavioral: Negative for agitation and confusion.     Objective:     Vital Signs (Most Recent):  Temp: 99.4 °F (37.4 °C) (12/29/19 1100)  Pulse: 92 (12/29/19 1200)  Resp: (!) 29 (12/29/19 1200)  BP: 104/60 (12/29/19 1200)  SpO2: (!) 94 % (12/29/19 1200) Vital Signs (24h Range):  Temp:  [98 °F (36.7 °C)-99.8 °F (37.7 °C)] 99.4 °F (37.4 °C)  Pulse:  [] 92  Resp:  [13-48] 29  SpO2:  [91 %-99 %] 94 %  BP: ()/(43-93) 104/60   Weight: 66 kg (145 lb 8.1 oz)  Body mass index is 19.73 kg/m².      Intake/Output Summary (Last 24 hours) at 12/29/2019 1335  Last data filed at 12/29/2019 1200  Gross per 24 hour   Intake 6012 ml   Output 6355 ml   Net -343 ml       Physical Exam   Constitutional: He is oriented to person, place, and time. He appears well-developed. No distress.   HENT:   Head: Atraumatic.   Mouth/Throat: No oropharyngeal exudate.   Eyes: Conjunctivae and EOM are normal. No scleral icterus.   Pinpoint pupils   Neck: Normal range of motion. Neck supple.   Cardiovascular: Normal  rate and regular rhythm.   Murmur heard.  Pulmonary/Chest: Effort normal and breath sounds normal. No stridor. No respiratory distress. He has no wheezes.   Abdominal: Soft. Bowel sounds are normal. He exhibits no distension. There is no tenderness. There is no guarding.   Musculoskeletal: He exhibits no edema or tenderness.   Neurological: He is alert and oriented to person, place, and time. No cranial nerve deficit.   Skin: Skin is warm and dry. No rash noted. He is not diaphoretic. No erythema.   Nursing note and vitals reviewed.      Vents:     Lines/Drains/Airways     Central Venous Catheter Line                 Percutaneous Central Line Insertion/Assessment - triple lumen  12/26/19 0230 right internal jugular 3 days          Drain                 Urethral Catheter 12/24/19 1954 Non-latex 16 Fr. 4 days          Peripheral Intravenous Line                 Peripheral IV - Single Lumen 12/24/19 1812 18 G Right Upper Arm 4 days         Peripheral IV - Single Lumen 12/24/19 1923 20 G Left Forearm 4 days         Peripheral IV - Single Lumen 12/24/19 1940 18 G Left Upper Arm 4 days              Significant Labs:    CBC/Anemia Profile:  Recent Labs   Lab 12/28/19  0412 12/28/19  1458 12/29/19  0302   WBC 10.30 12.74* 14.27*   HGB 6.9* 7.8* 8.1*   HCT 22.9* 25.0* 26.0*   * 132* 152   MCV 89 89 88   RDW 16.7* 16.5* 16.3*        Chemistries:  Recent Labs   Lab 12/28/19  0412 12/28/19  1927 12/28/19  2302 12/29/19  0302   * 138 136 140   K 4.6 5.4* 4.1 4.3    106 107 109   CO2 22* 20* 21* 21*   BUN 40* 41* 40* 36*   CREATININE 1.5* 1.6* 1.5* 1.5*   CALCIUM 8.2* 8.7 8.0* 8.5*   ALBUMIN 2.0*  --   --  2.1*   PROT 6.2  --   --  6.3   BILITOT 0.2  --   --  0.3   ALKPHOS 72  --   --  72   ALT 11  --   --  12   AST 19  --   --  18   MG 1.5* 1.8 1.7 1.6   PHOS 3.7 4.0  --  4.0       All pertinent labs within the past 24 hours have been reviewed.    Significant Imaging:  I have reviewed all pertinent imaging  results/findings within the past 24 hours.

## 2019-12-29 NOTE — ASSESSMENT & PLAN NOTE
Baseline sCr 0.9 beginning of 11/2019, now 4. 2 on admit. Improved with IV fluids. Likely multifactorial including hypotension, possibly element of septic ATN. Consider emboli to the kidneys from infective endocarditis. Cr improving, now 2.6 (12/26/19). Likely RJ 2/2 sepsis and hypovolemia. 12/27 continuing to resolve, Cr 1.9. 12/28 1.5, improving.     - Avoid nephrotoxic meds/substances  - Strict Is and Os, daily weights  - Leung precaution  - US RP with no significant abnormalities  - Monitor daily renal function  - 12/29 urine studies (U-Na 53, U Osm 193, Serum Osm 292) without electrolyte abnormality, Na WNL on BMP  - 12/30 serum creatinine 1.4, down trending slowly

## 2019-12-29 NOTE — NURSING
CK Tucker notified of CVP and uop for past 2 hrs.  No new orders.  See flowsheets for more details.  Will continue to monitor.

## 2019-12-29 NOTE — PROGRESS NOTES
[facility]  Critical Care Medicine  Progress Note    Patient Name: Ld Bronson  MRN: [id number]  Admission Date: 12/25/2019  Hospital Length of Stay: 4 days  Code Status: Full Code  Attending Provider: Warren Strange MD  Primary Care Provider: Kris Artis Jr, MD   Principal Problem: Endocarditis due to methicillin susceptible Staphylococcus aureus (MSSA)    Subjective:     HPI:  Mr. Bronson is a 53M IVDU with HCV, Hx of MSSA bacteremia and infective endocarditis (s/p 6 weeks of cefazolin 12/18), cervical spine epidural abscess s/p C6-7 diskectomy and arthrodesis 11/15/2019 who presented to [facility] with chest pain, generalized weakness, and shortness of breath. At OSH, he was found to be in septic shock requiring pressor support. He was transferred to [facility] for higher level of care. On arrival, patient was somnolent and difficult to arouse. Patient did waken with sternal rub and answered questions appropriately, but history largely obtained through chart review.     At OSH ED, patient recieved full 30ccs/kg fluid resuscitation with an additional liter (total ~3L) for hypotension and tachycardia; lactic acid was elevated to 3.4. He received 1 dose each of vancomycin and zosyn prior to transfer.     Hospital/ICU Course:  [facility] after a C6-7 ACDF with washout on 11/6 with end date 12/18/19. [facility] though it appears from transfer note that the patient had completed his full 6 week course at this time. His WCC and lactate have decreased significantly since starting broad spectrum antibiotics with vanc/zosyn and will be continued until sensitivities return. BCxs still positive for Staph aureus. C diff negative, gonorrhea/chlamydia negative, urine culture no growth. Patient c/o low back pain that has been present for a month and chronic neck pain since the anterior cervical  diskectomy and fusion. MRI cervical, thoracic, and lumbar ordered to r/o spinal cord compression and abscess. ID has been consulted for the recurrence of bacteremia and endocarditis, addiction psych consulted for continued heroin use (last use 3 days prior to admission per OSH ED note), and CTS consulted for evaluation of vegetation vs ruptured chord apparatus and worsened MR from prior study 11/04. Dr. Gooden with CTS has tentatively scheduled mitral valve repair, possible mitral valve replacement, possible tricuspid valve repair for Friday, January 3, 2020. CT chest w/o contrast ordered. NSGY consulted to assess for possible osteomyelitis on MRI. Addiction psych consulted who provided motivational interviewing and resources for outpatient rehabilitation to the patient, appreciated. Hgb noted to 6.7 today from 7.0, blood consent was attempted but the patient refused responding he will sign tomorrow. Will hold on transfusion until 12/28. Asymptomatic at this time, no dyspnea, lightheadedness, dizziness. 12/28 transfused 1 u prbc after consent obtained overnight. Requiring minimal pressors to keep MAP >65. Otherwise stable, good po intake, renal function improving. CT chest with small pleural effusions and possible early osteomyelitic changes in T8-T9, T10-T11. Staph aureus sensitive to oxacillin, vanc d/c'd.    No new subjective & objective note has been filed under this hospital service since the last note was generated.      ABG  Recent Labs   Lab 12/25/19  0045   PH 7.324*   PO2 36*   PCO2 35.0   HCO3 18.2*   BE -8     Assessment/Plan:     Psychiatric  IV drug abuse  - Monitor for signs of withdrawal  - Addiction psychiatry consulted - motivational interviewing and resources provided, appreciate help    Cardiac/Vascular  * Endocarditis due to methicillin susceptible Staphylococcus aureus (MSSA)  History of mitral valve MSSA endocarditis s/p 6 week course of cefazolin ending 12/18. Concern for septic emboli  leading to infarcts of spleen, potentially renal infarcts. Repeat echo with vegetation vs ruptured chord apparatus of mitral valve, worsened MR to mod-severe from mild-mod on 11/04. 12/26 MRI brain with multiple punctate foci bilaterally representing septic emboli. AOx4, no FNDs at this time. CTS tentatively planning mitral valve repair, possible mitral valve replacement, possible tricuspid valve repair for Friday, January 3, 2020. Emphasized to patient that if there is continued IVDU then there will be no re-operation if it becomes infected again.    - Oxacillin only, vanc d/c'd per sensitivities  - BCxs daily until clear  - ID following  - Monitor daily CBC  - F/u NSGY recs    Renal/  RJ (acute kidney injury)  Baseline sCr 0.9 beginning of 11/2019, now 4. 2 on admit. Improved with IV fluids. Likely multifactorial including hypotension, possibly element of septic ATN. Consider emboli to the kidneys from infective endocarditis. Cr improving, now 2.6 (12/26/19). Likely RJ 2/2 sepsis and hypovolemia. 12/27 continuing to resolve, Cr 1.9    - Avoid nephrotoxic meds/substances  - Strict Is and Os, daily weights  - Leung precaution  - US RP with no significant abnormalities  - Monitor daily renal function  - Improving 12/28 Cr 1.5  - 2-3 L UOP 12/29, urine studies (U-Na 53, U Osm 193, Serum Osm 292) without electrolyte abnormality, Na ok on BMP. No medication diuresis. Continue to follow, could be initial IVF rescusicitation    ID  Septic shock  Patient presented with generalized weakness and fatigue, vital signs concerning for shock with hypotension and tachycardia. Recently completed course of cefazolin for MSSA endocarditis, though UDS positive for amphetamines suggests patient may still be using IV drugs. With recent hospitalization would also be concerned about possible HAP. Urinalysis with 3+ leukocytes, 12 RBCs, >100 WBC and many bacteria - certainly concerning for urinary source. WCC elevated to 25. Initial  lactate 7.9.   S/p 3L fluid resuscitation and BSABx with improvement in lactate to 1.8  Repeat CT head here without evidence of acute process  CXR unremarkable, no signs of consolidation. CT abdo/pelvis 11/24 shows splenic infarcts as well as cholelithiasis    - De-escalated to oxacillin only 112/28  - Continue to follow-up BCx  And UCx  - 12/28 minimal levophed started overnight to maintain MAP >65, STEPHANIA    Other  Cachexia  Adult regular diet. Good po intake at this time.      Critical secondary to Patient has a condition that poses threat to life and bodily function: poor respiratory injury       Critical care was time spent personally by me on the following activities: development of treatment plan with patient or surrogate and bedside caregivers, discussions with consultants, evaluation of patient's response to treatment, examination of patient, ordering and performing treatments and interventions, ordering and review of laboratory studies, ordering and review of radiographic studies, pulse oximetry, re-evaluation of patient's condition. This critical care time did not overlap with that of any other provider or involve time for any procedures.     Roxana Reddy MD  Critical Care Medicine  [facility]

## 2019-12-29 NOTE — PROGRESS NOTES
MD called again for increase in HR to the 150s. Temp is now 101.8. STAT EKG ordered. Will continue to monitor.

## 2019-12-29 NOTE — NURSING
CK Tucker notified of low SBP, uop, increased levo requirements.  Orders received and implemented for 500 cc LR bolus.  See flowsheets for more details.  Will continue to monitor.

## 2019-12-29 NOTE — PLAN OF CARE
Pt. resting comfortably in bed, AAO x4.  Sats > 90% on RA.  Tmax 99.8.  MAP > 65 maintained c levo gtt.  HR .  1 episode of tachycardia, tachypnea, low sats.  MD at bedside.  2 L LR given r/t low CVP.  Other gtt includes oxacillin.  Uop 100-500 cc/hr.  No BM.  Electrolytes replaced per order.  Refused to be turned.  Skin free from new breakdown.  POC reviewed c pt.  See flowsheets for more details.  Will continue to monitor.

## 2019-12-29 NOTE — NURSING
MD Mary notified of increasing levo requirements.  Orders received and implemented for 1 L LR bolus and labs.  Orders also received to call when levo gtt is at 0.2 mcg/kg/min.  See flowsheets for more details.  Will continue to monitor.

## 2019-12-29 NOTE — ASSESSMENT & PLAN NOTE
Baseline sCr 0.9 beginning of 11/2019, now 4. 2 on admit. Improved with IV fluids. Likely multifactorial including hypotension, possibly element of septic ATN. Consider emboli to the kidneys from infective endocarditis. Cr improving, now 2.6 (12/26/19). Likely RJ 2/2 sepsis and hypovolemia. 12/27 continuing to resolve, Cr 1.9    - Avoid nephrotoxic meds/substances  - Strict Is and Os, daily weights  - Leung precaution  - US RP with no significant abnormalities  - Monitor daily renal function  - Improving 12/28 Cr 1.5

## 2019-12-30 ENCOUNTER — TELEPHONE (OUTPATIENT)
Dept: PREADMISSION TESTING | Facility: HOSPITAL | Age: 54
End: 2019-12-30

## 2019-12-30 DIAGNOSIS — I38 ENDOCARDITIS, UNSPECIFIED CHRONICITY, UNSPECIFIED ENDOCARDITIS TYPE: Primary | ICD-10-CM

## 2019-12-30 LAB
ALBUMIN SERPL BCP-MCNC: 1.8 G/DL (ref 3.5–5.2)
ALP SERPL-CCNC: 80 U/L (ref 55–135)
ALT SERPL W/O P-5'-P-CCNC: 14 U/L (ref 10–44)
ANION GAP SERPL CALC-SCNC: 8 MMOL/L (ref 8–16)
AST SERPL-CCNC: 22 U/L (ref 10–40)
BASOPHILS # BLD AUTO: 0.06 K/UL (ref 0–0.2)
BASOPHILS NFR BLD: 0.5 % (ref 0–1.9)
BILIRUB SERPL-MCNC: 0.3 MG/DL (ref 0.1–1)
BUN SERPL-MCNC: 34 MG/DL (ref 6–20)
CALCIUM SERPL-MCNC: 7.6 MG/DL (ref 8.7–10.5)
CHLORIDE SERPL-SCNC: 108 MMOL/L (ref 95–110)
CO2 SERPL-SCNC: 21 MMOL/L (ref 23–29)
CREAT SERPL-MCNC: 1.4 MG/DL (ref 0.5–1.4)
DIFFERENTIAL METHOD: ABNORMAL
EOSINOPHIL # BLD AUTO: 0.3 K/UL (ref 0–0.5)
EOSINOPHIL NFR BLD: 2.2 % (ref 0–8)
ERYTHROCYTE [DISTWIDTH] IN BLOOD BY AUTOMATED COUNT: 16.6 % (ref 11.5–14.5)
EST. GFR  (AFRICAN AMERICAN): >60 ML/MIN/1.73 M^2
EST. GFR  (NON AFRICAN AMERICAN): 56.2 ML/MIN/1.73 M^2
GLUCOSE SERPL-MCNC: 107 MG/DL (ref 70–110)
HCT VFR BLD AUTO: 23.2 % (ref 40–54)
HGB BLD-MCNC: 7.2 G/DL (ref 14–18)
IMM GRANULOCYTES # BLD AUTO: 0.15 K/UL (ref 0–0.04)
IMM GRANULOCYTES NFR BLD AUTO: 1.2 % (ref 0–0.5)
LYMPHOCYTES # BLD AUTO: 1.9 K/UL (ref 1–4.8)
LYMPHOCYTES NFR BLD: 14.7 % (ref 18–48)
MAGNESIUM SERPL-MCNC: 1.7 MG/DL (ref 1.6–2.6)
MCH RBC QN AUTO: 27.8 PG (ref 27–31)
MCHC RBC AUTO-ENTMCNC: 31 G/DL (ref 32–36)
MCV RBC AUTO: 90 FL (ref 82–98)
MONOCYTES # BLD AUTO: 1.1 K/UL (ref 0.3–1)
MONOCYTES NFR BLD: 8.5 % (ref 4–15)
NEUTROPHILS # BLD AUTO: 9.3 K/UL (ref 1.8–7.7)
NEUTROPHILS NFR BLD: 72.9 % (ref 38–73)
NRBC BLD-RTO: 0 /100 WBC
PHOSPHATE SERPL-MCNC: 4.7 MG/DL (ref 2.7–4.5)
PLATELET # BLD AUTO: 173 K/UL (ref 150–350)
PMV BLD AUTO: 10 FL (ref 9.2–12.9)
POTASSIUM SERPL-SCNC: 4.1 MMOL/L (ref 3.5–5.1)
PROT SERPL-MCNC: 5.8 G/DL (ref 6–8.4)
RBC # BLD AUTO: 2.59 M/UL (ref 4.6–6.2)
SODIUM SERPL-SCNC: 137 MMOL/L (ref 136–145)
WBC # BLD AUTO: 12.8 K/UL (ref 3.9–12.7)

## 2019-12-30 PROCEDURE — 25000003 PHARM REV CODE 250: Performed by: STUDENT IN AN ORGANIZED HEALTH CARE EDUCATION/TRAINING PROGRAM

## 2019-12-30 PROCEDURE — 63600175 PHARM REV CODE 636 W HCPCS: Performed by: STUDENT IN AN ORGANIZED HEALTH CARE EDUCATION/TRAINING PROGRAM

## 2019-12-30 PROCEDURE — 83735 ASSAY OF MAGNESIUM: CPT

## 2019-12-30 PROCEDURE — 97161 PT EVAL LOW COMPLEX 20 MIN: CPT

## 2019-12-30 PROCEDURE — 84100 ASSAY OF PHOSPHORUS: CPT

## 2019-12-30 PROCEDURE — 99232 PR SUBSEQUENT HOSPITAL CARE,LEVL II: ICD-10-PCS | Mod: AF,HB,S$PBB, | Performed by: PSYCHIATRY & NEUROLOGY

## 2019-12-30 PROCEDURE — 80053 COMPREHEN METABOLIC PANEL: CPT

## 2019-12-30 PROCEDURE — 99232 SBSQ HOSP IP/OBS MODERATE 35: CPT | Mod: AF,HB,S$PBB, | Performed by: PSYCHIATRY & NEUROLOGY

## 2019-12-30 PROCEDURE — 11000001 HC ACUTE MED/SURG PRIVATE ROOM

## 2019-12-30 PROCEDURE — 63600175 PHARM REV CODE 636 W HCPCS: Performed by: INTERNAL MEDICINE

## 2019-12-30 PROCEDURE — 85025 COMPLETE CBC W/AUTO DIFF WBC: CPT

## 2019-12-30 PROCEDURE — 99233 SBSQ HOSP IP/OBS HIGH 50: CPT | Mod: GC,,, | Performed by: INTERNAL MEDICINE

## 2019-12-30 PROCEDURE — 25000003 PHARM REV CODE 250: Performed by: INTERNAL MEDICINE

## 2019-12-30 PROCEDURE — 99233 PR SUBSEQUENT HOSPITAL CARE,LEVL III: ICD-10-PCS | Mod: GC,,, | Performed by: INTERNAL MEDICINE

## 2019-12-30 PROCEDURE — 99233 PR SUBSEQUENT HOSPITAL CARE,LEVL III: ICD-10-PCS | Mod: ,,, | Performed by: PHYSICIAN ASSISTANT

## 2019-12-30 PROCEDURE — 97165 OT EVAL LOW COMPLEX 30 MIN: CPT

## 2019-12-30 PROCEDURE — 99233 SBSQ HOSP IP/OBS HIGH 50: CPT | Mod: ,,, | Performed by: PHYSICIAN ASSISTANT

## 2019-12-30 RX ORDER — MAGNESIUM SULFATE HEPTAHYDRATE 40 MG/ML
2 INJECTION, SOLUTION INTRAVENOUS ONCE
Status: COMPLETED | OUTPATIENT
Start: 2019-12-30 | End: 2019-12-30

## 2019-12-30 RX ORDER — MIDODRINE HYDROCHLORIDE 5 MG/1
15 TABLET ORAL 3 TIMES DAILY
Status: DISCONTINUED | OUTPATIENT
Start: 2019-12-30 | End: 2020-01-06 | Stop reason: HOSPADM

## 2019-12-30 RX ORDER — AMOXICILLIN 250 MG
1 CAPSULE ORAL DAILY
Status: DISCONTINUED | OUTPATIENT
Start: 2019-12-30 | End: 2020-01-06 | Stop reason: HOSPADM

## 2019-12-30 RX ORDER — POLYETHYLENE GLYCOL 3350 17 G/17G
17 POWDER, FOR SOLUTION ORAL DAILY
Status: DISCONTINUED | OUTPATIENT
Start: 2019-12-30 | End: 2020-01-06 | Stop reason: HOSPADM

## 2019-12-30 RX ADMIN — MIDODRINE HYDROCHLORIDE 15 MG: 5 TABLET ORAL at 02:12

## 2019-12-30 RX ADMIN — PIPERACILLIN AND TAZOBACTAM 4.5 G: 4; .5 INJECTION, POWDER, LYOPHILIZED, FOR SOLUTION INTRAVENOUS; PARENTERAL at 03:12

## 2019-12-30 RX ADMIN — POLYETHYLENE GLYCOL 3350 17 G: 17 POWDER, FOR SOLUTION ORAL at 09:12

## 2019-12-30 RX ADMIN — OXACILLIN SODIUM 12 G: 10 INJECTION, POWDER, FOR SOLUTION INTRAVENOUS at 03:12

## 2019-12-30 RX ADMIN — SENNOSIDES AND DOCUSATE SODIUM 1 TABLET: 8.6; 5 TABLET ORAL at 09:12

## 2019-12-30 RX ADMIN — MAGNESIUM SULFATE IN WATER 2 G: 40 INJECTION, SOLUTION INTRAVENOUS at 08:12

## 2019-12-30 RX ADMIN — MIDODRINE HYDROCHLORIDE 15 MG: 5 TABLET ORAL at 09:12

## 2019-12-30 RX ADMIN — VANCOMYCIN HYDROCHLORIDE 1250 MG: 1.25 INJECTION, POWDER, LYOPHILIZED, FOR SOLUTION INTRAVENOUS at 01:12

## 2019-12-30 RX ADMIN — ENOXAPARIN SODIUM 40 MG: 100 INJECTION SUBCUTANEOUS at 05:12

## 2019-12-30 NOTE — PLAN OF CARE
Problem: Adult Inpatient Plan of Care  Goal: Plan of Care Review  Outcome: Ongoing, Progressing   HR , MAP >65, CVP 3, Oxygen weaned to room air SP02 >95%.   Gtt: Levo   Leung cathter 100-300cc/hr   Patient medicated for complaints of pain with prn medication.   Plan of care reviewed with patient.

## 2019-12-30 NOTE — ASSESSMENT & PLAN NOTE
Patient presented with generalized weakness and fatigue, vital signs concerning for shock with hypotension and tachycardia. Recently completed course of cefazolin for MSSA endocarditis, though UDS positive for amphetamines suggests patient may still be using IV drugs. With recent hospitalization would also be concerned about possible HAP. Urinalysis with 3+ leukocytes, 12 RBCs, >100 WBC and many bacteria - certainly concerning for urinary source. WCC elevated to 25. Initial lactate 7.9.   S/p 3L fluid resuscitation and BSABx with improvement in lactate to 1.8  Repeat CT head here without evidence of acute process  CXR unremarkable, no signs of consolidation. CT abdo/pelvis 11/24 shows splenic infarcts as well as cholelithiasis    - De-escalated to oxacillin only 12/28  - Continue to follow blood and urine cultures, last positive growth from 12/26  - 12/28 minimal Levophed started overnight to maintain MAP >65  - Continue attempts to wean Levophed today, patient on minimal infusion, will start on midodrine 15 mg TID for added BP support

## 2019-12-30 NOTE — PLAN OF CARE
Problem: Occupational Therapy Goal  Goal: Occupational Therapy Goal  Description  Goals to be met by: 1/13/2020     Patient will increase functional independence with ADLs by performing:    UE Dressing with [place].  LE Dressing with Moderate Assistance.  Grooming while EOB with Set-up Assistance.  Toileting from bedside commode with Moderate Assistance for hygiene and clothing management.   Supine to sit with Supervision.  Toilet transfer to bedside commode with Moderate Assistance and LRAD PRN.     Outcome: Ongoing, Progressing    OT evaluation completed and POC established.  Renetta Suazo OT  12/30/2019

## 2019-12-30 NOTE — PT/OT/SLP EVAL
"Physical Therapy Evaluation    Patient Name:  Ld Bronson   MRN:  [id number]    *co-treatment with OT   Recommendations:     Discharge Recommendations:  nursing facility, skilled   Discharge Equipment Recommendations: (TBD)   Barriers to discharge: Decreased caregiver support and homeless    Assessment:     Ld Bronson is a 53 y.o. male admitted with a medical diagnosis of Endocarditis due to methicillin susceptible Staphylococcus aureus (MSSA).  He presents with the following impairments/functional limitations:  weakness, impaired endurance, impaired self care skills, impaired functional mobilty, gait instability, impaired balance, impaired cardiopulmonary response to activity, pain. Pt with self limiting behavior, only agreeable to sitting EOB. Pt reports he had recently been using a wheelchair for mobility 2/2 being too sick/weak to stand and walk. Given, pt with no sable home and no reliable consistent assistance, pt is not safe to discharge at this time without further skilled therapy intervention. Upon discharge, pt would benefit from continued skilled therapy intervention at skilled nursing facility to progress toward more independent mobility. At this time, pt would continue to benefit from acute skilled therapy intervention to address deficits and progress toward prior level of function.       Rehab Prognosis: Good; patient would benefit from acute skilled PT services to address these deficits and reach maximum level of function.    Recent Surgery: * No surgery found *      Plan:     During this hospitalization, patient to be seen 4 x/week to address the identified rehab impairments via gait training, therapeutic activities, therapeutic exercises, neuromuscular re-education and progress toward the following goals:    · Plan of Care Expires:  01/30/20    Subjective     Chief Complaint: Pt c/o abdominal pain, states "It hurts whenever I inhale"   Patient/Family Comments/goals: to get better and return " "home   Pain/Comfort:  · Pain Rating 1: 8/10  · Location - Orientation 1: generalized  · Location 1: abdomen(upon inspiration)  · Pain Addressed 1: Cessation of Activity  · Pain Rating Post-Intervention 1: 0/10    Patients cultural, spiritual, Moravian conflicts given the current situation:      Living Environment:  Pt is homeless   Prior to admission, patients level of function: Pt reports he has been confined to a borrowed wheelchair for the "past few weeks", he contributes to sickness/weakness. Pt reports he propelled w/c manually, required assistance with ADLs, reports friends assisted him. Pt reports occasionally staying in a house his friends are squatting in, but no reports no consistent residence. Pt states [facility].  Equipment used at home: none.  DME owned (not currently used): none.  Upon discharge, patient will have assistance from no reliable assistance.    Objective:     Communicated with RN prior to session.  Patient found HOB elevated with blood pressure cuff, pulse ox (continuous), telemetry, central line, peripheral IV  upon PT entry to room.    General Precautions: Standard, fall   Orthopedic Precautions:N/A   Braces: N/A     Exams:  · Cognitive Exam:  Patient is AAOx4, followed all commands, communicates clearly and fluently  · Sensation: intact, reporting pain in feet feeling that they are cold   · Gross Motor Coordination:  WFL  · RLE ROM: WFL  · RLE Strength: grossly 3/5   · LLE ROM: WFL  · LLE Strength: grossly 3/5     Functional Mobility:  · Bed Mobility:     · Supine to Sit: minimum assistance for ascent of trunk and shoulders   · Sit to Supine: minimum assistance for lift and navigation of B LE       Therapeutic Activities and Exercises:   Pt sat EOB for 5 mins with B UE with supervision. Pt declined further mobility, declined to participate in ADLs and self care with OT.   Pt educated on importance of OOB mobility and progressing activity to " continue to improve strength, endurance, and prevent effects of prolonged time in bed.   Pt educated on role of PT/POC. Pt verbalized understanding.   Pt encouraged to only perform OOB mobility with assistance from nursing/therapy. Pt agreeable.       AM-PAC 6 CLICK MOBILITY  Total Score:15     Patient left HOB elevated with all lines intact, call button in reach and RN notified.    GOALS:   Multidisciplinary Problems     Physical Therapy Goals        Problem: Physical Therapy Goal    Goal Priority Disciplines Outcome Goal Variances Interventions   Physical Therapy Goal     PT, PT/OT Ongoing, Progressing     Description:  Goals to be met by: 2020     Patient will increase functional independence with mobility by performin. Supine to sit with Set-up [place]  2. Sit to stand transfer with Stand-by Assistance  3. Bed to chair transfer with Stand-by Assistance using LRAD                      History:     History reviewed. No pertinent past medical history.    Past Surgical History:   Procedure Laterality Date    ANTERIOR CERVICAL DISCECTOMY W/ FUSION N/A 2019    Procedure: C6-C7 ACDF w neuromonitoring, Mikayla Doshi for instrumentation;  Surgeon: Becca Malone MD;  Location: [facility] OR [address] [place] [address];  Service: Neurosurgery;  Laterality: N/A;    FRACTURE SURGERY      TRANSESOPHAGEAL ECHOCARDIOGRAPHY N/A 2019    Procedure: ECHOCARDIOGRAM, TRANSESOPHAGEAL;  Surgeon: [facility];  Location: [facility] EP LAB;  Service: Anesthesiology;  Laterality: N/A;       Time Tracking:     PT Received On: 19  PT Start Time: 908     PT Stop Time: 925  PT Total Time (min): 17 min     Billable Minutes: Evaluation 17 mins       Zainab Andrade, PT  2019

## 2019-12-30 NOTE — ASSESSMENT & PLAN NOTE
- Continue to monitor for signs of withdrawal  - Addiction psychiatry consulted, actively following patient, motivational interviewing and resources provided  - Will likely need rehab

## 2019-12-30 NOTE — PROGRESS NOTES
[facility]  Infectious Disease  Progress Note    Patient Name: Ld Bronson  MRN: [id number]  Admission Date: 12/25/2019  Length of Stay: 5 days  Attending Physician: Isabel Espinal MD  Primary Care Provider: Kris Artis Jr, MD    Isolation Status: No active isolations  Assessment/Plan:      * Endocarditis due to methicillin susceptible Staphylococcus aureus (MSSA)     53 year old  with history of IVDU, HCV, and MSSA bacteremia and mitral valve endocarditis, bilateral groin abscessed and SI joint effusion (s/p IR aspiration), cervical epidural abscess (MSSA) s/p C6-7 ACDF with epidural abscess evacuation on 11/6/19 who was recently discharged from [facility] on [facility] with plans for 6 weeks of IV cefazolin (end date 12/18).  He presented to [facility] on 12/24 with complaints of chest pain, weakness and shortness of breath.  He was found to be in septic shock requiring pressor support and was transferred to [facility] for a higher level of care.   It is unclear from patient whether he actually completed his most recent [facility], though per [facility] Teams notes it appears from his transfer note that he had completed a full 6 week course.      Micro:  Blood cultures 12/24 -12/26 positive for MSSA. Repeat blood cultures 12/27, 12/29 NGTD    Imaging:  CXR 12/25 - lungs clear.  Repeat 12/26 shows minimal, patchy consolidation LLL - edema vs PNA vs aspiration  MRI brain 12/26 - numerous scattered punctate to small sized foci of signal abnormality in cerebral hemispheres and cerebellum bilaterallly concerning for possible septic emboli   Renal US - negative  CT Abdomen/pelvis is concerning for possible splenic infarct as well as erosive changes involving the left sacroiliac joint and symphysis pubis  2D echo - Moderate to severe regurgitation. There is anterior leaflet vegetation. There is a moderate-large 7 by 12 mm mobile  density that flops on both sides of the mitral valve that is likely vegetation but could also represent a ruptured part of the chordal apparatus.  MRI C/T/L spine shows stable changes in cervical spine without apparent worsening of known osteomyelitis at this level and no fluid collections.  There are subtle endplate changes at T8-T9 and T10-11 and L2-3, L4-5, and L5-S1 concerning for possible early osteomyelitis.     Plan/recommendations:  1.  Continue oxacillin 12 grams IV q 24 hours continuous infusion for MSSA (better CNS penetration than cefazolin, given concern for septic emboli)   3.  Repeat blood cultures 12/27 NGTD.   4.  CTS following, recommending abx and optimizing nutrition with f/u on 2/6/20 with surgery scheduled on 2/18/20  5.  Continue IV abx therapy until date of surgery (2/18/20) with weekly CBC CMP ESR CRP sent to ID clinic at [phone]  6. Monitor closely. ID will follow with you tomorrow.            Anticipated Disposition:     Thank you for your consult. I will follow-up with patient. Please contact us if you have any additional questions.    John Toribio PA-C  Infectious Disease  [facility]    Subjective:     Principal Problem:Endocarditis due to methicillin susceptible Staphylococcus aureus (MSSA)    HPI:   Mr. Bronson is a 53 year old ,  known to ID from prior admission, with history of IVDU, HCV, and history of MSSA bacteremia and mitral valve endocarditis, bilateral groin abscessed and SI joint effusion (s/p IR aspiration), cervical epidural abscess (MSSA) s/p C6-7 ACDF with epidural abscess evacuation on 11/6/19 who was recently [place] [facility] [place] with plans for 6 weeks of IV cefazolin (end date 12/18).  Prior to last admission in November, he had history of incomplete treatment for [facility] endocard[facility] (left AMA prior to completing treatment).  He presented to [facility]  "on 12/24 with complaints of chest pain, weakness and shortness of breath.  He was found to be in septic shock requiring pressor support and was transferred to [facility] for a higher level of care.   It is unclear from patient whether he actually completed his most recent [facility], though per [facility] Teams notes it appears from his transfer note that he had completed a full 6 week course.      Micro:  Blood cultures 12/24 and 12/25 positive for Staph Aureus (sensitivities pending).      Imaging:  CXR 12/25 - lungs clear.  Repeat 12/26 shows minimal, patchy consolidation LLL - edema vs PNA vs aspiration  MRI brain 12/26 - numerous scattered punctate to small sized foci of signal abnormality in cerebral hemispheres and cerebellum bilaterallly concerning for possible septic emboli   Renal US - negative  CT Abdomen/pelvis is concerning for possible splenic infarct as well as erosive changes involving the left sacroiliac joint and symphysis pubis  2D echo - Moderate to severe regurgitation. There is anterior leaflet vegetation. There is a moderate-large 7 by 12 mm mobile density that flops on both sides of the mitral valve that is likely vegetation but could also represent a ruptured part of the chordal apparatus.    At time of visit, patient is somnolent, but easily arousable.  His primary complaint is that he "hurts all over", in particular, complaining of pain in bilateral toes.  Complains of persistent back pain and neck pain (present since his surgery).  Reports he may have used IV drugs once or twice [facility].      He is afebrile.  Initial leukocytosis on admission as resolved.  Creatinine downtrending 4.2 >>2.6.         Interval History:  Repeat blood cultures 12/27 NGTD. CTS following, recommend 6 weeks of IV abx and f/u with CTS on 2/5/20     Review of Systems   Constitutional: Negative for activity change, appetite change, chills and fever.   HENT: Negative for congestion, dental " problem and trouble swallowing.    Eyes: Negative.    Respiratory: Negative for cough, shortness of breath and stridor.    Cardiovascular: Negative for chest pain and leg swelling.   Gastrointestinal: Negative for abdominal distention, abdominal pain, diarrhea, nausea and vomiting.   Genitourinary: Negative for dysuria, flank pain and hematuria.   Musculoskeletal: Positive for arthralgias (bilateral toes), back pain and neck pain (since cervical surgery ). Negative for myalgias.   Skin: Positive for wound ( left groin). Negative for color change and rash.   Neurological: Negative for dizziness, speech difficulty and headaches.   Psychiatric/Behavioral: Negative for agitation and confusion.     Objective:     Vital Signs (Most Recent):  Temp: 100.3 °F (37.9 °C) (12/27/19 0700)  Pulse: (!) 113 (12/27/19 0930)  Resp: (!) 29 (12/27/19 0930)  BP: (!) 74/41 (12/27/19 0930)  SpO2: 97 % (12/27/19 0930) Vital Signs (24h Range):  Temp:  [98.3 °F (36.8 °C)-100.3 °F (37.9 °C)] 100.3 °F (37.9 °C)  Pulse:  [] 113  Resp:  [13-41] 29  SpO2:  [90 %-100 %] 97 %  BP: ()/(41-97) 74/41     Weight: 58 kg (127 lb 13.9 oz)  Body mass index is 17.34 kg/m².    Estimated Creatinine Clearance: 36 mL/min (A) (based on SCr of 1.9 mg/dL (H)).    Physical Exam   Constitutional: He is oriented to person, place, and time. No distress.   Thin, chronically ill appearing.    HENT:   Head: Normocephalic and atraumatic.   Poor dentition     Eyes: Conjunctivae are normal. No scleral icterus.   Cardiovascular: Regular rhythm. Tachycardia present. Exam reveals no friction rub.   Murmur heard.  Pulmonary/Chest: Effort normal and breath sounds normal. No respiratory distress. He has no wheezes. He has no rales.   Abdominal: Soft. He exhibits no distension. There is no tenderness. There is no guarding.   Musculoskeletal: He exhibits no edema.   Neurological: He is alert and oriented to person, place, and time.   Skin: Skin is warm and dry. He is  not diaphoretic.   Multiple tattoos  Left groin wound c/d/i. No purulent or redness   Vitals reviewed.      Significant Labs:   Blood Culture:   Recent Labs   Lab 12/24/19  1845 12/25/19  0105 12/25/19  0124 12/26/19  1259 12/26/19  1335   LABBLOO Gram stain aer bottle: Gram positive cocci in clusters resembling Staph  Gram stain emili bottle: Gram positive cocci in clusters resembling Staph  Results called to and read back by:Karthik Stoner RN. 12/25/2019  17:16  STAPHYLOCOCCUS AUREUS  ID consult required at [facility],[place] and [facility] locations.  For susceptibility see order # [id number]  * Gram stain aer bottle: Gram positive cocci in clusters resembling Staph  Results called to and read back by: Wendy Wallace RN. 12/25/2019  20:59 Gram stain aer bottle: Gram positive cocci in clusters resembling Staph  Results called to and read back by: Wendy Wallace RN. 12/25/2019  20:59 Gram stain aer bottle: Gram positive cocci in clusters resembling Staph   Results called to and read back by:Ashlie Rick RN 12/27/2019  08:58 No Growth to date     CBC:   Recent Labs   Lab 12/26/19  0524 12/27/19  0345 12/27/19  0605   WBC 9.16 8.09 9.38   HGB 7.7* 6.6* 7.0*   HCT 25.3* 21.5* 23.6*   * 101* 98*     CMP:   Recent Labs   Lab 12/26/19  0632 12/27/19  0345    139   K 3.2* 3.8    112*   CO2 17* 19*   * 104   BUN 58* 46*   CREATININE 2.6* 1.9*   CALCIUM 7.3* 7.6*   PROT 6.1 5.9*   ALBUMIN 2.1* 1.9*   BILITOT 0.3 0.2   ALKPHOS 58 69   AST 19 18   ALT 10 10   ANIONGAP 11 8   EGFRNONAA 26.6* 38.8*     HIV Rapid: No results for input(s): HIVRAPID in the last 48 hours.    Significant Imaging: I have reviewed all pertinent imaging results/findings within the past 24 hours.

## 2019-12-30 NOTE — PROGRESS NOTES
[facility]  Critical Care Medicine  Progress Note    Patient Name: Ld Bronson  MRN: [id number]  Admission Date: 12/25/2019  Hospital Length of Stay: 5 days  Code Status: Full Code  Attending Provider: Isabel Espinal MD  Primary Care Provider: Kris Artis Jr, MD   Principal Problem: Endocarditis due to methicillin susceptible Staphylococcus aureus (MSSA)    Subjective:     HPI:  Mr. Bronson is a 53M IVDU with HCV, Hx of MSSA bacteremia and infective endocarditis (s/p 6 weeks of cefazolin 12/18), cervical spine epidural abscess s/p C6-7 diskectomy and arthrodesis 11/15/2019 who presented to [facility] with chest pain, generalized weakness, and shortness of breath. At OSH, he was found to be in septic shock requiring pressor support. He was transferred to [facility] for higher level of care. On arrival, patient was somnolent and difficult to arouse. Patient did waken with sternal rub and answered questions appropriately, but history largely obtained through chart review.     At OSH ED, patient recieved full 30ccs/kg fluid resuscitation with an additional liter (total ~3L) for hypotension and tachycardia; lactic acid was elevated to 3.4. He received 1 dose each of vancomycin and zosyn prior to transfer.     Hospital/ICU Course:  [facility] after a C6-7 ACDF with washout on 11/6 with end date 12/18/19. [facility] available though it appears from transfer note that the patient had completed his full 6 week course at this time. His WCC and lactate have decreased significantly since starting broad spectrum antibiotics with vanc/zosyn and will be continued until sensitivities return. BCxs still positive for Staph aureus. C diff negative, gonorrhea/chlamydia negative, urine culture no growth. Patient c/o low back pain that has been present for a month and chronic neck pain since the anterior cervical  diskectomy and fusion. MRI cervical, thoracic, and lumbar ordered to r/o spinal cord compression and abscess. ID has been consulted for the recurrence of bacteremia and endocarditis, addiction psych consulted for continued heroin use (last use 3 days prior to admission per OSH ED note), and CTS consulted for evaluation of vegetation vs ruptured chord apparatus and worsened MR from prior study 11/04. Dr. Gooden with CTS has tentatively scheduled mitral valve repair, possible mitral valve replacement, possible tricuspid valve repair for Friday, January 3, 2020. CT chest w/o contrast ordered. NSGY consulted to assess for possible osteomyelitis on MRI. Addiction psych consulted who provided motivational interviewing and resources for outpatient rehabilitation to the patient, appreciated. Hgb noted to 6.7 today from 7.0, blood consent was attempted but the patient refused responding he will sign tomorrow. Will hold on transfusion until 12/28. Asymptomatic at this time, no dyspnea, lightheadedness, dizziness. 12/28 transfused 1 u prbc after consent obtained overnight. Requiring minimal pressors to keep MAP >65. Otherwise stable, good po intake, renal function improving. CT chest with small pleural effusions and possible early osteomyelitic changes in T8-T9, T10-T11. Currently patient is being treated for MSSA with oxacillin, active discussions with Cardiothoracic Surgery regarding valvular repair, however, patient would need further rehabilitation for history of IVDU. He will need a six week course of IV antibiotics with CTS outpatient follow-up for future surgical planning. Addicion psychiatry actively following.    Interval History/Significant Events: Noted to be febrile yesterday christian with temperature of 101.7 F, was initially restarted back on vancomycin. Now just on oxacillin. Otherwise, ~4L urine output within the last twenty four hours.  Minimal pressure requirements throughout night and this AM, will continue  attempts to wean and reassess need for pressors.    Review of Systems   Constitutional: Negative for chills, diaphoresis and fever.   HENT: Negative for congestion, rhinorrhea and sore throat.   Respiratory: Negative for cough, shortness of breath and stridor.    Cardiovascular: Negative for chest pain (pain with deep breaths) and leg swelling.   Gastrointestinal: Positive for abdominal pain (LLQ abdominal pain, releived with flatus) and constipation. Negative for abdominal distention, blood in stool, diarrhea, nausea and vomiting.   Genitourinary: Negative for dysuria and hematuria.   Neurological: Negative for dizziness, light-headedness and headaches.   Psychiatric/Behavioral: Negative for agitation and confusion.     Objective:     Vital Signs (Most Recent):  Temp: 97.8 °F (36.6 °C) (12/30/19 1200)  Pulse: 90 (12/30/19 1200)  Resp: (!) 24 (12/30/19 1200)  BP: 112/67 (12/30/19 1200)  SpO2: 96 % (12/30/19 1200) Vital Signs (24h Range):  Temp:  [97.6 °F (36.4 °C)-101.7 °F (38.7 °C)] 97.8 °F (36.6 °C)  Pulse:  [] 90  Resp:  [12-45] 24  SpO2:  [85 %-100 %] 96 %  BP: ()/() 112/67   Weight: 62.8 kg (138 lb 7.2 oz)  Body mass index is 18.78 kg/m².      Intake/Output Summary (Last 24 hours) at 12/30/2019 1401  Last data filed at 12/30/2019 1217  Gross per 24 hour   Intake 1770.44 ml   Output 4163 ml   Net -2392.56 ml       Physical Exam   Constitutional: He is oriented to person, place, and time. He appears well-developed. He is active and cooperative. No distress.   Thin  male   HENT:   Head: Normocephalic and atraumatic.   Mouth/Throat: Oropharynx is clear and moist.   Eyes: EOM are normal. No scleral icterus.   Pinpoint pupils   Neck: Normal range of motion. Neck supple.   Cardiovascular: Normal rate and regular rhythm.   Murmur heard.   Systolic murmur is present with a grade of 2/6.  Pulmonary/Chest: Effort normal and breath sounds normal. No stridor. No respiratory distress. He has no  wheezes.   Abdominal: Soft. Bowel sounds are normal. He exhibits no distension. There is no tenderness. There is no guarding.   Musculoskeletal: He exhibits no edema or tenderness.   Neurological: He is alert and oriented to person, place, and time. He exhibits normal muscle tone.   Skin: Skin is warm and dry. No rash noted. He is not diaphoretic. No erythema.   Nursing note and vitals reviewed.      Vents:     Lines/Drains/Airways     Central Venous Catheter Line                 Percutaneous Central Line Insertion/Assessment - triple lumen  12/26/19 0230 right internal jugular 4 days          Drain                 Urethral Catheter 12/24/19 1954 Non-latex 16 Fr. 5 days          Peripheral Intravenous Line                 Peripheral IV - Single Lumen 12/30/19 0900 20 G Anterior;Right Forearm less than 1 day              Significant Labs:    CBC/Anemia Profile:  Recent Labs   Lab 12/29/19  0302 12/29/19  1700 12/30/19  0319   WBC 14.27* 15.92* 12.80*   HGB 8.1* 8.4* 7.2*   HCT 26.0* 27.7* 23.2*    200 173   MCV 88 89 90   RDW 16.3* 16.5* 16.6*        Chemistries:  Recent Labs   Lab 12/28/19  1927 12/28/19  2302 12/29/19  0302 12/29/19  1810 12/30/19  0319    136 140 139 137   K 5.4* 4.1 4.3 4.1 4.1    107 109 110 108   CO2 20* 21* 21* 20* 21*   BUN 41* 40* 36* 34* 34*   CREATININE 1.6* 1.5* 1.5* 1.5* 1.4   CALCIUM 8.7 8.0* 8.5* 7.6* 7.6*   ALBUMIN  --   --  2.1*  --  1.8*   PROT  --   --  6.3  --  5.8*   BILITOT  --   --  0.3  --  0.3   ALKPHOS  --   --  72  --  80   ALT  --   --  12  --  14   AST  --   --  18  --  22   MG 1.8 1.7 1.6  --  1.7   PHOS 4.0  --  4.0  --  4.7*       All pertinent labs within the past 24 hours have been reviewed.    Significant Imaging:  I have reviewed all pertinent imaging results/findings within the past 24 hours.      ABG  Recent Labs   Lab 12/29/19  1704   PH 7.442   PO2 103*   PCO2 28.2*   HCO3 19.3*   BE -5     Assessment/Plan:     Psychiatric  IV drug abuse  -  Continue to monitor for signs of withdrawal  - Addiction psychiatry consulted, actively following patient, motivational interviewing and resources provided  - Will likely need rehab     Cardiac/Vascular  * Endocarditis due to methicillin susceptible Staphylococcus aureus (MSSA)  History of mitral valve MSSA endocarditis s/p 6 week course of cefazolin ending 12/18. Concern for septic emboli leading to infarcts of spleen, potentially renal infarcts. Repeat echo with vegetation vs ruptured chord apparatus of mitral valve, worsened MR to mod-severe from mild-mod on 11/04. 12/26 MRI brain with multiple punctate foci bilaterally representing septic emboli. AOx4, no FNDs at this time. Will follow-up with CTS s/p 6 week course of IV antibiotics on Wednesday, February 5, 2020 with repeat TTE and surgery on Tuesday, February 18, 2020.  Possible coronary angiogram between February 5 and February 18 should RJ resolve and patient still meet criteria for surgery.      - Oxacillin only, vanc d/c'd per sensitivities  - Last positive blood culture on 12/26, will continue to follow  - ID following  - Monitor daily CBC  - F/u NSGY recs    Renal/  RJ (acute kidney injury)  Baseline sCr 0.9 beginning of 11/2019, now 4. 2 on admit. Improved with IV fluids. Likely multifactorial including hypotension, possibly element of septic ATN. Consider emboli to the kidneys from infective endocarditis. Cr improving, now 2.6 (12/26/19). Likely RJ 2/2 sepsis and hypovolemia. 12/27 continuing to resolve, Cr 1.9. 12/28 1.5, improving.     - Avoid nephrotoxic meds/substances  - Strict Is and Os, daily weights  - Leung precaution  - US RP with no significant abnormalities  - Monitor daily renal function  - 12/29 urine studies (U-Na 53, U Osm 193, Serum Osm 292) without electrolyte abnormality, Na WNL on BMP  - 12/30 serum creatinine 1.4, down trending slowly    ID  Septic shock  Patient presented with generalized weakness and fatigue, vital signs  concerning for shock with hypotension and tachycardia. Recently completed course of cefazolin for MSSA endocarditis, though UDS positive for amphetamines suggests patient may still be using IV drugs. With recent hospitalization would also be concerned about possible HAP. Urinalysis with 3+ leukocytes, 12 RBCs, >100 WBC and many bacteria - certainly concerning for urinary source. WCC elevated to 25. Initial lactate 7.9.   S/p 3L fluid resuscitation and BSABx with improvement in lactate to 1.8  Repeat CT head here without evidence of acute process  CXR unremarkable, no signs of consolidation. CT abdo/pelvis 11/24 shows splenic infarcts as well as cholelithiasis    - De-escalated to oxacillin only 12/28  - Continue to follow blood and urine cultures, last positive growth from 12/26  - 12/28 minimal Levophed started overnight to maintain MAP >65  - Continue attempts to wean Levophed today, patient on minimal infusion, will start on midodrine 15 mg TID for added BP support     Other  Cachexia  Currently on adult regular diet and reports good PO intake. Nutrition consulted on 12/30 for BMI 18.78, goal of 20+ for possible valvular repairs.     - Appreciate Dietary and Nutrition recommendations        Critical Care Daily Checklist:    A: Awake: RASS Goal/Actual Goal:    Actual: Gonzalez Agitation Sedation Scale (RASS): Alert and calm   B: Spontaneous Breathing Trial Performed?  N/A   C: SAT & SBT Coordinated?  N/A                   D: Delirium: CAM-ICU Overall CAM-ICU: Negative   E: Early Mobility Performed? Yes   F: Feeding Goal:    Status:     Current Diet Order   Procedures    Diet Adult Regular (IDDSI Level 7)      AS: Analgesia/Sedation None   T: Thromboembolic Prophylaxis Lovenox   H: HOB > 300 N/A   U: Stress Ulcer Prophylaxis (if needed) None   G: Glucose Control None   B: Bowel Function Stool Occurrence: 1   I: Indwelling Catheter (Lines & Leung) Necessity Leung catheter   D: De-escalation of  Antimicrobials/Pharmacotherapies Yes, on oxacillin     Plan for the day/ETD Attempts to wean pressor support    Code Status:  Family/Goals of Care: Full Code         Critical secondary to Patient has a condition that poses threat to life and bodily function: Acute Renal Failure, Bacteremia, and Endocarditis.      Critical care was time spent personally by me on the following activities: development of treatment plan with patient or surrogate and bedside caregivers, discussions with consultants, evaluation of patient's response to treatment, examination of patient, ordering and performing treatments and interventions, ordering and review of laboratory studies, ordering and review of radiographic studies, pulse oximetry, re-evaluation of patient's condition. This critical care time did not overlap with that of any other provider or involve time for any procedures.     Kevin Valera MD  Critical Care Medicine  [facility]

## 2019-12-30 NOTE — SUBJECTIVE & OBJECTIVE
Interval History/Significant Events: Noted to be febrile yesterday christian with temperature of 101.7 F, was initially restarted back on vancomycin. Now just on oxacillin. Otherwise, ~4L urine output within the last twenty four hours.  Minimal pressure requirements throughout night and this AM, will continue attempts to wean and reassess need for pressors.    Review of Systems   Constitutional: Negative for chills, diaphoresis and fever.   HENT: Negative for congestion, rhinorrhea and sore throat.    Respiratory: Negative for cough, shortness of breath and stridor.    Cardiovascular: Negative for chest pain (pain with deep breaths) and leg swelling.   Gastrointestinal: Positive for abdominal pain (LLQ abdominal pain, releived with flatus) and constipation. Negative for abdominal distention, blood in stool, diarrhea, nausea and vomiting.   Genitourinary: Negative for dysuria and hematuria.   Neurological: Negative for dizziness, light-headedness and headaches.   Psychiatric/Behavioral: Negative for agitation and confusion.     Objective:     Vital Signs (Most Recent):  Temp: 97.8 °F (36.6 °C) (12/30/19 1200)  Pulse: 90 (12/30/19 1200)  Resp: (!) 24 (12/30/19 1200)  BP: 112/67 (12/30/19 1200)  SpO2: 96 % (12/30/19 1200) Vital Signs (24h Range):  Temp:  [97.6 °F (36.4 °C)-101.7 °F (38.7 °C)] 97.8 °F (36.6 °C)  Pulse:  [] 90  Resp:  [12-45] 24  SpO2:  [85 %-100 %] 96 %  BP: ()/() 112/67   Weight: 62.8 kg (138 lb 7.2 oz)  Body mass index is 18.78 kg/m².      Intake/Output Summary (Last 24 hours) at 12/30/2019 1401  Last data filed at 12/30/2019 1217  Gross per 24 hour   Intake 1770.44 ml   Output 4163 ml   Net -2392.56 ml       Physical Exam   Constitutional: He is oriented to person, place, and time. He appears well-developed. He is active and cooperative. No distress.   Thin  male   HENT:   Head: Normocephalic and atraumatic.   Mouth/Throat: Oropharynx is clear and moist.   Eyes: EOM are normal.  No scleral icterus.   Pinpoint pupils   Neck: Normal range of motion. Neck supple.   Cardiovascular: Normal rate and regular rhythm.   Murmur heard.   Systolic murmur is present with a grade of 2/6.  Pulmonary/Chest: Effort normal and breath sounds normal. No stridor. No respiratory distress. He has no wheezes.   Abdominal: Soft. Bowel sounds are normal. He exhibits no distension. There is no tenderness. There is no guarding.   Musculoskeletal: He exhibits no edema or tenderness.   Neurological: He is alert and oriented to person, place, and time. He exhibits normal muscle tone.   Skin: Skin is warm and dry. No rash noted. He is not diaphoretic. No erythema.   Nursing note and vitals reviewed.      Vents:     Lines/Drains/Airways     Central Venous Catheter Line                 Percutaneous Central Line Insertion/Assessment - triple lumen  12/26/19 0230 right internal jugular 4 days          Drain                 Urethral Catheter 12/24/19 1954 Non-latex 16 Fr. 5 days          Peripheral Intravenous Line                 Peripheral IV - Single Lumen 12/30/19 0900 20 G Anterior;Right Forearm less than 1 day              Significant Labs:    CBC/Anemia Profile:  Recent Labs   Lab 12/29/19  0302 12/29/19  1700 12/30/19  0319   WBC 14.27* 15.92* 12.80*   HGB 8.1* 8.4* 7.2*   HCT 26.0* 27.7* 23.2*    200 173   MCV 88 89 90   RDW 16.3* 16.5* 16.6*        Chemistries:  Recent Labs   Lab 12/28/19  1927 12/28/19  2302 12/29/19  0302 12/29/19  1810 12/30/19  0319    136 140 139 137   K 5.4* 4.1 4.3 4.1 4.1    107 109 110 108   CO2 20* 21* 21* 20* 21*   BUN 41* 40* 36* 34* 34*   CREATININE 1.6* 1.5* 1.5* 1.5* 1.4   CALCIUM 8.7 8.0* 8.5* 7.6* 7.6*   ALBUMIN  --   --  2.1*  --  1.8*   PROT  --   --  6.3  --  5.8*   BILITOT  --   --  0.3  --  0.3   ALKPHOS  --   --  72  --  80   ALT  --   --  12  --  14   AST  --   --  18  --  22   MG 1.8 1.7 1.6  --  1.7   PHOS 4.0  --  4.0  --  4.7*       All pertinent labs  within the past 24 hours have been reviewed.    Significant Imaging:  I have reviewed all pertinent imaging results/findings within the past 24 hours.

## 2019-12-30 NOTE — RESIDENT HANDOFF
Handoff     Primary Team: Networked reference to record PCT  Room Number: 26420/42427 A     Patient Name: Ld Bronson MRN: [id number]     Date of Birth: [id number] Allergies: Patient has no known allergies.     Age: 53 y.o. Admit Date: 12/25/2019     Sex: male  BMI: Body mass index is 18.78 kg/m².     Code Status: Full Code        Illness Level (current clinical status): Watcher - No    Reason for Admission: Endocarditis due to methicillin susceptible Staphylococcus aureus (MSSA)    Brief HPI (pertinent PMH and diagnosis or differential diagnosis): Mr. Bronson is a 53-year-old  male with known IVDU with prior bacteremia and HCV admitted for septic shock in the setting of MSSA bacteremia, infective endocarditis, and RJ (improving) currently being treated with oxacillin.    Procedure Date: N/A    Hospital Course (updated, brief assessment by system or problem, significant events): [facility] after a C6-7 ACDF with washout on 11/6 with end date 12/18/19. No [facility] records available though it appears from transfer note that the patient had completed his full 6 week course at this time. His WCC and lactate have decreased significantly since starting broad spectrum antibiotics with vanc/zosyn and will be continued until sensitivities return. BCxs still positive for Staph aureus. C diff negative, gonorrhea/chlamydia negative, urine culture no growth. Patient c/o low back pain that has been present for a month and chronic neck pain since the anterior cervical diskectomy and fusion. MRI cervical, thoracic, and lumbar ordered to r/o spinal cord compression and abscess. ID has been consulted for the recurrence of bacteremia and endocarditis, addiction psych consulted for continued heroin use (last use 3 days prior to admission per OSH ED note), and CTS consulted for evaluation of vegetation vs ruptured chord apparatus and worsened MR from  prior study 11/04. Dr. Gooden with CTS has tentatively scheduled mitral valve repair, possible mitral valve replacement, possible tricuspid valve repair for Friday, January 3, 2020. CT chest w/o contrast ordered. NSGY consulted to assess for possible osteomyelitis on MRI. Addiction psych consulted who provided motivational interviewing and resources for outpatient rehabilitation to the patient, appreciated. Hgb noted to 6.7 today from 7.0, blood consent was attempted but the patient refused responding he will sign tomorrow. Will hold on transfusion until 12/28. Asymptomatic at this time, no dyspnea, lightheadedness, dizziness. 12/28 transfused 1 u prbc after consent obtained overnight. Requiring minimal pressors to keep MAP >65. Otherwise stable, good po intake, renal function improving. CT chest with small pleural effusions and possible early osteomyelitic changes in T8-T9, T10-T11. Currently patient is being treated for MSSA with oxacillin, active discussions with Cardiothoracic Surgery regarding valvular repair, however, patient would need further rehabilitation for history of IVDU. He will need a six week course of IV antibiotics with CTS outpatient follow-up for future surgical planning. Addicion psychiatry actively following.    Tasks (specific, using if-then statements): Follow-up with addictive psychiatry and CTS recommendations. Will need IV antibiotics x6 weeks and rehab.    Contingency Plan (special circumstances anticipated and plan): Per clinical course.    Estimated Discharge Date: Per clinical course.    Discharge Disposition: Home or Self Care    Mentored By: CCS2 Team.

## 2019-12-30 NOTE — PLAN OF CARE
Pt AAOX4. NSR to Sinus tach throughout shift. Levo gtt @ 0.04mcg/kg/min to maintain MAP>65 per order. Antibiotic continuously infusing. See flowsheet. 1L NS given for excessive UOP per owusu. Pt T-max 101.8, PRN tylenol given. Pt complains of abd. Pain, MD aware. Pt noted to have open wound in groin area. Pt states abscess ruptured a month ago. No other new skin breakdown noted. Pt tolerates diet well. No BM for me this shift. Blood cx and urinalysis obtained per order. See flowsheet for further assessment data. Plan of care reviewed with patient. Questions and concerns addressed. Will continue to monitor.

## 2019-12-30 NOTE — TELEPHONE ENCOUNTER
----- Message from Lani Hernadez RN sent at 12/30/2019 11:48 AM CST -----  Good afternoon MsKarina Wild,    Can you please schedule a pre-op appt for Mr. Ld Bronson on Wednesday, February 5?  He is scheduled for surgery with Dr. Gooden on February 18.    Thank you,    Lani

## 2019-12-30 NOTE — ASSESSMENT & PLAN NOTE
53 year old  with history of IVDU, HCV, and MSSA bacteremia and mitral valve endocarditis, bilateral groin abscessed and SI joint effusion (s/p IR aspiration), cervical epidural abscess (MSSA) s/p C6-7 ACDF with epidural abscess evacuation on 11/6/19 who was recently [place] from [facility] [place] with plans for 6 weeks of IV cefazolin (end date 12/18).  He presented to [facility] on 12/24 with complaints of chest pain, weakness and shortness of breath.  He was found to be in septic shock requiring pressor support and was transferred to [facility] for a higher level of care.   It is unclear from patient whether he actually completed his most recent [facility], though per Primary Teams notes it appears from his transfer note that he had completed a full 6 week course.      Micro:  Blood cultures 12/24 -12/26 positive for MSSA. Repeat blood cultures 12/27, 12/29 NGTD    Imaging:  CXR 12/25 - lungs clear.  Repeat 12/26 shows minimal, patchy consolidation LLL - edema vs PNA vs aspiration  MRI brain 12/26 - numerous scattered punctate to small sized foci of signal abnormality in cerebral hemispheres and cerebellum bilaterallly concerning for possible septic emboli   Renal US - negative  CT Abdomen/pelvis is concerning for possible splenic infarct as well as erosive changes involving the left sacroiliac joint and symphysis pubis  2D echo - Moderate to severe regurgitation. There is anterior leaflet vegetation. There is a moderate-large 7 by 12 mm mobile density that flops on both sides of the mitral valve that is likely vegetation but could also represent a ruptured part of the chordal apparatus.  MRI C/T/L spine shows stable changes in cervical spine without apparent worsening of known osteomyelitis at this level and no fluid collections.  There are subtle endplate changes at T8-T9 and T10-11 and L2-3, L4-5, and L5-S1 concerning for possible early  osteomyelitis.     Plan/recommendations:  1.  Continue oxacillin 12 grams IV q 24 hours continuous infusion for MSSA (better CNS penetration than cefazolin, given concern for septic emboli)   3.  Repeat blood cultures 12/27 NGTD.   4.  CTS following, recommending abx and optimizing nutrition with f/u on 2/6/20 with surgery scheduled on 2/18/20  5.  Continue IV abx therapy until date of surgery (2/18/20) with weekly CBC CMP ESR CRP sent to ID clinic at [phone]  6. Monitor closely. ID will follow with you tomorrow.

## 2019-12-30 NOTE — ASSESSMENT & PLAN NOTE
History of mitral valve MSSA endocarditis s/p 6 week course of cefazolin ending 12/18. Concern for septic emboli leading to infarcts of spleen, potentially renal infarcts. Repeat echo with vegetation vs ruptured chord apparatus of mitral valve, worsened MR to mod-severe from mild-mod on 11/04. 12/26 MRI brain with multiple punctate foci bilaterally representing septic emboli. AOx4, no FNDs at this time. Will follow-up with CTS s/p 6 week course of IV antibiotics on Wednesday, February 5, 2020 with repeat TTE and surgery on Tuesday, February 18, 2020.  Possible coronary angiogram between February 5 and February 18 should RJ resolve and patient still meet criteria for surgery.      - Oxacillin only, vanc d/c'd per sensitivities  - Last positive blood culture on 12/26, will continue to follow  - ID following  - Monitor daily CBC  - F/u NSGY recs
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
## 2019-12-30 NOTE — ASSESSMENT & PLAN NOTE
Currently on adult regular diet and reports good PO intake. Nutrition consulted on 12/30 for BMI 18.78, goal of 20+ for possible valvular repairs.     - Appreciate Dietary and Nutrition recommendations

## 2019-12-30 NOTE — ASSESSMENT & PLAN NOTE
ASSESSMENT:      DIAGNOSES & PROBLEMS     Heroin Use Disorder, Severe  Cannabis Use Disorder  R/o Amphetamine Use Disorder     Patient is a 53-year-old male with history of a heavy, prolonged IV heroin use.  He has been to countless residential rehab programs and was previously on Suboxone and Subutex, which he sold to fund his heroin addiction.  Not only has continued heroin use led to poor social circumstances, but also it is a direct cause of many of his physical ailments including endocarditis with mitral valve vegetation, sepsis, recurrent staph bacteremia, and seeded septic emboli.  Discussed at length, urgency with which patient's addiction should be treated and provided motivational interviewing regarding next steps patient should take to treat said addiction.  Although he appears ambivalent, patient says he will call some of the residential rehabs whose information is provided on the resource sheet given to patient.        STRENGTHS AND LIABILITIES   Strength: Patient is stable., Liability: Patient is defensive., Liability: Patient has no suport network., Liability: Patient has poor judgment        MOTIVATION TO PURSUE RECOVERY: low     ACCEPTANCE OF ADDICTION: good     ABILITY TO ADHERE TO TREATMENT PLAN: low        PLAN:         MANAGEMENT PLAN, TREATMENT GOALS, THERAPEUTIC TECHNIQUES/APPROACHES & CLINICAL REASONING     · Patient counseled on abstinence from heroin and other drugs  · Relapse prevention and motivational interviewing provided.  · Education provided on 12 step recovery programs.        PRESCRIPTION DRUG MANAGEMENT  - The risks and benefits of medication were discussed with this patient.  - Possible expectable adverse effects of any current or proposed individual psychotropic agents were discussed with this patient.  - Counseling was provided on the importance of full compliance with medication regimens.   - No scheduled psychotropic medications recommended at this time  - Can use PRN meds  for opiate withdrawal sx as they arise:              PRNs:   · Bentyl 10mg PO Q6H PRN for abdominal cramps  · Robaxin 1000mg PO Q8H PRN for muscle cramps  · Zofran 4mg PO Q6H PRN for nausea/vomiting  · Imodium 2mg PO QID PRN for diarrhea (max 16mg in 24 hours)  · Motrin 600mg PO PRN for pain        In cases of emergency, daily coverage provided by Acute/ER Psych MD, NP, or SW, with contact numbers located in [facility] On Call Schedule     Case discussed with staff addiction psychiatrist: OMAR GREWAL MD

## 2019-12-30 NOTE — PLAN OF CARE
Problem: Physical Therapy Goal  Goal: Physical Therapy Goal  Description  Goals to be met by: 2020     Patient will increase functional independence with mobility by performin. Supine to sit with Set-up Arp  2. Sit to stand transfer with Stand-by Assistance  3. Bed to chair transfer with Stand-by Assistance using LRAD     Outcome: Ongoing, Progressing     Pt evaluated and appropriate goals established.     Zainab Andrade, PT, DPT  2019  [phone]

## 2019-12-30 NOTE — CARE UPDATE
Upon further discussion with Mr. Bronson today, the concern for him re-using IV drugs after this hospital course is high.  He appears to be responding to another course of IV antibiotics and his kidney dysfunction has improved.  With a recent stroke, recurrent endocarditis (due to recurrent IV drug use), and severe valvular dysfunction, he still meets indications for surgery.  Therefore, I recommend another 6 week course of IV antibiotics and for him to see me in clinic on Wednesday, February 5, 2020 with repeat transthoracic echocardiogram and surgery on Tuesday, February 18, 2020.  If his kidneys have fully recovered, we will try to obtain a coronary angiogram between February 5 and February 18 if he still meets criteria for surgery.  Additionally, due to his BMI of 18, I recommend a nutrition consult and for him to gain 10 to 15 lbs (would increase BMI to 20+).  He should also be followed by addiction medicine and urine drug screen to ensure compliance.      Warren Gooden MD  Cardiothoracic Surgery  [facility]

## 2019-12-30 NOTE — HOSPITAL COURSE
"12/30/2019  Patient lying in hospital bed, calm and cooperative with interview.  He reports doing okay." Denies issues with appetite, sleep, mood, withdrawal sx, or craving drugs.  Patient says he has not called any residential rehab programs, but is still interested in attending s/p discharge.  He agrees to try to make some phone calls to residential rehab programs today.  In discussing potential cardiac surgery, patient says he would like to attend rehab before having the surgery, to ensure he has the highest probability of staying sober after the surgery.  Denies SI/HI/AVH.  "

## 2019-12-30 NOTE — PROGRESS NOTES
Pharmacokinetic Initial Assessment: IV Vancomycin    Assessment/Plan:    Initiate intravenous vancomycin with dose of 1250 mg every 24 hours  (no load due to recent administration of vancomycin)    Desired empiric serum trough concentration is 15 to 20 mcg/mL  Draw vancomycin trough level 30 min prior to third dose on 01/01 at approximately 0100  Pharmacy will continue to follow and monitor vancomycin.      Please contact pharmacy at extension [phone] with any questions regarding this assessment.     Thank you for the consult,   Justino GRACIELA Ashley       Patient brief summary:  Ld Bronson is a 53 y.o. male initiated on antimicrobial therapy with IV Vancomycin for treatment of suspected endocarditis    Drug Allergies:   Review of patient's allergies indicates:  No Known Allergies    Actual Body Weight:   66 kg    Renal Function:   Estimated Creatinine Clearance: 51.9 mL/min (A) (based on SCr of 1.5 mg/dL (H)).,     Dialysis Method (if applicable):  N/A    CBC (last 72 hours):  Recent Labs   Lab Result Units 12/27/19  0345 12/27/19  0605 12/27/19  1200 12/28/19  0412 12/28/19  1458 12/29/19  0302 12/29/19  1700   WBC K/uL 8.09 9.38 9.64 10.30 12.74* 14.27* 15.92*   Hemoglobin g/dL 6.6* 7.0* 6.7* 6.9* 7.8* 8.1* 8.4*   Hematocrit % 21.5* 23.6* 21.3* 22.9* 25.0* 26.0* 27.7*   Platelets K/uL 101* 98* 104* 118* 132* 152 200   Gran% % 77.7* 81.3* 79.1* 75.3* 74.7* 78.3* 74.6*   Lymph% % 13.8* 10.4* 12.3* 13.6* 13.7* 11.2* 18.0   Mono% % 6.3 6.1 7.1 8.9 9.3 8.5 5.0   Eosinophil% % 1.2 1.0 0.7 1.3 0.9 0.9 1.1   Basophil% % 0.1 0.2 0.1 0.2 0.2 0.2 0.2   Differential Method  Automated Automated Automated Automated Automated Automated Automated       Metabolic Panel (last 72 hours):  Recent Labs   Lab Result Units 12/27/19  0345 12/28/19  0412 12/28/19  1927 12/28/19  2302 12/29/19  0042 12/29/19  0302 12/29/19  1727 12/29/19  1810   Sodium mmol/L 139 135* 138 136  --  140  --  139   Sodium Urine Random mmol/L  --   --   --    --  53  --  68  --    Potassium mmol/L 3.8 4.6 5.4* 4.1  --  4.3  --  4.1   Chloride mmol/L 112* 108 106 107  --  109  --  110   CO2 mmol/L 19* 22* 20* 21*  --  21*  --  20*   Glucose mg/dL 104 102 99 104  --  98  --  108   Glucose, UA   --   --   --   --   --   --  1+*  --    BUN, Bld mg/dL 46* 40* 41* 40*  --  36*  --  34*   Creatinine mg/dL 1.9* 1.5* 1.6* 1.5*  --  1.5*  --  1.5*   Creatinine, Random Ur mg/dL  --   --   --   --  10.0*  --  13.0*  13.0*  --    Albumin g/dL 1.9* 2.0*  --   --   --  2.1*  --   --    Total Bilirubin mg/dL 0.2 0.2  --   --   --  0.3  --   --    Alkaline Phosphatase U/L 69 72  --   --   --  72  --   --    AST U/L 18 19  --   --   --  18  --   --    ALT U/L 10 11  --   --   --  12  --   --    Magnesium mg/dL 1.7 1.5* 1.8 1.7  --  1.6  --   --    Phosphorus mg/dL 3.3 3.7 4.0  --   --  4.0  --   --        Drug levels (last 3 results):  Recent Labs   Lab Result Units 12/27/19  0345   Vancomycin, Random ug/mL 17.4       Microbiologic Results:  Microbiology Results (last 7 days)     Procedure Component Value Units Date/Time    Blood culture [[id number]] Collected:  12/29/19 1719    Order Status:  Sent Specimen:  Blood from Peripheral, Forearm, Left Updated:  12/29/19 1737    Blood culture [[id number]] Collected:  12/29/19 1700    Order Status:  Sent Specimen:  Blood from Peripheral, Forearm, Right Updated:  12/29/19 1736    Blood culture [[id number]] Collected:  12/27/19 1000    Order Status:  Completed Specimen:  Blood from Peripheral, Forearm, Right Updated:  12/29/19 1412     Blood Culture, Routine No Growth to date      No Growth to date      No Growth to date    Blood culture [[id number]] Collected:  12/27/19 1015    Order Status:  Completed Specimen:  Blood from Peripheral, Hand, Right Updated:  12/29/19 1412     Blood Culture, Routine No Growth to date      No Growth to date      No Growth to date    Blood culture [[id number]]  (Abnormal) Collected:  12/26/19 1335    Order Status:   Completed Specimen:  Blood from Peripheral, Forearm, Right Updated:  12/29/19 1202     Blood Culture, Routine Gram stain aer bottle: Gram positive cocci in clusters resembling Staph       Results called to and read back by:Ashlie Rick RN 12/27/2019  10:24      STAPHYLOCOCCUS AUREUS  ID consult required at [facility].  For susceptibility see order # [id number]      Narrative:       From 2 different sites 30 minutes apart    Blood culture [[id number]]  (Abnormal)  (Susceptibility) Collected:  12/26/19 1259    Order Status:  Completed Specimen:  Blood from Peripheral, Antecubital, Right Updated:  12/29/19 1200     Blood Culture, Routine Gram stain aer bottle: Gram positive cocci in clusters resembling Staph       Results called to and read back by:Ashlie Rick RN 12/27/2019  08:58      STAPHYLOCOCCUS AUREUS  ID consult required at [facility].      Narrative:       From 2 different sites 30 minutes apart    Blood culture [[id number]]  (Abnormal) Collected:  12/25/19 0124    Order Status:  Completed Specimen:  Blood from Peripheral, Upper Arm, Right Updated:  12/28/19 1154     Blood Culture, Routine Gram stain aer bottle: Gram positive cocci in clusters resembling Staph      Results called to and read back by: Wendy Wallace RN. 12/25/2019  20:59      STAPHYLOCOCCUS AUREUS  ID consult required at [facility].  For susceptibility see order # [id number]      Blood culture [[id number]]  (Abnormal) Collected:  12/25/19 0105    Order Status:  Completed Specimen:  Blood from Peripheral, Forearm, Right Updated:  12/28/19 1154     Blood Culture, Routine Gram stain aer bottle: Gram positive cocci in clusters resembling Staph      Results called to and read back by: Wendy Wallace RN. 12/25/2019  20:59      STAPHYLOCOCCUS AUREUS  ID consult required at [facility].  For susceptibility see order # [id number]       Urine culture [[id number]] Collected:  12/25/19 0140    Order Status:  Completed Specimen:  Urine Updated:  12/26/19 0927     Urine Culture, Routine No growth    Narrative:       Preferred Collection Type->Urine, Clean Catch    C. trachomatis/N. gonorrhoeae by AMP DNA [facility]; Urine [[id number]] Collected:  12/25/19 0140    Order Status:  Completed Specimen:  Genital Updated:  12/25/19 2347     Chlamydia, Amplified DNA Not Detected     N gonorrhoeae, amplified DNA Not Detected    Narrative:       Sources by Resulting Lab:->[facility]    Clostridium difficile EIA [[id number]] Collected:  12/25/19 1410    Order Status:  Completed Specimen:  Stool Updated:  12/25/19 2248     C. diff Antigen Negative     C difficile Toxins A+B, EIA Negative     Comment: Testing not recommended for children <24 months old.

## 2019-12-31 PROBLEM — R53.81 DEBILITY: Status: ACTIVE | Noted: 2019-12-31

## 2019-12-31 PROBLEM — D64.9 NORMOCYTIC ANEMIA: Status: ACTIVE | Noted: 2019-12-31

## 2019-12-31 LAB
ALBUMIN SERPL BCP-MCNC: 1.9 G/DL (ref 3.5–5.2)
ALP SERPL-CCNC: 86 U/L (ref 55–135)
ALT SERPL W/O P-5'-P-CCNC: 18 U/L (ref 10–44)
ANION GAP SERPL CALC-SCNC: 10 MMOL/L (ref 8–16)
AST SERPL-CCNC: 27 U/L (ref 10–40)
BASOPHILS # BLD AUTO: 0.05 K/UL (ref 0–0.2)
BASOPHILS NFR BLD: 0.4 % (ref 0–1.9)
BILIRUB SERPL-MCNC: 0.2 MG/DL (ref 0.1–1)
BUN SERPL-MCNC: 32 MG/DL (ref 6–20)
CALCIUM SERPL-MCNC: 7.9 MG/DL (ref 8.7–10.5)
CHLORIDE SERPL-SCNC: 106 MMOL/L (ref 95–110)
CO2 SERPL-SCNC: 19 MMOL/L (ref 23–29)
CREAT SERPL-MCNC: 1.5 MG/DL (ref 0.5–1.4)
DIFFERENTIAL METHOD: ABNORMAL
EOSINOPHIL # BLD AUTO: 0.2 K/UL (ref 0–0.5)
EOSINOPHIL NFR BLD: 1.6 % (ref 0–8)
ERYTHROCYTE [DISTWIDTH] IN BLOOD BY AUTOMATED COUNT: 16.9 % (ref 11.5–14.5)
EST. GFR  (AFRICAN AMERICAN): 59.7 ML/MIN/1.73 M^2
EST. GFR  (NON AFRICAN AMERICAN): 51.7 ML/MIN/1.73 M^2
FOLATE SERPL-MCNC: 6.3 NG/ML (ref 4–24)
GLUCOSE SERPL-MCNC: 84 MG/DL (ref 70–110)
HCT VFR BLD AUTO: 23.6 % (ref 40–54)
HGB BLD-MCNC: 7.3 G/DL (ref 14–18)
IMM GRANULOCYTES # BLD AUTO: 0.1 K/UL (ref 0–0.04)
IMM GRANULOCYTES NFR BLD AUTO: 0.8 % (ref 0–0.5)
IRON SERPL-MCNC: 23 UG/DL (ref 45–160)
LYMPHOCYTES # BLD AUTO: 2 K/UL (ref 1–4.8)
LYMPHOCYTES NFR BLD: 16.2 % (ref 18–48)
MAGNESIUM SERPL-MCNC: 1.8 MG/DL (ref 1.6–2.6)
MCH RBC QN AUTO: 27.3 PG (ref 27–31)
MCHC RBC AUTO-ENTMCNC: 30.9 G/DL (ref 32–36)
MCV RBC AUTO: 88 FL (ref 82–98)
MONOCYTES # BLD AUTO: 1 K/UL (ref 0.3–1)
MONOCYTES NFR BLD: 8.2 % (ref 4–15)
NEUTROPHILS # BLD AUTO: 8.8 K/UL (ref 1.8–7.7)
NEUTROPHILS NFR BLD: 72.8 % (ref 38–73)
NRBC BLD-RTO: 0 /100 WBC
PHOSPHATE SERPL-MCNC: 4.5 MG/DL (ref 2.7–4.5)
PLATELET # BLD AUTO: 235 K/UL (ref 150–350)
PMV BLD AUTO: 10.4 FL (ref 9.2–12.9)
POTASSIUM SERPL-SCNC: 4.1 MMOL/L (ref 3.5–5.1)
PROT SERPL-MCNC: 6.3 G/DL (ref 6–8.4)
RBC # BLD AUTO: 2.67 M/UL (ref 4.6–6.2)
SATURATED IRON: 11 % (ref 20–50)
SODIUM SERPL-SCNC: 135 MMOL/L (ref 136–145)
TOTAL IRON BINDING CAPACITY: 213 UG/DL (ref 250–450)
TRANSFERRIN SERPL-MCNC: 144 MG/DL (ref 200–375)
VIT B12 SERPL-MCNC: 591 PG/ML (ref 210–950)
WBC # BLD AUTO: 12.07 K/UL (ref 3.9–12.7)

## 2019-12-31 PROCEDURE — 25000003 PHARM REV CODE 250

## 2019-12-31 PROCEDURE — 80053 COMPREHEN METABOLIC PANEL: CPT

## 2019-12-31 PROCEDURE — 97802 MEDICAL NUTRITION INDIV IN: CPT

## 2019-12-31 PROCEDURE — 25000003 PHARM REV CODE 250: Performed by: INTERNAL MEDICINE

## 2019-12-31 PROCEDURE — 82607 VITAMIN B-12: CPT

## 2019-12-31 PROCEDURE — 84100 ASSAY OF PHOSPHORUS: CPT

## 2019-12-31 PROCEDURE — 99232 PR SUBSEQUENT HOSPITAL CARE,LEVL II: ICD-10-PCS | Mod: ,,,

## 2019-12-31 PROCEDURE — C1751 CATH, INF, PER/CENT/MIDLINE: HCPCS

## 2019-12-31 PROCEDURE — 63600175 PHARM REV CODE 636 W HCPCS: Performed by: STUDENT IN AN ORGANIZED HEALTH CARE EDUCATION/TRAINING PROGRAM

## 2019-12-31 PROCEDURE — 82746 ASSAY OF FOLIC ACID SERUM: CPT

## 2019-12-31 PROCEDURE — 83540 ASSAY OF IRON: CPT

## 2019-12-31 PROCEDURE — 25000003 PHARM REV CODE 250: Performed by: STUDENT IN AN ORGANIZED HEALTH CARE EDUCATION/TRAINING PROGRAM

## 2019-12-31 PROCEDURE — A4216 STERILE WATER/SALINE, 10 ML: HCPCS

## 2019-12-31 PROCEDURE — 76937 US GUIDE VASCULAR ACCESS: CPT

## 2019-12-31 PROCEDURE — 11000001 HC ACUTE MED/SURG PRIVATE ROOM

## 2019-12-31 PROCEDURE — 85025 COMPLETE CBC W/AUTO DIFF WBC: CPT

## 2019-12-31 PROCEDURE — 36573 INSJ PICC RS&I 5 YR+: CPT

## 2019-12-31 PROCEDURE — 99232 SBSQ HOSP IP/OBS MODERATE 35: CPT | Mod: ,,,

## 2019-12-31 PROCEDURE — 83735 ASSAY OF MAGNESIUM: CPT

## 2019-12-31 PROCEDURE — 36415 COLL VENOUS BLD VENIPUNCTURE: CPT

## 2019-12-31 RX ORDER — SODIUM CHLORIDE 0.9 % (FLUSH) 0.9 %
10 SYRINGE (ML) INJECTION EVERY 6 HOURS
Status: DISCONTINUED | OUTPATIENT
Start: 2019-12-31 | End: 2020-01-06 | Stop reason: HOSPADM

## 2019-12-31 RX ORDER — ONDANSETRON 2 MG/ML
4 INJECTION INTRAMUSCULAR; INTRAVENOUS EVERY 6 HOURS PRN
Status: DISCONTINUED | OUTPATIENT
Start: 2019-12-31 | End: 2020-01-06 | Stop reason: HOSPADM

## 2019-12-31 RX ORDER — DICYCLOMINE HYDROCHLORIDE 20 MG/1
20 TABLET ORAL 4 TIMES DAILY PRN
Status: DISCONTINUED | OUTPATIENT
Start: 2019-12-31 | End: 2020-01-06 | Stop reason: HOSPADM

## 2019-12-31 RX ORDER — LOPERAMIDE HYDROCHLORIDE 2 MG/1
2 CAPSULE ORAL 4 TIMES DAILY PRN
Status: DISCONTINUED | OUTPATIENT
Start: 2019-12-31 | End: 2020-01-06 | Stop reason: HOSPADM

## 2019-12-31 RX ORDER — METHOCARBAMOL 500 MG/1
1000 TABLET, FILM COATED ORAL 3 TIMES DAILY PRN
Status: DISCONTINUED | OUTPATIENT
Start: 2019-12-31 | End: 2020-01-06 | Stop reason: HOSPADM

## 2019-12-31 RX ORDER — SODIUM CHLORIDE 0.9 % (FLUSH) 0.9 %
10 SYRINGE (ML) INJECTION
Status: DISCONTINUED | OUTPATIENT
Start: 2019-12-31 | End: 2020-01-06 | Stop reason: HOSPADM

## 2019-12-31 RX ADMIN — MIDODRINE HYDROCHLORIDE 15 MG: 5 TABLET ORAL at 09:12

## 2019-12-31 RX ADMIN — MIDODRINE HYDROCHLORIDE 15 MG: 5 TABLET ORAL at 10:12

## 2019-12-31 RX ADMIN — THERA TABS 1 TABLET: TAB at 04:12

## 2019-12-31 RX ADMIN — MIDODRINE HYDROCHLORIDE 15 MG: 5 TABLET ORAL at 04:12

## 2019-12-31 RX ADMIN — OXACILLIN SODIUM 12 G: 10 INJECTION, POWDER, FOR SOLUTION INTRAVENOUS at 05:12

## 2019-12-31 RX ADMIN — SENNOSIDES AND DOCUSATE SODIUM 1 TABLET: 8.6; 5 TABLET ORAL at 09:12

## 2019-12-31 RX ADMIN — Medication 10 ML: at 05:12

## 2019-12-31 RX ADMIN — POLYETHYLENE GLYCOL 3350 17 G: 17 POWDER, FOR SOLUTION ORAL at 09:12

## 2019-12-31 NOTE — HPI
Mr. Bronson is a 53M IVDU with HCV, Hx of MSSA bacteremia and infective endocarditis (s/p 6 weeks of cefazolin 12/18), cervical spine epidural abscess s/p C6-7 diskectomy and arthrodesis 11/15/2019 who presented to [facility] with chest pain, generalized weakness, and shortness of breath. At OSH, he was found to be in septic shock requiring pressor support. [place] [facility] [place]. On arrival, patient was somnolent and difficult to arouse. Patient did waken with sternal rub and answered questions appropriately, but history largely obtained through chart review.      At OSH ED, patient recieved full 30ccs/kg fluid resuscitation with an additional liter (total ~3L) for hypotension and tachycardia; lactic acid was elevated to 3.4. He received 1 dose each of vancomycin and zosyn prior to transfer.

## 2019-12-31 NOTE — PHARMACY MED REC
Admission Medication Reconciliation - Pharmacy Consult Note    The home medication history was taken by Arminda José, Pharmacy Tech.       No issues noted with the medication reconciliation.        Crescencio Liao PharmROCKY., BCPS  [id number]                  .    .

## 2019-12-31 NOTE — PLAN OF CARE
12/30/19 1805   Discharge Reassessment   Assessment Type Discharge Planning Reassessment   Discharge Plan A Skilled Nursing Facility   Discharge Plan B Rehab   DME Needed Upon Discharge    (TBD)   Anticipated Discharge Disposition SNF   Post-Acute Status   Post-Acute Authorization Placement   Post-Acute Placement Status Awaiting Internal Medical Clearance

## 2019-12-31 NOTE — ASSESSMENT & PLAN NOTE
Patient presented with generalized weakness and fatigue, vital signs concerning for shock with hypotension and tachycardia. Recently completed course of cefazolin for MSSA endocarditis, though UDS positive for amphetamines suggests patient may still be using IV drugs. With recent hospitalization would also be concerned about possible HAP. Urinalysis with 3+ leukocytes, 12 RBCs, >100 WBC and many bacteria - certainly concerning for urinary source. WCC elevated to 25. Initial lactate 7.9.   S/p 3L fluid resuscitation and BSABx with improvement in lactate to 1.8  Repeat CT head here without evidence of acute process  CXR unremarkable, no signs of consolidation. CT abdo/pelvis 11/24 shows splenic infarcts as well as cholelithiasis     - De-escalated to oxacillin only 12/28  - Continue to follow blood and urine cultures, last positive growth from 12/26  - 12/28 minimal Levophed started overnight to maintain MAP >65  - Off pressors. Patient on midodrine 15 mg TID for BP support

## 2019-12-31 NOTE — NURSING TRANSFER
Nursing Transfer Note      12/30/2019     Transfer To: 1123    Transfer via bed    Transfer with cardiac monitoring    Transported by maureen sheridan RN     Medicines sent: none    Chart send with patient: Yes    Notified: none    Patient reassessed at: 12/30/2019, 2100 (date, time)    Upon arrival to floor: cardiac monitor applied, patient oriented to room, call bell in reach and bed in lowest position

## 2019-12-31 NOTE — PLAN OF CARE
Problem: Adult Inpatient Plan of Care  Goal: Plan of Care Review  Optimize nutrition for surgery  Outcome: Ongoing, Progressing     Recommendations     1.) Suggest regular diet with double portions.   2.) Suggest Boost Plus (chocolate) TID.   3.) Suggest MVI.   4.) Daily weights     Goals: 1.) Pt to consume/tolerate >75% EEN and EPN by follow up. 2.) Pt to gain +1-2 lbs/week while admitted.   Nutrition Goal Status: new  Communication of JOSE Recs: reviewed with physician

## 2019-12-31 NOTE — PROCEDURES
Ld Bronson is a 53 y.o. male patient.    Temp: 98.2 °F (36.8 °C) (12/31/19 1611)  Pulse: 85 (12/31/19 1611)  Resp: 18 (12/31/19 1611)  BP: (!) 98/55 (12/31/19 1611)  SpO2: 95 % (12/31/19 1611)  Weight: 62.8 kg (138 lb 7.2 oz) (12/30/19 0500)  Height: 6' (182.9 cm) (12/26/19 0845)    PICC  Date/Time: 12/31/2019 3:45 PM  Performed by: Lakhwinder Live RN  Assisting provider: Ruth Woodson LPN  Consent Done: Yes  Time out: Immediately prior to procedure a time out was called to verify the correct patient, procedure, equipment, support staff and site/side marked as required  Indications: med administration  Anesthesia: local infiltration  Local anesthetic: lidocaine 1% without epinephrine  Anesthetic Total (mL): 2  Preparation: skin prepped with ChloraPrep  Skin prep agent dried: skin prep agent completely dried prior to procedure  Sterile barriers: all five maximum sterile barriers used - cap, mask, sterile gown, sterile gloves, and large sterile sheet  Hand hygiene: hand hygiene performed prior to central venous catheter insertion  Location details: right basilic  Catheter type: double lumen  Catheter size: 5 Fr  Catheter Length: 38cm    Ultrasound guidance: yes  Vessel Caliber: medium and patent, compressibility normal  Vascular Doppler: not done  Needle advanced into vessel with real time Ultrasound guidance.  Guidewire confirmed in vessel.  Image recorded and saved.  Sterile sheath used.  Number of attempts: 1  Post-procedure: blood return through all ports, chlorhexidine patch and sterile dressing applied  Technical procedures used: 3CG  Specimens: No  Implants: No  Assessment: placement verified by x-ray  Complications: none          Ruth Woodson  12/31/2019

## 2019-12-31 NOTE — ASSESSMENT & PLAN NOTE
- Can use PRN meds for opiate withdrawal sx as they arise:              PRNs:   · Bentyl 10mg PO Q6H PRN for abdominal cramps  · Robaxin 1000mg PO Q8H PRN for muscle cramps  · Zofran 4mg PO Q6H PRN for nausea/vomiting  · Imodium 2mg PO QID PRN for diarrhea (max 16mg in 24 hours)  · Motrin 600mg PO PRN for pain

## 2019-12-31 NOTE — PT/OT/SLP PROGRESS
Occupational Therapy      Patient Name:  Ld Bronson   MRN:  [id number]    Pt not seen on this date; Chart reviewed and pt remain appropriate for OT services at this time.  Will see pt as schedule allows.      Suresh Gibson, OT  12/31/2019

## 2019-12-31 NOTE — PLAN OF CARE
Plan is to send to SNF for IV antibiotics once accepting facility is found.       12/31/19 5693   Discharge Reassessment   Assessment Type Discharge Planning Reassessment   Do you have any problems affording any of your prescribed medications? No   Discharge Plan A Skilled Nursing Facility   Discharge Plan B Rehab   DME Needed Upon Discharge  none   Anticipated Discharge Disposition SNF

## 2019-12-31 NOTE — CONSULTS
D/L PICC placed in R BASILIC vein, 38cm in length with 0cm exposed. Arm circumference 23cm. Lot#[id number]

## 2019-12-31 NOTE — ASSESSMENT & PLAN NOTE
History of mitral valve MSSA endocarditis s/p 6 week course of cefazolin ending 12/18. Concern for septic emboli leading to infarcts of spleen, potentially renal infarcts. Repeat echo with vegetation vs ruptured chord apparatus of mitral valve, worsened MR to mod-severe from mild-mod on 11/04. 12/26 MRI brain with multiple punctate foci bilaterally representing septic emboli. AOx4, no FNDs at this time. Will follow-up with CTS s/p 6 week course of IV antibiotics on Wednesday, February 5, 2020 with repeat TTE and surgery on Tuesday, February 18, 2020.  Possible coronary angiogram between February 5 and February 18 should RJ resolve and patient still meet criteria for surgery.       - Oxacillin only, vanc d/c'd per sensitivities  - Last positive blood culture on 12/26, will continue to follow  - ID following  - Monitor daily CBC  - F/u NSGY recs  - PICC line placement ordered

## 2019-12-31 NOTE — PHYSICIAN QUERY
PT Name: Ld Bronson  MR #: [id number]  Physician Query Form - Renal Condition Clarification     CDS: Karey Vidales RN, CCDS         Contact information :[phone]  [email or web address]       This form is a permanent document in the medical record.     QueryDate: December 31, 2019    By submitting this query, we are merely seeking further clarification of documentation. Please utilize your independent clinical judgment when addressing the question(s) below.    The Medical record contains the following:   Indicator Supporting Clinical Findings Location in Medical Record    Kidney (Renal) Insufficiency     x Kidney (Renal) Failure / Injury RJ (acute kidney injury)  Baseline sCr 0.9 beginning of 11/2019, now 4. 2 on admit. Improved with IV fluids. Likely multifactorial including hypotension, possibly element of septic ATN. H&P 12/25/19    Nephrotoxic Agents     x BUN/Creatinine GFR BUN 55, creatinine 3.8, GFR 16.8  BUN 58, creatinine 2.6, GFR 26.6  BUN 46, creatinine 1.9, GFR 38.8  BUN 41, creatinine 1.6, GFR 47.8 Lab 12/25/19  Lab 12/26/19  Lab 12/27/19  Lab 12/28/19   x Urine: Casts         Eosinophils Hyaline casts 6 Lab 12/25/19    Dehydration      Nausea/Vomiting      Dialysis/CRRT     x Treatment: sodium chloride 0.9% bolus 1,000 mL   norepinephrine 4 mg in dextrose 5% 250 mL infusion    MAR 12/25/19   x Other:  Urine sodium 53  Baseline sCr 0.9 beginning of 11/2019, now 4. 2 on admit. Improved with IV fluids. Likely multifactorial including hypotension, possibly element of septic ATN.  Lab 12/29/19  Critical care PN 12/20/19   Acute Kidney Injury / Acute Renal Failure has different defining criteria. A generally accepted guideline  is:   A greater than 100% (2X) rise in serum creatinine from baseline* occurring during the course of a single hospital stay.   *Baseline as determined by the providers judgment and consideration of previous lab values and other documentation, if available.  A  diagnosis of Acute Kidney Injury/ Acute Renal Failure should incorporate abnormal labs and clinical findings that are clinically significant    References: 1. Anu et al. Acute renal failure-definition, outcome measures, animal models, fluid therapy and information technology needs: the Second International Consensus Conference of the Acute Dialysis Quality Initiative (ADQI) Group. Crit Care 2004; 8:B204; 2. Aldo et al. Acute Kidney Injury Network: report of an initiative to improve outcomes in acute kidney injury. Crit Care 2007; 11:R31; 3. Kidney Disease: Improving Global Outcomes (KDIGO). Acute Kidney Injury Work Group. KDIGO clinical practice guidelines for acute kidney injury. Kidney Int Suppl 2012; 2:1.  The clinical guidelines noted below is only a system guideline, it does not replace the providers clinical judgment.      Provider, please specify the diagnosis or diagnoses associated with above clinical findings.  Please clarify RJ (acute kidney injury) possibly element of septic ATN    [   ] Acute Kidney Failure/Injury with Tubular Necrosis ruled in   [   ] Other Acute Kidney Failure/Injury (please specify): ____________     [ x ] Unspecified Acute Kidney Failure/Injury      [   ] Other (please specify): _________________________________   [   ]  Clinically Undetermined       Please document in your progress notes daily for the duration of treatment until resolved and include in your discharge summary.

## 2019-12-31 NOTE — PHYSICIAN QUERY
"PT Name: Ld Bronson  MR #: [id number]    Physician Query Form - Hematology Clarification      CDS: Karey Vidales RN, CCDS         Contact information :[phone]  [email or web address]       This form is a permanent document in the medical record.      Query Date: December 31, 2019    By submitting this query, we are merely seeking further clarification of documentation. Please utilize your independent clinical judgment when addressing the question(s) below.    The Medical record contains the following:   Indicators  Supporting Clinical Findings Location in Medical Record    "Anemia" documented     x H & H = H/H 9.3/29.8  H/H 7.7/25.3  H/H 6.6/21.5 Lab 12/25/19  Lab 12/26/19  Lab 12/27/19    BP =                     HR=      "GI bleeding" documented      Acute bleeding (Non GI site)     x Transfusion(s) Hgb noted to 6.7 today from 7.0, blood consent was attempted but the patient refused responding he will sign tomorrow. Will hold on transfusion until 12/28. Asymptomatic at this time, no dyspnea, lightheadedness, dizziness. 12/28 transfused 1 u prbc after consent obtained overnight.   Critical care medicine PN 12/28/19   x Treatment:  ferrous sulfate 325 (65 FE) MG EC tablet   1 unit PRBC  Home Meds per CT Consult 12/26/19  Blood bank result 12/27/19   x Other:  Septic shock  Endocarditis due to methicillin susceptible Staphylococcus aureus (MSSA)  IV drug abuse  cachexia  Splenic infarct  Urine occult blood 3+ H&P 12/25/19     Provider, please specify diagnosis or diagnoses associated with above clinical findings.    [  ] Iron deficiency anemia   [ ] Chronic blood loss anemia   [  ] Acute blood loss anemia     [x] Anemia of chronic disease ( inflammation)    [  ] Other type anemia, please specify, ______          [  ] Other Hematological Diagnosis (please specify):     [  ] Clinically Undetermined       Please document in your progress notes daily for the duration of treatment, until resolved, and " include in your discharge summary.

## 2019-12-31 NOTE — SUBJECTIVE & OBJECTIVE
Interval History/Significant Events: NAEON. Patient is hemodynamically stable and afebrile. PICC line ordered for long term antibiotics.    Review of Systems   Constitutional: Negative for chills, diaphoresis and fever.   HENT: Negative for congestion, rhinorrhea and sore throat.    Respiratory: Negative for cough, shortness of breath and stridor.    Cardiovascular: Negative for chest pain (pain with deep breaths) and leg swelling.   Gastrointestinal: Positive for abdominal pain (LLQ abdominal pain, releived with flatus) and constipation. Negative for abdominal distention, blood in stool, diarrhea, nausea and vomiting.   Genitourinary: Negative for dysuria and hematuria.   Neurological: Negative for dizziness, light-headedness and headaches.   Psychiatric/Behavioral: Negative for agitation and confusion.     Objective:     Vital Signs (Most Recent):  Temp: 98.8 °F (37.1 °C) (12/31/19 1142)  Pulse: 79 (12/31/19 1142)  Resp: 20 (12/31/19 1142)  BP: 111/65 (12/31/19 1142)  SpO2: 96 % (12/31/19 1142) Vital Signs (24h Range):  Temp:  [98.5 °F (36.9 °C)-99.9 °F (37.7 °C)] 98.8 °F (37.1 °C)  Pulse:  [] 79  Resp:  [16-31] 20  SpO2:  [94 %-97 %] 96 %  BP: ()/(53-70) 111/65   Weight: 62.8 kg (138 lb 7.2 oz)  Body mass index is 18.78 kg/m².      Intake/Output Summary (Last 24 hours) at 12/31/2019 1504  Last data filed at 12/31/2019 1300  Gross per 24 hour   Intake 520 ml   Output 2991 ml   Net -2471 ml       Physical Exam   Constitutional: He is oriented to person, place, and time. He appears well-developed. He is active and cooperative. No distress.   Thin  male   HENT:   Head: Normocephalic and atraumatic.   Mouth/Throat: Oropharynx is clear and moist.   Eyes: EOM are normal. No scleral icterus.   Pinpoint pupils   Neck: Normal range of motion. Neck supple.   Cardiovascular: Normal rate and regular rhythm.   Murmur heard.   Systolic murmur is present with a grade of 2/6.  Pulmonary/Chest: Effort normal  and breath sounds normal. No stridor. No respiratory distress. He has no wheezes.   Abdominal: Soft. Bowel sounds are normal. He exhibits no distension. There is no tenderness. There is no guarding.   Musculoskeletal: He exhibits no edema or tenderness.   Neurological: He is alert and oriented to person, place, and time. He exhibits normal muscle tone.   Skin: Skin is warm and dry. No rash noted. He is not diaphoretic. No erythema.   Nursing note and vitals reviewed.      Vents:     Lines/Drains/Airways     Peripheral Intravenous Line                 Peripheral IV - Single Lumen 12/30/19 0900 20 G Anterior;Right Forearm 1 day         Peripheral IV - Single Lumen 12/30/19 2101 20 G Anterior;Distal;Left Forearm less than 1 day              Significant Labs:    CBC/Anemia Profile:  Recent Labs   Lab 12/29/19  1700 12/30/19  0319 12/31/19  0529 12/31/19  0853   WBC 15.92* 12.80* 12.07  --    HGB 8.4* 7.2* 7.3*  --    HCT 27.7* 23.2* 23.6*  --     173 235  --    MCV 89 90 88  --    RDW 16.5* 16.6* 16.9*  --    IRON  --   --   --  23*   FOLATE  --   --   --  6.3   [id number]  --   --   --  591        Chemistries:  Recent Labs   Lab 12/29/19  1810 12/30/19  0319 12/31/19  0529    137 135*   K 4.1 4.1 4.1    108 106   CO2 20* 21* 19*   BUN 34* 34* 32*   CREATININE 1.5* 1.4 1.5*   CALCIUM 7.6* 7.6* 7.9*   ALBUMIN  --  1.8* 1.9*   PROT  --  5.8* 6.3   BILITOT  --  0.3 0.2   ALKPHOS  --  80 86   ALT  --  14 18   AST  --  22 27   MG  --  1.7 1.8   PHOS  --  4.7* 4.5       All pertinent labs within the past 24 hours have been reviewed.    Significant Imaging:  I have reviewed all pertinent imaging results/findings within the past 24 hours.

## 2019-12-31 NOTE — PROGRESS NOTES
[facility] Hospital Medicine  Progress Note    Patient Name: Ld Bronson  MRN: [id number]  Patient Class: IP- Inpatient   Admission Date: 12/25/2019  Length of Stay: 6 days  Attending Physician: Contreras Zimmerman MD  Primary Care Provider: Kris Artis Jr, MD    Hospital Medicine Team: Networked reference to record PCT  Lesley Jj MD    Subjective:     Principal Problem:Endocarditis due to methicillin susceptible Staphylococcus aureus (MSSA)        HPI:  Mr. Bronson is a 53M IVDU with HCV, Hx of MSSA bacteremia and infective endocarditis (s/p 6 weeks of cefazolin 12/18), cervical spine epidural abscess s/p C6-7 diskectomy and arthrodesis 11/15/2019 who presented to [facility] with chest pain, generalized weakness, and shortness of breath. At OSH, he was found to be in septic shock requiring pressor support. He was [place] [facility] [place] level of care. On arrival, patient was somnolent and difficult to arouse. Patient did waken with sternal rub and answered questions appropriately, but history largely obtained through chart review.      At OSH ED, patient recieved full 30ccs/kg fluid resuscitation with an additional liter (total ~3L) for hypotension and tachycardia; lactic acid was elevated to 3.4. He received 1 dose each of vancomycin and zosyn prior to transfer.      Overview/Hospital Course:  [facility] after a C6-7 ACDF with washout on 11/6 with end date 12/18/19. [facility] though it appears from transfer note that the patient had completed his full 6 week course at this time. His WCC and lactate have decreased significantly since starting broad spectrum antibiotics with vanc/zosyn and will be continued until sensitivities return. BCxs still positive for Staph aureus. C diff negative, gonorrhea/chlamydia negative, urine culture no growth. Patient c/o low back pain that  has been present for a month and chronic neck pain since the anterior cervical diskectomy and fusion. MRI cervical, thoracic, and lumbar ordered to r/o spinal cord compression and abscess. ID has been consulted for the recurrence of bacteremia and endocarditis, addiction psych consulted for continued heroin use (last use 3 days prior to admission per OSH ED note), and CTS consulted for evaluation of vegetation vs ruptured chord apparatus and worsened MR from prior study 11/04. Dr. Gooden with CTS has tentatively scheduled mitral valve repair, possible mitral valve replacement, possible tricuspid valve repair for Friday, January 3, 2020. CT chest w/o contrast ordered. NSGY consulted to assess for possible osteomyelitis on MRI. Addiction psych consulted who provided motivational interviewing and resources for outpatient rehabilitation to the patient, appreciated. Hgb noted to 6.7 today from 7.0, blood consent was attempted but the patient refused responding he will sign tomorrow. Will hold on transfusion until 12/28. Asymptomatic at this time, no dyspnea, lightheadedness, dizziness. 12/28 transfused 1 u prbc after consent obtained overnight. Requiring minimal pressors to keep MAP >65. Otherwise stable, good po intake, renal function improving. CT chest with small pleural effusions and possible early osteomyelitic changes in T8-T9, T10-T11. Currently patient is being treated for MSSA with oxacillin, active discussions with Cardiothoracic Surgery regarding valvular repair, however, patient would need further rehabilitation for history of IVDU. He will need a six week course of IV antibiotics with CTS outpatient follow-up for future surgical planning. Addicion psychiatry actively following.    Interval History/Significant Events: NAEON. Patient is hemodynamically stable and afebrile. PICC line ordered for long term antibiotics.    Review of Systems   Constitutional: Negative for chills, diaphoresis and fever.   HENT:  Negative for congestion, rhinorrhea and sore throat.    Respiratory: Negative for cough, shortness of breath and stridor.    Cardiovascular: Negative for chest pain (pain with deep breaths) and leg swelling.   Gastrointestinal: Positive for abdominal pain (LLQ abdominal pain, releived with flatus) and constipation. Negative for abdominal distention, blood in stool, diarrhea, nausea and vomiting.   Genitourinary: Negative for dysuria and hematuria.   Neurological: Negative for dizziness, light-headedness and headaches.   Psychiatric/Behavioral: Negative for agitation and confusion.     Objective:     Vital Signs (Most Recent):  Temp: 98.8 °F (37.1 °C) (12/31/19 1142)  Pulse: 79 (12/31/19 1142)  Resp: 20 (12/31/19 1142)  BP: 111/65 (12/31/19 1142)  SpO2: 96 % (12/31/19 1142) Vital Signs (24h Range):  Temp:  [98.5 °F (36.9 °C)-99.9 °F (37.7 °C)] 98.8 °F (37.1 °C)  Pulse:  [] 79  Resp:  [16-31] 20  SpO2:  [94 %-97 %] 96 %  BP: ()/(53-70) 111/65   Weight: 62.8 kg (138 lb 7.2 oz)  Body mass index is 18.78 kg/m².      Intake/Output Summary (Last 24 hours) at 12/31/2019 1504  Last data filed at 12/31/2019 1300  Gross per 24 hour   Intake 520 ml   Output 2991 ml   Net -2471 ml       Physical Exam   Constitutional: He is oriented to person, place, and time. He appears well-developed. He is active and cooperative. No distress.   Thin  male   HENT:   Head: Normocephalic and atraumatic.   Mouth/Throat: Oropharynx is clear and moist.   Eyes: EOM are normal. No scleral icterus.   Pinpoint pupils   Neck: Normal range of motion. Neck supple.   Cardiovascular: Normal rate and regular rhythm.   Murmur heard.   Systolic murmur is present with a grade of 2/6.  Pulmonary/Chest: Effort normal and breath sounds normal. No stridor. No respiratory distress. He has no wheezes.   Abdominal: Soft. Bowel sounds are normal. He exhibits no distension. There is no tenderness. There is no guarding.   Musculoskeletal: He exhibits  no edema or tenderness.   Neurological: He is alert and oriented to person, place, and time. He exhibits normal muscle tone.   Skin: Skin is warm and dry. No rash noted. He is not diaphoretic. No erythema.   Nursing note and vitals reviewed.      Vents:     Lines/Drains/Airways     Peripheral Intravenous Line                 Peripheral IV - Single Lumen 12/30/19 0900 20 G Anterior;Right Forearm 1 day         Peripheral IV - Single Lumen 12/30/19 2101 20 G Anterior;Distal;Left Forearm less than 1 day              Significant Labs:    CBC/Anemia Profile:  Recent Labs   Lab 12/29/19  1700 12/30/19  0319 12/31/19  0529 12/31/19  0853   WBC 15.92* 12.80* 12.07  --    HGB 8.4* 7.2* 7.3*  --    HCT 27.7* 23.2* 23.6*  --     173 235  --    MCV 89 90 88  --    RDW 16.5* 16.6* 16.9*  --    IRON  --   --   --  23*   FOLATE  --   --   --  6.3   [id number]  --   --   --  591        Chemistries:  Recent Labs   Lab 12/29/19  1810 12/30/19  0319 12/31/19  0529    137 135*   K 4.1 4.1 4.1    108 106   CO2 20* 21* 19*   BUN 34* 34* 32*   CREATININE 1.5* 1.4 1.5*   CALCIUM 7.6* 7.6* 7.9*   ALBUMIN  --  1.8* 1.9*   PROT  --  5.8* 6.3   BILITOT  --  0.3 0.2   ALKPHOS  --  80 86   ALT  --  14 18   AST  --  22 27   MG  --  1.7 1.8   PHOS  --  4.7* 4.5       All pertinent labs within the past 24 hours have been reviewed.    Significant Imaging:  I have reviewed all pertinent imaging results/findings within the past 24 hours.      Assessment/Plan:      * Endocarditis due to methicillin susceptible Staphylococcus aureus (MSSA)  History of mitral valve MSSA endocarditis s/p 6 week course of cefazolin ending 12/18. Concern for septic emboli leading to infarcts of spleen, potentially renal infarcts. Repeat echo with vegetation vs ruptured chord apparatus of mitral valve, worsened MR to mod-severe from mild-mod on 11/04. 12/26 MRI brain with multiple punctate foci bilaterally representing septic emboli. AOx4, no FNDs at this  time. Will follow-up with CTS s/p 6 week course of IV antibiotics on Wednesday, February 5, 2020 with repeat TTE and surgery on Tuesday, February 18, 2020.  Possible coronary angiogram between February 5 and February 18 should RJ resolve and patient still meet criteria for surgery.       - Oxacillin only, vanc d/c'd per sensitivities  - Last positive blood culture on 12/26, will continue to follow  - ID following  - Monitor daily CBC  - F/u NSGY recs  - PICC line placement ordered       Normocytic anemia  - Daily CBC  - Maintain Hb > 7  - Iron studies      Debility  PT/OT    Opioid use disorder, severe, dependence  - Can use PRN meds for opiate withdrawal sx as they arise:              PRNs:   · Bentyl 10mg PO Q6H PRN for abdominal cramps  · Robaxin 1000mg PO Q8H PRN for muscle cramps  · Zofran 4mg PO Q6H PRN for nausea/vomiting  · Imodium 2mg PO QID PRN for diarrhea (max 16mg in 24 hours)  · Motrin 600mg PO PRN for pain      RJ (acute kidney injury)  Baseline sCr 0.9 beginning of 11/2019, now 4. 2 on admit. Improved with IV fluids. Likely multifactorial including hypotension, possibly element of septic ATN. Consider emboli to the kidneys from infective endocarditis. Cr improving, now 2.6 (12/26/19). Likely RJ 2/2 sepsis and hypovolemia. 12/27 continuing to resolve, Cr 1.9. 12/28 1.5, improving.      - Avoid nephrotoxic meds/substances  - Strict Is and Os, daily weights  - Leung precaution  - US RP with no significant abnormalities  - Monitor daily renal function  - 12/29 urine studies (U-Na 53, U Osm 193, Serum Osm 292) without electrolyte abnormality, Na WNL on BMP  - 12/30 serum creatinine 1.4, down trending slowly      Septic shock  Patient presented with generalized weakness and fatigue, vital signs concerning for shock with hypotension and tachycardia. Recently completed course of cefazolin for MSSA endocarditis, though UDS positive for amphetamines suggests patient may still be using IV drugs. With recent  hospitalization would also be concerned about possible HAP. Urinalysis with 3+ leukocytes, 12 RBCs, >100 WBC and many bacteria - certainly concerning for urinary source. WCC elevated to 25. Initial lactate 7.9.   S/p 3L fluid resuscitation and BSABx with improvement in lactate to 1.8  Repeat CT head here without evidence of acute process  CXR unremarkable, no signs of consolidation. CT abdo/pelvis 11/24 shows splenic infarcts as well as cholelithiasis     - De-escalated to oxacillin only 12/28  - Continue to follow blood and urine cultures, last positive growth from 12/26  - 12/28 minimal Levophed started overnight to maintain MAP >65  - Off pressors. Patient on midodrine 15 mg TID for BP support       Cachexia  Currently on adult regular diet and reports good PO intake. Nutrition consulted on 12/30 for BMI 18.78, goal of 20+ for possible valvular repairs.      - Appreciate Dietary and Nutrition recommendations     IV drug abuse  - Continue to monitor for signs of withdrawal  - Addiction psychiatry consulted, actively following patient, motivational interviewing and resources provided  - Will likely need rehab         VTE Risk Mitigation (From admission, onward)         Ordered     enoxaparin injection 40 mg  Daily      12/27/19 0912     IP VTE HIGH RISK PATIENT  Once      12/25/19 0041                      Lesley Jj MD  Department of Hospital Medicine   [facility]

## 2019-12-31 NOTE — HOSPITAL COURSE
[facility] Br[facility] after a C6-7 ACDF with washout on 11/6 with end date 12/18/19. [facility] available though it appears from transfer note that the patient had completed his full 6 week course at this time. His WCC and lactate have decreased significantly since starting broad spectrum antibiotics with vanc/zosyn and was transitioned to Oxacillin per sensitivities. BCxs positive for MSSA. C diff negative, gonorrhea/chlamydia negative, urine culture no growth. Patient c/o low back pain that has been present for a month and chronic neck pain since the anterior cervical diskectomy and fusion. MRI cervical, thoracic, and lumbar ordered to r/o spinal cord compression and abscess. ID has been consulted for the recurrence of bacteremia and endocarditis, addiction psych consulted for continued heroin use (last use 3 days prior to admission per OSH ED note), and CTS consulted for evaluation of vegetation vs ruptured chord apparatus and worsened MR from prior study 11/04. Dr. Gooden with CTS has tentatively scheduled mitral valve repair, possible mitral valve replacement, possible tricuspid valve repair initially scheduled for [place] 3 then tentatively changed to Feb 18th. CT chest w/o contrast ordered. NSGY consulted to assess for possible osteomyelitis on MRI. Addiction psych consulted who provided motivational interviewing and resources for outpatient rehabilitation to the patient, appreciated. Hgb noted to 6.7 today from 7.0, blood consent was attempted but the patient refused responding he will sign tomorrow. Will hold on transfusion until 12/28. Asymptomatic at this time, no dyspnea, lightheadedness, dizziness. 12/28 transfused 1 u prbc after consent obtained overnight. Requiring minimal pressors to keep MAP >65. Otherwise stable, good po intake, renal function improving. CT chest with small pleural effusions and possible early osteomyelitic changes  in T8-T9, T10-T11. Currently patient is being treated for MSSA with oxacillin, active discussions with Cardiothoracic Surgery regarding valvular repair, however, patient would need further rehabilitation for history of IVDU. He will need a six week course of IV antibiotics with CTS outpatient follow-up on 2/5/20 and surgery on 2/18/20. He was stepped down to medicine from the ICU on 12/30 to continue this plan. Addicion psychiatry actively following who said patient has told them on many occasions that he does not want to start suboxone or methadone or anything other opioid medication and would prefer to just go to rehab after SNF. Expressed this to CM so that this will not be a barrier to placement. Patient was medically stable and ready to discharge to SNF.    On 1/6, patient was refusing all care overnight and during the day claiming that he would not allow us to treat him until we find his shoes which had been reported missing ~2 days prior. Unclear if patient's possessions were lost during transfer to [facility] or during stay in the ICU. Patient was unwilling to continue antibiotics and began shouting at staff and requesting to be discharged [facility]. After being told that [facility] does not provide transportation to patients leaving AMA, he  began throwing objects at the care team and yelling. Security was called and patient was escorted out.

## 2020-01-01 LAB
ALBUMIN SERPL BCP-MCNC: 2.1 G/DL (ref 3.5–5.2)
ALP SERPL-CCNC: 103 U/L (ref 55–135)
ALT SERPL W/O P-5'-P-CCNC: 43 U/L (ref 10–44)
ANION GAP SERPL CALC-SCNC: 9 MMOL/L (ref 8–16)
AST SERPL-CCNC: 86 U/L (ref 10–40)
BACTERIA BLD CULT: NORMAL
BACTERIA BLD CULT: NORMAL
BASOPHILS # BLD AUTO: 0.07 K/UL (ref 0–0.2)
BASOPHILS NFR BLD: 0.5 % (ref 0–1.9)
BILIRUB SERPL-MCNC: 0.3 MG/DL (ref 0.1–1)
BUN SERPL-MCNC: 32 MG/DL (ref 6–20)
CALCIUM SERPL-MCNC: 8.3 MG/DL (ref 8.7–10.5)
CHLORIDE SERPL-SCNC: 107 MMOL/L (ref 95–110)
CO2 SERPL-SCNC: 19 MMOL/L (ref 23–29)
CREAT SERPL-MCNC: 1.4 MG/DL (ref 0.5–1.4)
DIFFERENTIAL METHOD: ABNORMAL
EOSINOPHIL # BLD AUTO: 0.2 K/UL (ref 0–0.5)
EOSINOPHIL NFR BLD: 1.2 % (ref 0–8)
ERYTHROCYTE [DISTWIDTH] IN BLOOD BY AUTOMATED COUNT: 16.7 % (ref 11.5–14.5)
EST. GFR  (AFRICAN AMERICAN): >60 ML/MIN/1.73 M^2
EST. GFR  (NON AFRICAN AMERICAN): 56.2 ML/MIN/1.73 M^2
GLUCOSE SERPL-MCNC: 90 MG/DL (ref 70–110)
HCT VFR BLD AUTO: 24.3 % (ref 40–54)
HGB BLD-MCNC: 7.8 G/DL (ref 14–18)
IMM GRANULOCYTES # BLD AUTO: 0.1 K/UL (ref 0–0.04)
IMM GRANULOCYTES NFR BLD AUTO: 0.8 % (ref 0–0.5)
LYMPHOCYTES # BLD AUTO: 2 K/UL (ref 1–4.8)
LYMPHOCYTES NFR BLD: 15.3 % (ref 18–48)
MAGNESIUM SERPL-MCNC: 1.7 MG/DL (ref 1.6–2.6)
MCH RBC QN AUTO: 28.1 PG (ref 27–31)
MCHC RBC AUTO-ENTMCNC: 32.1 G/DL (ref 32–36)
MCV RBC AUTO: 87 FL (ref 82–98)
MONOCYTES # BLD AUTO: 0.8 K/UL (ref 0.3–1)
MONOCYTES NFR BLD: 6.2 % (ref 4–15)
NEUTROPHILS # BLD AUTO: 10 K/UL (ref 1.8–7.7)
NEUTROPHILS NFR BLD: 76 % (ref 38–73)
NRBC BLD-RTO: 0 /100 WBC
PHOSPHATE SERPL-MCNC: 4.5 MG/DL (ref 2.7–4.5)
PLATELET # BLD AUTO: 306 K/UL (ref 150–350)
PMV BLD AUTO: 9.7 FL (ref 9.2–12.9)
POTASSIUM SERPL-SCNC: 4.2 MMOL/L (ref 3.5–5.1)
PROT SERPL-MCNC: 6.7 G/DL (ref 6–8.4)
RBC # BLD AUTO: 2.78 M/UL (ref 4.6–6.2)
SODIUM SERPL-SCNC: 135 MMOL/L (ref 136–145)
WBC # BLD AUTO: 13.12 K/UL (ref 3.9–12.7)

## 2020-01-01 PROCEDURE — 25000003 PHARM REV CODE 250: Performed by: STUDENT IN AN ORGANIZED HEALTH CARE EDUCATION/TRAINING PROGRAM

## 2020-01-01 PROCEDURE — 80053 COMPREHEN METABOLIC PANEL: CPT

## 2020-01-01 PROCEDURE — 63600175 PHARM REV CODE 636 W HCPCS: Performed by: STUDENT IN AN ORGANIZED HEALTH CARE EDUCATION/TRAINING PROGRAM

## 2020-01-01 PROCEDURE — 25000003 PHARM REV CODE 250: Performed by: INTERNAL MEDICINE

## 2020-01-01 PROCEDURE — 25000003 PHARM REV CODE 250

## 2020-01-01 PROCEDURE — 99231 PR SUBSEQUENT HOSPITAL CARE,LEVL I: ICD-10-PCS | Mod: ,,,

## 2020-01-01 PROCEDURE — 83735 ASSAY OF MAGNESIUM: CPT

## 2020-01-01 PROCEDURE — 99231 SBSQ HOSP IP/OBS SF/LOW 25: CPT | Mod: ,,,

## 2020-01-01 PROCEDURE — A4216 STERILE WATER/SALINE, 10 ML: HCPCS

## 2020-01-01 PROCEDURE — 11000001 HC ACUTE MED/SURG PRIVATE ROOM

## 2020-01-01 PROCEDURE — 84100 ASSAY OF PHOSPHORUS: CPT

## 2020-01-01 PROCEDURE — 85025 COMPLETE CBC W/AUTO DIFF WBC: CPT

## 2020-01-01 RX ORDER — MAGNESIUM SULFATE HEPTAHYDRATE 40 MG/ML
2 INJECTION, SOLUTION INTRAVENOUS ONCE
Status: COMPLETED | OUTPATIENT
Start: 2020-01-01 | End: 2020-01-01

## 2020-01-01 RX ADMIN — Medication 10 ML: at 12:01

## 2020-01-01 RX ADMIN — OXACILLIN SODIUM 12 G: 10 INJECTION, POWDER, FOR SOLUTION INTRAVENOUS at 04:01

## 2020-01-01 RX ADMIN — Medication 10 ML: at 09:01

## 2020-01-01 RX ADMIN — MIDODRINE HYDROCHLORIDE 15 MG: 5 TABLET ORAL at 09:01

## 2020-01-01 RX ADMIN — THERA TABS 1 TABLET: TAB at 09:01

## 2020-01-01 RX ADMIN — Medication 10 ML: at 06:01

## 2020-01-01 RX ADMIN — MAGNESIUM SULFATE IN WATER 2 G: 40 INJECTION, SOLUTION INTRAVENOUS at 12:01

## 2020-01-01 RX ADMIN — MIDODRINE HYDROCHLORIDE 15 MG: 5 TABLET ORAL at 04:01

## 2020-01-01 RX ADMIN — POLYETHYLENE GLYCOL 3350 17 G: 17 POWDER, FOR SOLUTION ORAL at 09:01

## 2020-01-01 RX ADMIN — SENNOSIDES AND DOCUSATE SODIUM 1 TABLET: 8.6; 5 TABLET ORAL at 09:01

## 2020-01-01 NOTE — ASSESSMENT & PLAN NOTE
PLAN:   Per addiction Psych: Can use PRN meds for opiate withdrawal sx as they arise:              PRNs:   · Bentyl 10mg PO Q6H PRN for abdominal cramps  · Robaxin 1000mg PO Q8H PRN for muscle cramps  · Zofran 4mg PO Q6H PRN for nausea/vomiting  · Imodium 2mg PO QID PRN for diarrhea (max 16mg in 24 hours)  · Motrin 600mg PO PRN for pain

## 2020-01-01 NOTE — ASSESSMENT & PLAN NOTE
Currently on adult regular diet and reports good PO intake. Nutrition consulted on 12/30 for BMI 18.78, goal of 20+ for possible valvular repairs.      PLAN:  -- Double portion meals  -- Boost Protein supplementation TID WM

## 2020-01-01 NOTE — ASSESSMENT & PLAN NOTE
PLAN:  - Continue to monitor for signs of withdrawal. PRNs Available  - Addiction psychiatry consulted, actively following patient, motivational interviewing and resources provided  - Will likely need rehab OP

## 2020-01-01 NOTE — ASSESSMENT & PLAN NOTE
PLAN:  - Daily CBC  - Maintain Hb > 7 Transfuse if needed  - Iron studies showed patient is iron deficient. Would be reasonable to start iron supplementation OP after Abx and endocarditis resolves

## 2020-01-01 NOTE — PLAN OF CARE
Problem: Adult Inpatient Plan of Care  Goal: Plan of Care Review  Description  Pt had a good day. Pt levophed was discontinue around noon today. Pt was started on midodrine and tolerated well. Pt was weak with PT and needs more strength exercises. Pt was updated on plan of care be team and will be transfer to stepdown, Awaiting bed, will continue to monitor.    Outcome: Ongoing, Progressing  Goal: Patient-Specific Goal (Individualization)  Description  PMH: MSSA bacteremia and infective endocarditis (s/p 6 weeks of cefazolin 12/18), cervical spine epidural abscess s/p C6-7 diskectomy and arthrodesis 11/15/2019    DX: septic shock    12/24: [facility] with chest pain, generalized weakness, and SOB. Found to be in septic shock  12/25: tx to [facility] for pressor support/higher level of care, levo, Retro Perit. US  12/26: MRI head, echo  12/27: MRI Spine, CT chest, blood cultures  12/28 PM:  2.5 L LR.  Urine & blood samples.  12/29: 1L NS, blood cx, urinalysis    Nursing:  *MAP > 65             Outcome: Ongoing, Progressing  Goal: Absence of Hospital-Acquired Illness or Injury  Outcome: Ongoing, Progressing  Goal: Optimal Comfort and Wellbeing  Outcome: Ongoing, Progressing  Goal: Readiness for Transition of Care  Outcome: Ongoing, Progressing  Goal: Rounds/Family Conference  Outcome: Ongoing, Progressing     Problem: Infection  Goal: Infection Symptom Resolution  Outcome: Ongoing, Progressing   Patient alert and cooperative. Fluids encouraged. Voids without issue. Iv's patent and saline locked. Picc line flushable and patent

## 2020-01-01 NOTE — PROGRESS NOTES
[facility] Hospital Medicine  Progress Note    Patient Name: Ld Bronson  MRN: [id number]  Patient Class: IP- Inpatient   Admission Date: 12/25/2019  Length of Stay: 7 days  Attending Physician: Contreras Zimmerman MD  Primary Care Provider: Kris Artis Jr, MD    [facility] Medicine Team: Networked reference to record PCT  Batsheva MohrDO    Subjective:     Principal Problem:Endocarditis due to methicillin susceptible Staphylococcus aureus (MSSA)    HPI:  Mr. Bronson is a 53M IVDU with HCV, Hx of MSSA bacteremia and infective endocarditis (s/p 6 weeks of cefazolin 12/18), cervical spine epidural abscess s/p C6-7 diskectomy and arthrodesis 11/15/2019 who presented to [facility] with chest pain, generalized weakness, and shortness of breath. At OSH, he was found to be in septic shock requiring pressor support. He was transferred to [facility] for higher level of care. On arrival, patient was somnolent and difficult to arouse. Patient did waken with sternal rub and answered questions appropriately, but history largely obtained through chart review.      At OSH ED, patient recieved full 30ccs/kg fluid resuscitation with an additional liter (total ~3L) for hypotension and tachycardia; lactic acid was elevated to 3.4. He received 1 dose each of vancomycin and zosyn prior to transfer.      Overview/Hospital Course:  [facility] after a C6-7 ACDF with washout on 11/6 with end date 12/18/19. [facility] though it appears from transfer note that the patient had completed his full 6 week course at this time. His WCC and lactate have decreased significantly since starting broad spectrum antibiotics with vanc/zosyn and will be continued until sensitivities return. BCxs still positive for Staph aureus. C diff negative, gonorrhea/chlamydia negative, urine culture no growth. Patient c/o low back pain that has  been present for a month and chronic neck pain since the anterior cervical diskectomy and fusion. MRI cervical, thoracic, and lumbar ordered to r/o spinal cord compression and abscess. ID has been consulted for the recurrence of bacteremia and endocarditis, addiction psych consulted for continued heroin use (last use 3 days prior to admission per OSH ED note), and CTS consulted for evaluation of vegetation vs ruptured chord apparatus and worsened MR from prior study 11/04. Dr. Gooden with CTS has tentatively scheduled mitral valve repair, possible mitral valve replacement, possible tricuspid valve repair for Friday, January 3, 2020. CT chest w/o contrast ordered. NSGY consulted to assess for possible osteomyelitis on MRI. Addiction psych consulted who provided motivational interviewing and resources for outpatient rehabilitation to the patient, appreciated. Hgb noted to 6.7 today from 7.0, blood consent was attempted but the patient refused responding he will sign tomorrow. Will hold on transfusion until 12/28. Asymptomatic at this time, no dyspnea, lightheadedness, dizziness. 12/28 transfused 1 u prbc after consent obtained overnight. Requiring minimal pressors to keep MAP >65. Otherwise stable, good po intake, renal function improving. CT chest with small pleural effusions and possible early osteomyelitic changes in T8-T9, T10-T11. Currently patient is being treated for MSSA with oxacillin, active discussions with Cardiothoracic Surgery regarding valvular repair, however, patient would need further rehabilitation for history of IVDU. He will need a six week course of IV antibiotics with CTS outpatient follow-up on 2/5/20 and surgery on 2/18/20. Addicion psychiatry actively following.    Interval History: NAEON. PICC placed in R bracheocephalic vein. Currently waiting on SNF placement. Patient to continue Oxacillin till 2/18/20    Review of Systems   Constitutional: Negative for chills, diaphoresis and fever.    HENT: Negative for congestion, rhinorrhea and sore throat.    Eyes: Negative for photophobia and visual disturbance.   Respiratory: Negative for cough, shortness of breath and stridor.    Cardiovascular: Negative for chest pain (pain with deep breaths) and leg swelling.   Gastrointestinal: Positive for abdominal pain (LLQ abdominal pain, releived with flatus) and constipation. Negative for abdominal distention, blood in stool, diarrhea, nausea and vomiting.   Genitourinary: Negative for dysuria and hematuria.   Musculoskeletal: Negative for neck pain and neck stiffness.   Neurological: Negative for dizziness, light-headedness and headaches.   Psychiatric/Behavioral: Negative for agitation and confusion.     Objective:     Vital Signs (Most Recent):  Temp: 97.6 °F (36.4 °C) (01/01/20 0755)  Pulse: 77 (01/01/20 0755)  Resp: 16 (01/01/20 0755)  BP: 103/64 (01/01/20 0755)  SpO2: 95 % (01/01/20 0839) Vital Signs (24h Range):  Temp:  [96.6 °F (35.9 °C)-99.6 °F (37.6 °C)] 97.6 °F (36.4 °C)  Pulse:  [77-85] 77  Resp:  [16-20] 16  SpO2:  [95 %-96 %] 95 %  BP: ()/(55-65) 103/64     Weight: 62.8 kg (138 lb 7.2 oz)  Body mass index is 18.78 kg/m².    Intake/Output Summary (Last 24 hours) at 1/1/2020 1121  Last data filed at 1/1/2020 0700  Gross per 24 hour   Intake 360 ml   Output 1950 ml   Net -1590 ml      Physical Exam   Constitutional: He is oriented to person, place, and time. He appears well-developed. He appears cachectic. He is active and cooperative. No distress.   HENT:   Head: Normocephalic and atraumatic.   Mouth/Throat: Oropharynx is clear and moist.   Eyes: EOM are normal. No scleral icterus.   Neck: Normal range of motion. Neck supple.   Cardiovascular: Normal rate and regular rhythm.   Murmur heard.   Systolic murmur is present with a grade of 2/6.  Pulmonary/Chest: Effort normal and breath sounds normal. No stridor. No respiratory distress. He has no wheezes.   Abdominal: Soft. Bowel sounds are normal.  He exhibits no distension. There is no tenderness. There is no guarding.   Musculoskeletal: He exhibits no edema or tenderness.   Neurological: He is alert and oriented to person, place, and time. He exhibits normal muscle tone.   Skin: Skin is warm and dry. No rash noted. He is not diaphoretic. No erythema.   Nursing note and vitals reviewed.      Significant Labs:   CBC:   Recent Labs   Lab 12/31/19  0529 01/01/20  0712   WBC 12.07 13.12*   HGB 7.3* 7.8*   HCT 23.6* 24.3*    306     CMP:   Recent Labs   Lab 12/31/19  0529 01/01/20  0712   * 135*   K 4.1 4.2    107   CO2 19* 19*   GLU 84 90   BUN 32* 32*   CREATININE 1.5* 1.4   CALCIUM 7.9* 8.3*   PROT 6.3 6.7   ALBUMIN 1.9* 2.1*   BILITOT 0.2 0.3   ALKPHOS 86 103   AST 27 86*   ALT 18 43   ANIONGAP 10 9   EGFRNONAA 51.7* 56.2*     Magnesium:   Recent Labs   Lab 12/31/19  0529 01/01/20  0712   MG 1.8 1.7       Significant Imaging: I have reviewed and interpreted all pertinent imaging results/findings within the past 24 hours.      Assessment/Plan:      * Endocarditis due to methicillin susceptible Staphylococcus aureus (MSSA)  History of mitral valve MSSA endocarditis s/p 6 week course of cefazolin ending 12/18. Concern for septic emboli leading to infarcts of spleen, potentially renal infarcts. Repeat echo with vegetation vs ruptured chord apparatus of mitral valve, worsened MR to mod-severe from mild-mod on 11/04. 12/26 MRI brain with multiple punctate foci bilaterally representing septic emboli. AOx4, no FNDs at this time. Will follow-up with CTS s/p 6 week course of IV antibiotics on Wednesday, February 18, 2020 with repeat TTE and surgery on Tuesday, February 18, 2020.  Possible coronary angiogram between February 5 and February 18 should RJ resolve and patient still meet criteria for surgery.       PLAN:  - Oxacillin only to continue for 6 week course of abx  - Last positive blood culture on 12/26, will continue to follow  - ID following -   Continue IV abx therapy until date of surgery (2/18/20) with weekly CBC CMP ESR CRP sent to ID clinic at [phone] OP  - Monitor daily CBC  - PICC line placed in R Brachiocephalic.  - Follow up appointment with CTS Feb 5th with Surgery tentatively planned for 2/18      Normocytic anemia  PLAN:  - Daily CBC  - Maintain Hb > 7 Transfuse if needed  - Iron studies showed patient is iron deficient. Would be reasonable to start iron supplementation OP after Abx and endocarditis resolves      Debility  PT/OT - To evaluate and treat  Waiting on SNF placement    Opioid withdrawal  PLAN:   Per addiction Psych: Can use PRN meds for opiate withdrawal sx as they arise:              PRNs:   · Bentyl 10mg PO Q6H PRN for abdominal cramps  · Robaxin 1000mg PO Q8H PRN for muscle cramps  · Zofran 4mg PO Q6H PRN for nausea/vomiting  · Imodium 2mg PO QID PRN for diarrhea (max 16mg in 24 hours)  · Motrin 600mg PO PRN for pain      Opioid use disorder, severe, dependence  PLAN:   Per addiction Psych: Can use PRN meds for opiate withdrawal sx as they arise:              PRNs:   · Bentyl 10mg PO Q6H PRN for abdominal cramps  · Robaxin 1000mg PO Q8H PRN for muscle cramps  · Zofran 4mg PO Q6H PRN for nausea/vomiting  · Imodium 2mg PO QID PRN for diarrhea (max 16mg in 24 hours)  · Motrin 600mg PO PRN for pain      RJ (acute kidney injury)  Baseline sCr 0.9 beginning of 11/2019, now 4. 2 on admit. Improved with IV fluids. Likely multifactorial including hypotension, possibly element of septic ATN. Consider emboli to the kidneys from infective endocarditis. Cr improving, now 2.6 (12/26/19). Likely RJ 2/2 sepsis and hypovolemia. 12/27 continuing to resolve, Cr 1.9. 12/28 1.5, improving.   12/29 urine studies (U-Na 53, U Osm 193, Serum Osm 292) without electrolyte abnormality, Na WNL on BMP     PLAN:   - Avoid nephrotoxic meds/substances  - Strict Is and Os, daily weights  - US RP with no significant abnormalities  - Monitor daily renal  function  - 1/1/20 serum creatinine 1.4, down trending slowly      Septic shock  Patient presented with generalized weakness and fatigue, vital signs concerning for shock with hypotension and tachycardia. Recently completed course of cefazolin for MSSA endocarditis, though UDS positive for amphetamines suggests patient may still be using IV drugs. With recent hospitalization would also be concerned about possible HAP. Urinalysis with 3+ leukocytes, 12 RBCs, >100 WBC and many bacteria - certainly concerning for urinary source. WCC elevated to 25. Initial lactate 7.9.   S/p 3L fluid resuscitation and BSABx with improvement in lactate to 1.8  Repeat CT head here without evidence of acute process  CXR unremarkable, no signs of consolidation. CT abdo/pelvis 11/24 shows splenic infarcts as well as cholelithiasis  12/28 minimal Levophed started overnight to maintain MAP >65     PLAN:  - De-escalated to oxacillin only 12/28 - To continue till 2/18 (6 week course)  - Continue to follow blood and urine cultures, last positive growth from 12/26  - Off pressors. Patient on midodrine 15 mg TID for BP support       Cachexia  Currently on adult regular diet and reports good PO intake. Nutrition consulted on 12/30 for BMI 18.78, goal of 20+ for possible valvular repairs.      PLAN:  -- Double portion meals  -- Boost Protein supplementation TID WM    IV drug abuse  PLAN:  - Continue to monitor for signs of withdrawal. PRNs Available  - Addiction psychiatry consulted, actively following patient, motivational interviewing and resources provided  - Will likely need rehab OP        VTE Risk Mitigation (From admission, onward)         Ordered     enoxaparin injection 40 mg  Daily      12/27/19 0912     IP VTE HIGH RISK PATIENT  Once      12/25/19 0041                Batsheva Mohr DO  Department of Hospital Medicine   [facility]

## 2020-01-01 NOTE — ASSESSMENT & PLAN NOTE
Baseline sCr 0.9 beginning of 11/2019, now 4. 2 on admit. Improved with IV fluids. Likely multifactorial including hypotension, possibly element of septic ATN. Consider emboli to the kidneys from infective endocarditis. Cr improving, now 2.6 (12/26/19). Likely RJ 2/2 sepsis and hypovolemia. 12/27 continuing to resolve, Cr 1.9. 12/28 1.5, improving.   12/29 urine studies (U-Na 53, U Osm 193, Serum Osm 292) without electrolyte abnormality, Na WNL on BMP     PLAN:   - Avoid nephrotoxic meds/substances  - Strict Is and Os, daily weights  - US RP with no significant abnormalities  - Monitor daily renal function  - 1/1/20 serum creatinine 1.4, down trending slowly

## 2020-01-01 NOTE — PLAN OF CARE
Problem: Adult Inpatient Plan of Care  Goal: Plan of Care Review  Description  Pt had a good day. Pt levophed was discontinue around noon today. Pt was started on midodrine and tolerated well. Pt was weak with PT and needs more strength exercises. Pt was updated on plan of care be team and will be transfer to stepdown, Awaiting bed, will continue to monitor.    Outcome: Ongoing, Progressing  Goal: Patient-Specific Goal (Individualization)  Description  PMH: MSSA bacteremia and infective endocarditis (s/p 6 weeks of cefazolin 12/18), cervical spine epidural abscess s/p C6-7 diskectomy and arthrodesis 11/15/2019    DX: septic shock    12/24: [facility] with chest pain, generalized weakness, and SOB. Found to be in septic shock  12/25: tx to [facility] for pressor support/higher level of care, levo, Retro Perit. US  12/26: MRI head, echo  12/27: MRI Spine, CT chest, blood cultures  12/28 PM:  2.5 L LR.  Urine & blood samples.  12/29: 1L NS, blood cx, urinalysis    Nursing:  *MAP > 65             Outcome: Ongoing, Progressing  Goal: Absence of Hospital-Acquired Illness or Injury  Outcome: Ongoing, Progressing  Goal: Optimal Comfort and Wellbeing  Outcome: Ongoing, Progressing  Goal: Readiness for Transition of Care  Outcome: Ongoing, Progressing  Goal: Rounds/Family Conference  Outcome: Ongoing, Progressing     Problem: Infection  Goal: Infection Symptom Resolution  Outcome: Ongoing, Progressing    Patient continues on IV ABX. No s/s of adverse reactions. Picc line inserted this shift. Awaiting xray results to verify placement. Patient refused lovenox this shift.

## 2020-01-01 NOTE — ASSESSMENT & PLAN NOTE
Patient presented with generalized weakness and fatigue, vital signs concerning for shock with hypotension and tachycardia. Recently completed course of cefazolin for MSSA endocarditis, though UDS positive for amphetamines suggests patient may still be using IV drugs. With recent hospitalization would also be concerned about possible HAP. Urinalysis with 3+ leukocytes, 12 RBCs, >100 WBC and many bacteria - certainly concerning for urinary source. WCC elevated to 25. Initial lactate 7.9.   S/p 3L fluid resuscitation and BSABx with improvement in lactate to 1.8  Repeat CT head here without evidence of acute process  CXR unremarkable, no signs of consolidation. CT abdo/pelvis 11/24 shows splenic infarcts as well as cholelithiasis  12/28 minimal Levophed started overnight to maintain MAP >65     PLAN:  - De-escalated to oxacillin only 12/28 - To continue till 2/18 (6 week course)  - Continue to follow blood and urine cultures, last positive growth from 12/26  - Off pressors. Patient on midodrine 15 mg TID for BP support

## 2020-01-01 NOTE — SUBJECTIVE & OBJECTIVE
Interval History: NAEON. PICC placed in R bracheocephalic vein. Currently waiting on SNF placement. Patient to continue Oxacillin till 2/18/20    Review of Systems   Constitutional: Negative for chills, diaphoresis and fever.   HENT: Negative for congestion, rhinorrhea and sore throat.    Eyes: Negative for photophobia and visual disturbance.   Respiratory: Negative for cough, shortness of breath and stridor.    Cardiovascular: Negative for chest pain (pain with deep breaths) and leg swelling.   Gastrointestinal: Positive for abdominal pain (LLQ abdominal pain, releived with flatus) and constipation. Negative for abdominal distention, blood in stool, diarrhea, nausea and vomiting.   Genitourinary: Negative for dysuria and hematuria.   Musculoskeletal: Negative for neck pain and neck stiffness.   Neurological: Negative for dizziness, light-headedness and headaches.   Psychiatric/Behavioral: Negative for agitation and confusion.     Objective:     Vital Signs (Most Recent):  Temp: 97.6 °F (36.4 °C) (01/01/20 0755)  Pulse: 77 (01/01/20 0755)  Resp: 16 (01/01/20 0755)  BP: 103/64 (01/01/20 0755)  SpO2: 95 % (01/01/20 0839) Vital Signs (24h Range):  Temp:  [96.6 °F (35.9 °C)-99.6 °F (37.6 °C)] 97.6 °F (36.4 °C)  Pulse:  [77-85] 77  Resp:  [16-20] 16  SpO2:  [95 %-96 %] 95 %  BP: ()/(55-65) 103/64     Weight: 62.8 kg (138 lb 7.2 oz)  Body mass index is 18.78 kg/m².    Intake/Output Summary (Last 24 hours) at 1/1/2020 1121  Last data filed at 1/1/2020 0700  Gross per 24 hour   Intake 360 ml   Output 1950 ml   Net -1590 ml      Physical Exam   Constitutional: He is oriented to person, place, and time. He appears well-developed. He appears cachectic. He is active and cooperative. No distress.   HENT:   Head: Normocephalic and atraumatic.   Mouth/Throat: Oropharynx is clear and moist.   Eyes: EOM are normal. No scleral icterus.   Neck: Normal range of motion. Neck supple.   Cardiovascular: Normal rate and regular  rhythm.   Murmur heard.   Systolic murmur is present with a grade of 2/6.  Pulmonary/Chest: Effort normal and breath sounds normal. No stridor. No respiratory distress. He has no wheezes.   Abdominal: Soft. Bowel sounds are normal. He exhibits no distension. There is no tenderness. There is no guarding.   Musculoskeletal: He exhibits no edema or tenderness.   Neurological: He is alert and oriented to person, place, and time. He exhibits normal muscle tone.   Skin: Skin is warm and dry. No rash noted. He is not diaphoretic. No erythema.   Nursing note and vitals reviewed.      Significant Labs:   CBC:   Recent Labs   Lab 12/31/19  0529 01/01/20  0712   WBC 12.07 13.12*   HGB 7.3* 7.8*   HCT 23.6* 24.3*    306     CMP:   Recent Labs   Lab 12/31/19  0529 01/01/20  0712   * 135*   K 4.1 4.2    107   CO2 19* 19*   GLU 84 90   BUN 32* 32*   CREATININE 1.5* 1.4   CALCIUM 7.9* 8.3*   PROT 6.3 6.7   ALBUMIN 1.9* 2.1*   BILITOT 0.2 0.3   ALKPHOS 86 103   AST 27 86*   ALT 18 43   ANIONGAP 10 9   EGFRNONAA 51.7* 56.2*     Magnesium:   Recent Labs   Lab 12/31/19  0529 01/01/20  0712   MG 1.8 1.7       Significant Imaging: I have reviewed and interpreted all pertinent imaging results/findings within the past 24 hours.

## 2020-01-01 NOTE — ASSESSMENT & PLAN NOTE
History of mitral valve MSSA endocarditis s/p 6 week course of cefazolin ending 12/18. Concern for septic emboli leading to infarcts of spleen, potentially renal infarcts. Repeat echo with vegetation vs ruptured chord apparatus of mitral valve, worsened MR to mod-severe from mild-mod on 11/04. 12/26 MRI brain with multiple punctate foci bilaterally representing septic emboli. AOx4, no FNDs at this time. Will follow-up with CTS s/p 6 week course of IV antibiotics on Wednesday, February 18, 2020 with repeat TTE and surgery on Tuesday, February 18, 2020.  Possible coronary angiogram between February 5 and February 18 should RJ resolve and patient still meet criteria for surgery.       PLAN:  - Oxacillin only to continue for 6 week course of abx  - Last positive blood culture on 12/26, will continue to follow  - ID following -  Continue IV abx therapy until date of surgery (2/18/20) with weekly CBC CMP ESR CRP sent to ID clinic at [phone] OP  - Monitor daily CBC  - PICC line placed in R Brachiocephalic.  - Follow up appointment with CTS Feb 5th with Surgery tentatively planned for 2/18

## 2020-01-01 NOTE — PLAN OF CARE
Problem: Adult Inpatient Plan of Care  Goal: Optimal Comfort and Wellbeing  Outcome: Ongoing, Progressing     Problem: Infection  Goal: Infection Symptom Resolution  Outcome: Ongoing, Progressing     Problem: Fall Injury Risk  Goal: Absence of Fall and Fall-Related Injury  Outcome: Ongoing, Progressing     Problem: Skin Injury Risk Increased  Goal: Skin Health and Integrity  Outcome: Ongoing, Progressing    PT is AAOX4, no acute changes noted during shift, pt is on bedrest and repositions self in bed q2, no skin breakdown noted, hourly rounds made during shift,  VSS. No falls noted. Fall precautions remain. Pain assessed. No pain noted. Pt resting comfortably in bed. Call light in reach. Will continue to monitor.

## 2020-01-02 PROCEDURE — 99232 SBSQ HOSP IP/OBS MODERATE 35: CPT | Mod: AF,HB,S$PBB, | Performed by: PSYCHIATRY & NEUROLOGY

## 2020-01-02 PROCEDURE — 99232 SBSQ HOSP IP/OBS MODERATE 35: CPT | Mod: ,,,

## 2020-01-02 PROCEDURE — 11000001 HC ACUTE MED/SURG PRIVATE ROOM

## 2020-01-02 PROCEDURE — 99232 PR SUBSEQUENT HOSPITAL CARE,LEVL II: ICD-10-PCS | Mod: AF,HB,S$PBB, | Performed by: PSYCHIATRY & NEUROLOGY

## 2020-01-02 PROCEDURE — 25000003 PHARM REV CODE 250: Performed by: STUDENT IN AN ORGANIZED HEALTH CARE EDUCATION/TRAINING PROGRAM

## 2020-01-02 PROCEDURE — 97530 THERAPEUTIC ACTIVITIES: CPT

## 2020-01-02 PROCEDURE — 63600175 PHARM REV CODE 636 W HCPCS: Performed by: STUDENT IN AN ORGANIZED HEALTH CARE EDUCATION/TRAINING PROGRAM

## 2020-01-02 PROCEDURE — 99232 PR SUBSEQUENT HOSPITAL CARE,LEVL II: ICD-10-PCS | Mod: ,,,

## 2020-01-02 PROCEDURE — 25000003 PHARM REV CODE 250: Performed by: INTERNAL MEDICINE

## 2020-01-02 RX ORDER — MIDODRINE HYDROCHLORIDE 5 MG/1
15 TABLET ORAL 3 TIMES DAILY
Qty: 270 TABLET | Refills: 1
Start: 2020-01-02 | End: 2021-01-01

## 2020-01-02 RX ADMIN — MIDODRINE HYDROCHLORIDE 15 MG: 5 TABLET ORAL at 04:01

## 2020-01-02 RX ADMIN — MIDODRINE HYDROCHLORIDE 15 MG: 5 TABLET ORAL at 09:01

## 2020-01-02 RX ADMIN — THERA TABS 1 TABLET: TAB at 09:01

## 2020-01-02 RX ADMIN — OXACILLIN SODIUM 12 G: 10 INJECTION, POWDER, FOR SOLUTION INTRAVENOUS at 04:01

## 2020-01-02 NOTE — PLAN OF CARE
SW faxed referral to [facility] via  for review. Patient has been declined from 13 SNF's. SW will continue to follow.      01/02/20 1356   Post-Acute Status   Post-Acute Authorization Placement   Post-Acute Placement Status Referrals Sent   Discharge Delays (!) Other     Allison Sequeira LMSW   - [facility]  Ext. [phone]

## 2020-01-02 NOTE — PLAN OF CARE
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 susceptible Staphylococcus aureus (MSSA) [I33.0, B95.61]  Yes    Debility [R53.81]  Yes    Normocytic anemia [D64.9]  Yes    Opioid use disorder, severe, dependence [F11.20]  Yes     Chronic    Opioid withdrawal [F11.23]  Yes    Septic shock [A41.9, R65.21]  Yes    RJ (acute kidney injury) [N17.9]  Yes    Cachexia [R64]  Yes    IV drug abuse [F19.10]  Yes      Resolved Hospital Problems    Diagnosis Date Resolved POA    Elevated troponin [R79.89] 12/27/2019 Unknown       Patient is homebound due to:  Endocarditis due to methicillin susceptible Staphylococcus aureus (MSSA)    Allergies:Review of patient's allergies indicates:  No Known Allergies    Vitals:  Every shift (Skilled Nursing patients)    Diet: Cardiac - Double portions   Supplement:  1 can every three times a day with meals                         Type:  House     Acitivities:    - Up in a chair each morning as tolerated   - Ambulate with assistance to bathroom   - Scheduled walks once each shift (every 8 hours)   - May ambulate independently    LABS:    -weekly CBC CMP ESR CRP sent to ID clinic at [phone] OP    Nursing Precautions:       - Fall precautions per nursing home protocol    CONSULTS:     Physical Therapy to evaluate and treat     Occupational Therapy to evaluate and treat     Nutrition to evaluate and recommend diet    MISCELLANEOUS CARE:     Routine Skin for Bedridden Patients:  Apply moisture barrier cream to all    skin folds and wet areas in perineal area daily and after baths and                           all bowel  movements.    Medications: Discontinue all previous medication orders, if any. See new list below.      Ld Bronson   Home Medication Instructions CHRISTY:[id number]    Printed on:01/02/20 1350   Medication Information                      midodrine (PROAMATINE) 5 MG Tab  Take 3 tablets (15 mg total) by mouth 3 (three) times daily.             sodium chloride 0.9% SolP 500 mL with oxacillin 1 gram SolR 12 g  Inject 12 g into the vein continuous.                 _________________________________  Batsheva Mohr DO  01/02/2020

## 2020-01-02 NOTE — PROGRESS NOTES
"ADDICTION CONSULT FOLLOW UP VISIT     DEPARTMENT:  Psychiatry  SITE: [facility] [address]    DATE OF ADMISSION: 12/25/2019 12:08 AM  LENGTH OF STAY: 8 days    EXAMINING PRACTITIONER: Marcos Vargas      SUBJECTIVE:     HISTORY    Patient Name: Ld Bronson  YOB: 1966    CHIEF COMPLAINT   Ld Bronson is a 53 y.o. male who is being seen today for a follow up visit by the addiction psychiatry consult service.  Ld Bronson presents with the chief complaint of: opioid use disorder    HPI & PSYCHIATRIC ROS    Patient resting comfortably in bed.  He denies pain, last prn opioid use here in hospital was 12/29/19.  He reports sleep cycle is reversed.  No N/V/D.  He denies opioid withdrawal symptoms.  He denies cravings or urges to use.  He states he will likely go to SNF for IV antibiotics, then would like to go to [facility] for addiction rehab before having cardiac surgery.        PFSH: The patient's past medical history has been reviewed and updated as appropriate within the electronic medical record system.      PSYCHOTROPIC MEDICATIONS   none      OBJECTIVE:     EXAMINATION    Vitals:    01/02/20 0300 01/02/20 0525 01/02/20 0719 01/02/20 0822   BP:  108/67  116/71   BP Location:    Left arm   Patient Position:  Lying  Lying   Pulse: 79 82 82 79   Resp:  16  18   Temp:  98.4 °F (36.9 °C)  98 °F (36.7 °C)   TempSrc:  Oral  Oral   SpO2:  98%  99%   Weight:       Height:           CONSTITUTIONAL  General Appearance: stated age, thin, in hospital garb, resting comfortably in bed    PSYCHIATRIC   Speech: conversational, decreased spontaneity  Mood: "okay"  Affect: subdued but euthymic  Thought Process: linear  Thought Content: no SI  Insight: improved  Judgment: improved      ASSESSMENT:     DIAGNOSES & PROBLEMS    Opioid Use Disorder  Endocarditis  Cachexia  Disability      PLAN:       MANAGEMENT PLAN, TREATMENT GOALS, THERAPEUTIC TECHNIQUES/APPROACHES & CLINICAL " REASONING    No opioid withdrawal noted.  He remains interested in residential rehab for addiction after medical stabilization - [facility] would be a good program for him.  Relapse prevention and motivational interviewing provided.      DIAGNOSTIC TESTING  The chart was reviewed for recent diagnostic investigations, and pertinent results are noted below.  12/24/19 - utox positive for amphetamines

## 2020-01-02 NOTE — SUBJECTIVE & OBJECTIVE
Interval History: NAEON. Waiting on SNF placement. Blood cultures still NGTD    Review of Systems   Constitutional: Negative for chills, diaphoresis and fever.   HENT: Negative for congestion, rhinorrhea and sore throat.    Eyes: Negative for photophobia and visual disturbance.   Respiratory: Negative for cough, shortness of breath and stridor.    Cardiovascular: Negative for chest pain and leg swelling.   Gastrointestinal: Positive for abdominal pain (LLQ abdominal pain, releived with flatus) and constipation. Negative for abdominal distention, blood in stool, diarrhea, nausea and vomiting.   Genitourinary: Negative for dysuria and hematuria.   Musculoskeletal: Negative for neck pain and neck stiffness.   Neurological: Negative for dizziness, light-headedness and headaches.   Psychiatric/Behavioral: Negative for agitation and confusion.     Objective:     Vital Signs (Most Recent):  Temp: 98.4 °F (36.9 °C) (01/02/20 0525)  Pulse: 82 (01/02/20 0525)  Resp: 16 (01/02/20 0525)  BP: 108/67 (01/02/20 0525)  SpO2: 98 % (01/02/20 0525) Vital Signs (24h Range):  Temp:  [97.5 °F (36.4 °C)-98.8 °F (37.1 °C)] 98.4 °F (36.9 °C)  Pulse:  [77-88] 82  Resp:  [16-18] 16  SpO2:  [95 %-98 %] 98 %  BP: (101-109)/(57-69) 108/67     Weight: 62.8 kg (138 lb 7.2 oz)  Body mass index is 18.78 kg/m².    Intake/Output Summary (Last 24 hours) at 1/2/2020 0710  Last data filed at 1/2/2020 0500  Gross per 24 hour   Intake 1120 ml   Output 2350 ml   Net -1230 ml      Physical Exam   Constitutional: He is oriented to person, place, and time. He appears well-developed. He appears cachectic. He is active and cooperative. No distress.   HENT:   Head: Normocephalic and atraumatic.   Mouth/Throat: Oropharynx is clear and moist.   Eyes: EOM are normal. No scleral icterus.   Neck: Normal range of motion. Neck supple.   Cardiovascular: Normal rate and regular rhythm.   Murmur heard.   Systolic murmur is present with a grade of 2/6.  Pulmonary/Chest:  Effort normal and breath sounds normal. No stridor. No respiratory distress. He has no wheezes.   Abdominal: Soft. Bowel sounds are normal. He exhibits no distension. There is no tenderness. There is no guarding.   Musculoskeletal: He exhibits no edema or tenderness.   Neurological: He is alert and oriented to person, place, and time. He exhibits normal muscle tone.   Skin: Skin is warm and dry. No rash noted. He is not diaphoretic. No erythema.   Nursing note and vitals reviewed.      Significant Labs: All pertinent labs within the past 24 hours have been reviewed.    Significant Imaging: I have reviewed and interpreted all pertinent imaging results/findings within the past 24 hours.

## 2020-01-02 NOTE — PLAN OF CARE
Problem: Physical Therapy Goal  Goal: Physical Therapy Goal  Description  Goals to be met by: 2020     Patient will increase functional independence with mobility by performin. Supine to sit with [employer] [place]- met 2020  Supine to sit with independence  2. Sit to stand transfer with Stand-by Assistance- met 2020  Sit to stand transfer with independence   3. Bed to chair transfer with Stand-by Assistance using LRAD  4. Gait x200 feet with supervision and no AD.       Outcome: Ongoing, Progressing     Pt is progressing toward goals. All goals remain appropriate.    Zainab Andrade, PT, DPT  2020  [phone]

## 2020-01-02 NOTE — PLAN OF CARE
ALICIA called and spoke to Kiah [phone] ext [phone] [facility] who states [facility] is not service connected so he has no SNF benefits.  Kiah states [facility]) and disappeared from there.  She states they do SNF at the [facility] and that she is willing to go through the process to get him back in.  She states she is not sure they will take him but will try.  CM will speak to patient about plan and contact admissions to screen for medicaid.    ALICIA also received phone call from CARMEL Harrison, concerning the referral for placement.  Christy states the patient needs to be screened for medicaid first and then when/if he gets it they can look at him further.  ALICIA called medicaid office in admissions [phone] and requested patient be screened for medicaid.

## 2020-01-02 NOTE — PROGRESS NOTES
[facility] Hospital Medicine  Progress Note    Patient Name: Ld Bronson  MRN: [id number]  Patient Class: IP- Inpatient   Admission Date: 12/25/2019  Length of Stay: 8 days  Attending Physician: Contreras Zimmerman MD  Primary Care Provider: Kris Artis Jr, MD    [facility] Medicine Team: Networked reference to record PCT  Batsheva MohrDO    Subjective:     Principal Problem:Endocarditis due to methicillin susceptible Staphylococcus aureus (MSSA)    HPI:  Mr. Bronson is a 53M IVDU with HCV, Hx of MSSA bacteremia and infective endocarditis (s/p 6 weeks of cefazolin 12/18), cervical spine epidural abscess s/p C6-7 diskectomy and arthrodesis 11/15/2019 who presented to [facility] with chest pain, generalized weakness, and shortness of breath. At OSH, he was found to be in septic shock requiring pressor support. He was transferred to [facility] for higher level of care. On arrival, patient was somnolent and difficult to arouse. Patient did waken with sternal rub and answered questions appropriately, but history largely obtained through chart review.      At OSH ED, patient recieved full 30ccs/kg fluid resuscitation with an additional liter (total ~3L) for hypotension and tachycardia; lactic acid was elevated to 3.4. He received 1 dose each of vancomycin and zosyn prior to transfer.      Overview/Hospital Course:  [facility] after a C6-7 ACDF with washout on 11/6 with end date 12/18/19. No [facility] records available though it appears from transfer note that the patient had completed his full 6 week course at this time. His WCC and lactate have decreased significantly since starting broad spectrum antibiotics with vanc/zosyn and will be continued until sensitivities return. BCxs still positive for Staph aureus. C diff negative, gonorrhea/chlamydia negative, urine culture no growth. Patient c/o low back pain that has  been present for a month and chronic neck pain since the anterior cervical diskectomy and fusion. MRI cervical, thoracic, and lumbar ordered to r/o spinal cord compression and abscess. ID has been consulted for the recurrence of bacteremia and endocarditis, addiction psych consulted for continued heroin use (last use 3 days prior to admission per OSH ED note), and CTS consulted for evaluation of vegetation vs ruptured chord apparatus and worsened MR from prior study 11/04. Dr. Gooden with CTS has tentatively scheduled mitral valve repair, possible mitral valve replacement, possible tricuspid valve repair for Friday, January 3, 2020. CT chest w/o contrast ordered. NSGY consulted to assess for possible osteomyelitis on MRI. Addiction psych consulted who provided motivational interviewing and resources for outpatient rehabilitation to the patient, appreciated. Hgb noted to 6.7 today from 7.0, blood consent was attempted but the patient refused responding he will sign tomorrow. Will hold on transfusion until 12/28. Asymptomatic at this time, no dyspnea, lightheadedness, dizziness. 12/28 transfused 1 u prbc after consent obtained overnight. Requiring minimal pressors to keep MAP >65. Otherwise stable, good po intake, renal function improving. CT chest with small pleural effusions and possible early osteomyelitic changes in T8-T9, T10-T11. Currently patient is being treated for MSSA with oxacillin, active discussions with Cardiothoracic Surgery regarding valvular repair, however, patient would need further rehabilitation for history of IVDU. He will need a six week course of IV antibiotics with CTS outpatient follow-up on 2/5/20 and surgery on 2/18/20. Addicion psychiatry actively following.    Interval History: NAEON. Waiting on SNF placement. Blood cultures still NGTD    Review of Systems   Constitutional: Negative for chills, diaphoresis and fever.   HENT: Negative for congestion, rhinorrhea and sore throat.    Eyes:  Negative for photophobia and visual disturbance.   Respiratory: Negative for cough, shortness of breath and stridor.    Cardiovascular: Negative for chest pain and leg swelling.   Gastrointestinal: Positive for abdominal pain (LLQ abdominal pain, releived with flatus) and constipation. Negative for abdominal distention, blood in stool, diarrhea, nausea and vomiting.   Genitourinary: Negative for dysuria and hematuria.   Musculoskeletal: Negative for neck pain and neck stiffness.   Neurological: Negative for dizziness, light-headedness and headaches.   Psychiatric/Behavioral: Negative for agitation and confusion.     Objective:     Vital Signs (Most Recent):  Temp: 98.4 °F (36.9 °C) (01/02/20 0525)  Pulse: 82 (01/02/20 0525)  Resp: 16 (01/02/20 0525)  BP: 108/67 (01/02/20 0525)  SpO2: 98 % (01/02/20 0525) Vital Signs (24h Range):  Temp:  [97.5 °F (36.4 °C)-98.8 °F (37.1 °C)] 98.4 °F (36.9 °C)  Pulse:  [77-88] 82  Resp:  [16-18] 16  SpO2:  [95 %-98 %] 98 %  BP: (101-109)/(57-69) 108/67     Weight: 62.8 kg (138 lb 7.2 oz)  Body mass index is 18.78 kg/m².    Intake/Output Summary (Last 24 hours) at 1/2/2020 0710  Last data filed at 1/2/2020 0500  Gross per 24 hour   Intake 1120 ml   Output 2350 ml   Net -1230 ml      Physical Exam   Constitutional: He is oriented to person, place, and time. He appears well-developed. He appears cachectic. He is active and cooperative. No distress.   HENT:   Head: Normocephalic and atraumatic.   Mouth/Throat: Oropharynx is clear and moist.   Eyes: EOM are normal. No scleral icterus.   Neck: Normal range of motion. Neck supple.   Cardiovascular: Normal rate and regular rhythm.   Murmur heard.   Systolic murmur is present with a grade of 2/6.  Pulmonary/Chest: Effort normal and breath sounds normal. No stridor. No respiratory distress. He has no wheezes.   Abdominal: Soft. Bowel sounds are normal. He exhibits no distension. There is no tenderness. There is no guarding.   Musculoskeletal:  He exhibits no edema or tenderness.   Neurological: He is alert and oriented to person, place, and time. He exhibits normal muscle tone.   Skin: Skin is warm and dry. No rash noted. He is not diaphoretic. No erythema.   Nursing note and vitals reviewed.      Significant Labs: All pertinent labs within the past 24 hours have been reviewed.    Significant Imaging: I have reviewed and interpreted all pertinent imaging results/findings within the past 24 hours.      Assessment/Plan:      * Endocarditis due to methicillin susceptible Staphylococcus aureus (MSSA)  History of mitral valve MSSA endocarditis s/p 6 week course of cefazolin ending 12/18. Concern for septic emboli leading to infarcts of spleen, potentially renal infarcts. Repeat echo with vegetation vs ruptured chord apparatus of mitral valve, worsened MR to mod-severe from mild-mod on 11/04. 12/26 MRI brain with multiple punctate foci bilaterally representing septic emboli. AOx4, no FNDs at this time. Will follow-up with CTS s/p 6 week course of IV antibiotics on Wednesday, February 18, 2020 with repeat TTE and surgery on Tuesday, February 18, 2020.  Possible coronary angiogram between February 5 and February 18 should RJ resolve and patient still meet criteria for surgery.       PLAN:  - Oxacillin only to continue for 6 week course of abx  - Last positive blood culture on 12/26, will continue to follow  - ID following -  Continue IV abx therapy until date of surgery (2/18/20) with weekly CBC CMP ESR CRP sent to ID clinic at [phone] OP  - Monitor daily CBC  - PICC line placed in R Brachiocephalic.  - Follow up appointment with CTS Feb 5th with Surgery tentatively planned for 2/18      Normocytic anemia  PLAN:  -  CBC every 48 hours  - Maintain Hb > 7 Transfuse if needed  - Iron studies showed patient is iron deficient. Would be reasonable to start iron supplementation OP after Abx and endocarditis resolves      Debility  PT/OT - To evaluate and  treat  Waiting on SNF placement    Opioid withdrawal  PLAN:   Per addiction Psych: Can use PRN meds for opiate withdrawal sx as they arise:              PRNs:   · Bentyl 10mg PO Q6H PRN for abdominal cramps  · Robaxin 1000mg PO Q8H PRN for muscle cramps  · Zofran 4mg PO Q6H PRN for nausea/vomiting  · Imodium 2mg PO QID PRN for diarrhea (max 16mg in 24 hours)  · Motrin 600mg PO PRN for pain      Opioid use disorder, severe, dependence  PLAN:   Per addiction Psych: Can use PRN meds for opiate withdrawal sx as they arise:              PRNs:   · Bentyl 10mg PO Q6H PRN for abdominal cramps  · Robaxin 1000mg PO Q8H PRN for muscle cramps  · Zofran 4mg PO Q6H PRN for nausea/vomiting  · Imodium 2mg PO QID PRN for diarrhea (max 16mg in 24 hours)  · Motrin 600mg PO PRN for pain      RJ (acute kidney injury)  Baseline sCr 0.9 beginning of 11/2019, now 4. 2 on admit. Improved with IV fluids. Likely multifactorial including hypotension, possibly element of septic ATN. Consider emboli to the kidneys from infective endocarditis. Cr improving, now 2.6 (12/26/19). Likely RJ 2/2 sepsis and hypovolemia. 12/27 continuing to resolve, Cr 1.9. 12/28 1.5, improving.   12/29 urine studies (U-Na 53, U Osm 193, Serum Osm 292) without electrolyte abnormality, Na WNL on BMP     PLAN:   - Avoid nephrotoxic meds/substances  - Strict Is and Os, daily weights  - US RP with no significant abnormalities  - Monitor daily renal function  - 1/1/20 serum creatinine 1.4, down trending slowly      Septic shock  Patient presented with generalized weakness and fatigue, vital signs concerning for shock with hypotension and tachycardia. Recently completed course of cefazolin for MSSA endocarditis, though UDS positive for amphetamines suggests patient may still be using IV drugs. With recent hospitalization would also be concerned about possible HAP. Urinalysis with 3+ leukocytes, 12 RBCs, >100 WBC and many bacteria - certainly concerning for urinary source.  WCC elevated to 25. Initial lactate 7.9.   S/p 3L fluid resuscitation and BSABx with improvement in lactate to 1.8  Repeat CT head here without evidence of acute process  CXR unremarkable, no signs of consolidation. CT abdo/pelvis 11/24 shows splenic infarcts as well as cholelithiasis  12/28 minimal Levophed started overnight to maintain MAP >65     PLAN:  - De-escalated to oxacillin only 12/28 - To continue till 2/18 (6 week course)  - Continue to follow blood and urine cultures, last positive growth from 12/26  - Off pressors. Patient on midodrine 15 mg TID for BP support       Cachexia  Currently on adult regular diet and reports good PO intake. Nutrition consulted on 12/30 for BMI 18.78, goal of 20+ for possible valvular repairs.      PLAN:  -- Double portion meals  -- Boost Protein supplementation TID WM    IV drug abuse  PLAN:  - Continue to monitor for signs of withdrawal. PRNs Available  - Addiction psychiatry consulted, actively following patient, motivational interviewing and resources provided  - Will likely need rehab OP        VTE Risk Mitigation (From admission, onward)         Ordered     enoxaparin injection 40 mg  Daily      12/27/19 0912     IP VTE HIGH RISK PATIENT  Once      12/25/19 0041                  Batsheva Mohr DO  Department of Hospital Medicine   [facility]

## 2020-01-02 NOTE — PT/OT/SLP PROGRESS
Occupational Therapy      Patient Name:  Ld Bronson   MRN:  [id number]    Patient not seen today secondary to Patient fatigue, Patient unwilling to participate. Will follow-up 1/2/2020.    Suresh Gibson, OT  1/2/2020

## 2020-01-02 NOTE — PLAN OF CARE
Problem: Adult Inpatient Plan of Care  Goal: Plan of Care Review  Description  Outcome: Ongoing, Progressing  Goal: Absence of Hospital-Acquired Illness or Injury  Outcome: Ongoing, Progressing  Goal: Optimal Comfort and Wellbeing  Outcome: Ongoing, Progressing  Goal: Readiness for Transition of Care  Outcome: Ongoing, Progressing  Pt free of falls/injuries throughout the shift. Bed locked, in lowest position, call bell within reach. Continuous IV fluid's infusing. Pt afebrile, pain assessed & denied. VSS, pt in no distress, will continue to monitor.

## 2020-01-02 NOTE — NURSING
Pt refused night medication and vitals. Unable to assess wounds due to pt do not want to be waken up when sleeping

## 2020-01-02 NOTE — ASSESSMENT & PLAN NOTE
PLAN:  -  CBC every 48 hours  - Maintain Hb > 7 Transfuse if needed  - Iron studies showed patient is iron deficient. Would be reasonable to start iron supplementation OP after Abx and endocarditis resolves

## 2020-01-03 LAB
BACTERIA BLD CULT: NORMAL
BACTERIA BLD CULT: NORMAL

## 2020-01-03 PROCEDURE — 99232 SBSQ HOSP IP/OBS MODERATE 35: CPT | Mod: ,,, | Performed by: STUDENT IN AN ORGANIZED HEALTH CARE EDUCATION/TRAINING PROGRAM

## 2020-01-03 PROCEDURE — 25000003 PHARM REV CODE 250: Performed by: STUDENT IN AN ORGANIZED HEALTH CARE EDUCATION/TRAINING PROGRAM

## 2020-01-03 PROCEDURE — 11000001 HC ACUTE MED/SURG PRIVATE ROOM

## 2020-01-03 PROCEDURE — 25000003 PHARM REV CODE 250

## 2020-01-03 PROCEDURE — 99232 PR SUBSEQUENT HOSPITAL CARE,LEVL II: ICD-10-PCS | Mod: ,,, | Performed by: STUDENT IN AN ORGANIZED HEALTH CARE EDUCATION/TRAINING PROGRAM

## 2020-01-03 PROCEDURE — 30200315 PPD INTRADERMAL TEST REV CODE 302: Performed by: STUDENT IN AN ORGANIZED HEALTH CARE EDUCATION/TRAINING PROGRAM

## 2020-01-03 PROCEDURE — 99232 PR SUBSEQUENT HOSPITAL CARE,LEVL II: ICD-10-PCS | Mod: AF,HB,S$PBB, | Performed by: PSYCHIATRY & NEUROLOGY

## 2020-01-03 PROCEDURE — A4216 STERILE WATER/SALINE, 10 ML: HCPCS

## 2020-01-03 PROCEDURE — 99232 SBSQ HOSP IP/OBS MODERATE 35: CPT | Mod: AF,HB,S$PBB, | Performed by: PSYCHIATRY & NEUROLOGY

## 2020-01-03 PROCEDURE — 86580 TB INTRADERMAL TEST: CPT | Performed by: STUDENT IN AN ORGANIZED HEALTH CARE EDUCATION/TRAINING PROGRAM

## 2020-01-03 PROCEDURE — 25000003 PHARM REV CODE 250: Performed by: INTERNAL MEDICINE

## 2020-01-03 PROCEDURE — 63600175 PHARM REV CODE 636 W HCPCS: Performed by: STUDENT IN AN ORGANIZED HEALTH CARE EDUCATION/TRAINING PROGRAM

## 2020-01-03 RX ORDER — OXYCODONE HYDROCHLORIDE 5 MG/1
5 TABLET ORAL EVERY 6 HOURS PRN
Status: DISCONTINUED | OUTPATIENT
Start: 2020-01-03 | End: 2020-01-06 | Stop reason: HOSPADM

## 2020-01-03 RX ORDER — MAGNESIUM SULFATE HEPTAHYDRATE 40 MG/ML
2 INJECTION, SOLUTION INTRAVENOUS ONCE
Status: DISCONTINUED | OUTPATIENT
Start: 2020-01-03 | End: 2020-01-03

## 2020-01-03 RX ADMIN — Medication 10 ML: at 05:01

## 2020-01-03 RX ADMIN — Medication 5 UNITS: at 11:01

## 2020-01-03 RX ADMIN — OXACILLIN SODIUM 12 G: 10 INJECTION, POWDER, FOR SOLUTION INTRAVENOUS at 05:01

## 2020-01-03 RX ADMIN — Medication 10 ML: at 12:01

## 2020-01-03 NOTE — ASSESSMENT & PLAN NOTE
PLAN:   Per addiction Psych: Can use PRN meds for opiate withdrawal sx as they arise:              PRNs:   · Bentyl 10mg PO Q6H PRN for abdominal cramps  · Robaxin 1000mg PO Q8H PRN for muscle cramps  · Zofran 4mg PO Q6H PRN for nausea/vomiting  · Imodium 2mg PO QID PRN for diarrhea (max 16mg in 24 hours)  · Motrin 600mg PO PRN for pain  -- Reduced Oxy to only 5 mg PRN for moderate to severe pain. Plan on further weaning in anticipation that patient will go to SNF/[facility] without any opioids per his wishes.

## 2020-01-03 NOTE — PROGRESS NOTES
[facility] Hospital Medicine  Progress Note    Patient Name: Ld Bronson  MRN: [id number]  Patient Class: IP- Inpatient   Admission Date: 12/25/2019  Length of Stay: 9 days  Attending Physician: Anish Woodard MD  Primary Care Provider: Kris Artis Jr, MD    Hospital Medicine Team: [facility] HOSP MED 2 Batsheva Mohr DO    Subjective:     Principal Problem:Endocarditis due to methicillin susceptible Staphylococcus aureus (MSSA)    HPI:  Mr. Bronson is a 53M IVDU with HCV, Hx of MSSA bacteremia and infective endocarditis (s/p 6 weeks of cefazolin 12/18), cervical spine epidural abscess s/p C6-7 diskectomy and arthrodesis 11/15/2019 who presented to [facility] with chest pain, generalized weakness, and shortness of breath. At OSH, he was found to be in septic shock requiring pressor support. He was transferred to [facility] for higher level of care. On arrival, patient was somnolent and difficult to arouse. Patient did waken with sternal rub and answered questions appropriately, but history largely obtained through chart review.      At OSH ED, patient recieved full 30ccs/kg fluid resuscitation with an additional liter (total ~3L) for hypotension and tachycardia; lactic acid was elevated to 3.4. He received 1 dose each of vancomycin and zosyn prior to transfer.      Overview/Hospital Course:  [facility] after a C6-7 ACDF with washout on 11/6 with end date 12/18/19. No [facility] records available though it appears from transfer note that the patient had completed his full 6 week course at this time. His WCC and lactate have decreased significantly since starting broad spectrum antibiotics with vanc/zosyn and will be continued until sensitivities return. BCxs still positive for Staph aureus. C diff negative, gonorrhea/chlamydia negative, urine culture no growth. Patient c/o low back pain that has been present  for a month and chronic neck pain since the anterior cervical diskectomy and fusion. MRI cervical, thoracic, and lumbar ordered to r/o spinal cord compression and abscess. ID has been consulted for the recurrence of bacteremia and endocarditis, addiction psych consulted for continued heroin use (last use 3 days prior to admission per OSH ED note), and CTS consulted for evaluation of vegetation vs ruptured chord apparatus and worsened MR from prior study 11/04. Dr. Gooden with CTS has tentatively scheduled mitral valve repair, possible mitral valve replacement, possible tricuspid valve repair for Friday, January 3, 2020. CT chest w/o contrast ordered. NSGY consulted to assess for possible osteomyelitis on MRI. Addiction psych consulted who provided motivational interviewing and resources for outpatient rehabilitation to the patient, appreciated. Hgb noted to 6.7 today from 7.0, blood consent was attempted but the patient refused responding he will sign tomorrow. Will hold on transfusion until 12/28. Asymptomatic at this time, no dyspnea, lightheadedness, dizziness. 12/28 transfused 1 u prbc after consent obtained overnight. Requiring minimal pressors to keep MAP >65. Otherwise stable, good po intake, renal function improving. CT chest with small pleural effusions and possible early osteomyelitic changes in T8-T9, T10-T11. Currently patient is being treated for MSSA with oxacillin, active discussions with Cardiothoracic Surgery regarding valvular repair, however, patient would need further rehabilitation for history of IVDU. He will need a six week course of IV antibiotics with CTS outpatient follow-up on 2/5/20 and surgery on 2/18/20. Addicion psychiatry actively following.    Interval History: NIKUNJ. Spoke with addiction psych who said patient has told them on many occasions that he does not want to start suboxone or methadone or anything other opioid medication and would prefer to just go to rehab after SNF.  Expressed this to CM so that this will not be a barrier to placement.    Review of Systems   Constitutional: Negative for chills, diaphoresis and fever.   HENT: Negative for congestion, rhinorrhea and sore throat.    Eyes: Negative for photophobia and visual disturbance.   Respiratory: Negative for cough, shortness of breath and stridor.    Cardiovascular: Negative for chest pain and leg swelling.   Gastrointestinal: Positive for constipation. Negative for abdominal distention, abdominal pain, blood in stool, diarrhea, nausea and vomiting.   Genitourinary: Negative for dysuria and hematuria.   Musculoskeletal: Negative for neck pain and neck stiffness.   Neurological: Negative for dizziness, light-headedness and headaches.   Psychiatric/Behavioral: Negative for agitation and confusion.     Objective:     Vital Signs (Most Recent):  Temp: 97.6 °F (36.4 °C) (01/03/20 1131)  Pulse: 86 (01/03/20 1131)  Resp: 18 (01/03/20 1131)  BP: 108/68 (01/03/20 1131)  SpO2: 98 % (01/03/20 1131) Vital Signs (24h Range):  Temp:  [97.5 °F (36.4 °C)-98 °F (36.7 °C)] 97.6 °F (36.4 °C)  Pulse:  [82-97] 86  Resp:  [17-18] 18  SpO2:  [95 %-98 %] 98 %  BP: (101-118)/(62-72) 108/68     Weight: 62.8 kg (138 lb 7.2 oz)  Body mass index is 18.78 kg/m².    Intake/Output Summary (Last 24 hours) at 1/3/2020 1310  Last data filed at 1/3/2020 0500  Gross per 24 hour   Intake --   Output 1850 ml   Net -1850 ml      Physical Exam   Constitutional: He is oriented to person, place, and time. He appears well-developed. He appears cachectic. He is active and cooperative. No distress.   HENT:   Head: Normocephalic and atraumatic.   Mouth/Throat: Oropharynx is clear and moist.   Eyes: EOM are normal. No scleral icterus.   Neck: Normal range of motion. Neck supple.   Cardiovascular: Normal rate and regular rhythm.   Murmur heard.   Systolic murmur is present with a grade of 2/6.  Pulmonary/Chest: Effort normal and breath sounds normal. No stridor. No  respiratory distress. He has no wheezes.   Abdominal: Soft. Bowel sounds are normal. He exhibits no distension. There is no tenderness. There is no guarding.   Musculoskeletal: He exhibits no edema or tenderness.   Neurological: He is alert and oriented to person, place, and time. He exhibits normal muscle tone.   Skin: Skin is warm and dry. No rash noted. He is not diaphoretic. No erythema.   Nursing note and vitals reviewed.      Significant Labs:  I have reviewed and interpreted all pertinent lab results/findings within the past 24 hours.      Significant Imaging: I have reviewed and interpreted all pertinent imaging results/findings within the past 24 hours.      Assessment/Plan:      * Endocarditis due to methicillin susceptible Staphylococcus aureus (MSSA)  History of mitral valve MSSA endocarditis s/p 6 week course of cefazolin ending 12/18. Concern for septic emboli leading to infarcts of spleen, potentially renal infarcts. Repeat echo with vegetation vs ruptured chord apparatus of mitral valve, worsened MR to mod-severe from mild-mod on 11/04. 12/26 MRI brain with multiple punctate foci bilaterally representing septic emboli. AOx4, no FNDs at this time. Will follow-up with CTS s/p 6 week course of IV antibiotics on Wednesday, February 18, 2020 with repeat TTE and surgery on Tuesday, February 18, 2020.  Possible coronary angiogram between February 5 and February 18 should RJ resolve and patient still meet criteria for surgery.       PLAN:  - Oxacillin only to continue for 6 week course of abx  - Last positive blood culture on 12/26, will continue to follow  - ID following -  Continue IV abx therapy until date of surgery (2/18/20) with weekly CBC CMP ESR CRP sent to ID clinic at [phone] OP  - Monitor daily CBC  - PICC line placed in R Brachiocephalic.  - Follow up appointment with CTS Feb 5th with Surgery tentatively planned for 2/18      Normocytic anemia  PLAN:  -  CBC every 48 hours  - Maintain Hb > 7  Transfuse if needed  - Iron studies showed patient is iron deficient. Would be reasonable to start iron supplementation OP after Abx and endocarditis resolves      Debility  PT/OT - To evaluate and treat  Waiting on SNF/LTAC placement    Opioid withdrawal  PLAN:   Per addiction Psych: Can use PRN meds for opiate withdrawal sx as they arise:              PRNs:   · Bentyl 10mg PO Q6H PRN for abdominal cramps  · Robaxin 1000mg PO Q8H PRN for muscle cramps  · Zofran 4mg PO Q6H PRN for nausea/vomiting  · Imodium 2mg PO QID PRN for diarrhea (max 16mg in 24 hours)  · Motrin 600mg PO PRN for pain      Opioid use disorder, severe, dependence  PLAN:   Per addiction Psych: Can use PRN meds for opiate withdrawal sx as they arise:              PRNs:   · Bentyl 10mg PO Q6H PRN for abdominal cramps  · Robaxin 1000mg PO Q8H PRN for muscle cramps  · Zofran 4mg PO Q6H PRN for nausea/vomiting  · Imodium 2mg PO QID PRN for diarrhea (max 16mg in 24 hours)  · Motrin 600mg PO PRN for pain  -- Reduced Oxy to only 5 mg PRN for moderate to severe pain. Plan on further weaning in anticipation that patient will go to SNF/LTAC without any opioids per his wishes.    RJ (acute kidney injury)  Baseline sCr 0.9 beginning of 11/2019, now 4. 2 on admit. Improved with IV fluids. Likely multifactorial including hypotension, possibly element of septic ATN. Consider emboli to the kidneys from infective endocarditis. Cr improving, now 2.6 (12/26/19). Likely JR 2/2 sepsis and hypovolemia. 12/27 continuing to resolve, Cr 1.9. 12/28 1.5, improving.   12/29 urine studies (U-Na 53, U Osm 193, Serum Osm 292) without electrolyte abnormality, Na WNL on BMP     PLAN:   - Avoid nephrotoxic meds/substances  - Strict Is and Os, daily weights  - US RP with no significant abnormalities  - Monitor daily renal function  - 1/1/20 serum creatinine 1.4, down trending slowly      Septic shock  Patient presented with generalized weakness and fatigue, vital signs concerning  for shock with hypotension and tachycardia. Recently completed course of cefazolin for MSSA endocarditis, though UDS positive for amphetamines suggests patient may still be using IV drugs. With recent hospitalization would also be concerned about possible HAP. Urinalysis with 3+ leukocytes, 12 RBCs, >100 WBC and many bacteria - certainly concerning for urinary source. WCC elevated to 25. Initial lactate 7.9.   S/p 3L fluid resuscitation and BSABx with improvement in lactate to 1.8  Repeat CT head here without evidence of acute process  CXR unremarkable, no signs of consolidation. CT abdo/pelvis 11/24 shows splenic infarcts as well as cholelithiasis  12/28 minimal Levophed started overnight to maintain MAP >65     PLAN:  - De-escalated to oxacillin only 12/28 - To continue till 2/18 (6 week course)  - Continue to follow blood and urine cultures, last positive growth from 12/26  - Off pressors. Patient on midodrine 15 mg TID for BP support       Cachexia  Currently on adult regular diet and reports good PO intake. Nutrition consulted on 12/30 for BMI 18.78, goal of 20+ for possible valvular repairs.      PLAN:  -- Double portion meals  -- Boost Protein supplementation TID WM    IV drug abuse  PLAN:  - Continue to monitor for signs of withdrawal. PRNs Available  - Addiction psychiatry consulted, actively following patient, motivational interviewing and resources provided  - Will  need rehab OP        VTE Risk Mitigation (From admission, onward)         Ordered     enoxaparin injection 40 mg  Daily      12/27/19 0912     IP VTE HIGH RISK PATIENT  Once      12/25/19 0041                  Batsheva Mohr DO  Department of Hospital Medicine   [facility]

## 2020-01-03 NOTE — ASSESSMENT & PLAN NOTE
PLAN:  - Continue to monitor for signs of withdrawal. PRNs Available  - Addiction psychiatry consulted, actively following patient, motivational interviewing and resources provided  - Will  need rehab OP

## 2020-01-03 NOTE — SUBJECTIVE & OBJECTIVE
Interval History: NIKUNJ. Spoke with addiction psych who said patient has told them on many occasions that he does not want to start suboxone or methadone or anything other opioid medication and would prefer to just go to rehab after SNF. Expressed this to CM so that this will not be a barrier to placement.    Review of Systems   Constitutional: Negative for chills, diaphoresis and fever.   HENT: Negative for congestion, rhinorrhea and sore throat.    Eyes: Negative for photophobia and visual disturbance.   Respiratory: Negative for cough, shortness of breath and stridor.    Cardiovascular: Negative for chest pain and leg swelling.   Gastrointestinal: Positive for constipation. Negative for abdominal distention, abdominal pain, blood in stool, diarrhea, nausea and vomiting.   Genitourinary: Negative for dysuria and hematuria.   Musculoskeletal: Negative for neck pain and neck stiffness.   Neurological: Negative for dizziness, light-headedness and headaches.   Psychiatric/Behavioral: Negative for agitation and confusion.     Objective:     Vital Signs (Most Recent):  Temp: 97.6 °F (36.4 °C) (01/03/20 1131)  Pulse: 86 (01/03/20 1131)  Resp: 18 (01/03/20 1131)  BP: 108/68 (01/03/20 1131)  SpO2: 98 % (01/03/20 1131) Vital Signs (24h Range):  Temp:  [97.5 °F (36.4 °C)-98 °F (36.7 °C)] 97.6 °F (36.4 °C)  Pulse:  [82-97] 86  Resp:  [17-18] 18  SpO2:  [95 %-98 %] 98 %  BP: (101-118)/(62-72) 108/68     Weight: 62.8 kg (138 lb 7.2 oz)  Body mass index is 18.78 kg/m².    Intake/Output Summary (Last 24 hours) at 1/3/2020 1310  Last data filed at 1/3/2020 0500  Gross per 24 hour   Intake --   Output 1850 ml   Net -1850 ml      Physical Exam   Constitutional: He is oriented to person, place, and time. He appears well-developed. He appears cachectic. He is active and cooperative. No distress.   HENT:   Head: Normocephalic and atraumatic.   Mouth/Throat: Oropharynx is clear and moist.   Eyes: EOM are normal. No scleral icterus.    Neck: Normal range of motion. Neck supple.   Cardiovascular: Normal rate and regular rhythm.   Murmur heard.   Systolic murmur is present with a grade of 2/6.  Pulmonary/Chest: Effort normal and breath sounds normal. No stridor. No respiratory distress. He has no wheezes.   Abdominal: Soft. Bowel sounds are normal. He exhibits no distension. There is no tenderness. There is no guarding.   Musculoskeletal: He exhibits no edema or tenderness.   Neurological: He is alert and oriented to person, place, and time. He exhibits normal muscle tone.   Skin: Skin is warm and dry. No rash noted. He is not diaphoretic. No erythema.   Nursing note and vitals reviewed.      Significant Labs:  I have reviewed and interpreted all pertinent lab results/findings within the past 24 hours.      Significant Imaging: I have reviewed and interpreted all pertinent imaging results/findings within the past 24 hours.

## 2020-01-03 NOTE — PLAN OF CARE
Problem: Adult Inpatient Plan of Care  Goal: Plan of Care Review  Outcome: Ongoing, Progressing     Problem: Fall Injury Risk  Goal: Absence of Fall and Fall-Related Injury  Outcome: Ongoing, Progressing     Pt in bed resting VSS afebrile no c/o pain or discomfort to extremities tolerated medications without difficulty bed in lowest position for safety. Will continue to monitor.

## 2020-01-03 NOTE — PLAN OF CARE
Patient has been declined from 13 SNF's. Pending SNF's: [facility] and [facility]. SW will continue to follow up with these facilities. SW is in communication with patient's CM, and patient's care Team - IM3. SW will continue to follow.     Allison Sequeira LMSW   - [facility]  Ext. [phone]

## 2020-01-03 NOTE — PLAN OF CARE
Plan is for YOGI Dupont, to send referral to [facility].       01/03/20 9563   Discharge Reassessment   Assessment Type Discharge Planning Reassessment   Do you have any problems affording any of your prescribed medications? No   Discharge Plan A Skilled Nursing Facility   Discharge Plan B Rehab   DME Needed Upon Discharge  none   Anticipated Discharge Disposition SNF

## 2020-01-03 NOTE — PLAN OF CARE
LANEY completed LOCET and faxed PASRR to state, waiting on 142. LANEY is in communication with patient's CM, and patient's Care Team. LANEY will continue to follow.     11:26 AM  [facility] declined: Payor Not Accepted. The representative at [facility] stated that [facility] was a payor acceptable facility. LANEY will continue to follow.     Allison Sequeira LMSW   - [facility]  Ext. [phone]

## 2020-01-03 NOTE — PROGRESS NOTES
"ADDICTION CONSULT FOLLOW UP VISIT     DEPARTMENT:  Psychiatry  SITE: [facility] [address]    DATE OF ADMISSION: 12/25/2019 12:08 AM  LENGTH OF STAY: 9 days    EXAMINING PRACTITIONER: Marcos Vargas      SUBJECTIVE:     HISTORY    Patient Name: Ld Bronson  YOB: 1966    CHIEF COMPLAINT   Ld Bronson is a 53 y.o. male who is being seen today for a follow up visit by the addiction psychiatry consult service.  Ld Bronson presents with the chief complaint of: opioid use disorder    HPI & PSYCHIATRIC ROS    Patient resting comfortably in bed.  He remains difficult to engage, replies briefly and succinctly to questions.  When given the chance to talk open ended, he has little to say.  He does volunteer [facility] and may be transferred as early as today - this is unconfirmed.  He denies any cravings or urges to use opiates.  No new issues.      PFSH: The patient's past medical history has been reviewed and updated as appropriate within the electronic medical record system.      PSYCHOTROPIC MEDICATIONS   None      OBJECTIVE:     EXAMINATION    Vitals:    01/03/20 0732 01/03/20 0930 01/03/20 1107 01/03/20 1131   BP:  115/63  108/68   BP Location:  Left arm  Left arm   Patient Position:  Lying  Sitting   Pulse: 83 89 90 86   Resp:  18  18   Temp:  98 °F (36.7 °C)  97.6 °F (36.4 °C)   TempSrc:  Oral  Oral   SpO2:  98%  98%   Weight:       Height:           CONSTITUTIONAL  General Appearance: stated age, thin, in hospital garb, multiple tattoos, resting comfortably in bed    PSYCHIATRIC   Speech: conversational, decreased spontaneity, difficult to engage  Mood: "okay"  Affect: subdued  Thought Process: linear but guarded  Thought Content: no SI  Insight: improved  Judgment: improved      ASSESSMENT:     DIAGNOSES & PROBLEMS    Opioid Use Disorder  Endocarditis  Cachexia  Disability      PLAN:       MANAGEMENT PLAN, TREATMENT GOALS, " THERAPEUTIC TECHNIQUES/APPROACHES & CLINICAL REASONING    He denies cravings or urges to use.  Withdrawal has subsided.  He remains interested in residential rehab for addiction after medical stabilization - [facility] a [facility] program for him.  He will first go to a SNF or similar prior to rehab.  Relapse prevention and motivational interviewing provided.      DIAGNOSTIC TESTING  The chart was reviewed for recent diagnostic investigations, and pertinent results are noted below.  12/24/19 - utox positive for amphetamines

## 2020-01-04 PROCEDURE — 99231 SBSQ HOSP IP/OBS SF/LOW 25: CPT | Mod: ,,, | Performed by: STUDENT IN AN ORGANIZED HEALTH CARE EDUCATION/TRAINING PROGRAM

## 2020-01-04 PROCEDURE — 25000003 PHARM REV CODE 250

## 2020-01-04 PROCEDURE — 63600175 PHARM REV CODE 636 W HCPCS: Performed by: STUDENT IN AN ORGANIZED HEALTH CARE EDUCATION/TRAINING PROGRAM

## 2020-01-04 PROCEDURE — A4216 STERILE WATER/SALINE, 10 ML: HCPCS

## 2020-01-04 PROCEDURE — 11000001 HC ACUTE MED/SURG PRIVATE ROOM

## 2020-01-04 PROCEDURE — 99231 PR SUBSEQUENT HOSPITAL CARE,LEVL I: ICD-10-PCS | Mod: ,,, | Performed by: STUDENT IN AN ORGANIZED HEALTH CARE EDUCATION/TRAINING PROGRAM

## 2020-01-04 RX ADMIN — Medication 10 ML: at 12:01

## 2020-01-04 RX ADMIN — OXACILLIN SODIUM 12 G: 10 INJECTION, POWDER, FOR SOLUTION INTRAVENOUS at 07:01

## 2020-01-04 NOTE — PLAN OF CARE
Problem: Fall Injury Risk  Goal: Absence of Fall and Fall-Related Injury  1/4/2020 0444 by Elma Chaves LPN  Outcome: Ongoing, Progressing     Problem: Skin Injury Risk Increased  Goal: Skin Health and Integrity  Outcome: Ongoing, Progressing     Pt in bed resting VSS afebrile no c/o pain or discomfort is able to state needs refused medications IV antibiotics running tubing patent site intact. Safety maintained pt remains free from falls.

## 2020-01-04 NOTE — ASSESSMENT & PLAN NOTE
Patient presented with generalized weakness and fatigue, vital signs concerning for shock with hypotension and tachycardia. Recently completed course of cefazolin for MSSA endocarditis, though UDS positive for amphetamines suggests patient may still be using IV drugs. With recent hospitalization would also be concerned about possible HAP. Urinalysis with 3+ leukocytes, 12 RBCs, >100 WBC and many bacteria - certainly concerning for urinary source. WCC elevated to 25. Initial lactate 7.9.   S/p 3L fluid resuscitation and BSABx with improvement in lactate to 1.8  Repeat CT head here without evidence of acute process  CXR unremarkable, no signs of consolidation. CT abdo/pelvis 11/24 shows splenic infarcts as well as cholelithiasis  12/28 minimal Levophed started overnight to maintain MAP >65     PLAN:  -- De-escalated to oxacillin only 12/28 - To continue till 2/18 (6 week course)  -- Continue to follow blood and urine cultures, last positive growth from 12/26.  -- Off pressors. Patient on midodrine 15 mg TID for BP support

## 2020-01-04 NOTE — ASSESSMENT & PLAN NOTE
PLAN:   Per addiction Psych: Can use PRN meds for opiate withdrawal sx as they arise:              PRNs:   · Bentyl 10mg PO Q6H PRN for abdominal cramps  · Robaxin 1000mg PO Q8H PRN for muscle cramps  · Zofran 4mg PO Q6H PRN for nausea/vomiting  · Imodium 2mg PO QID PRN for diarrhea (max 16mg in 24 hours)  · Motrin 600mg PO PRN for pain  --  Patient will go to SNF/LTAC without any opioids (suboxone, methadone, etc.) per his wishes.

## 2020-01-04 NOTE — ASSESSMENT & PLAN NOTE
PLAN:   Per addiction Psych: Can use PRN meds for opiate withdrawal sx as they arise:              PRNs:   · Bentyl 10mg PO Q6H PRN for abdominal cramps  · Robaxin 1000mg PO Q8H PRN for muscle cramps  · Zofran 4mg PO Q6H PRN for nausea/vomiting  · Imodium 2mg PO QID PRN for diarrhea (max 16mg in 24 hours)  · Motrin 600mg PO PRN for pain    Patient appears to be over the his withdrawal period at this time

## 2020-01-04 NOTE — ASSESSMENT & PLAN NOTE
PLAN:  --  CBC every 48 hours  -- Maintain Hb > 7 Transfuse if needed  -- Iron studies showed patient is iron deficient. Would be reasonable to start iron supplementation OP after Abx and endocarditis resolves

## 2020-01-04 NOTE — ASSESSMENT & PLAN NOTE
Baseline sCr 0.9 beginning of 11/2019, now 4. 2 on admit. Improved with IV fluids. Likely multifactorial including hypotension, possibly element of septic ATN. Consider emboli to the kidneys from infective endocarditis. Cr improving, now 2.6 (12/26/19). Likely RJ 2/2 sepsis and hypovolemia. 12/27 continuing to resolve, Cr 1.9. 12/28 1.5, improving.   12/29 urine studies (U-Na 53, U Osm 193, Serum Osm 292) without electrolyte abnormality, Na WNL on BMP     US RP with no significant abnormalities     PLAN:   -- Avoid nephrotoxic meds/substances  -- Strict Is and Os, daily weights  -- Monitor daily renal function  - 1/1/20 serum creatinine 1.4, down trending slowly

## 2020-01-04 NOTE — SUBJECTIVE & OBJECTIVE
Interval History: NAEON. Patient is waiting on transfer to SNF. D/Terry tele due to patient constantly removing it.    Review of Systems   Constitutional: Negative for chills, diaphoresis and fever.   HENT: Negative for congestion, rhinorrhea and sore throat.    Eyes: Negative for photophobia and visual disturbance.   Respiratory: Negative for cough, shortness of breath and stridor.    Cardiovascular: Negative for chest pain and leg swelling.   Gastrointestinal: Positive for constipation. Negative for abdominal distention, abdominal pain, blood in stool, diarrhea, nausea and vomiting.   Genitourinary: Negative for dysuria and hematuria.   Musculoskeletal: Negative for neck pain and neck stiffness.   Neurological: Negative for dizziness, light-headedness and headaches.   Psychiatric/Behavioral: Negative for agitation and confusion.     Objective:     Vital Signs (Most Recent):  Temp: 97.8 °F (36.6 °C) (01/04/20 0400)  Pulse: 85 (01/04/20 0400)  Resp: 17 (01/03/20 1957)  BP: 109/68 (01/04/20 0400)  SpO2: 98 % (01/04/20 0400) Vital Signs (24h Range):  Temp:  [97.6 °F (36.4 °C)-98.3 °F (36.8 °C)] 97.8 °F (36.6 °C)  Pulse:  [84-97] 85  Resp:  [17-20] 17  SpO2:  [98 %] 98 %  BP: (107-109)/(66-69) 109/68     Weight: 62.8 kg (138 lb 7.2 oz)  Body mass index is 18.78 kg/m².    Intake/Output Summary (Last 24 hours) at 1/4/2020 1108  Last data filed at 1/4/2020 1000  Gross per 24 hour   Intake 240 ml   Output 1570 ml   Net -1330 ml      Physical Exam   Constitutional: He is oriented to person, place, and time. He appears well-developed. He appears cachectic. He is active and cooperative. No distress.   HENT:   Head: Normocephalic and atraumatic.   Mouth/Throat: Oropharynx is clear and moist.   Eyes: EOM are normal. No scleral icterus.   Neck: Normal range of motion. Neck supple.   Cardiovascular: Normal rate and regular rhythm.   Murmur heard.   Systolic murmur is present with a grade of 2/6.  Pulmonary/Chest: Effort normal and  breath sounds normal. No stridor. No respiratory distress. He has no wheezes.   Abdominal: Soft. Bowel sounds are normal. He exhibits no distension. There is no tenderness. There is no guarding.   Musculoskeletal: He exhibits no edema or tenderness.   Neurological: He is alert and oriented to person, place, and time. He exhibits normal muscle tone.   Skin: Skin is warm and dry. No rash noted. He is not diaphoretic. No erythema.   Nursing note and vitals reviewed.      Significant Labs:  All pertinent labs within the past 24 hours have been reviewed.    Significant Imaging: I have reviewed and interpreted all pertinent imaging results/findings within the past 24 hours.

## 2020-01-04 NOTE — ASSESSMENT & PLAN NOTE
History of mitral valve MSSA endocarditis s/p 6 week course of cefazolin ending 12/18. Concern for septic emboli leading to infarcts of spleen, potentially renal infarcts. Repeat echo with vegetation vs ruptured chord apparatus of mitral valve, worsened MR to mod-severe from mild-mod on 11/04. 12/26 MRI brain with multiple punctate foci bilaterally representing septic emboli. AOx4, no FNDs at this time. Will follow-up with CTS s/p 6 week course of IV antibiotics on Wednesday, February 18, 2020 with repeat TTE and surgery on Tuesday, February 18, 2020.  Possible coronary angiogram between February 5 and February 18 should RJ resolve and patient still meet criteria for surgery.       PLAN:  - Oxacillin only to continue for 6 week course of abx - stop date 2/18  - Last positive blood culture on 12/26, will continue to follow  - ID following -  Continue IV abx therapy until date of surgery (2/18/20) with weekly CBC CMP ESR CRP sent to ID clinic at [phone] OP  - Monitor daily CBC  - PICC line placed in R Brachiocephalic.  - Follow up appointment with CTS Feb 5th with Surgery tentatively planned for 2/18

## 2020-01-04 NOTE — PROGRESS NOTES
[facility] Hospital Medicine  Progress Note    Patient Name: Ld Bronson  MRN: [id number]  Patient Class: IP- Inpatient   Admission Date: 12/25/2019  Length of Stay: 10 days  Attending Physician: Anish Woodard MD  Primary Care Provider: Kris Artis Jr, MD    Hospital Medicine Team: [facility]OS[facility] MED 2 Batsheva Mohr DO    Subjective:     Principal Problem:Endocarditis due to methicillin susceptible Staphylococcus aureus (MSSA)      HPI:  Mr. Bronson is a 53M IVDU with HCV, Hx of MSSA bacteremia and infective endocarditis (s/p 6 weeks of cefazolin 12/18), cervical spine epidural abscess s/p C6-7 diskectomy and arthrodesis 11/15/2019 who presented to [facility] with chest pain, generalized weakness, and shortness of breath. At OSH, he was found to be in septic shock requiring pressor support. He was transferred to [facility] for higher level of care. On arrival, patient was somnolent and difficult to arouse. Patient did waken with sternal rub and answered questions appropriately, but history largely obtained through chart review.      At OSH ED, patient recieved full 30ccs/kg fluid resuscitation with an additional liter (total ~3L) for hypotension and tachycardia; lactic acid was elevated to 3.4. He received 1 dose each of vancomycin and zosyn prior to transfer.      Overview/Hospital Course:  [facility] after a C6-7 ACDF with washout on 11/6 with end date 12/18/19. [facility] though it appears from transfer note that the patient had completed his full 6 week course at this time. His WCC and lactate have decreased significantly since starting broad spectrum antibiotics with vanc/zosyn and will be continued until sensitivities return. BCxs still positive for Staph aureus. C diff negative, gonorrhea/chlamydia negative, urine culture no growth. Patient c/o low back pain that has been  present for a month and chronic neck pain since the anterior cervical diskectomy and fusion. MRI cervical, thoracic, and lumbar ordered to r/o spinal cord compression and abscess. ID has been consulted for the recurrence of bacteremia and endocarditis, addiction psych consulted for continued heroin use (last use 3 days prior to admission per OSH ED note), and CTS consulted for evaluation of vegetation vs ruptured chord apparatus and worsened MR from prior study 11/04. Dr. Gooden with CTS has tentatively scheduled mitral valve repair, possible mitral valve replacement, possible tricuspid valve repair for Friday, January 3, 2020. CT chest w/o contrast ordered. NSGY consulted to assess for possible osteomyelitis on MRI. Addiction psych consulted who provided motivational interviewing and resources for outpatient rehabilitation to the patient, appreciated. Hgb noted to 6.7 today from 7.0, blood consent was attempted but the patient refused responding he will sign tomorrow. Will hold on transfusion until 12/28. Asymptomatic at this time, no dyspnea, lightheadedness, dizziness. 12/28 transfused 1 u prbc after consent obtained overnight. Requiring minimal pressors to keep MAP >65. Otherwise stable, good po intake, renal function improving. CT chest with small pleural effusions and possible early osteomyelitic changes in T8-T9, T10-T11. Currently patient is being treated for MSSA with oxacillin, active discussions with Cardiothoracic Surgery regarding valvular repair, however, patient would need further rehabilitation for history of IVDU. He will need a six week course of IV antibiotics with CTS outpatient follow-up on 2/5/20 and surgery on 2/18/20. Addicion psychiatry actively following who said patient has told them on many occasions that he does not want to start suboxone or methadone or anything other opioid medication and would prefer to just go to rehab after SNF. Expressed this to CM so that this will not be a  barrier to placement.      Interval History: NAEON. Patient is waiting on transfer to SNF. D/Terry tele due to patient constantly removing it.    Review of Systems   Constitutional: Negative for chills, diaphoresis and fever.   HENT: Negative for congestion, rhinorrhea and sore throat.    Eyes: Negative for photophobia and visual disturbance.   Respiratory: Negative for cough, shortness of breath and stridor.    Cardiovascular: Negative for chest pain and leg swelling.   Gastrointestinal: Positive for constipation. Negative for abdominal distention, abdominal pain, blood in stool, diarrhea, nausea and vomiting.   Genitourinary: Negative for dysuria and hematuria.   Musculoskeletal: Negative for neck pain and neck stiffness.   Neurological: Negative for dizziness, light-headedness and headaches.   Psychiatric/Behavioral: Negative for agitation and confusion.     Objective:     Vital Signs (Most Recent):  Temp: 97.8 °F (36.6 °C) (01/04/20 0400)  Pulse: 85 (01/04/20 0400)  Resp: 17 (01/03/20 1957)  BP: 109/68 (01/04/20 0400)  SpO2: 98 % (01/04/20 0400) Vital Signs (24h Range):  Temp:  [97.6 °F (36.4 °C)-98.3 °F (36.8 °C)] 97.8 °F (36.6 °C)  Pulse:  [84-97] 85  Resp:  [17-20] 17  SpO2:  [98 %] 98 %  BP: (107-109)/(66-69) 109/68     Weight: 62.8 kg (138 lb 7.2 oz)  Body mass index is 18.78 kg/m².    Intake/Output Summary (Last 24 hours) at 1/4/2020 1108  Last data filed at 1/4/2020 1000  Gross per 24 hour   Intake 240 ml   Output 1570 ml   Net -1330 ml      Physical Exam   Constitutional: He is oriented to person, place, and time. He appears well-developed. He appears cachectic. He is active and cooperative. No distress.   HENT:   Head: Normocephalic and atraumatic.   Mouth/Throat: Oropharynx is clear and moist.   Eyes: EOM are normal. No scleral icterus.   Neck: Normal range of motion. Neck supple.   Cardiovascular: Normal rate and regular rhythm.   Murmur heard.   Systolic murmur is present with a grade of  2/6.  Pulmonary/Chest: Effort normal and breath sounds normal. No stridor. No respiratory distress. He has no wheezes.   Abdominal: Soft. Bowel sounds are normal. He exhibits no distension. There is no tenderness. There is no guarding.   Musculoskeletal: He exhibits no edema or tenderness.   Neurological: He is alert and oriented to person, place, and time. He exhibits normal muscle tone.   Skin: Skin is warm and dry. No rash noted. He is not diaphoretic. No erythema.   Nursing note and vitals reviewed.      Significant Labs:  All pertinent labs within the past 24 hours have been reviewed.    Significant Imaging: I have reviewed and interpreted all pertinent imaging results/findings within the past 24 hours.      Assessment/Plan:      * Endocarditis due to methicillin susceptible Staphylococcus aureus (MSSA)  History of mitral valve MSSA endocarditis s/p 6 week course of cefazolin ending 12/18. Concern for septic emboli leading to infarcts of spleen, potentially renal infarcts. Repeat echo with vegetation vs ruptured chord apparatus of mitral valve, worsened MR to mod-severe from mild-mod on 11/04. 12/26 MRI brain with multiple punctate foci bilaterally representing septic emboli. AOx4, no FNDs at this time. Will follow-up with CTS s/p 6 week course of IV antibiotics on Wednesday, February 18, 2020 with repeat TTE and surgery on Tuesday, February 18, 2020.  Possible coronary angiogram between February 5 and February 18 should RJ resolve and patient still meet criteria for surgery.       PLAN:  - Oxacillin only to continue for 6 week course of abx - stop date 2/18  - Last positive blood culture on 12/26, will continue to follow  - ID following -  Continue IV abx therapy until date of surgery (2/18/20) with weekly CBC CMP ESR CRP sent to ID clinic at [phone] OP  - Monitor daily CBC  - PICC line placed in R Brachiocephalic.  - Follow up appointment with CTS Feb 5th with Surgery tentatively planned for  2/18      Normocytic anemia  PLAN:  --  CBC every 48 hours  -- Maintain Hb > 7 Transfuse if needed  -- Iron studies showed patient is iron deficient. Would be reasonable to start iron supplementation OP after Abx and endocarditis resolves      Debility  PLAN:  --PT/OT - To evaluate and treat  --Waiting on SNF/LTAC placement    Opioid withdrawal  PLAN:   Per addiction Psych: Can use PRN meds for opiate withdrawal sx as they arise:              PRNs:   · Bentyl 10mg PO Q6H PRN for abdominal cramps  · Robaxin 1000mg PO Q8H PRN for muscle cramps  · Zofran 4mg PO Q6H PRN for nausea/vomiting  · Imodium 2mg PO QID PRN for diarrhea (max 16mg in 24 hours)  · Motrin 600mg PO PRN for pain    Patient appears to be over the his withdrawal period at this time      Opioid use disorder, severe, dependence  PLAN:   Per addiction Psych: Can use PRN meds for opiate withdrawal sx as they arise:              PRNs:   · Bentyl 10mg PO Q6H PRN for abdominal cramps  · Robaxin 1000mg PO Q8H PRN for muscle cramps  · Zofran 4mg PO Q6H PRN for nausea/vomiting  · Imodium 2mg PO QID PRN for diarrhea (max 16mg in 24 hours)  · Motrin 600mg PO PRN for pain  --  Patient will go to SNF/LTAC without any opioids (suboxone, methadone, etc.) per his wishes.    RJ (acute kidney injury)  Baseline sCr 0.9 beginning of 11/2019, now 4. 2 on admit. Improved with IV fluids. Likely multifactorial including hypotension, possibly element of septic ATN. Consider emboli to the kidneys from infective endocarditis. Cr improving, now 2.6 (12/26/19). Likely RJ 2/2 sepsis and hypovolemia. 12/27 continuing to resolve, Cr 1.9. 12/28 1.5, improving.   12/29 urine studies (U-Na 53, U Osm 193, Serum Osm 292) without electrolyte abnormality, Na WNL on BMP     US RP with no significant abnormalities     PLAN:   -- Avoid nephrotoxic meds/substances  -- Strict Is and Os, daily weights  -- Monitor daily renal function  - 1/1/20 serum creatinine 1.4, down trending  slowly      Septic shock  Patient presented with generalized weakness and fatigue, vital signs concerning for shock with hypotension and tachycardia. Recently completed course of cefazolin for MSSA endocarditis, though UDS positive for amphetamines suggests patient may still be using IV drugs. With recent hospitalization would also be concerned about possible HAP. Urinalysis with 3+ leukocytes, 12 RBCs, >100 WBC and many bacteria - certainly concerning for urinary source. WCC elevated to 25. Initial lactate 7.9.   S/p 3L fluid resuscitation and BSABx with improvement in lactate to 1.8  Repeat CT head here without evidence of acute process  CXR unremarkable, no signs of consolidation. CT abdo/pelvis 11/24 shows splenic infarcts as well as cholelithiasis  12/28 minimal Levophed started overnight to maintain MAP >65     PLAN:  -- De-escalated to oxacillin only 12/28 - To continue till 2/18 (6 week course)  -- Continue to follow blood and urine cultures, last positive growth from 12/26.  -- Off pressors. Patient on midodrine 15 mg TID for BP support       Cachexia  Currently on adult regular diet and reports good PO intake. Nutrition consulted on 12/30 for BMI 18.78, goal of 20+ for possible valvular repairs.      PLAN:  -- Double portion meals  -- Boost Protein supplementation TID WM    IV drug abuse  PLAN:  - Continue to monitor for signs of withdrawal. PRNs Available  - Addiction psychiatry consulted, actively following patient, motivational interviewing and resources provided  - Will  need rehab OP        VTE Risk Mitigation (From admission, onward)         Ordered     enoxaparin injection 40 mg  Daily      12/27/19 0912     IP VTE HIGH RISK PATIENT  Once      12/25/19 0041                  Batsheva Mohr DO  Department of Hospital Medicine   [facility]

## 2020-01-05 LAB — TB INDURATION 48 - 72 HR READ: 0 MM

## 2020-01-05 PROCEDURE — 25000003 PHARM REV CODE 250: Performed by: INTERNAL MEDICINE

## 2020-01-05 PROCEDURE — 11000001 HC ACUTE MED/SURG PRIVATE ROOM

## 2020-01-05 PROCEDURE — 25000003 PHARM REV CODE 250: Performed by: STUDENT IN AN ORGANIZED HEALTH CARE EDUCATION/TRAINING PROGRAM

## 2020-01-05 PROCEDURE — 99231 PR SUBSEQUENT HOSPITAL CARE,LEVL I: ICD-10-PCS | Mod: ,,, | Performed by: STUDENT IN AN ORGANIZED HEALTH CARE EDUCATION/TRAINING PROGRAM

## 2020-01-05 PROCEDURE — 99231 SBSQ HOSP IP/OBS SF/LOW 25: CPT | Mod: ,,, | Performed by: STUDENT IN AN ORGANIZED HEALTH CARE EDUCATION/TRAINING PROGRAM

## 2020-01-05 RX ADMIN — MIDODRINE HYDROCHLORIDE 15 MG: 5 TABLET ORAL at 09:01

## 2020-01-05 RX ADMIN — THERA TABS 1 TABLET: TAB at 09:01

## 2020-01-05 NOTE — NURSING
"Pt. Has refused all nursing care and scheduled medication ordered continue to be agitated with staff not wanting to interact with staff, stating " I want to be left alone "... Vital signs normal will continue to monitor   "

## 2020-01-05 NOTE — ASSESSMENT & PLAN NOTE
PLAN:  -- Continue to monitor for signs of withdrawal. PRNs Available  -- Addiction psychiatry consulted, actively following patient, motivational interviewing and resources provided  -- Will  need rehab OP

## 2020-01-05 NOTE — PROGRESS NOTES
[facility] Hospital Medicine  Progress Note    Patient Name: Ld Bronson  MRN: [id number]  Patient Class: IP- Inpatient   Admission Date: 12/25/2019  Length of Stay: 11 days  Attending Physician: Anish Woodard MD  Primary Care Provider: Kris Artis Jr, MD    Hospital Medicine Team: [facility] 2 Batsheva Mohr DO    Subjective:     Principal Problem:Endocarditis due to methicillin susceptible Staphylococcus aureus (MSSA)      HPI:  Mr. Bronson is a 53M IVDU with HCV, Hx of MSSA bacteremia and infective endocarditis (s/p 6 weeks of cefazolin 12/18), cervical spine epidural abscess s/p C6-7 diskectomy and arthrodesis 11/15/2019 who presented to [facility] with chest pain, generalized weakness, and shortness of breath. At OSH, he was found to be in septic shock requiring pressor support. [place] [facility] [place]. On arrival, patient was somnolent and difficult to arouse. Patient did waken with sternal rub and answered questions appropriately, but history largely obtained through chart review.      At OSH ED, patient recieved full 30ccs/kg fluid resuscitation with an additional liter (total ~3L) for hypotension and tachycardia; lactic acid was elevated to 3.4. He received 1 dose each of vancomycin and zosyn prior to transfer.      Overview/Hospital Course:  [facility] after a C6-7 ACDF with washout on 11/6 with end date 12/18/19. [facility] though it appears from transfer note that the patient had completed his full 6 week course at this time. His WCC and lactate have decreased significantly since starting broad spectrum antibiotics with vanc/zosyn and was transitioned to Oxacillin per sensitivities. BCxs positive for MSSA. C diff negative, gonorrhea/chlamydia negative, urine culture no growth. Patient c/o low back pain that has been present for a  month and chronic neck pain since the anterior cervical diskectomy and fusion. MRI cervical, thoracic, and lumbar ordered to r/o spinal cord compression and abscess. ID has been consulted for the recurrence of bacteremia and endocarditis, addiction psych consulted for continued heroin use (last use 3 days prior to admission per OSH ED note), and CTS consulted for evaluation of vegetation vs ruptured chord apparatus and worsened MR from prior study 11/04. Dr. Gooden with CTS has tentatively scheduled mitral valve repair, possible mitral valve replacement, possible tricuspid valve repair initially scheduled for [facility] 3 then tentatively changed to Feb 18th. CT chest w/o contrast ordered. NSGY consulted to assess for possible osteomyelitis on MRI. Addiction psych consulted who provided motivational interviewing and resources for outpatient rehabilitation to the patient, appreciated. Hgb noted to 6.7 today from 7.0, blood consent was attempted but the patient refused responding he will sign tomorrow. Will hold on transfusion until 12/28. Asymptomatic at this time, no dyspnea, lightheadedness, dizziness. 12/28 transfused 1 u prbc after consent obtained overnight. Requiring minimal pressors to keep MAP >65. Otherwise stable, good po intake, renal function improving. CT chest with small pleural effusions and possible early osteomyelitic changes in T8-T9, T10-T11. Currently patient is being treated for MSSA with oxacillin, active discussions with Cardiothoracic Surgery regarding valvular repair, however, patient would need further rehabilitation for history of IVDU. He will need a six week course of IV antibiotics with CTS outpatient follow-up on 2/5/20 and surgery on 2/18/20. He was stepped down to medicine from the ICU on 12/30 to continue this plan. Addicion psychiatry actively following who said patient has told them on many occasions that he does not want to start suboxone or methadone or anything other opioid medication  and would prefer to just go to rehab after SNF. Expressed this to CM so that this will not be a barrier to placement. Patient currently is medically stable and ready to discharge to SNF.    Interval History: NAEON. Patient is stable and currently waiting on SNF or LTAC placement. No need for opioids at this time. Patient voiced that he does not want to take any opioids moving forward or be started on long term opioids such as suboxone on methadone for fear that he would sell them as this has happened to him before in the past    Review of Systems   Constitutional: Negative for chills, diaphoresis and fever.   HENT: Negative for congestion, rhinorrhea and sore throat.    Eyes: Negative for photophobia and visual disturbance.   Respiratory: Negative for cough, shortness of breath and stridor.    Cardiovascular: Negative for chest pain and leg swelling.   Gastrointestinal: Positive for constipation. Negative for abdominal distention, abdominal pain, blood in stool, diarrhea, nausea and vomiting.   Genitourinary: Negative for dysuria and hematuria.   Musculoskeletal: Negative for neck pain and neck stiffness.   Neurological: Negative for dizziness, light-headedness and headaches.   Psychiatric/Behavioral: Negative for agitation and confusion.     Objective:     Vital Signs (Most Recent):  Temp: 98.9 °F (37.2 °C) (01/05/20 0447)  Pulse: 82 (01/05/20 0447)  Resp: 19 (01/05/20 0447)  BP: 105/70 (01/05/20 0447)  SpO2: 99 % (01/05/20 0447) Vital Signs (24h Range):  Temp:  [97.5 °F (36.4 °C)-98.9 °F (37.2 °C)] 98.9 °F (37.2 °C)  Pulse:  [82-94] 82  Resp:  [18-19] 19  SpO2:  [97 %-100 %] 99 %  BP: (101-115)/(66-70) 105/70     Weight: 62.8 kg (138 lb 7.2 oz)  Body mass index is 18.78 kg/m².    Intake/Output Summary (Last 24 hours) at 1/5/2020 0748  Last data filed at 1/5/2020 0500  Gross per 24 hour   Intake 240 ml   Output 800 ml   Net -560 ml      Physical Exam   Constitutional: He is oriented to person, place, and time. He  appears well-developed. He appears cachectic. He is active and cooperative. No distress.   HENT:   Head: Normocephalic and atraumatic.   Mouth/Throat: Oropharynx is clear and moist.   Eyes: EOM are normal. No scleral icterus.   Neck: Normal range of motion. Neck supple.   Cardiovascular: Normal rate and regular rhythm.   Murmur heard.   Systolic murmur is present with a grade of 2/6.  Pulmonary/Chest: Effort normal and breath sounds normal. No stridor. No respiratory distress. He has no wheezes.   Abdominal: Soft. Bowel sounds are normal. He exhibits no distension. There is no tenderness. There is no guarding.   Musculoskeletal: He exhibits no edema or tenderness.   Neurological: He is alert and oriented to person, place, and time. He exhibits normal muscle tone.   Skin: Skin is warm and dry. No rash noted. He is not diaphoretic. No erythema.   Nursing note and vitals reviewed.      Significant Labs: Blood Culture: No results for input(s): LABBLOO in the last 48 hours.  BMP: No results for input(s): GLU, NA, K, CL, CO2, BUN, CREATININE, CALCIUM, MG in the last 48 hours.  CBC: No results for input(s): WBC, HGB, HCT, PLT in the last 48 hours.    Significant Imaging: I have reviewed and interpreted all pertinent imaging results/findings within the past 24 hours.      Assessment/Plan:      * Endocarditis due to methicillin susceptible Staphylococcus aureus (MSSA)  History of mitral valve MSSA endocarditis s/p 6 week course of cefazolin ending 12/18. Concern for septic emboli leading to infarcts of spleen, potentially renal infarcts. Repeat echo with vegetation vs ruptured chord apparatus of mitral valve, worsened MR to mod-severe from mild-mod on 11/04. 12/26 MRI brain with multiple punctate foci bilaterally representing septic emboli. AOx4, no FNDs at this time. Will follow-up with CTS s/p 6 week course of IV antibiotics on Wednesday, February 18, 2020 with repeat TTE and surgery on Tuesday, February 18, 2020.   Possible coronary angiogram between February 5 and February 18 should RJ resolve and patient still meet criteria for surgery.       PLAN:  -- Oxacillin to continue for 6 week course of abx - stop date 2/18  -- Last positive blood culture on 12/26, will continue to follow  -- ID following -  Continue IV abx therapy until date of surgery (2/18/20) with weekly CBC CMP ESR CRP sent to ID clinic at [phone] OP  -- Monitor CBC every 48 hours  -- PICC line placed in R Brachiocephalic.  -- Follow up appointment with CTS Feb 5th with Surgery tentatively planned for 2/18      Normocytic anemia  PLAN:  --  CBC every 48 hours  -- Maintain Hb > 7 Transfuse if needed  -- Iron studies showed patient is iron deficient. Would be reasonable to start iron supplementation OP after Abx and endocarditis resolves  -- Patient should follow up with PCP 1-2 weeks after discharged      Debility  PLAN:  --PT/OT - To evaluate and treat  --Waiting on SNF/LTAC placement    Opioid withdrawal  PLAN:   Per addiction Psych: Can use PRN meds for opiate withdrawal sx as they arise:              PRNs:   · Bentyl 10mg PO Q6H PRN for abdominal cramps  · Robaxin 1000mg PO Q8H PRN for muscle cramps  · Zofran 4mg PO Q6H PRN for nausea/vomiting  · Imodium 2mg PO QID PRN for diarrhea (max 16mg in 24 hours)  · Motrin 600mg PO PRN for pain    Patient appears to be over the his withdrawal period at this time      Opioid use disorder, severe, dependence  PLAN:   Per addiction Psych: Can use PRN meds for opiate withdrawal sx as they arise:              PRNs:   · Bentyl 10mg PO Q6H PRN for abdominal cramps  · Robaxin 1000mg PO Q8H PRN for muscle cramps  · Zofran 4mg PO Q6H PRN for nausea/vomiting  · Imodium 2mg PO QID PRN for diarrhea (max 16mg in 24 hours)  · Motrin 600mg PO PRN for pain  --  Patient will go to SNF/LTAC without any opioids (suboxone, methadone, etc.) per his wishes.    RJ (acute kidney injury)  Baseline sCr 0.9 beginning of 11/2019, now 4. 2  on admit. Improved with IV fluids. Likely multifactorial including hypotension, possibly element of septic ATN. Consider emboli to the kidneys from infective endocarditis. Cr improving, now 2.6 (12/26/19). Likely RJ 2/2 sepsis and hypovolemia. 12/27 continuing to resolve, Cr 1.9. 12/28 1.5, improving.   12/29 urine studies (U-Na 53, U Osm 193, Serum Osm 292) without electrolyte abnormality, Na WNL on BMP     US RP with no significant abnormalities     PLAN:   -- Avoid nephrotoxic meds/substances  -- Strict Is and Os, daily weights  -- Monitor renal function every 48 hours  -- 1/1/20 serum creatinine 1.4, down trending slowly      Septic shock  Patient presented with generalized weakness and fatigue, vital signs concerning for shock with hypotension and tachycardia. Recently completed course of cefazolin for MSSA endocarditis, though UDS positive for amphetamines suggests patient may still be using IV drugs. With recent hospitalization would also be concerned about possible HAP. Urinalysis with 3+ leukocytes, 12 RBCs, >100 WBC and many bacteria - certainly concerning for urinary source. WCC elevated to 25. Initial lactate 7.9.   S/p 3L fluid resuscitation and BSABx with improvement in lactate to 1.8  Repeat CT head here without evidence of acute process  CXR unremarkable, no signs of consolidation. CT abdo/pelvis 11/24 shows splenic infarcts as well as cholelithiasis  12/28 minimal Levophed started overnight to maintain MAP >65     PLAN:  -- De-escalated to oxacillin only 12/28 - To continue till 2/18 (6 week course)  -- Continue to follow blood and urine cultures, last positive growth from 12/26.  -- Off pressors. Patient on midodrine 15 mg TID for BP support       Cachexia  Currently on adult regular diet and reports good PO intake. Nutrition consulted on 12/30 for BMI 18.78, goal of 20+ for possible valvular repairs.      PLAN:  -- Double portion meals  -- Boost Protein supplementation TID WM  -- goal BMI  >20    IV drug abuse  PLAN:  -- Continue to monitor for signs of withdrawal. PRNs Available  -- Addiction psychiatry consulted, actively following patient, motivational interviewing and resources provided  -- Will  need rehab OP        VTE Risk Mitigation (From admission, onward)         Ordered     enoxaparin injection 40 mg  Daily      12/27/19 0912     IP VTE HIGH RISK PATIENT  Once      12/25/19 0041                Batsheva Mohr DO  Department of Hospital Medicine   [facility]

## 2020-01-05 NOTE — NURSING
Pt. Refused 1900 vital signs. Agitated yelling at staff to leave him alone he wants to sleep... Will continue to monitor....

## 2020-01-05 NOTE — PLAN OF CARE
Problem: Adult Inpatient Plan of Care  Goal: Plan of Care Review  Description  Pt had a good day. Pt levophed was discontinue around noon today. Pt was started on midodrine and tolerated well. Pt was weak with PT and needs more strength exercises. Pt was updated on plan of care be team and will be transfer to stepdown, Awaiting bed, will continue to monitor.    Outcome: Ongoing, Not Progressing  Goal: Patient-Specific Goal (Individualization)  Description  PMH: MSSA bacteremia and infective endocarditis (s/p 6 weeks of cefazolin 12/18), cervical spine epidural abscess s/p C6-7 diskectomy and arthrodesis 11/15/2019    DX: septic shock    12/24: [facility] with chest pain, generalized weakness, and SOB. Found to be in septic shock  12/25: tx to [facility] for pressor support/higher level of care, levo, Retro Perit. US  12/26: MRI head, echo  12/27: MRI Spine, CT chest, blood cultures  12/28 PM:  2.5 L LR.  Urine & blood samples.  12/29: 1L NS, blood cx, urinalysis    Nursing:  *MAP > 65             Outcome: Ongoing, Not Progressing  Goal: Absence of Hospital-Acquired Illness or Injury  Outcome: Ongoing, Not Progressing  Intervention: Identify and Manage Fall Risk  Flowsheets (Taken 1/5/2020 1756)  Safety Promotion/Fall Prevention: assistive device/personal item within reach; bed alarm refused; Fall Risk reviewed with patient/family; nonskid shoes/socks when out of bed; side rails raised x 2; instructed to call staff for mobility  Intervention: Prevent VTE (venous thromboembolism)  Flowsheets (Taken 1/5/2020 1756)  VTE Prevention/Management: ambulation promoted; bleeding precautions maintained  Goal: Optimal Comfort and Wellbeing  Outcome: Ongoing, Not Progressing  Intervention: Provide Person-Centered Care  Flowsheets (Taken 1/5/2020 1756)  Trust Relationship/Rapport: care explained; emotional support provided; thoughts/feelings acknowledged  Goal: Readiness for Transition of Care  Outcome: Ongoing, Not  "Progressing  Goal: Rounds/Family Conference  Outcome: Ongoing, Not Progressing     Problem: Infection  Goal: Infection Symptom Resolution  Outcome: Ongoing, Not Progressing  Intervention: Prevent or Manage Infection  Flowsheets (Taken 1/5/2020 1756)  Infection Management: aseptic technique maintained     Problem: Fall Injury Risk  Goal: Absence of Fall and Fall-Related Injury  Outcome: Ongoing, Not Progressing  Intervention: Identify and Manage Contributors to Fall Injury Risk  Flowsheets (Taken 1/5/2020 1756)  Self-Care Promotion: BADL personal objects within reach; independence encouraged  Medication Review/Management: medications reviewed  Intervention: Promote Injury-Free Environment  Flowsheets (Taken 1/5/2020 1756)  Safety Promotion/Fall Prevention: assistive device/personal item within reach; bed alarm refused; Fall Risk reviewed with patient/family; nonskid shoes/socks when out of bed; side rails raised x 2; instructed to call staff for mobility     Problem: Skin Injury Risk Increased  Goal: Skin Health and Integrity  Outcome: Ongoing, Not Progressing     Problem: Wound  Goal: Optimal Wound Healing  Outcome: Ongoing, Not Progressing     Pt. refusing meds, IV Abx, flushing/accessing PICC line.   Pt. has multiple c/o. Pt. stated "my ID and jeans missing" Unable to find ID and jeans in the . [facility] made aware. Charge RN on SICU called to check. (Pt. was previously in RM [id number]). No call back from SICU charge RN.   Pt. is also asking the staff to take him off the unit for a smoking. Pt. was informed that staff can't do that.   Pt. became very agitated. Pt. went by himself/off the unit for a smoking.   Dr. Arzate  with IM 2 made aware that pt. is not receiving tx, specially continuous Abx since more than 12 hrs.   Fall safety and importance of IV Abx education provided multiple times, pt. became agitated and started cursing.   Frequent rounds made. Wctm.     "

## 2020-01-05 NOTE — ASSESSMENT & PLAN NOTE
PLAN:  --  CBC every 48 hours  -- Maintain Hb > 7 Transfuse if needed  -- Iron studies showed patient is iron deficient. Would be reasonable to start iron supplementation OP after Abx and endocarditis resolves  -- Patient should follow up with PCP 1-2 weeks after discharged

## 2020-01-05 NOTE — ASSESSMENT & PLAN NOTE
Currently on adult regular diet and reports good PO intake. Nutrition consulted on 12/30 for BMI 18.78, goal of 20+ for possible valvular repairs.      PLAN:  -- Double portion meals  -- Boost Protein supplementation TID WM  -- goal BMI >20

## 2020-01-05 NOTE — ASSESSMENT & PLAN NOTE
History of mitral valve MSSA endocarditis s/p 6 week course of cefazolin ending 12/18. Concern for septic emboli leading to infarcts of spleen, potentially renal infarcts. Repeat echo with vegetation vs ruptured chord apparatus of mitral valve, worsened MR to mod-severe from mild-mod on 11/04. 12/26 MRI brain with multiple punctate foci bilaterally representing septic emboli. AOx4, no FNDs at this time. Will follow-up with CTS s/p 6 week course of IV antibiotics on Wednesday, February 18, 2020 with repeat TTE and surgery on Tuesday, February 18, 2020.  Possible coronary angiogram between February 5 and February 18 should RJ resolve and patient still meet criteria for surgery.       PLAN:  -- Oxacillin to continue for 6 week course of abx - stop date 2/18  -- Last positive blood culture on 12/26, will continue to follow  -- ID following -  Continue IV abx therapy until date of surgery (2/18/20) with weekly CBC CMP ESR CRP sent to ID clinic at [phone] OP  -- Monitor CBC every 48 hours  -- PICC line placed in R Brachiocephalic.  -- Follow up appointment with CTS Feb 5th with Surgery tentatively planned for 2/18

## 2020-01-05 NOTE — ASSESSMENT & PLAN NOTE
Baseline sCr 0.9 beginning of 11/2019, now 4. 2 on admit. Improved with IV fluids. Likely multifactorial including hypotension, possibly element of septic ATN. Consider emboli to the kidneys from infective endocarditis. Cr improving, now 2.6 (12/26/19). Likely RJ 2/2 sepsis and hypovolemia. 12/27 continuing to resolve, Cr 1.9. 12/28 1.5, improving.   12/29 urine studies (U-Na 53, U Osm 193, Serum Osm 292) without electrolyte abnormality, Na WNL on BMP     US RP with no significant abnormalities     PLAN:   -- Avoid nephrotoxic meds/substances  -- Strict Is and Os, daily weights  -- Monitor renal function every 48 hours  -- 1/1/20 serum creatinine 1.4, down trending slowly

## 2020-01-05 NOTE — SUBJECTIVE & OBJECTIVE
Interval History: NAEON. Patient is stable and currently waiting on SNF or LTAC placement. No need for opioids at this time. Patient voiced that he does not want to take any opioids moving forward or be started on long term opioids such as suboxone on methadone for fear that he would sell them as this has happened to him before in the past    Review of Systems   Constitutional: Negative for chills, diaphoresis and fever.   HENT: Negative for congestion, rhinorrhea and sore throat.    Eyes: Negative for photophobia and visual disturbance.   Respiratory: Negative for cough, shortness of breath and stridor.    Cardiovascular: Negative for chest pain and leg swelling.   Gastrointestinal: Positive for constipation. Negative for abdominal distention, abdominal pain, blood in stool, diarrhea, nausea and vomiting.   Genitourinary: Negative for dysuria and hematuria.   Musculoskeletal: Negative for neck pain and neck stiffness.   Neurological: Negative for dizziness, light-headedness and headaches.   Psychiatric/Behavioral: Negative for agitation and confusion.     Objective:     Vital Signs (Most Recent):  Temp: 98.9 °F (37.2 °C) (01/05/20 0447)  Pulse: 82 (01/05/20 0447)  Resp: 19 (01/05/20 0447)  BP: 105/70 (01/05/20 0447)  SpO2: 99 % (01/05/20 0447) Vital Signs (24h Range):  Temp:  [97.5 °F (36.4 °C)-98.9 °F (37.2 °C)] 98.9 °F (37.2 °C)  Pulse:  [82-94] 82  Resp:  [18-19] 19  SpO2:  [97 %-100 %] 99 %  BP: (101-115)/(66-70) 105/70     Weight: 62.8 kg (138 lb 7.2 oz)  Body mass index is 18.78 kg/m².    Intake/Output Summary (Last 24 hours) at 1/5/2020 0748  Last data filed at 1/5/2020 0500  Gross per 24 hour   Intake 240 ml   Output 800 ml   Net -560 ml      Physical Exam   Constitutional: He is oriented to person, place, and time. He appears well-developed. He appears cachectic. He is active and cooperative. No distress.   HENT:   Head: Normocephalic and atraumatic.   Mouth/Throat: Oropharynx is clear and moist.   Eyes:  EOM are normal. No scleral icterus.   Neck: Normal range of motion. Neck supple.   Cardiovascular: Normal rate and regular rhythm.   Murmur heard.   Systolic murmur is present with a grade of 2/6.  Pulmonary/Chest: Effort normal and breath sounds normal. No stridor. No respiratory distress. He has no wheezes.   Abdominal: Soft. Bowel sounds are normal. He exhibits no distension. There is no tenderness. There is no guarding.   Musculoskeletal: He exhibits no edema or tenderness.   Neurological: He is alert and oriented to person, place, and time. He exhibits normal muscle tone.   Skin: Skin is warm and dry. No rash noted. He is not diaphoretic. No erythema.   Nursing note and vitals reviewed.      Significant Labs: Blood Culture: No results for input(s): LABBLOO in the last 48 hours.  BMP: No results for input(s): GLU, NA, K, CL, CO2, BUN, CREATININE, CALCIUM, MG in the last 48 hours.  CBC: No results for input(s): WBC, HGB, HCT, PLT in the last 48 hours.    Significant Imaging: I have reviewed and interpreted all pertinent imaging results/findings within the past 24 hours.

## 2020-01-06 VITALS
TEMPERATURE: 98 F | OXYGEN SATURATION: 99 % | BODY MASS INDEX: 18.75 KG/M2 | SYSTOLIC BLOOD PRESSURE: 113 MMHG | WEIGHT: 138.44 LBS | RESPIRATION RATE: 18 BRPM | DIASTOLIC BLOOD PRESSURE: 69 MMHG | HEART RATE: 81 BPM | HEIGHT: 72 IN

## 2020-01-06 LAB
ALBUMIN SERPL BCP-MCNC: 2.7 G/DL (ref 3.5–5.2)
ALBUMIN SERPL BCP-MCNC: 2.7 G/DL (ref 3.5–5.2)
ALP SERPL-CCNC: 225 U/L (ref 55–135)
ALP SERPL-CCNC: 225 U/L (ref 55–135)
ALT SERPL W/O P-5'-P-CCNC: 743 U/L (ref 10–44)
ALT SERPL W/O P-5'-P-CCNC: 743 U/L (ref 10–44)
ANION GAP SERPL CALC-SCNC: 7 MMOL/L (ref 8–16)
ANION GAP SERPL CALC-SCNC: 7 MMOL/L (ref 8–16)
AST SERPL-CCNC: 1078 U/L (ref 10–40)
AST SERPL-CCNC: 1078 U/L (ref 10–40)
BASOPHILS # BLD AUTO: 0.14 K/UL (ref 0–0.2)
BASOPHILS # BLD AUTO: 0.14 K/UL (ref 0–0.2)
BASOPHILS NFR BLD: 1.1 % (ref 0–1.9)
BASOPHILS NFR BLD: 1.1 % (ref 0–1.9)
BILIRUB SERPL-MCNC: 0.4 MG/DL (ref 0.1–1)
BILIRUB SERPL-MCNC: 0.4 MG/DL (ref 0.1–1)
BUN SERPL-MCNC: 55 MG/DL (ref 6–20)
BUN SERPL-MCNC: 55 MG/DL (ref 6–20)
CALCIUM SERPL-MCNC: 9.5 MG/DL (ref 8.7–10.5)
CALCIUM SERPL-MCNC: 9.5 MG/DL (ref 8.7–10.5)
CHLORIDE SERPL-SCNC: 105 MMOL/L (ref 95–110)
CHLORIDE SERPL-SCNC: 105 MMOL/L (ref 95–110)
CO2 SERPL-SCNC: 21 MMOL/L (ref 23–29)
CO2 SERPL-SCNC: 21 MMOL/L (ref 23–29)
CREAT SERPL-MCNC: 1.6 MG/DL (ref 0.5–1.4)
CREAT SERPL-MCNC: 1.6 MG/DL (ref 0.5–1.4)
DIFFERENTIAL METHOD: ABNORMAL
DIFFERENTIAL METHOD: ABNORMAL
EOSINOPHIL # BLD AUTO: 0.2 K/UL (ref 0–0.5)
EOSINOPHIL # BLD AUTO: 0.2 K/UL (ref 0–0.5)
EOSINOPHIL NFR BLD: 1.4 % (ref 0–8)
EOSINOPHIL NFR BLD: 1.4 % (ref 0–8)
ERYTHROCYTE [DISTWIDTH] IN BLOOD BY AUTOMATED COUNT: 17.4 % (ref 11.5–14.5)
ERYTHROCYTE [DISTWIDTH] IN BLOOD BY AUTOMATED COUNT: 17.4 % (ref 11.5–14.5)
EST. GFR  (AFRICAN AMERICAN): 55.2 ML/MIN/1.73 M^2
EST. GFR  (AFRICAN AMERICAN): 55.2 ML/MIN/1.73 M^2
EST. GFR  (NON AFRICAN AMERICAN): 47.8 ML/MIN/1.73 M^2
EST. GFR  (NON AFRICAN AMERICAN): 47.8 ML/MIN/1.73 M^2
GLUCOSE SERPL-MCNC: 106 MG/DL (ref 70–110)
GLUCOSE SERPL-MCNC: 106 MG/DL (ref 70–110)
HCT VFR BLD AUTO: 27.9 % (ref 40–54)
HCT VFR BLD AUTO: 27.9 % (ref 40–54)
HGB BLD-MCNC: 8.4 G/DL (ref 14–18)
HGB BLD-MCNC: 8.4 G/DL (ref 14–18)
IMM GRANULOCYTES # BLD AUTO: 0.1 K/UL (ref 0–0.04)
IMM GRANULOCYTES # BLD AUTO: 0.1 K/UL (ref 0–0.04)
IMM GRANULOCYTES NFR BLD AUTO: 0.8 % (ref 0–0.5)
IMM GRANULOCYTES NFR BLD AUTO: 0.8 % (ref 0–0.5)
LYMPHOCYTES # BLD AUTO: 2.6 K/UL (ref 1–4.8)
LYMPHOCYTES # BLD AUTO: 2.6 K/UL (ref 1–4.8)
LYMPHOCYTES NFR BLD: 20.9 % (ref 18–48)
LYMPHOCYTES NFR BLD: 20.9 % (ref 18–48)
MAGNESIUM SERPL-MCNC: 1.8 MG/DL (ref 1.6–2.6)
MAGNESIUM SERPL-MCNC: 1.8 MG/DL (ref 1.6–2.6)
MCH RBC QN AUTO: 26.8 PG (ref 27–31)
MCH RBC QN AUTO: 26.8 PG (ref 27–31)
MCHC RBC AUTO-ENTMCNC: 30.1 G/DL (ref 32–36)
MCHC RBC AUTO-ENTMCNC: 30.1 G/DL (ref 32–36)
MCV RBC AUTO: 89 FL (ref 82–98)
MCV RBC AUTO: 89 FL (ref 82–98)
MONOCYTES # BLD AUTO: 0.9 K/UL (ref 0.3–1)
MONOCYTES # BLD AUTO: 0.9 K/UL (ref 0.3–1)
MONOCYTES NFR BLD: 6.9 % (ref 4–15)
MONOCYTES NFR BLD: 6.9 % (ref 4–15)
NEUTROPHILS # BLD AUTO: 8.5 K/UL (ref 1.8–7.7)
NEUTROPHILS # BLD AUTO: 8.5 K/UL (ref 1.8–7.7)
NEUTROPHILS NFR BLD: 68.9 % (ref 38–73)
NEUTROPHILS NFR BLD: 68.9 % (ref 38–73)
NRBC BLD-RTO: 0 /100 WBC
NRBC BLD-RTO: 0 /100 WBC
PHOSPHATE SERPL-MCNC: 5.2 MG/DL (ref 2.7–4.5)
PHOSPHATE SERPL-MCNC: 5.2 MG/DL (ref 2.7–4.5)
PLATELET # BLD AUTO: 447 K/UL (ref 150–350)
PLATELET # BLD AUTO: 447 K/UL (ref 150–350)
PMV BLD AUTO: 9.3 FL (ref 9.2–12.9)
PMV BLD AUTO: 9.3 FL (ref 9.2–12.9)
POTASSIUM SERPL-SCNC: 4.1 MMOL/L (ref 3.5–5.1)
POTASSIUM SERPL-SCNC: 4.1 MMOL/L (ref 3.5–5.1)
PROT SERPL-MCNC: 8.1 G/DL (ref 6–8.4)
PROT SERPL-MCNC: 8.1 G/DL (ref 6–8.4)
RBC # BLD AUTO: 3.13 M/UL (ref 4.6–6.2)
RBC # BLD AUTO: 3.13 M/UL (ref 4.6–6.2)
SODIUM SERPL-SCNC: 133 MMOL/L (ref 136–145)
SODIUM SERPL-SCNC: 133 MMOL/L (ref 136–145)
WBC # BLD AUTO: 12.4 K/UL (ref 3.9–12.7)
WBC # BLD AUTO: 12.4 K/UL (ref 3.9–12.7)

## 2020-01-06 PROCEDURE — 84100 ASSAY OF PHOSPHORUS: CPT

## 2020-01-06 PROCEDURE — 99239 HOSP IP/OBS DSCHRG MGMT >30: CPT | Mod: ,,, | Performed by: INTERNAL MEDICINE

## 2020-01-06 PROCEDURE — 85025 COMPLETE CBC W/AUTO DIFF WBC: CPT

## 2020-01-06 PROCEDURE — 99900037 HC PT THERAPY SCREENING (STAT)

## 2020-01-06 PROCEDURE — 36415 COLL VENOUS BLD VENIPUNCTURE: CPT

## 2020-01-06 PROCEDURE — 25000003 PHARM REV CODE 250: Performed by: STUDENT IN AN ORGANIZED HEALTH CARE EDUCATION/TRAINING PROGRAM

## 2020-01-06 PROCEDURE — 25000003 PHARM REV CODE 250: Performed by: INTERNAL MEDICINE

## 2020-01-06 PROCEDURE — 25000003 PHARM REV CODE 250

## 2020-01-06 PROCEDURE — 99232 SBSQ HOSP IP/OBS MODERATE 35: CPT | Mod: AF,HB,S$PBB, | Performed by: PSYCHIATRY & NEUROLOGY

## 2020-01-06 PROCEDURE — A4216 STERILE WATER/SALINE, 10 ML: HCPCS

## 2020-01-06 PROCEDURE — 99232 PR SUBSEQUENT HOSPITAL CARE,LEVL II: ICD-10-PCS | Mod: AF,HB,S$PBB, | Performed by: PSYCHIATRY & NEUROLOGY

## 2020-01-06 PROCEDURE — 83735 ASSAY OF MAGNESIUM: CPT

## 2020-01-06 PROCEDURE — 80053 COMPREHEN METABOLIC PANEL: CPT

## 2020-01-06 PROCEDURE — 99239 PR HOSPITAL DISCHARGE DAY,>30 MIN: ICD-10-PCS | Mod: ,,, | Performed by: INTERNAL MEDICINE

## 2020-01-06 RX ADMIN — MIDODRINE HYDROCHLORIDE 15 MG: 5 TABLET ORAL at 09:01

## 2020-01-06 RX ADMIN — Medication 10 ML: at 12:01

## 2020-01-06 RX ADMIN — THERA TABS 1 TABLET: TAB at 09:01

## 2020-01-06 NOTE — DISCHARGE SUMMARY
[facility] Hospital Medicine  Discharge Summary      Patient Name: Ld Bronson  MRN: [id number]  Admission Date: 12/25/2019  Hospital Length of Stay: 12 days  Discharge Date and Time:  01/06/2020 4:42 PM  Attending Physician: Cristal Solorio MD   Discharging Provider: Kevin Puga MD  Primary Care Provider: Kris Artis Jr, MD  Hospital Medicine Team: [facility] 2 Kevin Puga MD    HPI:   Mr. Bronson is a 53M IVDU with HCV, Hx of MSSA bacteremia and infective endocarditis (s/p 6 weeks of cefazolin 12/18), cervical spine epidural abscess s/p C6-7 diskectomy and arthrodesis 11/15/2019 who presented to [facility] with chest pain, generalized weakness, and shortness of breath. At OSH, he was found to be in septic shock requiring pressor support. He was [place] [facility] [place] higher level of care. On arrival, patient was somnolent and difficult to arouse. Patient did waken with sternal rub and answered questions appropriately, but history largely obtained through chart review.      At OSH ED, patient recieved full 30ccs/kg fluid resuscitation with an additional liter (total ~3L) for hypotension and tachycardia; lactic acid was elevated to 3.4. He received 1 dose each of vancomycin and zosyn prior to transfer.      * No surgery found *      Hospital Course:   [facility] after a C6-7 ACDF with washout on 11/6 with end date 12/18/19. [facility] though it appears from transfer note that the patient had completed his full 6 week course at this time. His WCC and lactate have decreased significantly since starting broad spectrum antibiotics with vanc/zosyn and was transitioned to Oxacillin per sensitivities. BCxs positive for MSSA. C diff negative, gonorrhea/chlamydia negative, urine culture no growth. Patient c/o low back pain that has been present for a month and chronic  neck pain since the anterior cervical diskectomy and fusion. MRI cervical, thoracic, and lumbar ordered to r/o spinal cord compression and abscess. ID has been consulted for the recurrence of bacteremia and endocarditis, addiction psych consulted for continued heroin use (last use 3 days prior to admission per OSH ED note), and CTS consulted for evaluation of vegetation vs ruptured chord apparatus and worsened MR from prior study 11/04. Dr. Gooden with CTS has tentatively scheduled mitral valve repair, possible mitral valve replacement, possible tricuspid valve repair initially scheduled for [place] 3 then tentatively changed to Feb 18th. CT chest w/o contrast ordered. NSGY consulted to assess for possible osteomyelitis on MRI. Addiction psych consulted who provided motivational interviewing and resources for outpatient rehabilitation to the patient, appreciated. Hgb noted to 6.7 today from 7.0, blood consent was attempted but the patient refused responding he will sign tomorrow. Will hold on transfusion until 12/28. Asymptomatic at this time, no dyspnea, lightheadedness, dizziness. 12/28 transfused 1 u prbc after consent obtained overnight. Requiring minimal pressors to keep MAP >65. Otherwise stable, good po intake, renal function improving. CT chest with small pleural effusions and possible early osteomyelitic changes in T8-T9, T10-T11. Currently patient is being treated for MSSA with oxacillin, active discussions with Cardiothoracic Surgery regarding valvular repair, however, patient would need further rehabilitation for history of IVDU. He will need a six week course of IV antibiotics with CTS outpatient follow-up on 2/5/20 and surgery on 2/18/20. He was stepped down to medicine from the ICU on 12/30 to continue this plan. Addicion psychiatry actively following who said patient has told them on many occasions that he does not want to start suboxone or methadone or anything other opioid medication and would prefer  to just go to rehab after SNF. Expressed this to CM so that this will not be a barrier to placement. Patient currently is medically stable and ready to discharge to SNF.     Consults:   Consults (From admission, onward)        Status Ordering Provider     Inpatient consult to Cardiothoracic Surgery  Once     Provider:  (Not yet assigned)    Completed AMIE FOLEY     Inpatient consult to Infectious Diseases  Once     Provider:  (Not yet assigned)    Completed JARAD ALVARADO     Inpatient consult to PICC team (NIAS)  Once     Provider:  (Not yet assigned)    Completed VENICE DUMONT     Inpatient consult to Psychiatry  Once     Provider:  (Not yet assigned)    Completed AMIE FOLEY     Inpatient consult to Registered Dietitian/Nutritionist  Once     Provider:  (Not yet assigned)    Completed MUKESH BEAR     Inpatient consult to SNF  Once     Provider:  (Not yet assigned)    Acknowledged VENICE DUMONT     Inpatient consult to SNF [facility]  Once     Provider:  (Not yet assigned)    Acknowledged VENICE DUMONT          No new Assessment & Plan notes have been filed under this hospital service since the last note was generated.  Service: Hospital Medicine    Final Active Diagnoses:    Diagnosis Date Noted POA    PRINCIPAL PROBLEM:  Endocarditis due to methicillin susceptible Staphylococcus aureus (MSSA) [I33.0, B95.61] 10/31/2019 Yes    Debility [R53.81] 12/31/2019 Yes    Normocytic anemia [D64.9] 12/31/2019 Yes    Opioid use disorder, severe, dependence [F11.20] 12/27/2019 Yes     Chronic    Opioid withdrawal [F11.23] 12/27/2019 Yes    Septic shock [A41.9, R65.21] 12/25/2019 Yes    RJ (acute kidney injury) [N17.9] 12/25/2019 Yes    Cachexia [R64] 11/01/2019 Yes    IV drug abuse [F19.10] 10/31/2019 Yes      Problems Resolved During this Admission:    Diagnosis Date Noted Date Resolved POA    Elevated troponin [R79.89] 12/25/2019 12/27/2019 Unknown       Discharged Condition: against medical  advice    Disposition:     Follow Up:    Patient Instructions:   No discharge procedures on file.    Significant Diagnostic Studies: Labs:   CMP   Recent Labs   Lab 01/06/20  0624   *  133*   K 4.1  4.1     105   CO2 21*  21*     106   BUN 55*  55*   CREATININE 1.6*  1.6*   CALCIUM 9.5  9.5   PROT 8.1  8.1   ALBUMIN 2.7*  2.7*   BILITOT 0.4  0.4   ALKPHOS 225*  225*   AST 1,078*  1,078*   *  743*   ANIONGAP 7*  7*   ESTGFRAFRICA 55.2*  55.2*   EGFRNONAA 47.8*  47.8*    and CBC   Recent Labs   Lab 01/06/20  0624   WBC 12.40  12.40   HGB 8.4*  8.4*   HCT 27.9*  27.9*   *  447*       Pending Diagnostic Studies:     None         Medications:  Reconciled Home Medications:      Medication List      START taking these medications    midodrine 5 MG Tab  Commonly known as:  PROAMATINE  Take 3 tablets (15 mg total) by mouth 3 (three) times daily.     sodium chloride 0.9% SolP 500 mL with oxacillin 1 gram SolR 12 g  Inject 12 g into the vein continuous.            Indwelling Lines/Drains at time of discharge:   Lines/Drains/Airways     Peripherally Inserted Central Catheter Line                 PICC Double Lumen 12/31/19 1545 right basilic 6 days          Pressure Ulcer                 Pressure Injury 12/25/19 0030 Sacral spine 12 days                Time spent on the discharge of patient: 30 minutes    Patient was seen and examined on the date and demanded to be discharged against medical advice, acknowledging significant risks including but not limited to sepsis, embolization, stroke, organ failure.      Kevin Puag MD  Department of Hospital Medicine  [facility]

## 2020-01-06 NOTE — PROGRESS NOTES
"ADDICTION CONSULT FOLLOW UP VISIT     DEPARTMENT:  Psychiatry  SITE: [facility] [address]    DATE OF ADMISSION: 12/25/2019 12:08 AM  LENGTH OF STAY: 12 days    EXAMINING PRACTITIONER: Marcos Vargas      SUBJECTIVE:     HISTORY    Patient Name: Ld Bronson  YOB: 1966    CHIEF COMPLAINT   Ld Bronson is a 53 y.o. male who is being seen today for a follow up visit by the addiction psychiatry consult service.  Ld Bronson presents with the chief complaint of: opioid use disorder    HPI & PSYCHIATRIC ROS    Patient resting comfortably in bed.  He is irritable this AM - angry because he states his clothes and shoes are missing.  His anger over this issue is impacting his ability to cooperate with treatment plan - attempted to provide reassurance.  He denies any pain or symptoms of withdrawal.  No recent prn opiate use.        PFSH: The patient's past medical history has been reviewed and updated as appropriate within the electronic medical record system.      PSYCHOTROPIC MEDICATIONS   None      OBJECTIVE:     EXAMINATION    Vitals:    01/05/20 1219 01/05/20 1610 01/06/20 0845 01/06/20 1227   BP: 136/83 127/74 118/69 113/69   BP Location: Left arm Left arm Left arm Left arm   Patient Position: Lying Lying Lying Lying   Pulse: 86 90 82 81   Resp: 18 18 18 18   Temp: 99.6 °F (37.6 °C) 98.6 °F (37 °C) 98.2 °F (36.8 °C) 98.2 °F (36.8 °C)   TempSrc: Oral Oral Oral Oral   SpO2: 95% 98% 99% 99%   Weight:       Height:           CONSTITUTIONAL  General Appearance: stated age, thin, in hospital garb, multiple tattoos    PSYCHIATRIC   Speech: conversational, decreased spontaneity, difficult to engage  Mood: "upset"  Affect: irritable  Thought Process: linear, guarded, ruminative  Thought Content: no SI  Insight: impaired  Judgment: impaired      ASSESSMENT:     DIAGNOSES & PROBLEMS    Opioid Use Disorder  Endocarditis  Cachexia  Disability      PLAN:       MANAGEMENT PLAN, " TREATMENT GOALS, THERAPEUTIC TECHNIQUES/APPROACHES & CLINICAL REASONING    Patient awaiting SNF or similar placement, then would like to go to [facility] for rehab.  Motivational interviewing and relapse prevention provided.      DIAGNOSTIC TESTING  The chart was reviewed for recent diagnostic investigations, and pertinent results are noted below.  12/24/19 - utox positive for amphetamines

## 2020-01-06 NOTE — PLAN OF CARE
Patient remained free of falls and injuries during shift.  Patient refused to have vitals taken.  Patient refused all medications.  No other concerns noted.

## 2020-01-06 NOTE — PT/OT/SLP PROGRESS
Occupational Therapy      Patient Name:  Ld Bronson   MRN:  [id number]    Visited pt this morning with pt reporting he has no therapy needs at this time. Per patient and pt's RN, pt has been ambulating independently downstairs without (I). He also has been completing ADLs without (A)- including toileting and UB/LB dressing. At this time, pt does not need therapy services at this time. Orders discharged. Please re-consult if needed.     Nayely Beltran OT  1/6/2020

## 2020-01-06 NOTE — PT/OT/SLP PROGRESS
Physical Therapy Discharge      Patient Name:  Ld Bronson   MRN:  [id number]    Patient not seen today secondary to (Pt transferring and ambulating independently). No further PT indicated at this time.    Jason Beltran, PT

## 2020-01-07 NOTE — NURSING
Patient discharged home against medical advice.  Delayed by patient report of missing items that he states he had at time of admission to this facility. Patient did ambulate out independently at time of admission.

## 2020-01-07 NOTE — PLAN OF CARE
Patient left AMA.       01/07/20 1422   Final Note   Assessment Type Final Discharge Note   Anticipated Discharge Disposition Left Against

## 2020-01-08 LAB
ACID FAST MOD KINY STN SPEC: NORMAL
ACID FAST MOD KINY STN SPEC: NORMAL
MYCOBACTERIUM SPEC QL CULT: NORMAL
MYCOBACTERIUM SPEC QL CULT: NORMAL

## 2020-02-03 ENCOUNTER — TELEPHONE (OUTPATIENT)
Dept: CARDIOTHORACIC SURGERY | Facility: CLINIC | Age: 55
End: 2020-02-03

## 2020-02-03 ENCOUNTER — PATIENT MESSAGE (OUTPATIENT)
Dept: CARDIOTHORACIC SURGERY | Facility: CLINIC | Age: 55
End: 2020-02-03

## 2020-02-03 NOTE — TELEPHONE ENCOUNTER
Called pt to inform him that a referral from his PCP is necessary for his appts on February 5 and for surgery with Dr. Gooden.  When I called [phone], [phone], and [phone], I was informed that I had the wrong phone number, each time.  I attempted to contact the pt at [phone], and received a message stating that the call can not be completed at this time, to hang up and try again later.  Will continue to attempt to contact pt via telephone and will send a [facility] via pt's chart.

## 2020-02-04 ENCOUNTER — TELEPHONE (OUTPATIENT)
Dept: CARDIOTHORACIC SURGERY | Facility: CLINIC | Age: 55
End: 2020-02-04

## 2020-02-04 NOTE — TELEPHONE ENCOUNTER
Called pt to inform him that a referral from his PCP is necessary for his appts tomorrow and for surgery with Dr. Gooden.  I received a message stating that the call can not be completed at this time, to hang up and try again later ([phone]).  At this time, pt has not responded to the [facility] message that I sent him via his chart.

## 2021-02-08 NOTE — TRANSFER OF CARE
Anesthesia Transfer of Care Note    Patient: Ld Bronson    Procedure(s) Performed: Procedure(s) (LRB):  C6-C7 ACDF w neuromonitoring, Mikayla Doshi for instrumentation (N/A)    Patient location: PACU    Anesthesia Type: general    Transport from OR: Transported from OR on 6-10 L/min O2 by face mask with adequate spontaneous ventilation    Post pain: adequate analgesia    Post assessment: no apparent anesthetic complications and tolerated procedure well    Post vital signs: stable    Level of consciousness: awake and responds to stimulation    Nausea/Vomiting: no nausea/vomiting    Complications: none    Transfer of care protocol was followed      Last vitals:   Visit Vitals  /66 (BP Location: Right arm, Patient Position: Lying)   Pulse 90   Temp 36.4 °C (97.6 °F) (Oral)   Resp 18   Ht 6' (1.829 m)   Wt 58.1 kg (128 lb)   SpO2 100%   BMI 17.36 kg/m²      discussed with Dr. Cruz

## 2021-07-01 ENCOUNTER — PATIENT MESSAGE (OUTPATIENT)
Dept: ADMINISTRATIVE | Facility: OTHER | Age: 56
End: 2021-07-01

## 2023-12-01 NOTE — PROGRESS NOTES
[facility]  Infectious Disease  Progress Note    Patient Name: Ld rBonson  MRN: [id number]  Admission Date: 10/31/2019  Length of Stay: 4 days  Attending Physician: Zainab Casillas MD  Primary Care Provider: Kris Artis Jr, MD    Isolation Status: No active isolations  Assessment/Plan:      * Epidural abscess  Mr. Bronson is a 54 y/o male  with hx of IVDU (heroin), MSSA endocarditis, biltateral groin abscesses s/p I&D presents as a transfer from [facility] for NSGY evaluation for epidural abscess. He was on cefazolin and is now on vancomycin and ceftriaxone. We are consulted for abx recommendations.    No fever chills or night sweats here. His blood cultures here 10/31 MSSA. NSGY evaluation tentatively scheduled for washout Tuesday or Wednesday       Recommendations:   1. Continue cefazolin. Blood cultures here are positive for MSSA. Repeat blood cultures today pending.   2. NSGY plans for washout this week. Please send for gram stain and cultures.   3. HOLLAND here pending. TTE yesterday + There is mobile posterior mitral leaflet vegetation. There a very small mobile echodenisty on AV leaflet. This may be a sign of early endocarditis of this valve. Other differential is degenerative change.  4. Anticipate long term IV abx therapy  6. ID will follow with you pending above            Thank you for your consult. I will follow-up with patient. Please contact us if you have any additional questions.    John Toribio PA-C  Infectious Disease  [facility] [phone]    Subjective:     Principal Problem:Epidural abscess    HPI: Mr. Bronson is a 54 y/o male  with hx of IVDU (heroin), MSSA endocarditis presents as a transfer from [facility] for NSGY evaluation for epidural abscess. He was on cefazolin and is now on vancomycin and ceftriaxone. We are consulted for abx recommendations.    Per chat review, [facility]  s/p 3-4 weeks of treatment as left AMA. He returns to ED for CP, transferred to MICU for septic shock. He was noted to have multiple abscesses that required bedside drainage by general surgery. His blood cultures were negative on 10/25. He c/o neck and back pain and was found to have epidural abscess of C4. He also had SI joint effusion and underwent IR aspiration. Cultures NGTD. No additional intervention for ortho surgery.     No fever chills or night sweats here. His blood cultures here are NGTD. NSGY evaluation pending.   Interval History:   NAEON  Repeat blood cultures NGTD  HOLLAND today  Blood cultures here MSSA  On cefazolin      Review of Systems   Constitutional: Negative for chills, diaphoresis, fatigue and fever.   Respiratory: Negative for cough and shortness of breath.    Cardiovascular: Negative for chest pain.   Gastrointestinal: Negative for abdominal pain, diarrhea, nausea and vomiting.   Genitourinary: Negative for dysuria.        +bilateral groin wounds   Musculoskeletal: Positive for back pain.   Skin: Positive for wound. Negative for color change, pallor and rash.   Neurological: Negative for dizziness and headaches.   Psychiatric/Behavioral: Positive for agitation.   All other systems reviewed and are negative.    Objective:     Vital Signs (Most Recent):  Temp: 98.3 °F (36.8 °C) (11/04/19 0753)  Pulse: 98 (11/04/19 0753)  Resp: 18 (11/04/19 0753)  BP: 100/65 (11/04/19 0753)  SpO2: 98 % (11/04/19 0753) Vital Signs (24h Range):  Temp:  [97.8 °F (36.6 °C)-98.8 °F (37.1 °C)] 98.3 °F (36.8 °C)  Pulse:  [] 98  Resp:  [18-20] 18  SpO2:  [96 %-99 %] 98 %  BP: (100-113)/(63-71) 100/65     Weight: 58.1 kg (128 lb)  Body mass index is 17.36 kg/m².    Estimated Creatinine Clearance: 67.8 mL/min (based on SCr of 1 mg/dL).    Physical Exam   Constitutional: He is oriented to person, place, and time. No distress.   thin   HENT:   Head: Normocephalic.   Eyes: Pupils are equal, round, and reactive to light.    Cardiovascular: Normal rate and regular rhythm.   Murmur heard.  Pulmonary/Chest: Effort normal. No stridor. No respiratory distress.   Abdominal: Soft. He exhibits no distension. There is no tenderness. There is no guarding.   Musculoskeletal: Normal range of motion. He exhibits no edema, tenderness or deformity.   Bilateral groin wounds.  Left groin packed   Neurological: He is alert and oriented to person, place, and time.   Skin: Skin is warm and dry. He is not diaphoretic.   Psychiatric: He has a normal mood and affect.   Vitals reviewed.      Significant Labs: All pertinent labs within the past 24 hours have been reviewed.    Significant Imaging: I have reviewed all pertinent imaging results/findings within the past 24 hours.     Detail Level: Detailed Depth Of Biopsy: dermis Was A Bandage Applied: Yes Size Of Lesion In Cm: 0 Biopsy Type: H and E Biopsy Method: Dermablade Anesthesia Type: 1% lidocaine with epinephrine and a 1:10 solution of 8.4% sodium bicarbonate Anesthesia Volume In Cc: 0.5 Hemostasis: Drysol Wound Care: Vaseline Dressing: bandage Destruction After The Procedure: No Type Of Destruction Used: Curettage Curettage Text: The wound bed was treated with curettage after the biopsy was performed. Cryotherapy Text: The wound bed was treated with cryotherapy after the biopsy was performed. Electrodesiccation Text: The wound bed was treated with electrodesiccation after the biopsy was performed. Electrodesiccation And Curettage Text: The wound bed was treated with electrodesiccation and curettage after the biopsy was performed. Silver Nitrate Text: The wound bed was treated with silver nitrate after the biopsy was performed. Lab: [id number] Lab Facility: [id number] Consent: Written consent was obtained and risks were reviewed including but not limited to scarring, infection, bleeding, scabbing, incomplete removal, nerve damage and allergy to anesthesia. Post-Care Instructions: I reviewed with the patient in detail post-care instructions. Patient is to keep the biopsy site dry overnight, and then apply bacitracin twice daily until healed. Patient may apply hydrogen peroxide soaks to remove any crusting. Notification Instructions: Patient will be notified of biopsy results. However, patient instructed to call the office if not contacted within 2 weeks. Billing Type: Third-Party Bill Information: Selecting Yes will display possible errors in your note based on the variables you have selected. This validation is only offered as a suggestion for you. PLEASE NOTE THAT THE VALIDATION TEXT WILL BE REMOVED WHEN YOU FINALIZE YOUR NOTE. IF YOU WANT TO FAX A PRELIMINARY NOTE YOU WILL NEED TO TOGGLE THIS TO 'NO' IF YOU DO NOT WANT IT IN YOUR FAXED NOTE.

## 2024-08-27 NOTE — NURSING TRANSFER
Nursing Transfer Note      11/4/2019     Transfer To: 660a    Transfer via stretcher    Transfer with cardiac monitoring    Transported by [facility]ison with ticket to ride in chart    Medicines sent: iv antibotics     Chart send with patient: Yes    Notified: reported to floor nurse    Patient reassessed at: see epic (date, time)    Upon arrival to floor: to room no complaints no distress noted. I called telemetry room to make sure they could see patient on monitor.   Pt still wanting to be seen. Using med prescribed by PCP, tacrolimus ointment. States has had some effect, but still getting new areas and PCP wants him to see dermatologist. Apologizes as he realizes has missed several messages due to work schedule and jury duty.     Thinks Dr. Ochoa sent photos.    [phone]

## 2024-11-01 NOTE — CONSULTS
"  [facility]  Adult Nutrition  Consult Note    SUMMARY     Recommendations    1.) Suggest regular diet with double portions.   2.) Suggest Boost Plus (chocolate) TID.   3.) Suggest MVI.   4.) Daily weights    Goals: 1.) Pt to consume/tolerate >75% EEN and EPN by follow up. 2.) Pt to gain +1-2 lbs/week while admitted.   Nutrition Goal Status: new  Communication of RD Recs: reviewed with physician    Reason for Assessment    Reason For Assessment: consult  Diagnosis: infection/sepsis(endocardiditis 2/2 to MSSA)  Relevant Medical History: None  Interdisciplinary Rounds: did not attend  General Information Comments: Pt laying in bed reports good appetite. Observed bkfst to bedside with 100% consumed. Pt voiced frustration with missing items on tray this morning. Notified . He denies GI distress. Noted 10# wt gain x 2 months. Encouraged intake. Pt agreeable to ONS. Noted planned trasnthoracic surgery scheduled 20 with MD requesting wt gain for BMI >20. Provided education to bedside. NFPE completed with moderate/severe wasting, no edema present. Recent wt gain. At this time, pt does not meet malnutrition criteria.   Nutrition Discharge Planning: Educated on "Suggestions for increasing calorie and protein". Encouraged 5-6 small meals with high calorie snacks. Pt verbalized understanding. Handouts provided to bedside.     Nutrition Risk Screen    Nutrition Risk Screen: no indicators present    Nutrition/Diet History    Spiritual, Cultural Beliefs, Protestant Practices, Values that Affect Care: no    Anthropometrics    Temp: 98.8 °F (37.1 °C)  Height: 6' (182.9 cm)  Height (inches): 72 in  Weight Method: Bed Scale  Weight: 62.8 kg (138 lb 7.2 oz)  Weight (lb): 138.45 lb  Ideal Body Weight (IBW), Male: 178 lb  % Ideal Body Weight, Male (lb): 76.4 %  BMI (Calculated): 18.8  BMI Grade: 18.5-24.9 - normal  Usual Body Weight (UBW), k.5 kg(10/2019)  % Usual Body Weight: 115.47   "     Lab/Procedures/Meds    Pertinent Labs Reviewed: reviewed  Pertinent Labs Comments: Na 135, BUN 32, Cr 1.5, GFR 51.7, Ca 7.9  Pertinent Medications Reviewed: reviewed  Pertinent Medications Comments: magnesium sulfate, miralax, senna-docusate    Estimated/Assessed Needs    Weight Used For Calorie Calculations: 62.8 kg (138 lb 7.2 oz)  Energy Calorie Requirements (kcal): 1876  Energy Need Method: Wheeler-St Jeor(x1.25 PAL)  Protein Requirements: 76-94(g/day)  Weight Used For Protein Calculations: 62.8 kg (138 lb 7.2 oz)(1.2-1.5 g/kg)     Estimated Fluid Requirement Method: RDA Method(or per MD)  RDA Method (mL): 1876     Nutrition Prescription Ordered    Current Diet Order: regular    Evaluation of Received Nutrient/Fluid Intake    I/O: -8.6L since admit  Comments: LBM 12/26  % Intake of Estimated Energy Needs: 75 - 100 %  % Meal Intake: 75 - 100 %    Nutrition Risk    Level of Risk/Frequency of Follow-up: low     Assessment and Plan    Nutrition Problem  Increased nutrient needs    Related to (etiology):   Physiological causes    Signs and Symptoms (as evidenced by):   Planned transthoracic surgery requiring nutritional optimization 2/18/20    Interventions(treatment strategy):  Collaboration of nutrition care with other providers  Referral of care  General healthful diet  Supplemental beverage-Boost Plus  Vitamins-MVI  Diet education-increasing calories & protein    Nutrition Diagnosis Status:   New       Monitor and Evaluation    Food and Nutrient Intake: energy intake, food and beverage intake  Food and Nutrient Adminstration: diet order  Knowledge/Beliefs/Attitudes: food and nutrition knowledge/skill  Physical Activity and Function: nutrition-related ADLs and IADLs  Anthropometric Measurements: weight, weight change, body mass index  Biochemical Data, Medical Tests and Procedures: electrolyte and renal panel, gastrointestinal profile, glucose/endocrine profile  Nutrition-Focused Physical Findings: overall  appearance     Malnutrition Assessment                 Orbital Region (Subcutaneous Fat Loss): mild depletion  Upper Arm Region (Subcutaneous Fat Loss): severe depletion   Advent Region (Muscle Loss): moderate depletion  Clavicle Bone Region (Muscle Loss): moderate depletion  Clavicle and Acromion Bone Region (Muscle Loss): moderate depletion  Dorsal Hand (Muscle Loss): moderate depletion  Anterior Thigh Region (Muscle Loss): moderate depletion  Posterior Calf Region (Muscle Loss): moderate depletion   Edema (Fluid Accumulation): 0-->no edema present   Subcutaneous Fat Loss (Final Summary): moderate protein-calorie malnutrition  Muscle Loss Evaluation (Final Summary): moderate protein-calorie malnutrition         Nutrition Follow-Up    RD Follow-up?: Yes   normal...

## 2025-04-14 NOTE — PROGRESS NOTES
Addended by: DORITA HAWTHORNE on: 4/14/2025 01:40 PM     Modules accepted: Orders     [facility] Hospital Medicine  Progress Note    Patient Name: Ld Bronson  MRN: [id number]  Patient Class: IP- Inpatient   Admission Date: 10/31/2019  Length of Stay: 6 days  Attending Physician: Leonid Rey, *  Primary Care Provider: Kris Artis Jr, MD    Hospital Medicine Team: [facility] 4 Car Lopez MD    Subjective:     Principal Problem:Epidural abscess        HPI:  Mr. Bronson is a 53-year-old man with IVDA (heroin) and Hx MSSA bacteremia with IE, and HCV who presented to the [facility] on 10/23 with fever.     He has had a difficult year, mostly as a result of infectious diseases related to ongoing heroin abuse. He was treated this year for 3-4 weeks for IE prior to leaving [place] but had a more recent admission on 9/21 for chest pain at which time TTE was performed, and he was discharged after blood cultures were negative ruling out ongoing IE. On admission he was hypotensive refractory to fluids and had bilateral cellulitis with abscesses that required bedside drainage by surgery, after which he was admitted to the MICU for septic shock. He has been treated with broad-spectrum antibiotics and levophed via a subclavian line with significant improvement. He was weaned off vasopressors within 24h and stepped down from the ICU six days ago. Since then his blood cultures have been positive for MSSA (first negative result on 10/25), echocardiogram has shown a MV vegetation, and his antibiotics have been narrowed to cefazolin 2g Q8H.      His admission has been complicated by diffuse body pains and joint pains initially thought to be hyperalgesia in the setting of mild opiate withdrawal, eventually evolving into neck and back pain. MRI C-spine showed an epidural abscess at C4. NSGY there recommended cervical decompression, which they are unable to do that there as the hospital is not yet accredited for it per Dr. Artis. He also had an MRI L spine that  showed SI joint effusion that was aspirated by IR with negative cultures. Ortho there did not recommend any additional intervention. Dr. Malone is aware of the patient and agreed to consult. Dr. Artis stated that they would take the patient back upon completion of surgical evaluation/treatment.     Upon presenting to the floor, patient was in NAD and resting comfortably in hospital bed. Patient reports slight lumbar back pain decreased appetite. Patient denies any current N/V/Chills. Patient does state that he has extensive groin abscesses that are quite painful. Patient reports that these [facility] with [facility]. Patient has no further complaints at this time. NSG was contacted and report they will see him tonight however will likely not be going to OR tonight due to active case load tonight.     Overview/Hospital Course:  11/1- Patient doing well overnight with no new complaints; neurosurgery, ID, addiction psych, and nutrition consulted, awaiting neurosurgery recommendation on treatment plan for abscess; HOLLAND ordered for Monday. Patient was evaluated by NSG who would like to repeat MRI last night and now CT of C-Spine. Patient antibiotic regiment changed to continuous infusion of Cefazolin 6g given previous cultures were all MSSA. MRIs showing discitis/osteo at C3-C4, C6-C7 and epidural abscess associated at that point. Patient continues to spike low grade fevers of 100.5 most recently, wound care continues to address the packed ground abscesses. Hgb improved after 1U pRBC. HOLLAND showed mitral but no aortic vegetation. To OR today for washout of epidural abscess. [facility] after [facility] procedure today and manage him there further    Interval History: NIKUNJ  Verbalizes understanding of why he has to be NPO before the surgery today  Resting comfortably in the bed      Review of Systems   Constitutional: Negative for chills and fever.   Respiratory: Negative for  cough and shortness of breath.    Cardiovascular: Negative for chest pain.   Gastrointestinal: Negative for abdominal pain, diarrhea, nausea and vomiting.   Endocrine: Negative for cold intolerance and heat intolerance.   Genitourinary: Negative for difficulty urinating.   Musculoskeletal: Positive for back pain.   Neurological: Negative for headaches.   Psychiatric/Behavioral: Negative for agitation (due to being NPO).     Objective:     Vital Signs (Most Recent):  Temp: 97.6 °F (36.4 °C) (11/06/19 1351)  Pulse: 90 (11/06/19 1351)  Resp: 18 (11/06/19 1351)  BP: 101/66 (11/06/19 1351)  SpO2: 100 % (11/06/19 1351) Vital Signs (24h Range):  Temp:  [97.6 °F (36.4 °C)-98.4 °F (36.9 °C)] 97.6 °F (36.4 °C)  Pulse:  [] 90  Resp:  [16-20] 18  SpO2:  [93 %-100 %] 100 %  BP: (100-123)/(62-89) 101/66     Weight: 58.1 kg (128 lb)  Body mass index is 17.36 kg/m².    Intake/Output Summary (Last 24 hours) at 11/6/2019 1628  Last data filed at 11/6/2019 0700  Gross per 24 hour   Intake 240 ml   Output 900 ml   Net -660 ml      Physical Exam   Constitutional: He is oriented to person, place, and time. No distress.   cachectic   HENT:   Head: Normocephalic and atraumatic.   Eyes: Pupils are equal, round, and reactive to light. EOM are normal. No scleral icterus.   Neck: Normal range of motion. Neck supple. No JVD present.   Cardiovascular: Normal rate, normal heart sounds and intact distal pulses.   No murmur heard.  Pulmonary/Chest: Effort normal and breath sounds normal. No respiratory distress. He has no wheezes.   Abdominal: Soft. Bowel sounds are normal. He exhibits no distension. There is no tenderness.   Genitourinary:   Genitourinary Comments: Groin abscess - healthy open wound - not indurated or draining pus   Musculoskeletal: Normal range of motion. He exhibits no edema or tenderness.   Neurological: He is alert and oriented to person, place, and time. No cranial nerve deficit.   Skin: Skin is warm. Capillary refill  takes less than 2 seconds. He is not diaphoretic. No erythema.   Psychiatric: He has a normal mood and affect.   Vitals reviewed.      Significant Labs:   Recent Lab Results     None          Significant Imaging: I have reviewed all pertinent imaging results/findings within the past 24 hours.      Assessment/Plan:      * Epidural abscess  Patient with IVDA, MSSA bacteremia w/ endocarditis (MV veg) and epidural abscess at C4 transferred for NSG evaluation. [facility] to decompress however they do not have NSG credentialing so patient was transferred here.   -MRI here showing C3-C4 and C6-C7 osteo/discitis w/ epidural abscess at that point     Plan:  -Cefazolin 6g continuous   -neuro surg consulted- washout scheduled today in the noon  -holding DVT Prophy due to planned washout in the OR today  -NPO since midnight for NSG procedure today       Hepatitis B antibody positive  History of positive Hep B antibody test      Cachexia  Patient chronically cachectic  -nutrition consulted       Groin abscess  Patient with multiple groin abscess that was I&D at bedside last week. Covering with bs ABx at this time. Wounds packed.     Plan:  -BS Abx  -Wound care consult - recommend supportive care as does not seem infected  -Obtain records of cultures       Severe protein-calorie malnutrition  Patient with albumin of   Lab Results   Component Value Date    LABPROT 11.3 11/04/2019    ALBUMIN 2.2 (L) 11/05/2019        Plan:  -Nutrition consult, will f/u recs   -Boost with every meal  -Continue to monitor albumin  -time spent counseling patient on increasing PO intake, modalities to help increase protein intake etc.        HCV (hepatitis C virus)  Hx of hep C unsure of treatment, previously on Sofosuvir-Velpatasvir   -however he only filled this once   -Care everywhere showing HCV log 6.24 and RNA >200k    Plan:  -f/u outpatient     Opiate abuse, continuous  See above       Endocarditis due to methicillin susceptible  Staphylococcus aureus (MSSA)  Patient with recent history of MSSA bacteremia and endocarditis who presents as a transfer for concern of epidural abscess of C4. Patient was treated for IE for 3-4 weeks and left AMA earlier this month and [facility] ICU, BS abx and pressors. Patient was stepped down from ICU and has remained on BS abx on the floor of the [facility] for the past 6 days.   -MSSA (first negative result on 10/25), echocardiogram has shown a MV vegetation, and his antibiotics have been narrowed to cefazolin 2g Q8H.    Plan:  -Given Epidural abscess and concomitant MSSA endocarditis will treat with Cefazolin  -obtain records from [facility], likely not till next week  -HOLLAND and TTE both showing mitral vegetation. HOLLAND ruled out any aortic vegetations which was a concern from TTE   -Repeat cultures (10/31)- Aerobic 1/2 GPC in clusters resembling Staph  -repeat Bcx 11/4- NGTD      IV drug abuse  Chronic IV Heroin use, recently left AMA From outside facility earlier this month with continued heroin use. Reports last IVDU > days prior to his admission [facility] for [facility].       Plan:  -Addiction psych - follow recs  -patient not currently on MAT       VTE Risk Mitigation (From admission, onward)         Ordered     heparin (porcine) injection 5,000 Units  Every 8 hours      11/02/19 1049     Place sequential compression device  Until discontinued      10/31/19 1851     IP VTE LOW RISK PATIENT  Once      10/31/19 1851                      Car Lopez MD  Department of Hospital Medicine   [facility]

## (undated) DEVICE — DRAPE OPMI STERILE

## (undated) DEVICE — MARKER SKIN STND TIP BLUE BARR

## (undated) DEVICE — ELECTRODE REM PLYHSV RETURN 9

## (undated) DEVICE — KIT POWDER ABSORBABLE GELATIN

## (undated) DEVICE — GAUZE SPONGE PEANUT STRL

## (undated) DEVICE — DRAPE STERI-DRAPE 1000 17X11IN

## (undated) DEVICE — RUBBERBAND STERILE 3X1/8IN

## (undated) DEVICE — SPONGE GAUZE 16PLY 4X4

## (undated) DEVICE — CARTRIDGE OIL

## (undated) DEVICE — PIN DISTRACTION DISP 14MM
Type: IMPLANTABLE DEVICE | Site: NECK | Status: NON-FUNCTIONAL
Removed: 2019-11-06

## (undated) DEVICE — TUBE FRAZIER 5MM 2FT SOFT TIP

## (undated) DEVICE — DRAPE THYROID WITH ARMBOARD

## (undated) DEVICE — SEE MEDLINE ITEM 157131

## (undated) DEVICE — DRESSING TELFA N ADH 3X8

## (undated) DEVICE — DRAPE INCISE IOBAN 2 23X17IN

## (undated) DEVICE — PACK SET UP CONVERTORS

## (undated) DEVICE — BUR BONE CUT MICRO TPS 3X3.8MM

## (undated) DEVICE — SUT MONOCRYL 4-0 PS-1 UND

## (undated) DEVICE — DRESSING TEGADERM 2X2 3/4

## (undated) DEVICE — NDL SPINAL 18GX3.5 SPINOCAN

## (undated) DEVICE — SEE MEDLINE ITEM 156905

## (undated) DEVICE — DIFFUSER

## (undated) DEVICE — GAUZE SPONGE 4X4 12PLY

## (undated) DEVICE — DRAPE STERI INSTRUMENT 1018

## (undated) DEVICE — CORD BIPOLAR 12 FOOT

## (undated) DEVICE — SUT CTD VICRYL 3-0 CR/SH

## (undated) DEVICE — DRAPE C ARM 42 X 120 10/BX